# Patient Record
Sex: FEMALE | Race: WHITE | NOT HISPANIC OR LATINO
[De-identification: names, ages, dates, MRNs, and addresses within clinical notes are randomized per-mention and may not be internally consistent; named-entity substitution may affect disease eponyms.]

---

## 2019-02-16 ENCOUNTER — TRANSCRIPTION ENCOUNTER (OUTPATIENT)
Age: 49
End: 2019-02-16

## 2019-03-07 ENCOUNTER — TRANSCRIPTION ENCOUNTER (OUTPATIENT)
Age: 49
End: 2019-03-07

## 2019-04-01 ENCOUNTER — OUTPATIENT (OUTPATIENT)
Dept: OUTPATIENT SERVICES | Facility: HOSPITAL | Age: 49
LOS: 1 days | End: 2019-04-01
Payer: MEDICAID

## 2019-04-01 PROCEDURE — G9001: CPT

## 2019-04-18 DIAGNOSIS — Z71.89 OTHER SPECIFIED COUNSELING: ICD-10-CM

## 2019-06-05 ENCOUNTER — TRANSCRIPTION ENCOUNTER (OUTPATIENT)
Age: 49
End: 2019-06-05

## 2019-10-23 ENCOUNTER — FORM ENCOUNTER (OUTPATIENT)
Age: 49
End: 2019-10-23

## 2019-12-06 ENCOUNTER — TRANSCRIPTION ENCOUNTER (OUTPATIENT)
Age: 49
End: 2019-12-06

## 2019-12-12 ENCOUNTER — TRANSCRIPTION ENCOUNTER (OUTPATIENT)
Age: 49
End: 2019-12-12

## 2020-01-21 ENCOUNTER — TRANSCRIPTION ENCOUNTER (OUTPATIENT)
Age: 50
End: 2020-01-21

## 2020-05-05 ENCOUNTER — APPOINTMENT (OUTPATIENT)
Dept: GASTROENTEROLOGY | Facility: CLINIC | Age: 50
End: 2020-05-05

## 2020-05-06 ENCOUNTER — TRANSCRIPTION ENCOUNTER (OUTPATIENT)
Age: 50
End: 2020-05-06

## 2020-05-08 ENCOUNTER — APPOINTMENT (OUTPATIENT)
Dept: GASTROENTEROLOGY | Facility: CLINIC | Age: 50
End: 2020-05-08
Payer: MEDICAID

## 2020-05-08 DIAGNOSIS — Z78.9 OTHER SPECIFIED HEALTH STATUS: ICD-10-CM

## 2020-05-08 PROCEDURE — 99443: CPT

## 2020-05-08 NOTE — PHYSICAL EXAM
[General Appearance - Alert] : alert [Oriented To Time, Place, And Person] : oriented to person, place, and time [Impaired Insight] : insight and judgment were intact

## 2020-05-08 NOTE — HISTORY OF PRESENT ILLNESS
[de-identified] :  This is a new telemed  visit. Visit took 40 minutes. Verbal consent obtained\par   Patient  States in December she was taking doxycycline for sinusitis. She then began having esophageal spasms. She felt her chest tightness and chest pressure she felt some dysphagia with both cold and warm liquids. Symptoms were moderate to severe. Symptoms are occurring every day for about 2-3 weeks. Worse with eating. She went to an urgent care and was told her symptoms may be due to doxycycline so she stopped it. Patient was concerned as her mother has had history of severe GERD. Her mother has required esophageal dilations and possibly a Nissen fundoplication.\par     Patient states she gets severe heartburn which radiates to the back. Symptoms last hours. She gets associated belching. Worse with caffeine. Her crit 6 days a week. She is also having some joint aches and pains and fatigue. She thinks she may have fibromyalgia. She does not have a primary doctor at this time. Patient tried Prilosec 20 mg q. day for 3 weeks over-the-counter. She has no improvement of her symptoms.\par

## 2020-05-08 NOTE — ASSESSMENT
[FreeTextEntry1] : A/P\par The patient had a telemed visit Which was 40 min.\par gerd\par Today's instructions for acid reflux include avoid provocative foods. For example citrus alcohol coffee chocolate mints. Smaller meals, no eating 3 hours prior to bedtime and elevate head of the bed prior to sleep.\par prilosec 40 mg qd\par - will have in offive visit in 2-3 weeks. . Will consider EGD when covid pandemic improves

## 2020-05-19 ENCOUNTER — LABORATORY RESULT (OUTPATIENT)
Age: 50
End: 2020-05-19

## 2020-05-20 LAB
ALBUMIN SERPL ELPH-MCNC: 4.3 G/DL
ALP BLD-CCNC: 50 U/L
ALT SERPL-CCNC: 14 U/L
ANION GAP SERPL CALC-SCNC: 10 MMOL/L
AST SERPL-CCNC: 19 U/L
BILIRUB SERPL-MCNC: 0.4 MG/DL
BUN SERPL-MCNC: 14 MG/DL
CALCIUM SERPL-MCNC: 9.2 MG/DL
CHLORIDE SERPL-SCNC: 107 MMOL/L
CO2 SERPL-SCNC: 25 MMOL/L
CREAT SERPL-MCNC: 0.82 MG/DL
GLUCOSE SERPL-MCNC: 95 MG/DL
POTASSIUM SERPL-SCNC: 4.3 MMOL/L
PROT SERPL-MCNC: 6.3 G/DL
SODIUM SERPL-SCNC: 142 MMOL/L

## 2020-05-26 ENCOUNTER — APPOINTMENT (OUTPATIENT)
Dept: GASTROENTEROLOGY | Facility: CLINIC | Age: 50
End: 2020-05-26
Payer: MEDICAID

## 2020-05-26 VITALS
OXYGEN SATURATION: 98 % | RESPIRATION RATE: 16 BRPM | WEIGHT: 126 LBS | HEIGHT: 61 IN | BODY MASS INDEX: 23.79 KG/M2 | SYSTOLIC BLOOD PRESSURE: 121 MMHG | DIASTOLIC BLOOD PRESSURE: 74 MMHG | HEART RATE: 85 BPM | TEMPERATURE: 98.5 F

## 2020-05-26 DIAGNOSIS — R13.12 DYSPHAGIA, OROPHARYNGEAL PHASE: ICD-10-CM

## 2020-05-26 LAB
CELIAC DISEASE INTERPRETATION: NORMAL
CELIAC GENE PAIRS PRESENT: YES
DQ ALPHA 1: NORMAL
DQ BETA 1: NORMAL
IMMUNOGLOBULIN A (IGA): 177 MG/DL

## 2020-05-26 PROCEDURE — 99214 OFFICE O/P EST MOD 30 MIN: CPT

## 2020-05-26 NOTE — PHYSICAL EXAM
[General Appearance - Alert] : alert [General Appearance - In No Acute Distress] : in no acute distress [General Appearance - Well Nourished] : well nourished [Outer Ear] : the ears and nose were normal in appearance [Sclera] : the sclera and conjunctiva were normal [Hearing Threshold Finger Rub Not [place]amo] : hearing was normal [Both Tympanic Membranes Were Examined] : both tympanic membranes were normal [Respiration, Rhythm And Depth] : normal respiratory rhythm and effort [Neck Appearance] : the appearance of the neck was normal [] : no respiratory distress [Exaggerated Use Of Accessory Muscles For Inspiration] : no accessory muscle use [Auscultation Breath Sounds / Voice Sounds] : lungs were clear to auscultation bilaterally [Heart Rate And Rhythm] : heart rate was normal and rhythm regular [Apical Impulse] : the apical impulse was normal [Heart Sounds] : normal S1 and S2 [Abdomen Soft] : soft [Bowel Sounds] : normal bowel sounds [Skin Color & Pigmentation] : normal skin color and pigmentation [Abdomen Tenderness] : non-tender [Oriented To Time, Place, And Person] : oriented to person, place, and time [Impaired Insight] : insight and judgment were intact [Affect] : the affect was normal

## 2020-05-26 NOTE — ASSESSMENT
[FreeTextEntry1] : A/P\par gerd\par Today's instructions for acid reflux include avoid provocative foods. For example citrus alcohol coffee chocolate mints. Smaller meals, no eating 3 hours prior to bedtime and elevate head of the bed prior to sleep.\par prilosec 40 mg bid\par add carafate 1 g qid prn\par - will get esophagram to r/o stricture. Pt will call for results, then will schedule EGD\par  I discussed the risks and benefits of EGD and patient was given opportunity to ask questions. EGD to r/o Canseco's  esophagus, PUD, mass, AVM'S. Pt is medically optimized for EGD\par

## 2020-05-26 NOTE — HISTORY OF PRESENT ILLNESS
[de-identified] : The patient is here in followup. She states in December she took doxycycline and she began experiencing esophageal spasm. This occurred with cold and hot food. Has moderate to severe. It occurs every day for 5 weeks. Those symptoms resolved. However she now has severe heartburn. She is chest pressure that radiates to the back. It can last hours. Has belching. Initially she thought it was worse with caffeine. She try Prilosec 20 mg q. day with no improvement. She increase to 40 mg a day with no improvement. She is feeling globus sensation stating that she has an uncomfortable feeling in her throat when she swallows. Her CBC CMP TSH and celiac were negative.

## 2020-06-03 ENCOUNTER — APPOINTMENT (OUTPATIENT)
Dept: GASTROENTEROLOGY | Facility: CLINIC | Age: 50
End: 2020-06-03
Payer: MEDICAID

## 2020-06-03 ENCOUNTER — NON-APPOINTMENT (OUTPATIENT)
Age: 50
End: 2020-06-03

## 2020-06-03 PROCEDURE — 99443: CPT

## 2020-06-04 ENCOUNTER — RX CHANGE (OUTPATIENT)
Age: 50
End: 2020-06-04

## 2020-06-08 ENCOUNTER — RX CHANGE (OUTPATIENT)
Age: 50
End: 2020-06-08

## 2020-06-14 DIAGNOSIS — Z01.818 ENCOUNTER FOR OTHER PREPROCEDURAL EXAMINATION: ICD-10-CM

## 2020-06-15 ENCOUNTER — APPOINTMENT (OUTPATIENT)
Dept: DISASTER EMERGENCY | Facility: CLINIC | Age: 50
End: 2020-06-15

## 2020-06-16 ENCOUNTER — APPOINTMENT (OUTPATIENT)
Dept: DISASTER EMERGENCY | Facility: CLINIC | Age: 50
End: 2020-06-16

## 2020-06-17 LAB — SARS-COV-2 N GENE NPH QL NAA+PROBE: NOT DETECTED

## 2020-06-18 ENCOUNTER — OUTPATIENT (OUTPATIENT)
Dept: OUTPATIENT SERVICES | Facility: HOSPITAL | Age: 50
LOS: 1 days | End: 2020-06-18
Payer: COMMERCIAL

## 2020-06-18 ENCOUNTER — RESULT REVIEW (OUTPATIENT)
Age: 50
End: 2020-06-18

## 2020-06-18 ENCOUNTER — APPOINTMENT (OUTPATIENT)
Dept: GASTROENTEROLOGY | Facility: GI CENTER | Age: 50
End: 2020-06-18
Payer: MEDICAID

## 2020-06-18 DIAGNOSIS — K21.9 GASTRO-ESOPHAGEAL REFLUX DISEASE WITHOUT ESOPHAGITIS: ICD-10-CM

## 2020-06-18 PROCEDURE — 43239 EGD BIOPSY SINGLE/MULTIPLE: CPT

## 2020-06-18 PROCEDURE — 88305 TISSUE EXAM BY PATHOLOGIST: CPT | Mod: 26

## 2020-06-18 PROCEDURE — 88342 IMHCHEM/IMCYTCHM 1ST ANTB: CPT | Mod: 26

## 2020-06-18 PROCEDURE — 88342 IMHCHEM/IMCYTCHM 1ST ANTB: CPT

## 2020-06-18 PROCEDURE — 88305 TISSUE EXAM BY PATHOLOGIST: CPT

## 2020-06-18 NOTE — PHYSICAL EXAM
[General Appearance - Alert] : alert [General Appearance - Well Developed] : well developed [General Appearance - In No Acute Distress] : in no acute distress [General Appearance - Well Nourished] : well nourished [Outer Ear] : the ears and nose were normal in appearance [Sclera] : the sclera and conjunctiva were normal [] : no respiratory distress [Auscultation Breath Sounds / Voice Sounds] : lungs were clear to auscultation bilaterally [Respiration, Rhythm And Depth] : normal respiratory rhythm and effort [Exaggerated Use Of Accessory Muscles For Inspiration] : no accessory muscle use [Heart Rate And Rhythm] : heart rate was normal and rhythm regular [Apical Impulse] : the apical impulse was normal [Heart Sounds] : normal S1 and S2 [Abdomen Soft] : soft [Bowel Sounds] : normal bowel sounds [Oriented To Time, Place, And Person] : oriented to person, place, and time [Abdomen Tenderness] : non-tender [Impaired Insight] : insight and judgment were intact [Affect] : the affect was normal

## 2020-06-18 NOTE — PHYSICAL EXAM
[General Appearance - Alert] : alert [General Appearance - In No Acute Distress] : in no acute distress [General Appearance - Well Nourished] : well nourished [General Appearance - Well Developed] : well developed [Sclera] : the sclera and conjunctiva were normal [Outer Ear] : the ears and nose were normal in appearance [Respiration, Rhythm And Depth] : normal respiratory rhythm and effort [Exaggerated Use Of Accessory Muscles For Inspiration] : no accessory muscle use [] : no respiratory distress [Auscultation Breath Sounds / Voice Sounds] : lungs were clear to auscultation bilaterally [Heart Rate And Rhythm] : heart rate was normal and rhythm regular [Apical Impulse] : the apical impulse was normal [Bowel Sounds] : normal bowel sounds [Heart Sounds] : normal S1 and S2 [Abdomen Soft] : soft [Oriented To Time, Place, And Person] : oriented to person, place, and time [Impaired Insight] : insight and judgment were intact [Abdomen Tenderness] : non-tender [Affect] : the affect was normal

## 2020-06-18 NOTE — PROCEDURE
[With Biopsy] : with biopsy [GERD] : GERD [Procedure Explained] : The procedure was explained [Allergies Reviewed] : allergies reviewed. [Risks] : Risks [Benefits] : benefits [Alternatives] : alternatives [Consent Obtained] : written consent was obtained prior to the procedure and is detailed in the patient's record [Patient] : the patient [Automated Blood Pressure Cuff] : automated blood pressure cuff [Cardiac Monitor] : cardiac monitor [Pulse Oximeter] : pulse oximeter [2] : 2 [Sedation Clearance] : the patient was cleared for moderate sedation [Performed By: ___] : Performed by:  YANELI [Normal] : Normal [Sent to Pathology] : was sent to pathology for analysis [Tolerated Well] : the patient tolerated the procedure well [de-identified] :  Random biopsies taken from antrum and body to r/o Hp [Vital Signs Stable] : the vital signs were stable [de-identified] : Random biopsies to r/o celiac sprue

## 2020-06-18 NOTE — REASON FOR VISIT
[Follow-Up: _____] : a [unfilled] follow-up visit [FreeTextEntry1] : gerd,  [Endoscopy] : an endoscopy [FreeTextEntry2] : gerd

## 2020-06-18 NOTE — PROCEDURE
[With Biopsy] : with biopsy [GERD] : GERD [Procedure Explained] : The procedure was explained [Allergies Reviewed] : allergies reviewed. [Risks] : Risks [Benefits] : benefits [Alternatives] : alternatives [Consent Obtained] : written consent was obtained prior to the procedure and is detailed in the patient's record [Patient] : the patient [Automated Blood Pressure Cuff] : automated blood pressure cuff [Cardiac Monitor] : cardiac monitor [Pulse Oximeter] : pulse oximeter [2] : 2 [Sedation Clearance] : the patient was cleared for moderate sedation [Performed By: ___] : Performed by:  YANELI [Sent to Pathology] : was sent to pathology for analysis [Normal] : Normal [Tolerated Well] : the patient tolerated the procedure well [de-identified] :  Random biopsies taken from antrum and body to r/o Hp [Vital Signs Stable] : the vital signs were stable [de-identified] : Random biopsies to r/o celiac sprue

## 2020-06-18 NOTE — ASSESSMENT
[FreeTextEntry1] : A/P\par gerd- normal EGD\par \par Today's instructions for acid reflux include avoid provocative foods. For example citrus alcohol coffee chocolate mints. Smaller meals, no eating 3 hours prior to bedtime and elevate head of the bed prior to sleep.\par F/U in 3 months\par call in one week for HP  and celiac biopsy results\par continue current meds

## 2020-06-18 NOTE — REASON FOR VISIT
Grandma called in to get refills on the inhaler and also nasal spray. [Follow-Up: _____] : a [unfilled] follow-up visit [FreeTextEntry1] : gerd,  [Endoscopy] : an endoscopy [FreeTextEntry2] : gerd

## 2020-06-22 LAB — SURGICAL PATHOLOGY STUDY: SIGNIFICANT CHANGE UP

## 2020-06-29 ENCOUNTER — RX CHANGE (OUTPATIENT)
Age: 50
End: 2020-06-29

## 2020-06-29 ENCOUNTER — NON-APPOINTMENT (OUTPATIENT)
Age: 50
End: 2020-06-29

## 2020-08-19 ENCOUNTER — TRANSCRIPTION ENCOUNTER (OUTPATIENT)
Age: 50
End: 2020-08-19

## 2020-08-20 ENCOUNTER — EMERGENCY (EMERGENCY)
Facility: HOSPITAL | Age: 50
LOS: 1 days | Discharge: HOME | End: 2020-08-20
Attending: EMERGENCY MEDICINE
Payer: COMMERCIAL

## 2020-08-20 VITALS
DIASTOLIC BLOOD PRESSURE: 73 MMHG | OXYGEN SATURATION: 98 % | HEART RATE: 92 BPM | WEIGHT: 117.95 LBS | TEMPERATURE: 100 F | SYSTOLIC BLOOD PRESSURE: 114 MMHG | RESPIRATION RATE: 20 BRPM | HEIGHT: 61 IN

## 2020-08-20 LAB
ALBUMIN SERPL ELPH-MCNC: 3.8 G/DL — SIGNIFICANT CHANGE UP (ref 3.3–5.2)
ALP SERPL-CCNC: 63 U/L — SIGNIFICANT CHANGE UP (ref 40–120)
ALT FLD-CCNC: 28 U/L — SIGNIFICANT CHANGE UP
ANION GAP SERPL CALC-SCNC: 13 MMOL/L — SIGNIFICANT CHANGE UP (ref 5–17)
AST SERPL-CCNC: 40 U/L — HIGH
BILIRUB SERPL-MCNC: <0.2 MG/DL — LOW (ref 0.4–2)
BUN SERPL-MCNC: 6 MG/DL — LOW (ref 8–20)
CALCIUM SERPL-MCNC: 8.8 MG/DL — SIGNIFICANT CHANGE UP (ref 8.6–10.2)
CHLORIDE SERPL-SCNC: 104 MMOL/L — SIGNIFICANT CHANGE UP (ref 98–107)
CO2 SERPL-SCNC: 25 MMOL/L — SIGNIFICANT CHANGE UP (ref 22–29)
CREAT SERPL-MCNC: 0.68 MG/DL — SIGNIFICANT CHANGE UP (ref 0.5–1.3)
CULTURE RESULTS: SIGNIFICANT CHANGE UP
GLUCOSE SERPL-MCNC: 108 MG/DL — HIGH (ref 70–99)
HCT VFR BLD CALC: 39.1 % — SIGNIFICANT CHANGE UP (ref 34.5–45)
HGB BLD-MCNC: 12.9 G/DL — SIGNIFICANT CHANGE UP (ref 11.5–15.5)
MCHC RBC-ENTMCNC: 30.7 PG — SIGNIFICANT CHANGE UP (ref 27–34)
MCHC RBC-ENTMCNC: 33 GM/DL — SIGNIFICANT CHANGE UP (ref 32–36)
MCV RBC AUTO: 93.1 FL — SIGNIFICANT CHANGE UP (ref 80–100)
PLATELET # BLD AUTO: 224 K/UL — SIGNIFICANT CHANGE UP (ref 150–400)
POTASSIUM SERPL-MCNC: 4.2 MMOL/L — SIGNIFICANT CHANGE UP (ref 3.5–5.3)
POTASSIUM SERPL-SCNC: 4.2 MMOL/L — SIGNIFICANT CHANGE UP (ref 3.5–5.3)
PROT SERPL-MCNC: 6.1 G/DL — LOW (ref 6.6–8.7)
RBC # BLD: 4.2 M/UL — SIGNIFICANT CHANGE UP (ref 3.8–5.2)
RBC # FLD: 13.2 % — SIGNIFICANT CHANGE UP (ref 10.3–14.5)
SODIUM SERPL-SCNC: 141 MMOL/L — SIGNIFICANT CHANGE UP (ref 135–145)
SPECIMEN SOURCE: SIGNIFICANT CHANGE UP
WBC # BLD: 6.13 K/UL — SIGNIFICANT CHANGE UP (ref 3.8–10.5)
WBC # FLD AUTO: 6.13 K/UL — SIGNIFICANT CHANGE UP (ref 3.8–10.5)

## 2020-08-20 PROCEDURE — 99284 EMERGENCY DEPT VISIT MOD MDM: CPT

## 2020-08-20 PROCEDURE — 99283 EMERGENCY DEPT VISIT LOW MDM: CPT

## 2020-08-20 PROCEDURE — 36415 COLL VENOUS BLD VENIPUNCTURE: CPT

## 2020-08-20 PROCEDURE — 71045 X-RAY EXAM CHEST 1 VIEW: CPT | Mod: 26

## 2020-08-20 PROCEDURE — 87507 IADNA-DNA/RNA PROBE TQ 12-25: CPT

## 2020-08-20 PROCEDURE — 80053 COMPREHEN METABOLIC PANEL: CPT

## 2020-08-20 PROCEDURE — 71045 X-RAY EXAM CHEST 1 VIEW: CPT

## 2020-08-20 PROCEDURE — 85027 COMPLETE CBC AUTOMATED: CPT

## 2020-08-20 RX ORDER — ACETAMINOPHEN 500 MG
975 TABLET ORAL ONCE
Refills: 0 | Status: COMPLETED | OUTPATIENT
Start: 2020-08-20 | End: 2020-08-20

## 2020-08-20 RX ORDER — SODIUM CHLORIDE 9 MG/ML
2000 INJECTION INTRAMUSCULAR; INTRAVENOUS; SUBCUTANEOUS ONCE
Refills: 0 | Status: COMPLETED | OUTPATIENT
Start: 2020-08-20 | End: 2020-08-20

## 2020-08-20 RX ADMIN — Medication 975 MILLIGRAM(S): at 11:41

## 2020-08-20 RX ADMIN — SODIUM CHLORIDE 2000 MILLILITER(S): 9 INJECTION INTRAMUSCULAR; INTRAVENOUS; SUBCUTANEOUS at 11:41

## 2020-08-20 NOTE — ED PROVIDER NOTE - PATIENT PORTAL LINK FT
You can access the FollowMyHealth Patient Portal offered by [facility] by registering at the following website: [email or web address]. By joining [facility]’s FollowMyHealth portal, you will also be able to view your health information using other applications (apps) compatible with our system.

## 2020-08-20 NOTE — ED PROVIDER NOTE - NSFOLLOWUPINSTRUCTIONS_ED_ALL_ED_FT
- Follow up with your doctor within 2-3 days.   - Bring results with you to the appointment.   - Take Tylenol (Acetaminophen) 650mg or Motrin (Ibuprofen/Advil) 600mg every 6 hours as needed for pain.  - Stay well hydrated.   - Follow the BRAT (Bananas, Rice, Applesauce, Toast) diet.   - Return to the ED for any new or worsening symptoms.     Diarrhea    Diarrhea is frequent loose or watery bowel movements that has many causes. Diarrhea can make you feel weak and cause you to become dehydrated. Diarrhea typically lasts 2–3 days, but can last longer if it is a sign of something more serious. Drink clear fluids to prevent dehydration. Eat bland, easy-to-digest foods as tolerated.     SEEK IMMEDIATE MEDICAL CARE IF YOU HAVE ANY OF THE FOLLOWING SYMPTOMS: high fevers, lightheadedness/dizziness, chest pain, black or bloody stools, shortness of breath, severe abdominal or back pain, or any signs of dehydration.

## 2020-08-20 NOTE — ED ADULT TRIAGE NOTE - BP NONINVASIVE SYSTOLIC (MM HG)
RNCC left voicemail for AYALA Jaramillo at [facility] requesting callback on patient.     SANJU Bernal, RN  Telephonic RN Care Coordinator  Phone: [phone]  
RNCC received message from AYALA Jaramillo at [facility] stating that she received RNCC's message and is working on short term SNF placement for patient.     SANJU Bernal, RN  Telephonic RN Care Coordinator  Phone: [phone]  
[phone]

## 2020-08-20 NOTE — ED ADULT NURSE NOTE - CHIEF COMPLAINT QUOTE
Pt arrives to ED c/o SOB / fever/ body aches/ diarrhea  since Tuesday . Pt recently returned from [place]. Went to Urgent Care yesterday don't have COVID results. Breathing easy and unlabored

## 2020-08-20 NOTE — ED ADULT NURSE NOTE - OBJECTIVE STATEMENT
patient with diarrhea since return from [place] where she admits to eating fruits and vegetables washed with local water.  patient is alert and oriented x4, denies vomiting, denies abdominal pain.  abdomen is soft and non tender, c/o dry mouth and cough, states she hasn't drank water. skin is warm and dry. ambulating without difficulty.

## 2020-08-20 NOTE — ED PROVIDER NOTE - CLINICAL SUMMARY MEDICAL DECISION MAKING FREE TEXT BOX
Patient with viral illness in setting of returning from [place]. Plan for labs, IVF, tylenol, cxr. Would send stool sample if patient can provide. Reassess.

## 2020-08-20 NOTE — ED ADULT TRIAGE NOTE - CHIEF COMPLAINT QUOTE
Pt arrives to ED c/o SOB / fever/ body aches/ duarrhea  since Tuesday . Pt recently returned from [place]. Wend to Urgent Care yesterday don't have COVID results. Breathing easy and unlabored Pt arrives to ED c/o SOB / fever/ body aches/ diarrhea  since Tuesday . Pt recently returned from [place]. Wend to Urgent Care yesterday don't have COVID results. Breathing easy and unlabored Pt arrives to ED c/o SOB / fever/ body aches/ diarrhea  since Tuesday . Pt recently returned from [place]. Went to Urgent Care yesterday don't have COVID results. Breathing easy and unlabored

## 2020-08-20 NOTE — ED PROVIDER NOTE - OBJECTIVE STATEMENT
49F h/o GERD, chronic sinusitis p/w myalgias, fever, chills, non-bloody diarrhea, dry cough x 2 days since returning from [place]. Went to [facility] yesterday, covid swab sent out. Pt feeling dehydrated. Denies vomiting, sick contacts, rash, dysuria, hematuria.

## 2020-08-21 RX ORDER — AZITHROMYCIN 500 MG/1
1 TABLET, FILM COATED ORAL
Qty: 1 | Refills: 0
Start: 2020-08-21 | End: 2020-08-25

## 2020-08-21 NOTE — ED POST DISCHARGE NOTE - RESULT SUMMARY
+ salmonella - discussed results with patient + salmonella - discussed results with patient - pt tolerating PO - return precautions advised, sent script for antibiotics

## 2020-09-11 ENCOUNTER — APPOINTMENT (OUTPATIENT)
Dept: FAMILY MEDICINE | Facility: CLINIC | Age: 50
End: 2020-09-11
Payer: MEDICAID

## 2020-09-11 VITALS
HEIGHT: 61 IN | WEIGHT: 119 LBS | DIASTOLIC BLOOD PRESSURE: 66 MMHG | HEART RATE: 76 BPM | SYSTOLIC BLOOD PRESSURE: 110 MMHG | OXYGEN SATURATION: 99 % | TEMPERATURE: 97.8 F | BODY MASS INDEX: 22.47 KG/M2 | RESPIRATION RATE: 14 BRPM

## 2020-09-11 DIAGNOSIS — Z86.19 PERSONAL HISTORY OF OTHER INFECTIOUS AND PARASITIC DISEASES: ICD-10-CM

## 2020-09-11 PROCEDURE — 90471 IMMUNIZATION ADMIN: CPT

## 2020-09-11 PROCEDURE — 90686 IIV4 VACC NO PRSV 0.5 ML IM: CPT

## 2020-09-11 PROCEDURE — 99386 PREV VISIT NEW AGE 40-64: CPT | Mod: 25

## 2020-09-11 RX ORDER — NORETHINDRONE ACETATE AND ETHINYL ESTRADIOL 1; 20 MG/1; UG/1
1-20 TABLET ORAL
Qty: 21 | Refills: 0 | Status: DISCONTINUED | COMMUNITY
Start: 2019-12-03 | End: 2020-09-11

## 2020-09-11 RX ORDER — TAMSULOSIN HCL 0.4 MG
CAPSULE ORAL
Refills: 0 | Status: DISCONTINUED | COMMUNITY
End: 2020-09-11

## 2020-09-11 RX ORDER — OMEPRAZOLE 40 MG/1
40 CAPSULE, DELAYED RELEASE ORAL TWICE DAILY
Qty: 180 | Refills: 1 | Status: DISCONTINUED | COMMUNITY
Start: 2020-06-03 | End: 2020-09-11

## 2020-09-11 RX ORDER — TERCONAZOLE 4 MG/G
0.4 CREAM VAGINAL
Qty: 45 | Refills: 0 | Status: DISCONTINUED | COMMUNITY
Start: 2020-02-07 | End: 2020-09-11

## 2020-09-11 RX ORDER — OMEPRAZOLE 40 MG/1
40 CAPSULE, DELAYED RELEASE ORAL
Qty: 90 | Refills: 1 | Status: DISCONTINUED | COMMUNITY
Start: 2020-05-08 | End: 2020-09-11

## 2020-09-11 NOTE — PAST MEDICAL HISTORY
[Menstruating] : menstruating [Definite ___ (Date)] : the last menstrual period was [unfilled] [Total Preg ___] : G[unfilled] [Regular Cycle Intervals] : have been regular [Full Term ___] : Full Term: [unfilled] [Live Births ___] : P[unfilled]  [Living ___] : Living: [unfilled] [Premature ___] : Premature: [unfilled]

## 2020-09-14 LAB
25(OH)D3 SERPL-MCNC: 35.1 NG/ML
ALBUMIN SERPL ELPH-MCNC: 4.4 G/DL
ALP BLD-CCNC: 62 U/L
ALT SERPL-CCNC: 14 U/L
ANION GAP SERPL CALC-SCNC: 13 MMOL/L
APPEARANCE: CLEAR
AST SERPL-CCNC: 22 U/L
BILIRUB SERPL-MCNC: 0.3 MG/DL
BILIRUBIN URINE: NEGATIVE
BLOOD URINE: NEGATIVE
BUN SERPL-MCNC: 15 MG/DL
CALCIUM SERPL-MCNC: 9.4 MG/DL
CHLORIDE SERPL-SCNC: 102 MMOL/L
CHOLEST SERPL-MCNC: 160 MG/DL
CHOLEST/HDLC SERPL: 2.4 RATIO
CO2 SERPL-SCNC: 26 MMOL/L
COLOR: NORMAL
CREAT SERPL-MCNC: 0.86 MG/DL
ESTIMATED AVERAGE GLUCOSE: 108 MG/DL
GLUCOSE QUALITATIVE U: NEGATIVE
GLUCOSE SERPL-MCNC: 88 MG/DL
HBA1C MFR BLD HPLC: 5.4 %
HDLC SERPL-MCNC: 68 MG/DL
HIV1+2 AB SPEC QL IA.RAPID: NONREACTIVE
KETONES URINE: NEGATIVE
LDLC SERPL CALC-MCNC: 76 MG/DL
LEUKOCYTE ESTERASE URINE: NEGATIVE
NITRITE URINE: NEGATIVE
PH URINE: 6.5
POTASSIUM SERPL-SCNC: 4.3 MMOL/L
PROT SERPL-MCNC: 6.5 G/DL
PROTEIN URINE: NEGATIVE
SODIUM SERPL-SCNC: 141 MMOL/L
SPECIFIC GRAVITY URINE: 1.01
TRIGL SERPL-MCNC: 84 MG/DL
TSH SERPL-ACNC: 2.75 UIU/ML
UROBILINOGEN URINE: NORMAL

## 2020-09-16 NOTE — ASSESSMENT
[FreeTextEntry1] : 50 y/o F establishing new PCP:\par \par HCM:\par -Blood and UA today\par -HIV screening\par \par Gyn: with Dr Barraza\par -recent abnormal PAP, schedule for Colposcopy 9/24/20\par \par Pre Op Clearance:for Colposcopy 9/24/20\par -Pending pre surgical testing.\par -Pt with no absolute contraindication for surgical procedure. pending labs.\par \par Urinary hesitance:\par -UA w/ reflex\par -Start pyridium.\par \par Further recommendations based on lab results.

## 2020-09-16 NOTE — HEALTH RISK ASSESSMENT
[Very Good] : ~his/her~  mood as very good [Good] : ~his/her~ current health as good [0-5] : 0-5 [Yes] : Yes [Never (0 pts)] : Never (0 points) [3 or 4 (1 pt)] : 3 or 4  (1 point) [2 - 3 times a week (3 pts)] : 2 - 3  times a week (3 points) [No falls in past year] : Patient reported no falls in the past year [No] : In the past 12 months have you used drugs other than those required for medical reasons? No [0] : 1) Little interest or pleasure doing things: Not at all (0) [Retinopathy] : Retinopathy [Patient reported mammogram was normal] : Patient reported mammogram was normal [Patient reported PAP Smear was abnormal] : Patient reported PAP Smear was abnormal [Patient declined bone density test] : Patient declined bone density test [HIV Test offered] : HIV Test offered [Hepatitis C test offered] : Hepatitis C test offered [Patient declined colonoscopy] : Patient declined colonoscopy [None] : None [# of Members in Household ___] :  household currently consist of [unfilled] member(s) [With Family] : lives with family [With Significant Other] : lives with significant other [Unemployed] : unemployed [College] : College [Significant Other] : lives with significant other [Sexually Active] : sexually active [Fully functional (bathing, dressing, toileting, transferring, walking, feeding)] : Fully functional (bathing, dressing, toileting, transferring, walking, feeding) [Feels Safe at Home] : Feels safe at home [Fully functional (using the telephone, shopping, preparing meals, housekeeping, doing laundry, using] : Fully functional and needs no help or supervision to perform IADLs (using the telephone, shopping, preparing meals, housekeeping, doing laundry, using transportation, managing medications and managing finances) [Reports normal functional visual acuity (ie: able to read med bottle)] : Reports normal functional visual acuity [Smoke Detector] : smoke detector [Carbon Monoxide Detector] : carbon monoxide detector [Seat Belt] :  uses seat belt [Travel to Developing Areas] : travel to developing areas [Sunscreen] : uses sunscreen [Reviewed no changes] : Reviewed no changes [I will adhere to the patient's wishes as expressed in the advance directive except as noted below.] : I will adhere to the patient's wishes as expressed in the advance directive except as noted below [# Of Children ___] : has [unfilled] children [Designated Healthcare Proxy] : Designated healthcare proxy [Name: ___] : Health Care Proxy's Name: [unfilled]  [Relationship: ___] : Relationship: [unfilled] [FreeTextEntry1] : Urinary urgency, colposcopy with biopsy (09/24/20) [] : No [de-identified] : ER 08/19/20 for salmonella [de-identified] : GI, cardiology, GYN [YearQuit] : 1990 [Audit-CScore] : 3 [de-identified] : denies [de-identified] : biking, swimming, running [de-identified] : Rice, chicken, salad, eggs, smoothies [LowDoseCTScan] : denies [EyeExamDate] : 01/20 [Change in mental status noted] : No change in mental status noted [Language] : denies difficulty with language [Behavior] : denies difficulty with behavior [Learning/Retaining New Information] : denies difficulty learning/retaining new information [Handling Complex Tasks] : denies difficulty handling complex tasks [Reasoning] : denies difficulty with reasoning [Spatial Ability and Orientation] : denies difficulty with spatial ability and orientation [High Risk Behavior] : no high risk behavior [Reports changes in hearing] : Reports no changes in hearing [Guns at Home] : no guns at home [Reports changes in vision] : Reports no changes in vision [Reports changes in dental health] : Reports no changes in dental health [TB Exposure] : is not being exposed to tuberculosis [Safety elements used in home] : no safety elements used in home [Caregiver Concerns] : does not have caregiver concerns [MammogramDate] : 02/20 [PapSmearDate] : 02/20 [PapSmearComments] : Biopsy will be performed 09/24/20 [de-identified] : Some college [ColonoscopyDate] : denies [BoneDensityDate] : denies [de-identified] : [place] 08/14/20 [FreeTextEntry3] : 2 from partner [FreeTextEntry4] : denies [AdvancecareDate] : 09/11/20

## 2020-09-16 NOTE — HISTORY OF PRESENT ILLNESS
[FreeTextEntry1] : Pt is here to establish PCP. [de-identified] : 50 y/o F with hx significant for Abnormal PAP, schedule with Gyn for Colposcopy on 9/24/20 presents today to establish anew PCP and medical clearance for procedure.\par Pt reports Bloating, urinary hesitance x 4 days. Denies fever, dysuria, states has some discomfort/\par Pt relocated from [place] , and states never establish PCP since then.

## 2020-09-16 NOTE — REVIEW OF SYSTEMS
[Dysuria] : no dysuria [Incontinence] : no incontinence [Nocturia] : no nocturia [Frequency] : no frequency [Hematuria] : no hematuria [FreeTextEntry8] : hesitance

## 2020-09-17 LAB
BASOPHILS # BLD AUTO: 0.08 K/UL
BASOPHILS NFR BLD AUTO: 1.5 %
EOSINOPHIL # BLD AUTO: 0.37 K/UL
EOSINOPHIL NFR BLD AUTO: 6.9 %
HCT VFR BLD CALC: 43.1 %
HGB BLD-MCNC: 13 G/DL
IMM GRANULOCYTES NFR BLD AUTO: 0.2 %
LYMPHOCYTES # BLD AUTO: 1.5 K/UL
LYMPHOCYTES NFR BLD AUTO: 27.8 %
MAN DIFF?: NORMAL
MCHC RBC-ENTMCNC: 29.4 PG
MCHC RBC-ENTMCNC: 30.2 GM/DL
MCV RBC AUTO: 97.5 FL
MONOCYTES # BLD AUTO: 0.63 K/UL
MONOCYTES NFR BLD AUTO: 11.7 %
NEUTROPHILS # BLD AUTO: 2.8 K/UL
NEUTROPHILS NFR BLD AUTO: 51.9 %
PLATELET # BLD AUTO: 323 K/UL
RBC # BLD: 4.42 M/UL
RBC # FLD: 13.8 %
WBC # FLD AUTO: 5.39 K/UL

## 2020-10-12 ENCOUNTER — TRANSCRIPTION ENCOUNTER (OUTPATIENT)
Age: 50
End: 2020-10-12

## 2020-11-04 ENCOUNTER — TRANSCRIPTION ENCOUNTER (OUTPATIENT)
Age: 50
End: 2020-11-04

## 2020-11-11 PROBLEM — K21.9 GASTRO-ESOPHAGEAL REFLUX DISEASE WITHOUT ESOPHAGITIS: Chronic | Status: ACTIVE | Noted: 2020-08-20

## 2020-11-12 ENCOUNTER — TRANSCRIPTION ENCOUNTER (OUTPATIENT)
Age: 50
End: 2020-11-12

## 2020-11-16 ENCOUNTER — APPOINTMENT (OUTPATIENT)
Dept: GASTROENTEROLOGY | Facility: CLINIC | Age: 50
End: 2020-11-16

## 2020-11-19 ENCOUNTER — TRANSCRIPTION ENCOUNTER (OUTPATIENT)
Age: 50
End: 2020-11-19

## 2021-01-07 ENCOUNTER — FORM ENCOUNTER (OUTPATIENT)
Age: 51
End: 2021-01-07

## 2021-01-19 ENCOUNTER — APPOINTMENT (OUTPATIENT)
Dept: GYNECOLOGIC ONCOLOGY | Facility: CLINIC | Age: 51
End: 2021-01-19
Payer: MEDICAID

## 2021-01-19 DIAGNOSIS — Z80.0 FAMILY HISTORY OF MALIGNANT NEOPLASM OF DIGESTIVE ORGANS: ICD-10-CM

## 2021-01-19 DIAGNOSIS — Z87.19 PERSONAL HISTORY OF OTHER DISEASES OF THE DIGESTIVE SYSTEM: ICD-10-CM

## 2021-01-19 PROCEDURE — 99205 OFFICE O/P NEW HI 60 MIN: CPT | Mod: 25

## 2021-01-19 PROCEDURE — 93976 VASCULAR STUDY: CPT | Mod: 59

## 2021-01-19 PROCEDURE — 99072 ADDL SUPL MATRL&STAF TM PHE: CPT

## 2021-01-19 PROCEDURE — 76830 TRANSVAGINAL US NON-OB: CPT | Mod: 59

## 2021-01-19 PROCEDURE — 76857 US EXAM PELVIC LIMITED: CPT | Mod: 59

## 2021-01-20 PROBLEM — Z87.19 HISTORY OF GASTROESOPHAGEAL REFLUX (GERD): Status: RESOLVED | Noted: 2021-01-20 | Resolved: 2021-01-20

## 2021-01-20 PROBLEM — Z80.0 FAMILY HISTORY OF MALIGNANT NEOPLASM OF COLON: Status: ACTIVE | Noted: 2021-01-20

## 2021-01-20 RX ORDER — CLINDAMYCIN HYDROCHLORIDE 300 MG/1
300 CAPSULE ORAL
Qty: 30 | Refills: 0 | Status: DISCONTINUED | COMMUNITY
Start: 2020-10-12

## 2021-01-20 RX ORDER — METHYLPREDNISOLONE 4 MG/1
4 TABLET ORAL
Qty: 21 | Refills: 0 | Status: DISCONTINUED | COMMUNITY
Start: 2020-12-02

## 2021-01-20 RX ORDER — AZITHROMYCIN 250 MG/1
250 TABLET, FILM COATED ORAL
Qty: 6 | Refills: 0 | Status: DISCONTINUED | COMMUNITY
Start: 2020-08-21

## 2021-01-20 RX ORDER — CLINDAMYCIN HYDROCHLORIDE 150 MG/1
150 CAPSULE ORAL
Qty: 28 | Refills: 0 | Status: DISCONTINUED | COMMUNITY
Start: 2020-12-02

## 2021-01-20 RX ORDER — CHLORHEXIDINE GLUCONATE, 0.12% ORAL RINSE 1.2 MG/ML
0.12 SOLUTION DENTAL
Qty: 473 | Refills: 0 | Status: DISCONTINUED | COMMUNITY
Start: 2020-12-23

## 2021-01-20 RX ORDER — PHENAZOPYRIDINE 200 MG/1
200 TABLET, FILM COATED ORAL 3 TIMES DAILY
Qty: 6 | Refills: 0 | Status: DISCONTINUED | COMMUNITY
Start: 2020-09-11 | End: 2021-01-20

## 2021-01-20 NOTE — HISTORY OF PRESENT ILLNESS
[FreeTextEntry1] : 51 yo  via 3 C/S, LMP 20 referred by referred by friend for AUB and pelvic pain. She reports pain in C/S incision, pain with intercourse and cramping with menstruation. Menstruation lasts 4-5 days with dark brown discharge followed by a few days of bright blood. She reports normal flow. 2020 she had an abnormal pap smear followed by attempted colposcopy in office, which was unsuccessful due to her history of cryotherapy. She underwent colposcopy, D&C hysteroscopy polypectomy under anesthesia 2020, pathology benign. She reports she had a follow up in December and was planning to undergo hysterectomy with that physician but she was unhappy at her follow up visit. She reports since her D&C her symptoms have not resolved, her quality of life has been poorly effected due to her pain and she would like to discuss hysterectomy. Admits to occasional post coital spotting. She denies unintentional weight loss, change in bowel/bladder habits. \par \par Lpap: 2020 \par Lmammo: appointment this week\par Lcolonoscopy: scheduled for 3/2021

## 2021-01-20 NOTE — CHIEF COMPLAINT
[FreeTextEntry1] : [facility]\par [place] [facility]\par [address]\[place] [address]\par

## 2021-01-20 NOTE — PHYSICAL EXAM
[Abnormal] : Uterus: Abnormal [Normal] : Bimanual Exam: Normal [de-identified] : uterus adherant to anterior abdominal wall.  [de-identified] : Patient was seen and examined with chaperone Josi Vasquez PA-C.

## 2021-01-20 NOTE — ASSESSMENT
[FreeTextEntry1] : 51 yo female with chronic pelvic pain effecting quality of life. US today with uterus measuring 8.1 cm, adherent to anterior abdominal wall.

## 2021-01-20 NOTE — END OF VISIT
[FreeTextEntry3] : Written by Josi Vasquez PA-C, acting as a scribe for Dr. Jose Cruz.\par This note accurately reflects the work and decisions made by me.\par

## 2021-01-20 NOTE — DISCUSSION/SUMMARY
[FreeTextEntry1] : Disucssed with patient her pain is likely due to previous C/S causing uterus to adhere to abdominal wall, we discussed my recommendation for RA TLH BS cysto due improve the quality of her life. We discussed recmmendation for leaving ovaries in place due to her age and decreasing risk of all cause mortality associated with oophorectomy in a young healthy woman. I discussed at length with the patient the nature, purpose, risks, benefits, and alternatives of Robot assisted total laparoscopic hysterectomy with bilateral salpingectomy. The patient understands the risks to include (but not be limited to) bowel injury, bleeding (with the possible need for transfusion), bladder or ureteral injury, infections, deep venous thrombosis, and beth-operative death.  The patient also understands that her surgery may not be able to be performed robotically and that she may need a laparotomy.  She also understands the limitations of robotic surgery and the possibility of missing a surgical complication with need for subsequent re-exploration.  She agrees to proceed.  She asked numerous questions which were answered to her satisfaction.  She understands the need for a pre-operative bowel preparation and agrees to comply with our instructions.  She also understands the rationale for a cystoscopy at the completion of the procedure and the potential risks of cystoscopy.\par

## 2021-02-07 ENCOUNTER — RX RENEWAL (OUTPATIENT)
Age: 51
End: 2021-02-07

## 2021-02-10 ENCOUNTER — OUTPATIENT (OUTPATIENT)
Dept: OUTPATIENT SERVICES | Facility: HOSPITAL | Age: 51
LOS: 1 days | End: 2021-02-10
Payer: COMMERCIAL

## 2021-02-10 VITALS
RESPIRATION RATE: 18 BRPM | DIASTOLIC BLOOD PRESSURE: 72 MMHG | TEMPERATURE: 98 F | HEIGHT: 61 IN | HEART RATE: 74 BPM | SYSTOLIC BLOOD PRESSURE: 110 MMHG | WEIGHT: 121.25 LBS

## 2021-02-10 DIAGNOSIS — Z98.890 OTHER SPECIFIED POSTPROCEDURAL STATES: Chronic | ICD-10-CM

## 2021-02-10 DIAGNOSIS — R10.2 PELVIC AND PERINEAL PAIN: ICD-10-CM

## 2021-02-10 DIAGNOSIS — Z29.9 ENCOUNTER FOR PROPHYLACTIC MEASURES, UNSPECIFIED: ICD-10-CM

## 2021-02-10 DIAGNOSIS — Z98.891 HISTORY OF UTERINE SCAR FROM PREVIOUS SURGERY: Chronic | ICD-10-CM

## 2021-02-10 DIAGNOSIS — Z01.818 ENCOUNTER FOR OTHER PREPROCEDURAL EXAMINATION: ICD-10-CM

## 2021-02-10 LAB
A1C WITH ESTIMATED AVERAGE GLUCOSE RESULT: 5.5 % — SIGNIFICANT CHANGE UP (ref 4–5.6)
ANION GAP SERPL CALC-SCNC: 12 MMOL/L — SIGNIFICANT CHANGE UP (ref 5–17)
BASOPHILS # BLD AUTO: 0.07 K/UL — SIGNIFICANT CHANGE UP (ref 0–0.2)
BASOPHILS NFR BLD AUTO: 1.2 % — SIGNIFICANT CHANGE UP (ref 0–2)
BLD GP AB SCN SERPL QL: SIGNIFICANT CHANGE UP
BUN SERPL-MCNC: 18 MG/DL — SIGNIFICANT CHANGE UP (ref 8–20)
CALCIUM SERPL-MCNC: 9.2 MG/DL — SIGNIFICANT CHANGE UP (ref 8.6–10.2)
CHLORIDE SERPL-SCNC: 101 MMOL/L — SIGNIFICANT CHANGE UP (ref 98–107)
CO2 SERPL-SCNC: 25 MMOL/L — SIGNIFICANT CHANGE UP (ref 22–29)
CREAT SERPL-MCNC: 0.97 MG/DL — SIGNIFICANT CHANGE UP (ref 0.5–1.3)
EOSINOPHIL # BLD AUTO: 0.18 K/UL — SIGNIFICANT CHANGE UP (ref 0–0.5)
EOSINOPHIL NFR BLD AUTO: 3 % — SIGNIFICANT CHANGE UP (ref 0–6)
ESTIMATED AVERAGE GLUCOSE: 111 MG/DL — SIGNIFICANT CHANGE UP (ref 68–114)
GLUCOSE SERPL-MCNC: 94 MG/DL — SIGNIFICANT CHANGE UP (ref 70–99)
HCT VFR BLD CALC: 42.2 % — SIGNIFICANT CHANGE UP (ref 34.5–45)
HGB BLD-MCNC: 13.7 G/DL — SIGNIFICANT CHANGE UP (ref 11.5–15.5)
IMM GRANULOCYTES NFR BLD AUTO: 0.2 % — SIGNIFICANT CHANGE UP (ref 0–1.5)
LYMPHOCYTES # BLD AUTO: 1.48 K/UL — SIGNIFICANT CHANGE UP (ref 1–3.3)
LYMPHOCYTES # BLD AUTO: 24.7 % — SIGNIFICANT CHANGE UP (ref 13–44)
MCHC RBC-ENTMCNC: 29.3 PG — SIGNIFICANT CHANGE UP (ref 27–34)
MCHC RBC-ENTMCNC: 32.5 GM/DL — SIGNIFICANT CHANGE UP (ref 32–36)
MCV RBC AUTO: 90.4 FL — SIGNIFICANT CHANGE UP (ref 80–100)
MONOCYTES # BLD AUTO: 0.58 K/UL — SIGNIFICANT CHANGE UP (ref 0–0.9)
MONOCYTES NFR BLD AUTO: 9.7 % — SIGNIFICANT CHANGE UP (ref 2–14)
NEUTROPHILS # BLD AUTO: 3.67 K/UL — SIGNIFICANT CHANGE UP (ref 1.8–7.4)
NEUTROPHILS NFR BLD AUTO: 61.2 % — SIGNIFICANT CHANGE UP (ref 43–77)
PLATELET # BLD AUTO: 296 K/UL — SIGNIFICANT CHANGE UP (ref 150–400)
POTASSIUM SERPL-MCNC: 4.1 MMOL/L — SIGNIFICANT CHANGE UP (ref 3.5–5.3)
POTASSIUM SERPL-SCNC: 4.1 MMOL/L — SIGNIFICANT CHANGE UP (ref 3.5–5.3)
RBC # BLD: 4.67 M/UL — SIGNIFICANT CHANGE UP (ref 3.8–5.2)
RBC # FLD: 12.8 % — SIGNIFICANT CHANGE UP (ref 10.3–14.5)
SODIUM SERPL-SCNC: 138 MMOL/L — SIGNIFICANT CHANGE UP (ref 135–145)
WBC # BLD: 5.99 K/UL — SIGNIFICANT CHANGE UP (ref 3.8–10.5)
WBC # FLD AUTO: 5.99 K/UL — SIGNIFICANT CHANGE UP (ref 3.8–10.5)

## 2021-02-10 PROCEDURE — G0463: CPT

## 2021-02-10 RX ORDER — OMEPRAZOLE 10 MG/1
0 CAPSULE, DELAYED RELEASE ORAL
Qty: 0 | Refills: 0 | DISCHARGE

## 2021-02-10 NOTE — H&P PST ADULT - NSICDXPROBLEM_GEN_ALL_CORE_FT
PROBLEM DIAGNOSES  Problem: Need for prophylactic measure  Assessment and Plan: caprini score 4     Problem: Pain pelvic  Assessment and Plan: robotic assisted total laparoscopic hysterectomy bilateral salpingectomy   medical clearance

## 2021-02-10 NOTE — H&P PST ADULT - ASSESSMENT
Pleasant 50 yr old female with history of cervical dysplasia , lower abdominal discomfort and abn menses presents for hysterectomy with Dr. Cruz.  OPIOID RISK TOOL    NIECY EACH BOX THAT APPLIES AND ADD TOTALS AT THE END    FAMILY HISTORY OF SUBSTANCE ABUSE                 FEMALE         MALE                                                Alcohol                             [  ]1 pt          [  ]3pts                                               Illegal Durgs                     [  ]2 pts        [  ]3pts                                               Rx Drugs                           [  ]4 pts        [  ]4 pts    PERSONAL HISTORY OF SUBSTANCE ABUSE                                                                                          Alcohol                             [  ]3 pts       [  ]3 pts                                               Illegal Durgs                     [  ]4 pts        [  ]4 pts                                               Rx Drugs                           [  ]5 pts        [  ]5 pts    AGE BETWEEN 16-45 YEARS                                      [  ]1 pt         [  ]1 pt    HISTORY OF PREADOLESCENT   SEXUAL ABUSE                                                             [  ]3 pts        [  ]0pts    PSYCHOLOGICAL DISEASE                     ADD, OCD, Bipolar, Schizophrenia        [  ]2 pts         [  ]2 pts                      Depression                                               [  ]1 pt           [  ]1 pt           SCORING TOTAL   (add numbers and type here)              (*0**)                                     A score of 3 or lower indicated LOW risk for future opiod abuse  A score of 4 to 7 indicated moderate risk for future opiod abuse  A score of 8 or higher indicates a high risk for opiod abuse  CAPRINI VTE 2.0 SCORE [CLOT updated 2019]    AGE RELATED RISK FACTORS                                                       MOBILITY RELATED FACTORS  [ X] Age 41-60 years                                            (1 Point)                    [ ] Bed rest                                                        (1 Point)  [ ] Age: 61-74 years                                           (2 Points)                  [ ] Plaster cast                                                   (2 Points)  [ ] Age= 75 years                                              (3 Points)                    [ ] Bed bound for more than 72 hours                 (2 Points)    DISEASE RELATED RISK FACTORS                                               GENDER SPECIFIC FACTORS  [ ] Edema in the lower extremities                       (1 Point)              [ ] Pregnancy                                                     (1 Point)  [ ] Varicose veins                                               (1 Point)                     [ ] Post-partum < 6 weeks                                   (1 Point)             [ X] BMI > 25 Kg/m2                                            (1 Point)                     [ ] Hormonal therapy  or oral contraception          (1 Point)                 [ ] Sepsis (in the previous month)                        (1 Point)               [ ] History of pregnancy complications                 (1 point)  [ ] Pneumonia or serious lung disease                                               [ ] Unexplained or recurrent                     (1 Point)           (in the previous month)                               (1 Point)  [ ] Abnormal pulmonary function test                     (1 Point)                 SURGERY RELATED RISK FACTORS  [ ] Acute myocardial infarction                              (1 Point)               [ ]  Section                                             (1 Point)  [ ] Congestive heart failure (in the previous month)  (1 Point)      [ ] Minor surgery                                                  (1 Point)   [ ] Inflammatory bowel disease                             (1 Point)               [ ] Arthroscopic surgery                                        (2 Points)  [ ] Central venous access                                      (2 Points)                [X ] General surgery lasting more than 45 minutes (2 points)  [ ] Malignancy- Present or previous                   (2 Points)                [ ] Elective arthroplasty                                         (5 points)    [ ] Stroke (in the previous month)                          (5 Points)                                                                                                                                                           HEMATOLOGY RELATED FACTORS                                                 TRAUMA RELATED RISK FACTORS  [ ] Prior episodes of VTE                                     (3 Points)                [ ] Fracture of the hip, pelvis, or leg                       (5 Points)  [ ] Positive family history for VTE                         (3 Points)             [ ] Acute spinal cord injury (in the previous month)  (5 Points)  [ ] Prothrombin 07793 A                                     (3 Points)               [ ] Paralysis  (less than 1 month)                             (5 Points)  [ ] Factor V Leiden                                             (3 Points)                  [ ] Multiple Trauma within 1 month                        (5 Points)  [ ] Lupus anticoagulants                                     (3 Points)                                                           [ ] Anticardiolipin antibodies                               (3 Points)                                                       [ ] High homocysteine in the blood                      (3 Points)                                             [ ] Other congenital or acquired thrombophilia      (3 Points)                                                [ ] Heparin induced thrombocytopenia                  (3 Points)                                     Total Score [     4     ]

## 2021-02-10 NOTE — H&P PST ADULT - NSICDXPASTMEDICALHX_GEN_ALL_CORE_FT
PAST MEDICAL HISTORY:  Asthma     GERD (gastroesophageal reflux disease)      PAST MEDICAL HISTORY:  Asthma     Cervical dysplasia     GERD (gastroesophageal reflux disease)      PAST MEDICAL HISTORY:  Asthma mild    Cervical dysplasia     GERD (gastroesophageal reflux disease)     Kidney stone

## 2021-02-10 NOTE — H&P PST ADULT - NSANTHOSAYNRD_GEN_A_CORE
No. LEVI screening performed.  STOP BANG Legend: 0-2 = LOW Risk; 3-4 = INTERMEDIATE Risk; 5-8 = HIGH Risk

## 2021-02-10 NOTE — H&P PST ADULT - NSICDXFAMILYHX_GEN_ALL_CORE_FT
[facility]...
FAMILY HISTORY:  Family history of Hodgkins disease, father  nasopharyngeal cancer , non hodgkins  FH: colon cancer, grandmother

## 2021-02-10 NOTE — H&P PST ADULT - HISTORY OF PRESENT ILLNESS
50 yr old female presents with c/o abdominal pain lower with radiation to back at times , pt had 3 c -sections  1999, 2001, 2006 , pt states she has scar tissue that is causing pain . Occ urinary stress incontinence , also has discomfort with intercourse , pt had dysplasia in past with colposcopy , PAP  done 2020 abnormal with dysplasia , attempted colposcopy in office but to uncomfortable . Polyp  was removed .  50 yr old female presents with c/o abdominal pain lower with radiation to back at times , pt had 3 c -sections  1999, 2001, 2006 , pt states she has scar tissue that is causing pain . Occ urinary stress incontinence , also has discomfort with intercourse , pt had dysplasia in sept 2020 abnormal with dysplasia , attempted  colposcopy in office but to uncomfortable  had D&C . Polyp  was removed .  50 yr old female presents with c/o abdominal pain lower with radiation to back at times , pt had 3 c -sections  , ,  ,   lmp 2020 pt states she has scar tissue that is causing pain . Occ urinary stress incontinence , also has discomfort with intercourse , pt had dysplasia with pap smear in 2020 , attempted  colposcopy in office but to uncomfortable  had D&C and hysteroscopy . Polyp  was removed . Pt states scar tissue noted and is scheduled for hysterectomy .

## 2021-02-10 NOTE — H&P PST ADULT - NSICDXPASTSURGICALHX_GEN_ALL_CORE_FT
PAST SURGICAL HISTORY:  H/O  section x3 , ,      PAST SURGICAL HISTORY:  H/O  section x3 , ,     H/O dilation and curettage     H/O hand surgery staph infection  1992    History of sinus surgery 2005

## 2021-02-17 ENCOUNTER — APPOINTMENT (OUTPATIENT)
Dept: FAMILY MEDICINE | Facility: CLINIC | Age: 51
End: 2021-02-17
Payer: MEDICAID

## 2021-02-17 VITALS
SYSTOLIC BLOOD PRESSURE: 114 MMHG | TEMPERATURE: 98.1 F | DIASTOLIC BLOOD PRESSURE: 68 MMHG | WEIGHT: 123 LBS | OXYGEN SATURATION: 97 % | BODY MASS INDEX: 23.22 KG/M2 | HEIGHT: 61 IN | HEART RATE: 85 BPM | RESPIRATION RATE: 12 BRPM

## 2021-02-17 DIAGNOSIS — R87.619 UNSPECIFIED ABNORMAL CYTOLOGICAL FINDINGS IN SPECIMENS FROM CERVIX UTERI: ICD-10-CM

## 2021-02-17 PROCEDURE — 99213 OFFICE O/P EST LOW 20 MIN: CPT

## 2021-02-17 PROCEDURE — 99072 ADDL SUPL MATRL&STAF TM PHE: CPT

## 2021-02-17 NOTE — HISTORY OF PRESENT ILLNESS
[No Pertinent Cardiac History] : no history of aortic stenosis, atrial fibrillation, coronary artery disease, recent myocardial infarction, or implantable device/pacemaker [No Pertinent Pulmonary History] : no history of asthma, COPD, sleep apnea, or smoking [No Adverse Anesthesia Reaction] : no adverse anesthesia reaction in self or family member [(Patient denies any chest pain, claudication, dyspnea on exertion, orthopnea, palpitations or syncope)] : Patient denies any chest pain, claudication, dyspnea on exertion, orthopnea, palpitations or syncope [Chronic Anticoagulation] : no chronic anticoagulation [Chronic Kidney Disease] : no chronic kidney disease [Diabetes] : no diabetes [FreeTextEntry1] : Robotic assisted total Hysterectomy and salpingectomy [FreeTextEntry2] : 02/24/21 [FreeTextEntry3] : Jose Valle [FreeTextEntry4] : 49 y/o F with hx significant for Abnormal PAP, s/p Colposcopy on 9/24/20 , which was unsuccessful due to her history of cryotherapy. She underwent colposcopy, D&C hysteroscopy polypectomy under anesthesia 9/2020, pathology benign. \par She was recently seen by new Gyn, Dr Cruz and is schedule for Robotic laparoscopic B/L Hysterectomy and salpingectomy.\par She is here today for medical clearance.\par

## 2021-02-17 NOTE — RESULTS/DATA
[] : not indicated [de-identified] : presurgical testing done at [facility] on 2/10/21.\par Schedule for COVID PCR on 2/21/21

## 2021-02-17 NOTE — ASSESSMENT
[High Risk Surgery - Intraperitoneal, Intrathoracic or Supringuinal Vascular Procedures] : High Risk Surgery - Intraperitoneal, Intrathoracic or Supringuinal Vascular Procedures - No (0) [Ischemic Heart Disease] : Ischemic Heart Disease - No (0) [Congestive Heart Failure] : Congestive Heart Failure - No (0) [Prior Cerebrovascular Accident or TIA] : Prior Cerebrovascular Accident or TIA - No (0) [Creatinine >= 2mg/dL (1 Point)] : Creatinine >= 2mg/dL - No (0) [Insulin-dependent Diabetic (1 Point)] : Insulin-dependent Diabetic - No (0) [0] : 0 , RCRI Class: I, Risk of Post-Op Cardiac Complications: 3.9%, 95% CI for Risk Estimate: 2.8% - 5.4% [Patient Optimized for Surgery] : Patient optimized for surgery [No Further Testing Recommended] : no further testing recommended [Continue medications as is] : Continue current medications [As per surgery] : as per surgery [FreeTextEntry4] : Pt with no absolute contraindication for surgical procedure. Clear from clinical stand point.\par Pending COVID PCR schedule for 2/21/21

## 2021-02-21 ENCOUNTER — APPOINTMENT (OUTPATIENT)
Dept: DISASTER EMERGENCY | Facility: CLINIC | Age: 51
End: 2021-02-21

## 2021-02-21 LAB — SARS-COV-2 N GENE NPH QL NAA+PROBE: NOT DETECTED

## 2021-02-24 ENCOUNTER — OUTPATIENT (OUTPATIENT)
Dept: OUTPATIENT SERVICES | Facility: HOSPITAL | Age: 51
LOS: 1 days | End: 2021-02-24
Payer: COMMERCIAL

## 2021-02-24 ENCOUNTER — RESULT REVIEW (OUTPATIENT)
Age: 51
End: 2021-02-24

## 2021-02-24 VITALS
TEMPERATURE: 99 F | HEART RATE: 80 BPM | DIASTOLIC BLOOD PRESSURE: 60 MMHG | OXYGEN SATURATION: 98 % | RESPIRATION RATE: 16 BRPM | SYSTOLIC BLOOD PRESSURE: 98 MMHG

## 2021-02-24 VITALS
TEMPERATURE: 99 F | HEIGHT: 61 IN | HEART RATE: 65 BPM | OXYGEN SATURATION: 98 % | SYSTOLIC BLOOD PRESSURE: 113 MMHG | DIASTOLIC BLOOD PRESSURE: 666 MMHG | WEIGHT: 119.93 LBS | RESPIRATION RATE: 16 BRPM

## 2021-02-24 DIAGNOSIS — Z98.890 OTHER SPECIFIED POSTPROCEDURAL STATES: Chronic | ICD-10-CM

## 2021-02-24 DIAGNOSIS — R10.2 PELVIC AND PERINEAL PAIN: ICD-10-CM

## 2021-02-24 DIAGNOSIS — Z98.891 HISTORY OF UTERINE SCAR FROM PREVIOUS SURGERY: Chronic | ICD-10-CM

## 2021-02-24 LAB
ABO RH CONFIRMATION: SIGNIFICANT CHANGE UP
GLUCOSE BLDC GLUCOMTR-MCNC: 114 MG/DL — HIGH (ref 70–99)
GLUCOSE BLDC GLUCOMTR-MCNC: 89 MG/DL — SIGNIFICANT CHANGE UP (ref 70–99)
GLUCOSE BLDC GLUCOMTR-MCNC: 94 MG/DL — SIGNIFICANT CHANGE UP (ref 70–99)

## 2021-02-24 PROCEDURE — 94640 AIRWAY INHALATION TREATMENT: CPT

## 2021-02-24 PROCEDURE — 36415 COLL VENOUS BLD VENIPUNCTURE: CPT

## 2021-02-24 PROCEDURE — 57425 LAPAROSCOPY SURG COLPOPEXY: CPT | Mod: 80

## 2021-02-24 PROCEDURE — 58571 TLH W/T/O 250 G OR LESS: CPT

## 2021-02-24 PROCEDURE — 58571 TLH W/T/O 250 G OR LESS: CPT | Mod: 80

## 2021-02-24 PROCEDURE — S2900: CPT

## 2021-02-24 PROCEDURE — 88307 TISSUE EXAM BY PATHOLOGIST: CPT

## 2021-02-24 PROCEDURE — 57425 LAPAROSCOPY SURG COLPOPEXY: CPT

## 2021-02-24 PROCEDURE — 82962 GLUCOSE BLOOD TEST: CPT

## 2021-02-24 PROCEDURE — 88307 TISSUE EXAM BY PATHOLOGIST: CPT | Mod: 26

## 2021-02-24 RX ORDER — SODIUM CHLORIDE 9 MG/ML
3 INJECTION INTRAMUSCULAR; INTRAVENOUS; SUBCUTANEOUS ONCE
Refills: 0 | Status: DISCONTINUED | OUTPATIENT
Start: 2021-02-24 | End: 2021-02-24

## 2021-02-24 RX ORDER — ONDANSETRON 8 MG/1
4 TABLET, FILM COATED ORAL ONCE
Refills: 0 | Status: DISCONTINUED | OUTPATIENT
Start: 2021-02-24 | End: 2021-02-24

## 2021-02-24 RX ORDER — ACETAMINOPHEN 500 MG
1 TABLET ORAL
Qty: 0 | Refills: 0 | DISCHARGE

## 2021-02-24 RX ORDER — HYDROMORPHONE HYDROCHLORIDE 2 MG/ML
0.5 INJECTION INTRAMUSCULAR; INTRAVENOUS; SUBCUTANEOUS
Refills: 0 | Status: DISCONTINUED | OUTPATIENT
Start: 2021-02-24 | End: 2021-02-24

## 2021-02-24 RX ORDER — IBUPROFEN 200 MG
1 TABLET ORAL
Qty: 0 | Refills: 0 | DISCHARGE

## 2021-02-24 RX ORDER — IPRATROPIUM/ALBUTEROL SULFATE 18-103MCG
3 AEROSOL WITH ADAPTER (GRAM) INHALATION ONCE
Refills: 0 | Status: COMPLETED | OUTPATIENT
Start: 2021-02-24 | End: 2021-02-24

## 2021-02-24 RX ORDER — SODIUM CHLORIDE 9 MG/ML
1000 INJECTION, SOLUTION INTRAVENOUS
Refills: 0 | Status: DISCONTINUED | OUTPATIENT
Start: 2021-02-24 | End: 2021-02-24

## 2021-02-24 RX ORDER — DOCUSATE SODIUM 100 MG
1 CAPSULE ORAL
Qty: 12 | Refills: 0
Start: 2021-02-24 | End: 2021-02-27

## 2021-02-24 RX ORDER — VANCOMYCIN HCL 1 G
750 VIAL (EA) INTRAVENOUS ONCE
Refills: 0 | Status: COMPLETED | OUTPATIENT
Start: 2021-02-24 | End: 2021-02-24

## 2021-02-24 RX ADMIN — HYDROMORPHONE HYDROCHLORIDE 0.5 MILLIGRAM(S): 2 INJECTION INTRAMUSCULAR; INTRAVENOUS; SUBCUTANEOUS at 10:53

## 2021-02-24 RX ADMIN — Medication 250 MILLIGRAM(S): at 06:55

## 2021-02-24 RX ADMIN — Medication 3 MILLILITER(S): at 10:00

## 2021-02-24 NOTE — ASU DISCHARGE PLAN (ADULT/PEDIATRIC) - ASU DC SPECIAL INSTRUCTIONSFT
Follow-up with Dr. Cruz in two weeks to review pathology report.    Please contact your provider for any pain uncontrolled by medication, excessive bleeding or Fever>100.4  Please take aleve-1 tablet every 12 hours x 3 days, may take percocet as prescribed for breakthrough pain. Please take colace while on narcotic to avoid constipation.      May walk and climb stairs as often as youd like, no vigorous activity, do not lift anything greater than 10lbs, nothing per vagina x 6 weeks, do not drive while on pain medication.

## 2021-02-24 NOTE — PROGRESS NOTE ADULT - SUBJECTIVE AND OBJECTIVE BOX
GYNECOLOGIC ONCOLOGY PROGRESS NOTE    POD#0    PROBLEMS:      Pt seen and examined at bedside.     SUBJECTIVE:    Patient is c/o lower back pressure/pain, requesting to use restroom.  Pain well-controlled.  Flatus: negative  Denies Nausea, Vomiting or Diarrhea.  Denies shortness of breath, chest pain or dyspnea on exertion.  Tolerating fluids.    OBJECTIVE:     VITALS:  T(F): 98.8 (02-24-21 @ 12:30), Max: 98.8 (02-24-21 @ 06:24)  HR: 80 (02-24-21 @ 12:30) (65 - 84)  BP: 92/49 (02-24-21 @ 12:30) (92/49 - 132/29)  RR: 16 (02-24-21 @ 12:30) (14 - 18)  SpO2: 98% (02-24-21 @ 12:30) (93% - 98%)    I&O's Summary    24 Feb 2021 07:01  -  24 Feb 2021 13:04  --------------------------------------------------------  IN: 0 mL / OUT: 250 mL / NET: -250 mL  Sandhu removed in OR, voiding spontaneously.       MEDICATIONS  (STANDING):  lactated ringers. 1000 milliLiter(s) (75 mL/Hr) IV Continuous <Continuous>    MEDICATIONS  (PRN):  HYDROmorphone  Injectable 0.5 milliGRAM(s) IV Push every 10 minutes PRN Moderate Pain (4 - 6)  ondansetron Injectable 4 milliGRAM(s) IV Push once PRN Nausea and/or Vomiting      Physical Exam:  Constitutional: NAD  Pulmonary: clear to auscultation bilaterally   Cardiovascular: Regular rate and rhythm   Abdomen: soft, non-tender, non-distended, hypoactive bowel sounds  Extremities: no lower extremity edema or calve tenderness, Shaina's sign negative.  Incision: Clean, dry, intact with dermabond.  Without signs of infection or hernia.      LABS:                RADIOLOGY & ADDITIONAL TESTS:

## 2021-02-24 NOTE — BRIEF OPERATIVE NOTE - OPERATION/FINDINGS
~8cm uterus. normal appearing uterus, bilateral fallopian tubes and ovaries. mild omental adhesion to anterior abdominal wall. uncomplicated RA TLH BS. cystoscopy performed at the end of the procedure, bilateral ureteral jets visualized.

## 2021-02-24 NOTE — ASU DISCHARGE PLAN (ADULT/PEDIATRIC) - CARE PROVIDER_API CALL
Jose Cruz)  [facility]  [address] [place] [address]  Phone: [phone]  Fax: [phone]  Follow Up Time:

## 2021-02-24 NOTE — PROGRESS NOTE ADULT - ASSESSMENT
51 y/o female s/p RA [facility] BS cysto for pelvic pain. Patient feeling well post operatively, VSS, recovering well, pain well controlled, voiding, tolerating fluid and ambulating to bathroom. Discussed discharge instructions with patient. Advised to call with any questions or concerns. Scripts sent to home pharmacy.     D/w Dr [facility]

## 2021-02-24 NOTE — BRIEF OPERATIVE NOTE - NSICDXBRIEFPROCEDURE_GEN_ALL_CORE_FT
PROCEDURES:  Salpingectomy, bilateral, robot-assisted 24-Feb-2021 09:34:57  Handy [facility]  Robot-assisted laparoscopic total hysterectomy using da Eleazar Xi with cystoscopy 24-Feb-2021 09:34:45  Handy [facility]

## 2021-02-25 PROBLEM — N20.0 CALCULUS OF KIDNEY: Chronic | Status: ACTIVE | Noted: 2021-02-10

## 2021-02-25 PROBLEM — N87.9 DYSPLASIA OF CERVIX UTERI, UNSPECIFIED: Chronic | Status: ACTIVE | Noted: 2021-02-10

## 2021-02-25 PROBLEM — J45.909 UNSPECIFIED ASTHMA, UNCOMPLICATED: Chronic | Status: ACTIVE | Noted: 2020-08-20

## 2021-03-04 LAB — SURGICAL PATHOLOGY STUDY: SIGNIFICANT CHANGE UP

## 2021-03-05 ENCOUNTER — APPOINTMENT (OUTPATIENT)
Dept: GASTROENTEROLOGY | Facility: CLINIC | Age: 51
End: 2021-03-05
Payer: MEDICAID

## 2021-03-05 VITALS
BODY MASS INDEX: 22.66 KG/M2 | TEMPERATURE: 98.2 F | SYSTOLIC BLOOD PRESSURE: 107 MMHG | HEIGHT: 61 IN | WEIGHT: 120 LBS | HEART RATE: 74 BPM | DIASTOLIC BLOOD PRESSURE: 69 MMHG

## 2021-03-05 DIAGNOSIS — K62.5 HEMORRHAGE OF ANUS AND RECTUM: ICD-10-CM

## 2021-03-05 PROCEDURE — 99214 OFFICE O/P EST MOD 30 MIN: CPT

## 2021-03-05 PROCEDURE — 99072 ADDL SUPL MATRL&STAF TM PHE: CPT

## 2021-03-08 LAB
APPEARANCE: CLEAR
BACTERIA UR CULT: NORMAL
BACTERIA: NEGATIVE
BILIRUBIN URINE: NEGATIVE
BLOOD URINE: NEGATIVE
COLOR: NORMAL
GLUCOSE QUALITATIVE U: NEGATIVE
HYALINE CASTS: 3 /LPF
KETONES URINE: NEGATIVE
LEUKOCYTE ESTERASE URINE: NEGATIVE
MICROSCOPIC-UA: NORMAL
NITRITE URINE: NEGATIVE
PH URINE: 6
PROTEIN URINE: NEGATIVE
RED BLOOD CELLS URINE: 1 /HPF
SPECIFIC GRAVITY URINE: 1.01
SQUAMOUS EPITHELIAL CELLS: 8 /HPF
UROBILINOGEN URINE: NORMAL
WHITE BLOOD CELLS URINE: 1 /HPF

## 2021-03-09 ENCOUNTER — APPOINTMENT (OUTPATIENT)
Dept: GYNECOLOGIC ONCOLOGY | Facility: CLINIC | Age: 51
End: 2021-03-09
Payer: MEDICAID

## 2021-03-09 VITALS
DIASTOLIC BLOOD PRESSURE: 70 MMHG | HEIGHT: 61 IN | SYSTOLIC BLOOD PRESSURE: 105 MMHG | WEIGHT: 120 LBS | HEART RATE: 80 BPM | RESPIRATION RATE: 16 BRPM | BODY MASS INDEX: 22.66 KG/M2

## 2021-03-09 DIAGNOSIS — B96.89 ACUTE VAGINITIS: ICD-10-CM

## 2021-03-09 DIAGNOSIS — N76.0 ACUTE VAGINITIS: ICD-10-CM

## 2021-03-09 PROCEDURE — 99072 ADDL SUPL MATRL&STAF TM PHE: CPT

## 2021-03-09 PROCEDURE — 99212 OFFICE O/P EST SF 10 MIN: CPT | Mod: 24

## 2021-03-09 NOTE — ASSESSMENT
[FreeTextEntry1] : Pt is a 51 yo s/p TRH, BS on 2/24/21 for adenomyosis. She is recovering well. Bacterial vaginosis.

## 2021-03-09 NOTE — REASON FOR VISIT
[Post Op] : post op visit [de-identified] : 2/24/21 [de-identified] : TRH, BS [de-identified] : Pt presents for post-op check. She reports still having some lower abdominal discomfort, irritation of the bladder and urine culture was negative. She has no fevers/chills, no vaginal discharge.

## 2021-03-15 ENCOUNTER — TRANSCRIPTION ENCOUNTER (OUTPATIENT)
Age: 51
End: 2021-03-15

## 2021-03-18 NOTE — PHYSICAL EXAM
[General Appearance - Alert] : alert [General Appearance - In No Acute Distress] : in no acute distress [General Appearance - Well Nourished] : well nourished [General Appearance - Well Developed] : well developed [Sclera] : the sclera and conjunctiva were normal [Outer Ear] : the ears and nose were normal in appearance [] : no respiratory distress [Respiration, Rhythm And Depth] : normal respiratory rhythm and effort [Exaggerated Use Of Accessory Muscles For Inspiration] : no accessory muscle use [Auscultation Breath Sounds / Voice Sounds] : lungs were clear to auscultation bilaterally [Apical Impulse] : the apical impulse was normal [Heart Rate And Rhythm] : heart rate was normal and rhythm regular [Heart Sounds] : normal S1 and S2 [Bowel Sounds] : normal bowel sounds [Abdomen Soft] : soft [Abdomen Tenderness] : non-tender [Oriented To Time, Place, And Person] : oriented to person, place, and time [Impaired Insight] : insight and judgment were intact [Affect] : the affect was normal [FreeTextEntry1] :  patient with  5 small incisions from her laproscopic surgery last week

## 2021-03-18 NOTE — HISTORY OF PRESENT ILLNESS
[de-identified] : Patient is here for follow-up of GERD.  Patient was first seen in May.  Initially last spring patient was having episodes of dysphagia and esophageal spasm, chest pain after taking doxycycline.  Those symptoms resolved on doxycycline  when was stopped.\par    Patient then began developing heartburn with radiation to the back.  Symptoms would last hours.  She had associated belching.  Worse with coffee.  Had associated globus sensation.  Last visit patient's was increased to Prilosec 40 mg twice daily and Carafate as needed.  Symptoms  have since resolved and she is currently on Prilosec 40 mg daily.  She never needed to use the Carafate.  He had an EGD in June 2020 which was normal.  Biopsies were negative for H. pylori and celiac sprue.  Her CBC this month was normal\par     Patient tells me she had a hysterectomy last week.  She started experiencing constipation 2 weeks before her hysterectomy.  She also had rectal bleeding for every day for 1 week.  Rectal bleeding was worse with constipation.  She was having a bowel movement every few days.  Patient states her constipation was worse after her surgery as she was taking Percocet.  Now that she is decreasing the  Percocet, the constipation is improving.  She is using over-the-counter Colace as well.  She has no family history of colon cancer or colon polyps first-degree relative.  She has a grandparent with colon cancer

## 2021-03-18 NOTE — ASSESSMENT
[FreeTextEntry1] : A/P\par gerd\par Today's instructions for acid reflux include avoid provocative foods. For example citrus alcohol coffee chocolate mints. Smaller meals, no eating 3 hours prior to bedtime and elevate head of the bed prior to sleep.\par prilosec 40 mg qd\par \par \par rectal bleeding/constipation\par miralax qd\par  I discussed the risks and benefits of colonoscopy and patient was given opportunity to ask questions. Colonoscopy to r/o colon cancer, polyps, AVM's. Patient is medically optimized for the procedure\par suprep \par addendum- suprep, movi prep and gavalyte not covered. will give miralax with dulcolax\par

## 2021-03-24 ENCOUNTER — NON-APPOINTMENT (OUTPATIENT)
Age: 51
End: 2021-03-24

## 2021-03-25 ENCOUNTER — NON-APPOINTMENT (OUTPATIENT)
Age: 51
End: 2021-03-25

## 2021-03-26 ENCOUNTER — APPOINTMENT (OUTPATIENT)
Dept: GYNECOLOGIC ONCOLOGY | Facility: CLINIC | Age: 51
End: 2021-03-26
Payer: MEDICAID

## 2021-03-26 VITALS — SYSTOLIC BLOOD PRESSURE: 133 MMHG | OXYGEN SATURATION: 98 % | DIASTOLIC BLOOD PRESSURE: 87 MMHG | HEART RATE: 110 BPM

## 2021-03-26 DIAGNOSIS — T78.40XA ALLERGY, UNSPECIFIED, INITIAL ENCOUNTER: ICD-10-CM

## 2021-03-26 PROCEDURE — 99212 OFFICE O/P EST SF 10 MIN: CPT | Mod: 24

## 2021-03-26 PROCEDURE — 99072 ADDL SUPL MATRL&STAF TM PHE: CPT

## 2021-03-29 PROBLEM — T78.40XA ALLERGIC REACTION: Status: ACTIVE | Noted: 2021-03-25

## 2021-03-29 NOTE — ASSESSMENT
[FreeTextEntry1] : 51 y/o with physical exam findings consistent with allergic reaction of port site incisions, cause uncertain as she has tried multiple creams/washes in the past few weeks. I discussed with the patient that her left wrist rash is a contact dermatitis from waxing kit that she trialed at home. In regards to facial flushing and chest redness I believe this is secondary to anxiousness and physical examination manifestation. I discussed that when comparing the pictures from yesterday 3/25 to 3/26 when steroids were initiated, I do not see a increase in rash but it remains stable. I discussed with the patient that an allergic reaction can take 1-3 weeks to resolve and we treat symptomatically. I recommend Benadryl 50mg q4-6 hours for symptomatic control and to continue steroid melvin as prescribed. I also recommended continuation of antibiotics to cover for bacterial infection although clinically not present at this time. I discussed with the patient to refrain from using scented soaps and creams and to let water run over it in the shower without irritating or directly scrubbing the incision sites. I recommended to let physicians know in the future possible allergic reaction to dermabond vs. Monocryl suture. Patient expressed understanding and ultimately seems nervous/anxious about post operative state but understands this may be an active for the next few weeks which is normal as long as she doesn't appear to be acutely worsening. Advised to observe for symptomatic symptoms of an infection and to call us immediately if present.  Above plan discussed with Dr. Cruz and pictures sent to him as well during office visit.

## 2021-03-29 NOTE — HISTORY OF PRESENT ILLNESS
[FreeTextEntry1] : 49 y/o female s/p RA TLH BS on 2/24/21 with post operative port site incision infections vs. allergic reaction presenting today after being send in by her primary gynecologist for evaluation of wounds. Patient was treated by our office on 3/23/21 with course of Bactrim and Mupirocin ointment after sending a photograph which was suspicious for wound infection. At that time patient denied fevers. She then called back in 2 days and reported after applying Mupirocin ointment it was noted that her incisions appeared to be more irritated and inflamed. She was then advised to stop using the ointment and steroid melvin pack was prescribed for presumed allergic reaction either to Dermabond vs. sutures. Patient now presents today with reported subjectively worsening of incisions as well as facial flushing and chest redness. She also reports left hand rash which she admits was post trial of skin waxing kit. Patient denies sensation of throat closing, fevers, chest pains or difficulty breathing. She also admits to trying multiple skin washes post operatively including the preoperative chlorhexidine wash on incisions. Of note she also reports she took Moderna vaccination 3/21/21 for which she feels anxious from as well.

## 2021-03-29 NOTE — PHYSICAL EXAM
[Normal] : Masses/Tenderness: Non-tender, no masses [Abnormal] : Skin and subcutaneous tissue: Abnormal

## 2021-04-06 ENCOUNTER — APPOINTMENT (OUTPATIENT)
Dept: GYNECOLOGIC ONCOLOGY | Facility: CLINIC | Age: 51
End: 2021-04-06
Payer: MEDICAID

## 2021-04-06 VITALS
SYSTOLIC BLOOD PRESSURE: 115 MMHG | HEIGHT: 61 IN | OXYGEN SATURATION: 99 % | DIASTOLIC BLOOD PRESSURE: 77 MMHG | WEIGHT: 120 LBS | HEART RATE: 78 BPM | BODY MASS INDEX: 22.66 KG/M2

## 2021-04-06 DIAGNOSIS — N80.0 ENDOMETRIOSIS OF UTERUS: ICD-10-CM

## 2021-04-06 PROCEDURE — 99024 POSTOP FOLLOW-UP VISIT: CPT

## 2021-04-06 NOTE — REASON FOR VISIT
[Post Op] : post op visit [de-identified] : 2/24/21 [de-identified] : TRH, BS [de-identified] : Pt presents for  second post-op check. She reports still having some lower abdominal discomfort, irritation of the bladder and urine culture was negative. She has no fevers/chills, no vaginal discharge.

## 2021-04-06 NOTE — ASSESSMENT
[FreeTextEntry1] : Pt is a 49 yo s/p TRH, BS on 2/24/21 for adenomyosis. She is recovered well. She had an allergic reaction likely to dermabond requiring PO taper. Improving lesions around incisions.

## 2021-04-06 NOTE — DISCUSSION/SUMMARY
[Clean] : was clean [Dry] : was dry [Intact] : was intact [Erythema] : was erythematous [Ecchymosis] : was not ecchymotic [Seroma] : had no seroma [None] : had no drainage [Normal Skin] : normal appearance [Firm] : soft [Tender] : nontender [Abnormal Bowel Sounds] : normal bowel sounds [Rebound] : no rebound tenderness [Guarding] : no guarding [Incisional Hernia] : no incisional hernia [Mass] : no palpable mass [Doing Well] : is doing well [Excellent Pain Control] : has excellent pain control [No Sign of Infection] : is showing no signs of infection [FreeTextEntry1] : paitent with circunferencial erythema much improved from prior evaluation.

## 2021-05-03 ENCOUNTER — APPOINTMENT (OUTPATIENT)
Dept: DISASTER EMERGENCY | Facility: CLINIC | Age: 51
End: 2021-05-03

## 2021-05-04 LAB — SARS-COV-2 N GENE NPH QL NAA+PROBE: NOT DETECTED

## 2021-05-06 ENCOUNTER — OUTPATIENT (OUTPATIENT)
Dept: OUTPATIENT SERVICES | Facility: HOSPITAL | Age: 51
LOS: 1 days | End: 2021-05-06
Payer: COMMERCIAL

## 2021-05-06 ENCOUNTER — RESULT REVIEW (OUTPATIENT)
Age: 51
End: 2021-05-06

## 2021-05-06 ENCOUNTER — APPOINTMENT (OUTPATIENT)
Dept: GASTROENTEROLOGY | Facility: GI CENTER | Age: 51
End: 2021-05-06
Payer: MEDICAID

## 2021-05-06 DIAGNOSIS — Z98.890 OTHER SPECIFIED POSTPROCEDURAL STATES: Chronic | ICD-10-CM

## 2021-05-06 DIAGNOSIS — K62.5 HEMORRHAGE OF ANUS AND RECTUM: ICD-10-CM

## 2021-05-06 DIAGNOSIS — Z98.891 HISTORY OF UTERINE SCAR FROM PREVIOUS SURGERY: Chronic | ICD-10-CM

## 2021-05-06 PROCEDURE — 88305 TISSUE EXAM BY PATHOLOGIST: CPT | Mod: 26

## 2021-05-06 PROCEDURE — 45381 COLONOSCOPY SUBMUCOUS NJX: CPT

## 2021-05-06 PROCEDURE — 45380 COLONOSCOPY AND BIOPSY: CPT

## 2021-05-06 PROCEDURE — 88305 TISSUE EXAM BY PATHOLOGIST: CPT

## 2021-05-06 PROCEDURE — 45381 COLONOSCOPY SUBMUCOUS NJX: CPT | Mod: 59

## 2021-05-06 NOTE — ASSESSMENT
[FreeTextEntry1] : A/P\par  Sigmoid mass\par  call in one week for biopsy results\par  Will order Ct of chest and abdomen\par  Will get Colorectal surgery evaluation\par cbc, cmp and cea ordered\par \par F/U in 2 weeks

## 2021-05-06 NOTE — PHYSICAL EXAM
[General Appearance - Alert] : alert [General Appearance - In No Acute Distress] : in no acute distress [General Appearance - Well Nourished] : well nourished [General Appearance - Well Developed] : well developed [Sclera] : the sclera and conjunctiva were normal [Outer Ear] : the ears and nose were normal in appearance [Neck Appearance] : the appearance of the neck was normal [Neck Cervical Mass (___cm)] : no neck mass was observed [] : no respiratory distress [Respiration, Rhythm And Depth] : normal respiratory rhythm and effort [Exaggerated Use Of Accessory Muscles For Inspiration] : no accessory muscle use [Auscultation Breath Sounds / Voice Sounds] : lungs were clear to auscultation bilaterally [Apical Impulse] : the apical impulse was normal [Heart Rate And Rhythm] : heart rate was normal and rhythm regular [Heart Sounds] : normal S1 and S2 [Bowel Sounds] : normal bowel sounds [Abdomen Soft] : soft [Abdomen Tenderness] : non-tender [Skin Color & Pigmentation] : normal skin color and pigmentation [Impaired Insight] : insight and judgment were intact [Oriented To Time, Place, And Person] : oriented to person, place, and time [Affect] : the affect was normal

## 2021-05-06 NOTE — PROCEDURE
[Procedure Explained] : The procedure was explained [Allergies Reviewed] : allergies reviewed. [Risks] : Risks [Benefits] : benefits [Alternatives] : alternatives [Bleeding] : bleeding risk [Infection] : risk of infection [Consent Obtained] : written consent was obtained prior to the procedure and is detailed in the patient's record [Patient] : the patient [Bowel Prep Kit] : the patient took the appropriate bowel preparation kit as directed [Approved Diet Followed] : the patient avoided solid foods and adhered to the approved diet list for 24 hours prior to the procedure [Automated Blood Pressure Cuff] : automated blood pressure cuff [Cardiac Monitor] : cardiac monitor [Pulse Oximeter] : pulse oximeter [2] : 2 [Prep Qualtiy: ___] : Prep Quality:  [unfilled] [Withdrawal Time: ___] : Withdrawal Time:  [unfilled] [Performed By: ___] : Performed by:  YANELI [Left Lateral Decubitus] : The patient was positioned in the left lateral decubitus position [Cecum (Landmarks/Transillum)] : and guided to the cecum which was identified by the anatomic landmarks of the appendiceal orifice and ileocecal valve and by transillumination in the right lower quadrant [Insufflated] : insufflated [Single Pass Needed] : after a single pass [Retroflex View] : a retroflex view of the rectum was performed [Patient Rotated Into Alternating Positions] : the patient was rotated into alternating positions relative to insertion in order to maximally visualize the mucosa [Normal] : Normal [Diverticulosis] : diverticulosis [Hemorrhoids] : hemorrhoids [Sent to Pathology] : was sent to pathology for analysis [Tolerated Well] : the patient tolerated the procedure well [Vital Signs Stable] : the vital signs were stable [No Complications] : There were no complications [Abnormal Rectum] : a normal rectum [External Hemorrhoids] : no external hemorrhoids [de-identified] : rectal bleeding, constipation [de-identified] :  There was a 4 cm mass from 35-39 cm from anal verge. The mass was 1/2 the circumference of the lumen.  Multiple biopsies taken.  3 cc indospot ink injected submucosally   just distal to the mass. Few tics noted

## 2021-05-06 NOTE — PROCEDURE
[Procedure Explained] : The procedure was explained [Allergies Reviewed] : allergies reviewed. [Risks] : Risks [Benefits] : benefits [Alternatives] : alternatives [Bleeding] : bleeding risk [Infection] : risk of infection [Consent Obtained] : written consent was obtained prior to the procedure and is detailed in the patient's record [Patient] : the patient [Bowel Prep Kit] : the patient took the appropriate bowel preparation kit as directed [Approved Diet Followed] : the patient avoided solid foods and adhered to the approved diet list for 24 hours prior to the procedure [Automated Blood Pressure Cuff] : automated blood pressure cuff [Cardiac Monitor] : cardiac monitor [Pulse Oximeter] : pulse oximeter [2] : 2 [Prep Qualtiy: ___] : Prep Quality:  [unfilled] [Withdrawal Time: ___] : Withdrawal Time:  [unfilled] [Performed By: ___] : Performed by:  YANELI [Left Lateral Decubitus] : The patient was positioned in the left lateral decubitus position [Cecum (Landmarks/Transillum)] : and guided to the cecum which was identified by the anatomic landmarks of the appendiceal orifice and ileocecal valve and by transillumination in the right lower quadrant [Insufflated] : insufflated [Single Pass Needed] : after a single pass [Retroflex View] : a retroflex view of the rectum was performed [Patient Rotated Into Alternating Positions] : the patient was rotated into alternating positions relative to insertion in order to maximally visualize the mucosa [Normal] : Normal [Diverticulosis] : diverticulosis [Hemorrhoids] : hemorrhoids [Sent to Pathology] : was sent to pathology for analysis [Tolerated Well] : the patient tolerated the procedure well [Vital Signs Stable] : the vital signs were stable [No Complications] : There were no complications [Abnormal Rectum] : a normal rectum [External Hemorrhoids] : no external hemorrhoids [de-identified] : rectal bleeding, constipation [de-identified] :  There was a 4 cm mass from 35-39 cm from anal verge. The mass was 1/2 the circumference of the lumen.  Multiple biopsies taken.  3 cc indospot ink injected submucosally   just distal to the mass. Few tics noted

## 2021-05-07 ENCOUNTER — OUTPATIENT (OUTPATIENT)
Dept: OUTPATIENT SERVICES | Facility: HOSPITAL | Age: 51
LOS: 1 days | End: 2021-05-07
Payer: MEDICAID

## 2021-05-07 ENCOUNTER — APPOINTMENT (OUTPATIENT)
Dept: CT IMAGING | Facility: CLINIC | Age: 51
End: 2021-05-07
Payer: MEDICAID

## 2021-05-07 ENCOUNTER — NON-APPOINTMENT (OUTPATIENT)
Age: 51
End: 2021-05-07

## 2021-05-07 ENCOUNTER — OUTPATIENT (OUTPATIENT)
Dept: OUTPATIENT SERVICES | Facility: HOSPITAL | Age: 51
LOS: 1 days | End: 2021-05-07

## 2021-05-07 DIAGNOSIS — C18.7 MALIGNANT NEOPLASM OF SIGMOID COLON: ICD-10-CM

## 2021-05-07 DIAGNOSIS — Z98.890 OTHER SPECIFIED POSTPROCEDURAL STATES: Chronic | ICD-10-CM

## 2021-05-07 DIAGNOSIS — Z98.891 HISTORY OF UTERINE SCAR FROM PREVIOUS SURGERY: Chronic | ICD-10-CM

## 2021-05-07 DIAGNOSIS — C18.6 MALIGNANT NEOPLASM OF DESCENDING COLON: ICD-10-CM

## 2021-05-07 DIAGNOSIS — Z00.00 ENCOUNTER FOR GENERAL ADULT MEDICAL EXAMINATION WITHOUT ABNORMAL FINDINGS: ICD-10-CM

## 2021-05-07 LAB
ALBUMIN SERPL ELPH-MCNC: 4 G/DL
ALP BLD-CCNC: 65 U/L
ALT SERPL-CCNC: 14 U/L
ANION GAP SERPL CALC-SCNC: 13 MMOL/L
AST SERPL-CCNC: 19 U/L
BASOPHILS # BLD AUTO: 0.08 K/UL
BASOPHILS NFR BLD AUTO: 1.3 %
BILIRUB SERPL-MCNC: 0.6 MG/DL
BUN SERPL-MCNC: 11 MG/DL
CALCIUM SERPL-MCNC: 9 MG/DL
CEA SERPL-MCNC: 2.1 NG/ML
CHLORIDE SERPL-SCNC: 107 MMOL/L
CO2 SERPL-SCNC: 22 MMOL/L
CREAT SERPL-MCNC: 0.79 MG/DL
EOSINOPHIL # BLD AUTO: 0.48 K/UL
EOSINOPHIL NFR BLD AUTO: 7.8 %
GLUCOSE SERPL-MCNC: 88 MG/DL
HCT VFR BLD CALC: 39.2 %
HGB BLD-MCNC: 12.4 G/DL
IMM GRANULOCYTES NFR BLD AUTO: 0.2 %
LYMPHOCYTES # BLD AUTO: 1.53 K/UL
LYMPHOCYTES NFR BLD AUTO: 25 %
MAN DIFF?: NORMAL
MCHC RBC-ENTMCNC: 28.7 PG
MCHC RBC-ENTMCNC: 31.6 GM/DL
MCV RBC AUTO: 90.7 FL
MONOCYTES # BLD AUTO: 0.66 K/UL
MONOCYTES NFR BLD AUTO: 10.8 %
NEUTROPHILS # BLD AUTO: 3.37 K/UL
NEUTROPHILS NFR BLD AUTO: 54.9 %
PLATELET # BLD AUTO: 336 K/UL
POTASSIUM SERPL-SCNC: 4.6 MMOL/L
PROT SERPL-MCNC: 6.3 G/DL
RBC # BLD: 4.32 M/UL
RBC # FLD: 13.8 %
SODIUM SERPL-SCNC: 142 MMOL/L
WBC # FLD AUTO: 6.13 K/UL

## 2021-05-07 PROCEDURE — 71260 CT THORAX DX C+: CPT

## 2021-05-07 PROCEDURE — 74177 CT ABD & PELVIS W/CONTRAST: CPT | Mod: 26

## 2021-05-07 PROCEDURE — 71260 CT THORAX DX C+: CPT | Mod: 26

## 2021-05-07 PROCEDURE — 74177 CT ABD & PELVIS W/CONTRAST: CPT

## 2021-05-10 LAB — SURGICAL PATHOLOGY STUDY: SIGNIFICANT CHANGE UP

## 2021-05-13 ENCOUNTER — APPOINTMENT (OUTPATIENT)
Dept: COLORECTAL SURGERY | Facility: CLINIC | Age: 51
End: 2021-05-13
Payer: MEDICAID

## 2021-05-13 VITALS
RESPIRATION RATE: 16 BRPM | HEIGHT: 61 IN | HEART RATE: 74 BPM | SYSTOLIC BLOOD PRESSURE: 116 MMHG | WEIGHT: 120 LBS | BODY MASS INDEX: 22.66 KG/M2 | DIASTOLIC BLOOD PRESSURE: 74 MMHG | TEMPERATURE: 98.2 F

## 2021-05-13 DIAGNOSIS — Z80.0 FAMILY HISTORY OF MALIGNANT NEOPLASM OF DIGESTIVE ORGANS: ICD-10-CM

## 2021-05-13 PROCEDURE — 99072 ADDL SUPL MATRL&STAF TM PHE: CPT

## 2021-05-13 PROCEDURE — 99205 OFFICE O/P NEW HI 60 MIN: CPT

## 2021-05-13 RX ORDER — PEG-3350, SODIUM SULFATE, SODIUM CHLORIDE, POTASSIUM CHLORIDE, SODIUM ASCORBATE AND ASCORBIC ACID 7.5-2.691G
100 KIT ORAL
Qty: 1 | Refills: 0 | Status: COMPLETED | COMMUNITY
Start: 2021-03-15 | End: 2021-05-13

## 2021-05-13 RX ORDER — SODIUM SULFATE, POTASSIUM SULFATE, MAGNESIUM SULFATE 17.5; 3.13; 1.6 G/ML; G/ML; G/ML
17.5-3.13-1.6 SOLUTION, CONCENTRATE ORAL
Qty: 1 | Refills: 0 | Status: COMPLETED | COMMUNITY
Start: 2021-03-05 | End: 2021-05-13

## 2021-05-13 RX ORDER — METRONIDAZOLE 500 MG/1
500 TABLET ORAL TWICE DAILY
Qty: 10 | Refills: 0 | Status: COMPLETED | COMMUNITY
Start: 2021-03-09 | End: 2021-05-13

## 2021-05-13 RX ORDER — SULFAMETHOXAZOLE AND TRIMETHOPRIM 800; 160 MG/1; MG/1
800-160 TABLET ORAL
Qty: 10 | Refills: 0 | Status: COMPLETED | COMMUNITY
Start: 2021-03-23 | End: 2021-05-13

## 2021-05-13 RX ORDER — METHYLPREDNISOLONE 4 MG/1
4 TABLET ORAL
Qty: 1 | Refills: 0 | Status: COMPLETED | COMMUNITY
Start: 2021-03-25 | End: 2021-05-13

## 2021-05-13 NOTE — PHYSICAL EXAM
[Respiratory Effort] : normal respiratory effort [Normal Rate and Rhythm] : normal rate and rhythm [Calm] : calm [de-identified] : Soft, nontender, nondistended.  No mass or hernias appreciated.  Healed laparoscopic surgery sites.  Pfannenstiel incision noted [de-identified] : Well-appearing, in no distress [de-identified] : Normocephalic, atraumatic [de-identified] : Moves extremities without difficulty [de-identified] : Warm and dry [de-identified] : Alert and oriented x3

## 2021-05-13 NOTE — DATA REVIEWED
[FreeTextEntry1] : Colonoscopy and pathology report reviewed. \par \par CEA 2.1\par \par \par EXAM: CT ABDOMEN AND PELVIS IC\par EXAM: CT CHEST IC\par \par PROCEDURE DATE: 05/07/2021\par \par INTERPRETATION: CLINICAL INFORMATION: Sigmoid lesion.\par \par COMPARISON: None.\par \par FINDINGS:\par CHEST:\par LUNGS AND LARGE AIRWAYS: Patent central airways. There is 3 mm nodular thickening of the minor fissure on image 80, likely postinflammatory. There is a 2 mm calcified granuloma in the left lower lobe on image 125. No confluent airspace opacity is identified. There is no evidence of interstitial lung disease, bronchiectasis, or fibrosis.\par PLEURA: No pleural effusion.\par VESSELS: Within normal limits.\par HEART: Heart size is normal. No pericardial effusion.\par MEDIASTINUM AND OLESYA: No lymphadenopathy.\par CHEST WALL AND LOWER NECK: Within normal limits.\par \par ABDOMEN AND PELVIS:\par LIVER: Within normal limits.\par BILE DUCTS: Normal caliber.\par GALLBLADDER: Within normal limits.\par SPLEEN: Within normal limits. Small accessory spleens.\par PANCREAS: Within normal limits.\par ADRENALS: Within normal limits.\par KIDNEYS/URETERS: Tiny right renal upper pole cysts. Otherwise, within normal limits.\par \par BLADDER: Minimally distended.\par REPRODUCTIVE ORGANS: The uterus has been removed. The adnexa are unremarkable by CT criteria.\par \par BOWEL: Evaluation of the left colon is suboptimal due to limited transit of oral contrast and stool. There is diverticulosis of the sigmoid colon. There is eccentric irregular enhancing wall thickening of the proximal to mid sigmoid colon extending for 3.2 cm, best visualized on image 230. There are shotty enhancing adjacent mesenteric lymph nodes measuring up to 6 mm on image 216. No central mesenteric adenopathy is identified.\par \par Remaining small and large bowel loops are unremarkable with no evidence of obstruction, bowel wall thickening, or inflammatory stranding of the adjacent mesenteric fat.\par PERITONEUM: No ascites.\par VESSELS: Within normal limits.\par RETROPERITONEUM/LYMPH NODES: No lymphadenopathy.\par ABDOMINAL WALL: Within normal limits.\par BONES: Degenerative changes of the pubic symphysis.\par \par IMPRESSION: Eccentric irregular proximal to mid sigmoid mass with shotty enhancing adjacent mesenteric lymph nodes, compatible with known malignancy. No evidence of central mesenteric adenopathy or hepatic metastases. No evidence of lung metastases.\par \par \par \par

## 2021-05-13 NOTE — CONSULT LETTER
[Dear  ___] : Dear  [unfilled], [Consult Letter:] : I had the pleasure of evaluating your patient, [unfilled]. [Please see my note below.] : Please see my note below. [Consult Closing:] : Thank you very much for allowing me to participate in the care of this patient.  If you have any questions, please do not hesitate to contact me. [Sincerely,] : Sincerely, [FreeTextEntry3] : King Johnson MD\par

## 2021-05-13 NOTE — HISTORY OF PRESENT ILLNESS
[FreeTextEntry1] : 50-year-old female who presents for consultation for new diagnosis of sigmoid colon cancer.  She has had a variety of symptoms over the past few years and is uncertain which ones are new..  She did have some chronic pelvic pain over the last 10 years and recently underwent a hysterectomy for adenomyosis by Dr. Cruz in 2021.  Several of her pelvic symptoms did improve.  She reports constipation and intermittent blood in her stool and some changes in the caliber of her stool.  Her appetite has been fine and she has not had any significant weight loss.  She underwent a colonoscopy which showed a mass in the sigmoid colon.  Staging work-up so far showed no evidence of metastatic disease.  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

## 2021-05-17 ENCOUNTER — APPOINTMENT (OUTPATIENT)
Dept: GASTROENTEROLOGY | Facility: CLINIC | Age: 51
End: 2021-05-17
Payer: MEDICAID

## 2021-05-17 VITALS
HEART RATE: 76 BPM | DIASTOLIC BLOOD PRESSURE: 70 MMHG | SYSTOLIC BLOOD PRESSURE: 112 MMHG | BODY MASS INDEX: 22.09 KG/M2 | OXYGEN SATURATION: 98 % | TEMPERATURE: 98.2 F | RESPIRATION RATE: 14 BRPM | HEIGHT: 61 IN | WEIGHT: 117 LBS

## 2021-05-17 PROCEDURE — 99214 OFFICE O/P EST MOD 30 MIN: CPT

## 2021-05-17 PROCEDURE — 99072 ADDL SUPL MATRL&STAF TM PHE: CPT

## 2021-05-17 RX ORDER — METRONIDAZOLE 500 MG/1
500 TABLET ORAL
Qty: 3 | Refills: 0 | Status: DISCONTINUED | COMMUNITY
Start: 2021-05-13 | End: 2021-05-17

## 2021-05-17 RX ORDER — MUPIROCIN 20 MG/G
2 OINTMENT TOPICAL TWICE DAILY
Qty: 1 | Refills: 0 | Status: DISCONTINUED | COMMUNITY
Start: 2021-03-23 | End: 2021-05-17

## 2021-05-17 RX ORDER — NEOMYCIN SULFATE 500 MG/1
500 TABLET ORAL
Qty: 6 | Refills: 0 | Status: DISCONTINUED | COMMUNITY
Start: 2021-05-13 | End: 2021-05-17

## 2021-05-17 NOTE — HISTORY OF PRESENT ILLNESS
[de-identified] : The patient was seen with worsening constipation after having a hysterectomy.  She also had low-volume rectal bleeding.  Colonoscopy showed a sigmoid mass.  Biopsies positive for adenocarcinoma.  CT was negative for lung and liver mets.  CEA was normal patient is denying any abdominal pain nausea vomiting\par    Does have chronic heartburn and regurgitation which at 1 time gave her some globus sensation.  Symptoms controlled Prilosec 40 mg a day

## 2021-05-17 NOTE — ASSESSMENT
[FreeTextEntry1] : A/P\par The patient is here for follow-up of colon cancer.  She was seen by Dr. Daigle\par -As per patient surgery is pending in the next 3 weeks\par -Follow-up with me in about 3 months.  Plan for repeat colonoscopy in about a year\par \par \par -GERD\par Today's instructions for acid reflux include avoid provocative foods. For example citrus alcohol coffee chocolate mints. Smaller meals, no eating 3 hours prior to bedtime and elevate head of the bed prior to sleep.\par [id number] mg qd

## 2021-05-21 ENCOUNTER — OUTPATIENT (OUTPATIENT)
Dept: OUTPATIENT SERVICES | Facility: HOSPITAL | Age: 51
LOS: 1 days | End: 2021-05-21
Payer: COMMERCIAL

## 2021-05-21 VITALS
TEMPERATURE: 98 F | DIASTOLIC BLOOD PRESSURE: 70 MMHG | RESPIRATION RATE: 20 BRPM | HEIGHT: 61 IN | HEART RATE: 88 BPM | SYSTOLIC BLOOD PRESSURE: 121 MMHG | WEIGHT: 117.07 LBS

## 2021-05-21 DIAGNOSIS — Z90.710 ACQUIRED ABSENCE OF BOTH CERVIX AND UTERUS: Chronic | ICD-10-CM

## 2021-05-21 DIAGNOSIS — Z98.891 HISTORY OF UTERINE SCAR FROM PREVIOUS SURGERY: Chronic | ICD-10-CM

## 2021-05-21 DIAGNOSIS — Z98.890 OTHER SPECIFIED POSTPROCEDURAL STATES: Chronic | ICD-10-CM

## 2021-05-21 DIAGNOSIS — Z29.9 ENCOUNTER FOR PROPHYLACTIC MEASURES, UNSPECIFIED: ICD-10-CM

## 2021-05-21 DIAGNOSIS — C18.7 MALIGNANT NEOPLASM OF SIGMOID COLON: ICD-10-CM

## 2021-05-21 DIAGNOSIS — Z01.818 ENCOUNTER FOR OTHER PREPROCEDURAL EXAMINATION: ICD-10-CM

## 2021-05-21 LAB
A1C WITH ESTIMATED AVERAGE GLUCOSE RESULT: 5 % — SIGNIFICANT CHANGE UP (ref 4–5.6)
ALBUMIN SERPL ELPH-MCNC: 4.1 G/DL — SIGNIFICANT CHANGE UP (ref 3.3–5.2)
ALP SERPL-CCNC: 56 U/L — SIGNIFICANT CHANGE UP (ref 40–120)
ALT FLD-CCNC: 13 U/L — SIGNIFICANT CHANGE UP
ANION GAP SERPL CALC-SCNC: 9 MMOL/L — SIGNIFICANT CHANGE UP (ref 5–17)
APTT BLD: 32.1 SEC — SIGNIFICANT CHANGE UP (ref 27.5–35.5)
AST SERPL-CCNC: 24 U/L — SIGNIFICANT CHANGE UP
BASOPHILS # BLD AUTO: 0.07 K/UL — SIGNIFICANT CHANGE UP (ref 0–0.2)
BASOPHILS NFR BLD AUTO: 1.4 % — SIGNIFICANT CHANGE UP (ref 0–2)
BILIRUB SERPL-MCNC: 0.3 MG/DL — LOW (ref 0.4–2)
BLD GP AB SCN SERPL QL: SIGNIFICANT CHANGE UP
BUN SERPL-MCNC: 13 MG/DL — SIGNIFICANT CHANGE UP (ref 8–20)
CALCIUM SERPL-MCNC: 8.6 MG/DL — SIGNIFICANT CHANGE UP (ref 8.6–10.2)
CHLORIDE SERPL-SCNC: 105 MMOL/L — SIGNIFICANT CHANGE UP (ref 98–107)
CO2 SERPL-SCNC: 25 MMOL/L — SIGNIFICANT CHANGE UP (ref 22–29)
CREAT SERPL-MCNC: 0.61 MG/DL — SIGNIFICANT CHANGE UP (ref 0.5–1.3)
EOSINOPHIL # BLD AUTO: 0.3 K/UL — SIGNIFICANT CHANGE UP (ref 0–0.5)
EOSINOPHIL NFR BLD AUTO: 5.9 % — SIGNIFICANT CHANGE UP (ref 0–6)
ESTIMATED AVERAGE GLUCOSE: 97 MG/DL — SIGNIFICANT CHANGE UP (ref 68–114)
GLUCOSE SERPL-MCNC: 85 MG/DL — SIGNIFICANT CHANGE UP (ref 70–99)
HCT VFR BLD CALC: 38.1 % — SIGNIFICANT CHANGE UP (ref 34.5–45)
HGB BLD-MCNC: 12.1 G/DL — SIGNIFICANT CHANGE UP (ref 11.5–15.5)
IMM GRANULOCYTES NFR BLD AUTO: 0.2 % — SIGNIFICANT CHANGE UP (ref 0–1.5)
INR BLD: 1 RATIO — SIGNIFICANT CHANGE UP (ref 0.88–1.16)
LYMPHOCYTES # BLD AUTO: 1.07 K/UL — SIGNIFICANT CHANGE UP (ref 1–3.3)
LYMPHOCYTES # BLD AUTO: 21.1 % — SIGNIFICANT CHANGE UP (ref 13–44)
MCHC RBC-ENTMCNC: 28.6 PG — SIGNIFICANT CHANGE UP (ref 27–34)
MCHC RBC-ENTMCNC: 31.8 GM/DL — LOW (ref 32–36)
MCV RBC AUTO: 90.1 FL — SIGNIFICANT CHANGE UP (ref 80–100)
MONOCYTES # BLD AUTO: 0.5 K/UL — SIGNIFICANT CHANGE UP (ref 0–0.9)
MONOCYTES NFR BLD AUTO: 9.9 % — SIGNIFICANT CHANGE UP (ref 2–14)
NEUTROPHILS # BLD AUTO: 3.11 K/UL — SIGNIFICANT CHANGE UP (ref 1.8–7.4)
NEUTROPHILS NFR BLD AUTO: 61.5 % — SIGNIFICANT CHANGE UP (ref 43–77)
PLATELET # BLD AUTO: 285 K/UL — SIGNIFICANT CHANGE UP (ref 150–400)
POTASSIUM SERPL-MCNC: 3.9 MMOL/L — SIGNIFICANT CHANGE UP (ref 3.5–5.3)
POTASSIUM SERPL-SCNC: 3.9 MMOL/L — SIGNIFICANT CHANGE UP (ref 3.5–5.3)
PROT SERPL-MCNC: 6.3 G/DL — LOW (ref 6.6–8.7)
PROTHROM AB SERPL-ACNC: 11.6 SEC — SIGNIFICANT CHANGE UP (ref 10.6–13.6)
RBC # BLD: 4.23 M/UL — SIGNIFICANT CHANGE UP (ref 3.8–5.2)
RBC # FLD: 13.7 % — SIGNIFICANT CHANGE UP (ref 10.3–14.5)
SODIUM SERPL-SCNC: 139 MMOL/L — SIGNIFICANT CHANGE UP (ref 135–145)
WBC # BLD: 5.06 K/UL — SIGNIFICANT CHANGE UP (ref 3.8–10.5)
WBC # FLD AUTO: 5.06 K/UL — SIGNIFICANT CHANGE UP (ref 3.8–10.5)

## 2021-05-21 PROCEDURE — 93010 ELECTROCARDIOGRAM REPORT: CPT

## 2021-05-21 RX ORDER — L.ACIDOPH/B.ANIMALIS/B.LONGUM 15B CELL
1 CAPSULE ORAL
Qty: 0 | Refills: 0 | DISCHARGE

## 2021-05-21 NOTE — H&P PST ADULT - ASSESSMENT
49 yo F    OPIOID RISK TOOL    NIECY EACH BOX THAT APPLIES AND ADD TOTALS AT THE END    FAMILY HISTORY OF SUBSTANCE ABUSE                 FEMALE         MALE                                                Alcohol                             [  ]1 pt          [  ]3pts                                               Illegal Durgs                     [  ]2 pts        [  ]3pts                                               Rx Drugs                           [  ]4 pts        [  ]4 pts    PERSONAL HISTORY OF SUBSTANCE ABUSE                                                                                          Alcohol                             [  ]3 pts       [  ]3 pts                                               Illegal Drugs                     [  ]4 pts        [  ]4 pts                                               Rx Drugs                           [  ]5 pts        [  ]5 pts    AGE BETWEEN 16-45 YEARS                                      [  ]1 pt         [  ]1 pt    HISTORY OF PREADOLESCENT   SEXUAL ABUSE                                                             [  ]3 pts        [  ]0pts    PSYCHOLOGICAL DISEASE                     ADD, OCD, Bipolar, Schizophrenia        [  ]2 pts         [  ]2 pts                      Depression                                               [  ]1 pt           [  ]1 pt           SCORING TOTAL   (add numbers and type here)              ( 0 )                                     A score of 3 or lower indicated LOW risk for future opioid abuse  A score of 4 to 7 indicated moderate risk for future opioid abuse  A score of 8 or higher indicates a high risk for opioid abuse    CAPRINI VTE 2.0 SCORE [CLOT updated 2019]    AGE RELATED RISK FACTORS                                                       MOBILITY RELATED FACTORS  [x ] Age 41-60 years                                            (1 Point)                    [ ] Bed rest                                                        (1 Point)  [ ] Age: 61-74 years                                           (2 Points)                  [ ] Plaster cast                                                   (2 Points)  [ ] Age= 75 years                                              (3 Points)                    [ ] Bed bound for more than 72 hours                 (2 Points)    DISEASE RELATED RISK FACTORS                                               GENDER SPECIFIC FACTORS  [ ] Edema in the lower extremities                       (1 Point)              [ ] Pregnancy                                                     (1 Point)  [ ] Varicose veins                                               (1 Point)                     [ ] Post-partum < 6 weeks                                   (1 Point)             [x ] BMI > 25 Kg/m2                                            (1 Point)                     [ ] Hormonal therapy  or oral contraception          (1 Point)                 [ ] Sepsis (in the previous month)                        (1 Point)               [ ] History of pregnancy complications                 (1 point)  [ ] Pneumonia or serious lung disease                                               [ ] Unexplained or recurrent                     (1 Point)           (in the previous month)                               (1 Point)  [ ] Abnormal pulmonary function test                     (1 Point)                 SURGERY RELATED RISK FACTORS  [ ] Acute myocardial infarction                              (1 Point)               [ ]  Section                                             (1 Point)  [ ] Congestive heart failure (in the previous month)  (1 Point)      [ ] Minor surgery                                                  (1 Point)   [ ] Inflammatory bowel disease                             (1 Point)               [ ] Arthroscopic surgery                                        (2 Points)  [ ] Central venous access                                      (2 Points)                [x ] General surgery lasting more than 45 minutes (2 points)  [ x] Malignancy- Present or previous                   (2 Points)                [ ] Elective arthroplasty                                         (5 points)    [ ] Stroke (in the previous month)                          (5 Points)                                                                                                                                                           HEMATOLOGY RELATED FACTORS                                                 TRAUMA RELATED RISK FACTORS  [ ] Prior episodes of VTE                                     (3 Points)                [ ] Fracture of the hip, pelvis, or leg                       (5 Points)  [ ] Positive family history for VTE                         (3 Points)             [ ] Acute spinal cord injury (in the previous month)  (5 Points)  [ ] Prothrombin 08238 A                                     (3 Points)               [ ] Paralysis  (less than 1 month)                             (5 Points)  [ ] Factor V Leiden                                             (3 Points)                  [ ] Multiple Trauma within 1 month                        (5 Points)  [ ] Lupus anticoagulants                                     (3 Points)                                                           [ ] Anticardiolipin antibodies                               (3 Points)                                                       [ ] High homocysteine in the blood                      (3 Points)                                             [ ] Other congenital or acquired thrombophilia      (3 Points)                                                [ ] Heparin induced thrombocytopenia                  (3 Points)                                     Total Score [   6       ] 51 yo F   x3 PMH of asthma presents with c/o new diagnosis for sigmoid colon cancer. She had chronic pelvic pain for the past 10 yrs and underwent a hysterectomy for adenomyosis w/Dr Cruz in 2021. Today she reports generalized fatigue, occasional constipation, intermittent blood in stool and some abdominal discomfort with bowel movements. She denies loss of appetite or recent weight loss. Recent colonoscopy showed a mass in sigmoid colon. Staging work up showed no evidence of metastatic disease. Reports FHx of colon cancer. Preop assessment prior to robotic assisted possible open sigmoid colon resection w/Dr Johnson on       OPIOID RISK TOOL    NIEYC EACH BOX THAT APPLIES AND ADD TOTALS AT THE END    FAMILY HISTORY OF SUBSTANCE ABUSE                 FEMALE         MALE                                                Alcohol                             [  ]1 pt          [  ]3pts                                               Illegal Durgs                     [  ]2 pts        [  ]3pts                                               Rx Drugs                           [  ]4 pts        [  ]4 pts    PERSONAL HISTORY OF SUBSTANCE ABUSE                                                                                          Alcohol                             [  ]3 pts       [  ]3 pts                                               Illegal Drugs                     [  ]4 pts        [  ]4 pts                                               Rx Drugs                           [  ]5 pts        [  ]5 pts    AGE BETWEEN 16-45 YEARS                                      [  ]1 pt         [  ]1 pt    HISTORY OF PREADOLESCENT   SEXUAL ABUSE                                                             [  ]3 pts        [  ]0pts    PSYCHOLOGICAL DISEASE                     ADD, OCD, Bipolar, Schizophrenia        [  ]2 pts         [  ]2 pts                      Depression                                               [  ]1 pt           [  ]1 pt           SCORING TOTAL   (add numbers and type here)              ( 0 )                                     A score of 3 or lower indicated LOW risk for future opioid abuse  A score of 4 to 7 indicated moderate risk for future opioid abuse  A score of 8 or higher indicates a high risk for opioid abuse    CAPRINI VTE 2.0 SCORE [CLOT updated 2019]    AGE RELATED RISK FACTORS                                                       MOBILITY RELATED FACTORS  [x ] Age 41-60 years                                            (1 Point)                    [ ] Bed rest                                                        (1 Point)  [ ] Age: 61-74 years                                           (2 Points)                  [ ] Plaster cast                                                   (2 Points)  [ ] Age= 75 years                                              (3 Points)                    [ ] Bed bound for more than 72 hours                 (2 Points)    DISEASE RELATED RISK FACTORS                                               GENDER SPECIFIC FACTORS  [ ] Edema in the lower extremities                       (1 Point)              [ ] Pregnancy                                                     (1 Point)  [ ] Varicose veins                                               (1 Point)                     [ ] Post-partum < 6 weeks                                   (1 Point)             [ ] BMI > 25 Kg/m2                                            (1 Point)                     [ ] Hormonal therapy  or oral contraception          (1 Point)                 [ ] Sepsis (in the previous month)                        (1 Point)               [ ] History of pregnancy complications                 (1 point)  [ ] Pneumonia or serious lung disease                                               [ ] Unexplained or recurrent                     (1 Point)           (in the previous month)                               (1 Point)  [ ] Abnormal pulmonary function test                     (1 Point)                 SURGERY RELATED RISK FACTORS  [ ] Acute myocardial infarction                              (1 Point)               [ ]  Section                                             (1 Point)  [ ] Congestive heart failure (in the previous month)  (1 Point)      [ ] Minor surgery                                                  (1 Point)   [ ] Inflammatory bowel disease                             (1 Point)               [ ] Arthroscopic surgery                                        (2 Points)  [ ] Central venous access                                      (2 Points)                [x ] General surgery lasting more than 45 minutes (2 points)  [ x] Malignancy- Present or previous                   (2 Points)                [ ] Elective arthroplasty                                         (5 points)    [ ] Stroke (in the previous month)                          (5 Points)                                                                                                                                                           HEMATOLOGY RELATED FACTORS                                                 TRAUMA RELATED RISK FACTORS  [ ] Prior episodes of VTE                                     (3 Points)                [ ] Fracture of the hip, pelvis, or leg                       (5 Points)  [ ] Positive family history for VTE                         (3 Points)             [ ] Acute spinal cord injury (in the previous month)  (5 Points)  [ ] Prothrombin 40150 A                                     (3 Points)               [ ] Paralysis  (less than 1 month)                             (5 Points)  [ ] Factor V Leiden                                             (3 Points)                  [ ] Multiple Trauma within 1 month                        (5 Points)  [ ] Lupus anticoagulants                                     (3 Points)                                                           [ ] Anticardiolipin antibodies                               (3 Points)                                                       [ ] High homocysteine in the blood                      (3 Points)                                             [ ] Other congenital or acquired thrombophilia      (3 Points)                                                [ ] Heparin induced thrombocytopenia                  (3 Points)                                     Total Score [   5     ]

## 2021-05-21 NOTE — H&P PST ADULT - NSICDXPROBLEM_GEN_ALL_CORE_FT
PROBLEM DIAGNOSES  Problem: Malignant neoplasm of sigmoid colon  Assessment and Plan: preop assessment, medical clearance pending, robotic assisted possible open sigmoid colon resection 5/28    Problem: Need for prophylactic measure  Assessment and Plan: caprini score 6, moderate risk for dvt, SCD ordered, surgical team to assess for dvt prophylaxis        PROBLEM DIAGNOSES  Problem: Need for prophylactic measure  Assessment and Plan: caprini score 5, moderate risk for dvt, SCD ordered, surgical team to assess for dvt prophylaxis     Problem: Malignant neoplasm of sigmoid colon  Assessment and Plan: preop assessment, medical clearance pending, robotic assisted possible open sigmoid colon resection 5/28

## 2021-05-21 NOTE — H&P PST ADULT - NSICDXPASTSURGICALHX_GEN_ALL_CORE_FT
PAST SURGICAL HISTORY:  H/O  section x3 , ,     H/O dilation and curettage     H/O hand surgery staph infection  1992    History of sinus surgery 2005     PAST SURGICAL HISTORY:  H/O  section x3 , ,     H/O dilation and curettage     H/O hand surgery staph infection  1992    History of sinus surgery     S/P hysterectomy 2021     PAST SURGICAL HISTORY:  H/O  section x3 , ,     H/O dilation and curettage     H/O hand surgery staph infection 1992    History of sinus surgery     S/P hysterectomy 2021

## 2021-05-21 NOTE — H&P PST ADULT - HEALTH CARE MAINTENANCE
endoscopy  2020 endoscopy  2020  colonoscopy 2021 endoscopy 2020  colonoscopy 5/6/2021: sigmoid mass

## 2021-05-21 NOTE — H&P PST ADULT - NSALCOHOLAMT_GEN_A_CORE_SD
1-2 drinks
Patient Specific Counseling (Will Not Stick From Patient To Patient): - Discussed SPF usage, rec 50+
Detail Level: Detailed

## 2021-05-21 NOTE — H&P PST ADULT - NEGATIVE GENERAL GENITOURINARY SYMPTOMS
no hematuria/no flank pain L/no flank pain R/no bladder infections/no incontinence/no dysuria/normal urinary frequency/no nocturia

## 2021-05-21 NOTE — H&P PST ADULT - HISTORY OF PRESENT ILLNESS
51 yo F   x3 PMH of asthma presents with c/o new diagnosis for sigmoid colon cancer. She had chronic pelvic pain for the past 10 yrs and underwent a hysterectomy for adenomyosis w/Dr Cruz in 2021. She reports constipation and intermittent blood in her stools and some changes in the caliber of her stools. Denies loss of appetite or recent weight loss. Recent colonoscopy showed a mass in sigmoid colon. Staging work up showed no evidence of metastatic disease. Preop assessment prior to robotic assisted possible open sigmoid colon resection w/Dr Johnson on  51 yo F   x3 PMH of asthma presents with c/o new diagnosis for sigmoid colon cancer. She had chronic pelvic pain for the past 10 yrs and underwent a hysterectomy for adenomyosis w/Dr Cruz in 2021. She reports generalized fatigue, constipation and intermittent blood in her stools and some changes in the caliber of her stools. Denies loss of appetite or recent weight loss. Recent colonoscopy showed a mass in sigmoid colon. Staging work up showed no evidence of metastatic disease. Reports FHx of colon cancer. Preop assessment prior to robotic assisted possible open sigmoid colon resection w/Dr Johnson on  49 yo F   x3 PMH of asthma presents with c/o new diagnosis for sigmoid colon cancer. She had chronic pelvic pain for the past 10 yrs and underwent a hysterectomy for adenomyosis w/Dr Cruz in 2021. Today she reports generalized fatigue, occasional constipation, intermittent blood in stool and some abdominal discomfort with bowel movements. She denies loss of appetite or recent weight loss. Recent colonoscopy showed a mass in sigmoid colon. Staging work up showed no evidence of metastatic disease. Reports FHx of colon cancer. Preop assessment prior to robotic assisted possible open sigmoid colon resection w/Dr Johnson on     CT abdomen/pelvis 21 impression: eccentric irregular proximal to mid sigmoid mass with shotty enhancing adjacent mesenteric lymph nodes, compatible with known malignancy. No evidence of central mesenteric adenopathy or hepatic metastases. No evidence of lung metastases.

## 2021-05-21 NOTE — H&P PST ADULT - NSICDXPASTMEDICALHX_GEN_ALL_CORE_FT
PAST MEDICAL HISTORY:  Asthma mild    Cervical dysplasia     GERD (gastroesophageal reflux disease)     Kidney stone      PAST MEDICAL HISTORY:  Asthma mild    Cervical dysplasia     GERD (gastroesophageal reflux disease)     Kidney stone     Malignant neoplasm of sigmoid colon

## 2021-05-21 NOTE — H&P PST ADULT - NEGATIVE GASTROINTESTINAL SYMPTOMS
Will get baseline six minute walk    no nausea/no vomiting/no diarrhea/no constipation no nausea/no vomiting/no diarrhea

## 2021-05-21 NOTE — PATIENT PROFILE ADULT - NSPREOP1_ABLETOREACHPT_GEN_A_NUR
[phone]    denies domestic or international travel in the past 3 weeks [phone]    denies domestic or international travel in the past 3 weeks/yes

## 2021-05-21 NOTE — H&P PST ADULT - NSICDXFAMILYHX_GEN_ALL_CORE_FT
FAMILY HISTORY:  Family history of Hodgkins disease, father  nasopharyngeal cancer , non hodgkins  FH: colon cancer, grandmother     FAMILY HISTORY:  FH: colon cancer, maternal grandmother, maternal uncle    Father  Still living? Unknown  FH: lymphoma, Age at diagnosis: Age Unknown    Aunt  Still living? Unknown  FH: breast cancer, Age at diagnosis: Age Unknown

## 2021-05-21 NOTE — H&P PST ADULT - GASTROINTESTINAL DETAILS
soft/nontender/no masses palpable/bowel sounds normal/no bruit soft/nontender/no distention/no masses palpable/bowel sounds normal/no bruit

## 2021-05-21 NOTE — H&P PST ADULT - EKG AND INTERPRETATION
NSR, HR , official reading present NSR, HR 68, official reading present NSR, HR 68, official reading pending

## 2021-05-26 ENCOUNTER — APPOINTMENT (OUTPATIENT)
Dept: FAMILY MEDICINE | Facility: CLINIC | Age: 51
End: 2021-05-26
Payer: MEDICAID

## 2021-05-26 VITALS
WEIGHT: 118 LBS | HEART RATE: 79 BPM | TEMPERATURE: 98.2 F | HEIGHT: 61 IN | BODY MASS INDEX: 22.28 KG/M2 | DIASTOLIC BLOOD PRESSURE: 70 MMHG | SYSTOLIC BLOOD PRESSURE: 110 MMHG | RESPIRATION RATE: 16 BRPM | OXYGEN SATURATION: 98 %

## 2021-05-26 DIAGNOSIS — Z01.818 ENCOUNTER FOR OTHER PREPROCEDURAL EXAMINATION: ICD-10-CM

## 2021-05-26 PROCEDURE — 99214 OFFICE O/P EST MOD 30 MIN: CPT

## 2021-05-26 NOTE — HISTORY OF PRESENT ILLNESS
[No Pertinent Cardiac History] : no history of aortic stenosis, atrial fibrillation, coronary artery disease, recent myocardial infarction, or implantable device/pacemaker [No Pertinent Pulmonary History] : no history of asthma, COPD, sleep apnea, or smoking [No Adverse Anesthesia Reaction] : no adverse anesthesia reaction in self or family member [(Patient denies any chest pain, claudication, dyspnea on exertion, orthopnea, palpitations or syncope)] : Patient denies any chest pain, claudication, dyspnea on exertion, orthopnea, palpitations or syncope [Chronic Anticoagulation] : no chronic anticoagulation [Chronic Kidney Disease] : no chronic kidney disease [Diabetes] : no diabetes [FreeTextEntry1] : Robotic sigmoid resection. [FreeTextEntry2] : 5/28/21 [FreeTextEntry4] : 51 y/o F with hx significant for Abnormal PAP, s/p Colposcopy on 9/24/20 , which was unsuccessful due to her history of cryotherapy. She underwent colposcopy, D&C hysteroscopy polypectomy under anesthesia 9/2020, pathology benign. \par She was recently seen by new Gyn, Dr Cruz had Robotic laparoscopic B/L Hysterectomy and salpingectomy 02/24/21.\par She had a Rectal bleeding, and colonoscopy done 5/6/21 with Dx Sigmoid Colon. CT scan done showed no MTS. She was seen by proctology and manisha to Robotic Sigmoidectomy.\par She is here today for medical clearance.\par  [FreeTextEntry3] : Dr Jose Johnson

## 2021-05-26 NOTE — RESULTS/DATA
[] : results reviewed [de-identified] : presurgical testing done at [facility] on 5/21/21.

## 2021-05-26 NOTE — ASSESSMENT
[High Risk Surgery - Intraperitoneal, Intrathoracic or Supringuinal Vascular Procedures] : High Risk Surgery - Intraperitoneal, Intrathoracic or Supringuinal Vascular Procedures - No (0) [Ischemic Heart Disease] : Ischemic Heart Disease - No (0) [Congestive Heart Failure] : Congestive Heart Failure - No (0) [Prior Cerebrovascular Accident or TIA] : Prior Cerebrovascular Accident or TIA - No (0) [Creatinine >= 2mg/dL (1 Point)] : Creatinine >= 2mg/dL - No (0) [Insulin-dependent Diabetic (1 Point)] : Insulin-dependent Diabetic - No (0) [0] : 0 , RCRI Class: I, Risk of Post-Op Cardiac Complications: 3.9%, 95% CI for Risk Estimate: 2.8% - 5.4% [Patient Optimized for Surgery] : Patient optimized for surgery [No Further Testing Recommended] : no further testing recommended [Continue medications as is] : Continue current medications [As per surgery] : as per surgery [FreeTextEntry4] : Pt with no absolute contraindication for surgical procedure. Clear from clinical stand point.\par

## 2021-05-27 ENCOUNTER — TRANSCRIPTION ENCOUNTER (OUTPATIENT)
Age: 51
End: 2021-05-27

## 2021-05-27 PROBLEM — C18.7 MALIGNANT NEOPLASM OF SIGMOID COLON: Chronic | Status: ACTIVE | Noted: 2021-05-21

## 2021-05-28 ENCOUNTER — RESULT REVIEW (OUTPATIENT)
Age: 51
End: 2021-05-28

## 2021-05-28 ENCOUNTER — INPATIENT (INPATIENT)
Facility: HOSPITAL | Age: 51
LOS: 2 days | Discharge: HOME | DRG: 331 | End: 2021-05-31
Attending: SURGERY | Admitting: SURGERY
Payer: COMMERCIAL

## 2021-05-28 ENCOUNTER — APPOINTMENT (OUTPATIENT)
Dept: COLORECTAL SURGERY | Facility: HOSPITAL | Age: 51
End: 2021-05-28

## 2021-05-28 VITALS
RESPIRATION RATE: 16 BRPM | OXYGEN SATURATION: 100 % | SYSTOLIC BLOOD PRESSURE: 97 MMHG | TEMPERATURE: 98 F | WEIGHT: 115.96 LBS | DIASTOLIC BLOOD PRESSURE: 82 MMHG | HEART RATE: 89 BPM | HEIGHT: 61 IN

## 2021-05-28 DIAGNOSIS — Z98.890 OTHER SPECIFIED POSTPROCEDURAL STATES: Chronic | ICD-10-CM

## 2021-05-28 DIAGNOSIS — Z98.891 HISTORY OF UTERINE SCAR FROM PREVIOUS SURGERY: Chronic | ICD-10-CM

## 2021-05-28 DIAGNOSIS — C18.7 MALIGNANT NEOPLASM OF SIGMOID COLON: ICD-10-CM

## 2021-05-28 DIAGNOSIS — Z90.710 ACQUIRED ABSENCE OF BOTH CERVIX AND UTERUS: Chronic | ICD-10-CM

## 2021-05-28 LAB
GLUCOSE BLDC GLUCOMTR-MCNC: 107 MG/DL — HIGH (ref 70–99)
GLUCOSE BLDC GLUCOMTR-MCNC: 184 MG/DL — HIGH (ref 70–99)
GLUCOSE BLDC GLUCOMTR-MCNC: 187 MG/DL — HIGH (ref 70–99)

## 2021-05-28 PROCEDURE — 44204 LAPARO PARTIAL COLECTOMY: CPT | Mod: AS

## 2021-05-28 PROCEDURE — 88342 IMHCHEM/IMCYTCHM 1ST ANTB: CPT | Mod: 26

## 2021-05-28 PROCEDURE — 45300 PROCTOSIGMOIDOSCOPY DX: CPT

## 2021-05-28 PROCEDURE — 44204 LAPARO PARTIAL COLECTOMY: CPT

## 2021-05-28 PROCEDURE — 88305 TISSUE EXAM BY PATHOLOGIST: CPT | Mod: 26

## 2021-05-28 PROCEDURE — 88341 IMHCHEM/IMCYTCHM EA ADD ANTB: CPT | Mod: 26

## 2021-05-28 PROCEDURE — 88309 TISSUE EXAM BY PATHOLOGIST: CPT | Mod: 26

## 2021-05-28 RX ORDER — HYDROMORPHONE HYDROCHLORIDE 2 MG/ML
0.5 INJECTION INTRAMUSCULAR; INTRAVENOUS; SUBCUTANEOUS
Refills: 0 | Status: DISCONTINUED | OUTPATIENT
Start: 2021-05-28 | End: 2021-05-28

## 2021-05-28 RX ORDER — ACETAMINOPHEN 500 MG
1000 TABLET ORAL ONCE
Refills: 0 | Status: COMPLETED | OUTPATIENT
Start: 2021-05-28 | End: 2021-05-28

## 2021-05-28 RX ORDER — ACETAMINOPHEN 500 MG
975 TABLET ORAL EVERY 6 HOURS
Refills: 0 | Status: DISCONTINUED | OUTPATIENT
Start: 2021-05-28 | End: 2021-05-31

## 2021-05-28 RX ORDER — OXYCODONE HYDROCHLORIDE 5 MG/1
5 TABLET ORAL EVERY 4 HOURS
Refills: 0 | Status: DISCONTINUED | OUTPATIENT
Start: 2021-05-28 | End: 2021-05-28

## 2021-05-28 RX ORDER — TRAMADOL HYDROCHLORIDE 50 MG/1
50 TABLET ORAL EVERY 4 HOURS
Refills: 0 | Status: DISCONTINUED | OUTPATIENT
Start: 2021-05-28 | End: 2021-05-30

## 2021-05-28 RX ORDER — KETOROLAC TROMETHAMINE 30 MG/ML
15 SYRINGE (ML) INJECTION EVERY 6 HOURS
Refills: 0 | Status: DISCONTINUED | OUTPATIENT
Start: 2021-05-28 | End: 2021-05-30

## 2021-05-28 RX ORDER — ENOXAPARIN SODIUM 100 MG/ML
40 INJECTION SUBCUTANEOUS EVERY 24 HOURS
Refills: 0 | Status: DISCONTINUED | OUTPATIENT
Start: 2021-05-29 | End: 2021-06-04

## 2021-05-28 RX ORDER — CIPROFLOXACIN LACTATE 400MG/40ML
400 VIAL (ML) INTRAVENOUS ONCE
Refills: 0 | Status: COMPLETED | OUTPATIENT
Start: 2021-05-28 | End: 2021-05-28

## 2021-05-28 RX ORDER — HEPARIN SODIUM 5000 [USP'U]/ML
5000 INJECTION INTRAVENOUS; SUBCUTANEOUS ONCE
Refills: 0 | Status: COMPLETED | OUTPATIENT
Start: 2021-05-28 | End: 2021-05-28

## 2021-05-28 RX ORDER — ONDANSETRON 8 MG/1
4 TABLET, FILM COATED ORAL EVERY 6 HOURS
Refills: 0 | Status: DISCONTINUED | OUTPATIENT
Start: 2021-05-28 | End: 2021-06-04

## 2021-05-28 RX ORDER — BUPIVACAINE 13.3 MG/ML
20 INJECTION, SUSPENSION, LIPOSOMAL INFILTRATION ONCE
Refills: 0 | Status: DISCONTINUED | OUTPATIENT
Start: 2021-05-28 | End: 2021-05-28

## 2021-05-28 RX ORDER — ONDANSETRON 8 MG/1
4 TABLET, FILM COATED ORAL ONCE
Refills: 0 | Status: DISCONTINUED | OUTPATIENT
Start: 2021-05-28 | End: 2021-05-28

## 2021-05-28 RX ORDER — HYDROMORPHONE HYDROCHLORIDE 2 MG/ML
0.5 INJECTION INTRAMUSCULAR; INTRAVENOUS; SUBCUTANEOUS EVERY 4 HOURS
Refills: 0 | Status: DISCONTINUED | OUTPATIENT
Start: 2021-05-28 | End: 2021-05-31

## 2021-05-28 RX ORDER — FENTANYL CITRATE 50 UG/ML
50 INJECTION INTRAVENOUS
Refills: 0 | Status: DISCONTINUED | OUTPATIENT
Start: 2021-05-28 | End: 2021-05-28

## 2021-05-28 RX ORDER — SODIUM CHLORIDE 9 MG/ML
3 INJECTION INTRAMUSCULAR; INTRAVENOUS; SUBCUTANEOUS EVERY 8 HOURS
Refills: 0 | Status: DISCONTINUED | OUTPATIENT
Start: 2021-05-28 | End: 2021-05-28

## 2021-05-28 RX ORDER — SODIUM CHLORIDE 9 MG/ML
1000 INJECTION, SOLUTION INTRAVENOUS
Refills: 0 | Status: DISCONTINUED | OUTPATIENT
Start: 2021-05-28 | End: 2021-05-29

## 2021-05-28 RX ORDER — SODIUM CHLORIDE 9 MG/ML
1000 INJECTION, SOLUTION INTRAVENOUS
Refills: 0 | Status: DISCONTINUED | OUTPATIENT
Start: 2021-05-28 | End: 2021-05-28

## 2021-05-28 RX ADMIN — Medication 400 MILLIGRAM(S): at 19:25

## 2021-05-28 RX ADMIN — Medication 15 MILLIGRAM(S): at 23:30

## 2021-05-28 RX ADMIN — HEPARIN SODIUM 5000 UNIT(S): 5000 INJECTION INTRAVENOUS; SUBCUTANEOUS at 10:54

## 2021-05-28 RX ADMIN — Medication 15 MILLIGRAM(S): at 23:45

## 2021-05-28 RX ADMIN — Medication 1000 MILLIGRAM(S): at 20:00

## 2021-05-28 RX ADMIN — SODIUM CHLORIDE 70 MILLILITER(S): 9 INJECTION, SOLUTION INTRAVENOUS at 23:39

## 2021-05-28 NOTE — BRIEF OPERATIVE NOTE - OPERATION/FINDINGS
Tattooed sigmoid colon identified, multiple lymph nodes noted with tattoo dye. Viable margins of the colon visualized with IC-Green Firefly. 28 EEA stapler used. Leak test negative for air bubbles.

## 2021-05-28 NOTE — CHART NOTE - NSCHARTNOTEFT_GEN_A_CORE
Subjective/Overnight event: Evaluated at bedside found laying down in no distress.  Denies any fever/chills, nausea/vomiting, dizziness, SOB chest pain, constipation/diarrhea, weakness, numbness.       MEDICATIONS  (STANDING):  aspirin enteric coated 81 milliGRAM(s) Oral daily  atorvastatin 40 milliGRAM(s) Oral at bedtime  cadexomer iodine 0.9% Gel 1 Application(s) Topical daily  calcium acetate 667 milliGRAM(s) Oral four times a day with meals  carvedilol 25 milliGRAM(s) Oral every 12 hours  chlorhexidine 4% Liquid 1 Application(s) Topical Once  dextrose 40% Gel 15 Gram(s) Oral once  dextrose 5%. 1000 milliLiter(s) (50 mL/Hr) IV Continuous <Continuous>  dextrose 5%. 1000 milliLiter(s) (100 mL/Hr) IV Continuous <Continuous>  dextrose 50% Injectable 25 Gram(s) IV Push once  dextrose 50% Injectable 12.5 Gram(s) IV Push once  dextrose 50% Injectable 25 Gram(s) IV Push once  furosemide    Tablet 80 milliGRAM(s) Oral daily  glucagon  Injectable 1 milliGRAM(s) IntraMuscular once  hydrALAZINE 50 milliGRAM(s) Oral every 8 hours  insulin glargine Injectable (LANTUS) 30 Unit(s) SubCutaneous at bedtime  insulin lispro (ADMELOG) corrective regimen sliding scale   SubCutaneous Before meals and at bedtime  isosorbide   mononitrate ER Tablet (IMDUR) 30 milliGRAM(s) Oral daily  Nephro-deanna 1 Tablet(s) Oral daily  piperacillin/tazobactam IVPB.. 3.375 Gram(s) IV Intermittent every 12 hours    MEDICATIONS  (PRN):      Vital Signs:  Vital Signs Last 24 Hrs  T(C): 36.6 (26 May 2021 16:00), Max: 36.6 (26 May 2021 10:37)  T(F): 97.9 (26 May 2021 16:00), Max: 97.9 (26 May 2021 16:00)  HR: 81 (26 May 2021 16:00) (71 - 81)  BP: 142/59 (26 May 2021 16:00) (142/59 - 174/81)  BP(mean): --  RR: 17 (26 May 2021 16:00) (16 - 17)  SpO2: 100% (26 May 2021 16:00) (100% - 100%)    CAPILLARY BLOOD GLUCOSE      POCT Blood Glucose.: 118 mg/dL (26 May 2021 17:53)  POCT Blood Glucose.: 113 mg/dL (26 May 2021 14:32)      I&O's Detail      Physical Examination:  General: alert, oriented x 3 in no acute distress   CV: normal S1/S2, RRR, no gallops, murmurs or rubs   Lungs: clear to auscultation b/l, symmetric chest movement, no rales, rhonchi or wheezing   Abdomen: soft, non-tender, non-distended, +BS  EXT: sensation intact, full ROM     Labs:    05-26    136  |  98  |  52.0<H>  ----------------------------<  145<H>  5.0   |  25.0  |  9.48<H>    Ca    10.0      26 May 2021 10:06    TPro  7.9  /  Alb  3.9  /  TBili  0.3<L>  /  DBili  x   /  AST  13  /  ALT  8   /  AlkPhos  87  05-26    LIVER FUNCTIONS - ( 26 May 2021 10:06 )  Alb: 3.9 g/dL / Pro: 7.9 g/dL / ALK PHOS: 87 U/L / ALT: 8 U/L / AST: 13 U/L / GGT: x                                 11.2   10.37 )-----------( 133      ( 26 May 2021 10:06 )             36.9 Subjective/Overnight event: Evaluated at bedside found laying down in no distress Robot assisted sigmoidectomy. Reports pain is under control with current pain medications. No bowel function reported. Voiding adequate volumes. Denies any fever/chills, nausea/vomiting, dizziness, SOB chest pain, constipation/diarrhea, weakness, numbness.       MEDICATIONS  (STANDING):  aspirin enteric coated 81 milliGRAM(s) Oral daily  atorvastatin 40 milliGRAM(s) Oral at bedtime  cadexomer iodine 0.9% Gel 1 Application(s) Topical daily  calcium acetate 667 milliGRAM(s) Oral four times a day with meals  carvedilol 25 milliGRAM(s) Oral every 12 hours  chlorhexidine 4% Liquid 1 Application(s) Topical Once  dextrose 40% Gel 15 Gram(s) Oral once  dextrose 5%. 1000 milliLiter(s) (50 mL/Hr) IV Continuous <Continuous>  dextrose 5%. 1000 milliLiter(s) (100 mL/Hr) IV Continuous <Continuous>  dextrose 50% Injectable 25 Gram(s) IV Push once  dextrose 50% Injectable 12.5 Gram(s) IV Push once  dextrose 50% Injectable 25 Gram(s) IV Push once  furosemide    Tablet 80 milliGRAM(s) Oral daily  glucagon  Injectable 1 milliGRAM(s) IntraMuscular once  hydrALAZINE 50 milliGRAM(s) Oral every 8 hours  insulin glargine Injectable (LANTUS) 30 Unit(s) SubCutaneous at bedtime  insulin lispro (ADMELOG) corrective regimen sliding scale   SubCutaneous Before meals and at bedtime  isosorbide   mononitrate ER Tablet (IMDUR) 30 milliGRAM(s) Oral daily  Nephro-deanna 1 Tablet(s) Oral daily  piperacillin/tazobactam IVPB.. 3.375 Gram(s) IV Intermittent every 12 hours    MEDICATIONS  (PRN):  Vital Signs:  Vital Signs Last 24 Hrs  T(C): 36.6 (26 May 2021 16:00), Max: 36.6 (26 May 2021 10:37)  T(F): 97.9 (26 May 2021 16:00), Max: 97.9 (26 May 2021 16:00)  HR: 81 (26 May 2021 16:00) (71 - 81)  BP: 142/59 (26 May 2021 16:00) (142/59 - 174/81)  BP(mean): --  RR: 17 (26 May 2021 16:00) (16 - 17)  SpO2: 100% (26 May 2021 16:00) (100% - 100%)    CAPILLARY BLOOD GLUCOSE  POCT Blood Glucose.: 118 mg/dL (26 May 2021 17:53)  POCT Blood Glucose.: 113 mg/dL (26 May 2021 14:32)    Physical Examination:  General: alert, oriented x 3 in no acute distress   CV: normal S1/S2, RRR, no gallops, murmurs or rubs   Lungs:  clear to auscultation b/l, symmetric chest movement, no rales, rhonchi or wheezing   Abdomen: surgical wound with no surrounding erythema, edema or active bleeding, soft, non-tender, non-distended, +BS  EXT: sensation intact, full ROM     Labs:    05-26    136  |  98  |  52.0<H>  ----------------------------<  145<H>  5.0   |  25.0  |  9.48<H>    Ca    10.0      26 May 2021 10:06    TPro  7.9  /  Alb  3.9  /  TBili  0.3<L>  /  DBili  x   /  AST  13  /  ALT  8   /  AlkPhos  87  05-26    LIVER FUNCTIONS - ( 26 May 2021 10:06 )  Alb: 3.9 g/dL / Pro: 7.9 g/dL / ALK PHOS: 87 U/L / ALT: 8 U/L / AST: 13 U/L / GGT: x                                 11.2   10.37 )-----------( 133      ( 26 May 2021 10:06 )             36.9    Assessment:   49 y/o F s/p robot assisted sigmoidectomy. Patient tolerated well procedure with no reports of new symptoms. Still awaiting bowel function.     Plan:   - Advance diet as tolerated   - Monitor bowel function   - Discontinue mireles 5/29 AM

## 2021-05-28 NOTE — BRIEF OPERATIVE NOTE - NSICDXBRIEFPROCEDURE_GEN_ALL_CORE_FT
PROCEDURES:  Robot-assisted laparoscopic left colectomy or sigmoidectomy using da Eleazar Xi 28-May-2021 15:17:58  Beau Mathews

## 2021-05-29 LAB
COVID-19 SPIKE DOMAIN AB INTERP: POSITIVE
COVID-19 SPIKE DOMAIN ANTIBODY RESULT: >250 U/ML — HIGH
SARS-COV-2 IGG+IGM SERPL QL IA: >250 U/ML — HIGH
SARS-COV-2 IGG+IGM SERPL QL IA: POSITIVE

## 2021-05-29 RX ORDER — ACETAMINOPHEN 500 MG
1000 TABLET ORAL ONCE
Refills: 0 | Status: DISCONTINUED | OUTPATIENT
Start: 2021-05-29 | End: 2021-05-31

## 2021-05-29 RX ORDER — PANTOPRAZOLE SODIUM 20 MG/1
40 TABLET, DELAYED RELEASE ORAL
Refills: 0 | Status: DISCONTINUED | OUTPATIENT
Start: 2021-05-29 | End: 2021-05-31

## 2021-05-29 RX ADMIN — Medication 15 MILLIGRAM(S): at 23:48

## 2021-05-29 RX ADMIN — PANTOPRAZOLE SODIUM 40 MILLIGRAM(S): 20 TABLET, DELAYED RELEASE ORAL at 17:08

## 2021-05-29 RX ADMIN — Medication 15 MILLIGRAM(S): at 06:09

## 2021-05-29 RX ADMIN — Medication 15 MILLIGRAM(S): at 17:08

## 2021-05-29 RX ADMIN — Medication 975 MILLIGRAM(S): at 09:20

## 2021-05-29 RX ADMIN — TRAMADOL HYDROCHLORIDE 50 MILLIGRAM(S): 50 TABLET ORAL at 04:01

## 2021-05-29 RX ADMIN — Medication 975 MILLIGRAM(S): at 20:08

## 2021-05-29 RX ADMIN — Medication 975 MILLIGRAM(S): at 01:40

## 2021-05-29 RX ADMIN — Medication 975 MILLIGRAM(S): at 02:19

## 2021-05-29 RX ADMIN — Medication 15 MILLIGRAM(S): at 17:23

## 2021-05-29 RX ADMIN — TRAMADOL HYDROCHLORIDE 50 MILLIGRAM(S): 50 TABLET ORAL at 04:45

## 2021-05-29 RX ADMIN — Medication 975 MILLIGRAM(S): at 08:27

## 2021-05-29 RX ADMIN — Medication 15 MILLIGRAM(S): at 13:15

## 2021-05-29 RX ADMIN — ONDANSETRON 4 MILLIGRAM(S): 8 TABLET, FILM COATED ORAL at 20:31

## 2021-05-29 RX ADMIN — Medication 975 MILLIGRAM(S): at 12:59

## 2021-05-29 RX ADMIN — Medication 15 MILLIGRAM(S): at 13:00

## 2021-05-29 RX ADMIN — Medication 975 MILLIGRAM(S): at 21:08

## 2021-05-29 RX ADMIN — Medication 975 MILLIGRAM(S): at 13:50

## 2021-05-29 RX ADMIN — ENOXAPARIN SODIUM 40 MILLIGRAM(S): 100 INJECTION SUBCUTANEOUS at 06:09

## 2021-05-29 NOTE — PROGRESS NOTE ADULT - SUBJECTIVE AND OBJECTIVE BOX
Subjective/Overnight event: Evaluated at bedside found laying down in no distress AM. No overnight events.  Denies any fever/chills, nausea/vomiting, dizziness, SOB chest pain, constipation/diarrhea, weakness, numbness.       MEDICATIONS  (STANDING):  acetaminophen   Tablet .. 975 milliGRAM(s) Oral every 6 hours  enoxaparin Injectable 40 milliGRAM(s) SubCutaneous every 24 hours  ketorolac   Injectable 15 milliGRAM(s) IV Push every 6 hours  lactated ringers. 1000 milliLiter(s) (70 mL/Hr) IV Continuous <Continuous>    MEDICATIONS  (PRN):  HYDROmorphone  Injectable 0.5 milliGRAM(s) IV Push every 4 hours PRN for break through pain  ondansetron Injectable 4 milliGRAM(s) IV Push every 6 hours PRN Nausea  oxyCODONE    IR 5 milliGRAM(s) Oral every 4 hours PRN Severe Pain (7 - 10)  traMADol 50 milliGRAM(s) Oral every 4 hours PRN Moderate Pain (4 - 6)      Vital Signs:  Vital Signs Last 24 Hrs  T(C): 37 (28 May 2021 21:03), Max: 37.6 (28 May 2021 16:40)  T(F): 98.6 (28 May 2021 21:03), Max: 99.6 (28 May 2021 16:40)  HR: 75 (28 May 2021 21:03) (75 - 96)  BP: 113/64 (28 May 2021 21:03) (92/58 - 122/65)  BP(mean): --  RR: 15 (28 May 2021 20:30) (10 - 18)  SpO2: 96% (28 May 2021 21:03) (94% - 100%)    CAPILLARY BLOOD GLUCOSE      POCT Blood Glucose.: 107 mg/dL (28 May 2021 16:45)  POCT Blood Glucose.: 187 mg/dL (28 May 2021 09:28)  POCT Blood Glucose.: 184 mg/dL (28 May 2021 09:26)      I&O's Detail    28 May 2021 07:01  -  29 May 2021 01:10  --------------------------------------------------------  IN:    Lactated Ringers: 300 mL    Oral Fluid: 240 mL  Total IN: 540 mL    OUT:    Indwelling Catheter - Urethral (mL): 350 mL  Total OUT: 350 mL    Total NET: 190 mL          Physical Examination:  General: alert, oriented x 3 in no acute distress   CV: normal S1/S2, RRR, no gallops, murmurs or rubs   Lungs: clear to auscultation b/l, symmetric chest movement, no rales, rhonchi or wheezing   Abdomen: soft, non-tender, non-distended, +BS  EXT: sensation intact, full ROM     Labs:                                         Subjective/Overnight event: Evaluated at bedside found laying down in no distress AM. No overnight events. Reports pain is under control with current pain medications. No bowel function reported. Voiding adequate volumes. Denies any fever/chills, nausea/vomiting, dizziness, SOB chest pain, constipation/diarrhea, weakness, numbness. Denies any fever/chills, nausea/vomiting, dizziness, SOB chest pain, constipation/diarrhea, weakness, numbness.       MEDICATIONS  (STANDING):  acetaminophen   Tablet .. 975 milliGRAM(s) Oral every 6 hours  enoxaparin Injectable 40 milliGRAM(s) SubCutaneous every 24 hours  ketorolac   Injectable 15 milliGRAM(s) IV Push every 6 hours  lactated ringers. 1000 milliLiter(s) (70 mL/Hr) IV Continuous <Continuous>    MEDICATIONS  (PRN):  HYDROmorphone  Injectable 0.5 milliGRAM(s) IV Push every 4 hours PRN for break through pain  ondansetron Injectable 4 milliGRAM(s) IV Push every 6 hours PRN Nausea  oxyCODONE    IR 5 milliGRAM(s) Oral every 4 hours PRN Severe Pain (7 - 10)  traMADol 50 milliGRAM(s) Oral every 4 hours PRN Moderate Pain (4 - 6)      Vital Signs:  Vital Signs Last 24 Hrs  T(C): 37 (28 May 2021 21:03), Max: 37.6 (28 May 2021 16:40)  T(F): 98.6 (28 May 2021 21:03), Max: 99.6 (28 May 2021 16:40)  HR: 75 (28 May 2021 21:03) (75 - 96)  BP: 113/64 (28 May 2021 21:03) (92/58 - 122/65)  BP(mean): --  RR: 15 (28 May 2021 20:30) (10 - 18)  SpO2: 96% (28 May 2021 21:03) (94% - 100%)    CAPILLARY BLOOD GLUCOSE  POCT Blood Glucose.: 107 mg/dL (28 May 2021 16:45)  POCT Blood Glucose.: 187 mg/dL (28 May 2021 09:28)  POCT Blood Glucose.: 184 mg/dL (28 May 2021 09:26)      I&O's Detail    28 May 2021 07:01  -  29 May 2021 01:10  --------------------------------------------------------  IN:    Lactated Ringers: 300 mL    Oral Fluid: 240 mL  Total IN: 540 mL    OUT:    Indwelling Catheter - Urethral (mL): 350 mL  Total OUT: 350 mL    Total NET: 190 mL    Physical Examination:  General: alert, oriented x 3 in no acute distress   CV: normal S1/S2, RRR, no gallops, murmurs or rubs   Lungs: clear to auscultation b/l, symmetric chest movement, no rales, rhonchi or wheezing   Abdomen: surgical wound with no surrounding erythema, edema or active bleeding, soft, non-tender, non-distended, +BS  EXT: sensation intact, full ROM

## 2021-05-29 NOTE — PROGRESS NOTE ADULT - ATTENDING COMMENTS
Seen and examined.  No c/o. Pain well controlled.  Hola liquid diet without N/V. Passing gas but no stool.  AFVSS  NAD  dressings in place, soft, approp tender, ND  Labs pending  POD 1  Advance to low fiber diet.  HLIV.  D/C planning for tomorrow.

## 2021-05-30 RX ADMIN — Medication 15 MILLIGRAM(S): at 12:14

## 2021-05-30 RX ADMIN — ONDANSETRON 4 MILLIGRAM(S): 8 TABLET, FILM COATED ORAL at 05:40

## 2021-05-30 RX ADMIN — TRAMADOL HYDROCHLORIDE 50 MILLIGRAM(S): 50 TABLET ORAL at 15:25

## 2021-05-30 RX ADMIN — Medication 975 MILLIGRAM(S): at 11:59

## 2021-05-30 RX ADMIN — TRAMADOL HYDROCHLORIDE 50 MILLIGRAM(S): 50 TABLET ORAL at 16:20

## 2021-05-30 RX ADMIN — Medication 975 MILLIGRAM(S): at 18:05

## 2021-05-30 RX ADMIN — Medication 975 MILLIGRAM(S): at 17:11

## 2021-05-30 RX ADMIN — Medication 15 MILLIGRAM(S): at 05:55

## 2021-05-30 RX ADMIN — Medication 975 MILLIGRAM(S): at 05:39

## 2021-05-30 RX ADMIN — Medication 15 MILLIGRAM(S): at 11:59

## 2021-05-30 RX ADMIN — PANTOPRAZOLE SODIUM 40 MILLIGRAM(S): 20 TABLET, DELAYED RELEASE ORAL at 05:39

## 2021-05-30 RX ADMIN — Medication 975 MILLIGRAM(S): at 12:55

## 2021-05-30 RX ADMIN — Medication 15 MILLIGRAM(S): at 05:33

## 2021-05-30 RX ADMIN — Medication 975 MILLIGRAM(S): at 06:45

## 2021-05-30 RX ADMIN — ENOXAPARIN SODIUM 40 MILLIGRAM(S): 100 INJECTION SUBCUTANEOUS at 05:33

## 2021-05-30 RX ADMIN — Medication 975 MILLIGRAM(S): at 23:05

## 2021-05-30 RX ADMIN — Medication 15 MILLIGRAM(S): at 17:26

## 2021-05-30 RX ADMIN — Medication 15 MILLIGRAM(S): at 00:05

## 2021-05-30 RX ADMIN — ONDANSETRON 4 MILLIGRAM(S): 8 TABLET, FILM COATED ORAL at 11:59

## 2021-05-30 RX ADMIN — Medication 15 MILLIGRAM(S): at 17:11

## 2021-05-30 NOTE — PROGRESS NOTE ADULT - SUBJECTIVE AND OBJECTIVE BOX
INTERVAL HPI/OVERNIGHT EVENTS: No acute events overnight.  Tolerating diet, urinating and ambulating independently. Passing flatus and soft bowel movement. Denies fevers, chills, nausea, emesis.  Denies chest pain, dyspnea.  Denies constipation, diarrhea. Denies headaches, dizziness, changes in vision.       MEDICATIONS  (STANDING):  acetaminophen   Tablet .. 975 milliGRAM(s) Oral every 6 hours  acetaminophen  IVPB .. 1000 milliGRAM(s) IV Intermittent once  enoxaparin Injectable 40 milliGRAM(s) SubCutaneous every 24 hours  ketorolac   Injectable 15 milliGRAM(s) IV Push every 6 hours  pantoprazole    Tablet 40 milliGRAM(s) Oral before breakfast    MEDICATIONS  (PRN):  HYDROmorphone  Injectable 0.5 milliGRAM(s) IV Push every 4 hours PRN for break through pain  ondansetron Injectable 4 milliGRAM(s) IV Push every 6 hours PRN Nausea  oxyCODONE    IR 5 milliGRAM(s) Oral every 4 hours PRN Severe Pain (7 - 10)  traMADol 50 milliGRAM(s) Oral every 4 hours PRN Moderate Pain (4 - 6)      Vital Signs Last 24 Hrs  T(C): 37.3 (29 May 2021 21:30), Max: 37.3 (29 May 2021 21:30)  T(F): 99.1 (29 May 2021 21:30), Max: 99.1 (29 May 2021 21:30)  HR: 63 (29 May 2021 21:30) (63 - 93)  BP: 97/61 (29 May 2021 21:30) (85/57 - 108/66)  BP(mean): --  RR: 18 (29 May 2021 21:30) (18 - 18)  SpO2: 95% (29 May 2021 21:30) (93% - 98%)      GEN: NAD, laying in bed, appears stated age  HEENT: NCAT, clear oral mucosa, normal conjunctiva  Chest: non-tender  CV:  non-tachycardic, 2+ radial pulse  Pulm: no increased work of breathing, no conversational dyspnea  GI: soft, non tender, dressing in place c/d/i, non-distended  MSK: moving all extremities       I&O's Detail    28 May 2021 07:01  -  29 May 2021 07:00  --------------------------------------------------------  IN:    Lactated Ringers: 300 mL    Oral Fluid: 240 mL  Total IN: 540 mL    OUT:    Indwelling Catheter - Urethral (mL): 1850 mL  Total OUT: 1850 mL    Total NET: -1310 mL      29 May 2021 07:01  -  30 May 2021 01:35  --------------------------------------------------------  IN:  Total IN: 0 mL    OUT:    Voided (mL): 1050 mL  Total OUT: 1050 mL    Total NET: -1050 mL

## 2021-05-30 NOTE — PROGRESS NOTE ADULT - ATTENDING COMMENTS
Seen and examined.  Some nausea though no emesis.   Had been tolerating po without incident. Passing gas and stool.  Pain controlled.  AFVSS  NAD  incisions CDI, soft, approp tender, ND  Labs not collected yest     POD 2  Cont po.  OOB.  Possible d/c tomorrow if feeling improved.

## 2021-05-31 ENCOUNTER — TRANSCRIPTION ENCOUNTER (OUTPATIENT)
Age: 51
End: 2021-05-31

## 2021-05-31 VITALS
HEART RATE: 64 BPM | DIASTOLIC BLOOD PRESSURE: 64 MMHG | OXYGEN SATURATION: 97 % | RESPIRATION RATE: 18 BRPM | TEMPERATURE: 98 F | SYSTOLIC BLOOD PRESSURE: 105 MMHG

## 2021-05-31 PROCEDURE — 93005 ELECTROCARDIOGRAM TRACING: CPT

## 2021-05-31 PROCEDURE — G0463: CPT

## 2021-05-31 RX ORDER — TRAMADOL HYDROCHLORIDE 50 MG/1
1 TABLET ORAL
Qty: 16 | Refills: 0
Start: 2021-05-31

## 2021-05-31 RX ORDER — ACETAMINOPHEN 500 MG
2 TABLET ORAL
Qty: 0 | Refills: 0 | DISCHARGE

## 2021-05-31 RX ORDER — ACETAMINOPHEN 500 MG
2 TABLET ORAL
Qty: 120 | Refills: 0
Start: 2021-05-31

## 2021-05-31 RX ORDER — IBUPROFEN 200 MG
1 TABLET ORAL
Qty: 120 | Refills: 0
Start: 2021-05-31

## 2021-05-31 RX ORDER — IBUPROFEN 200 MG
0 TABLET ORAL
Qty: 0 | Refills: 0 | DISCHARGE

## 2021-05-31 RX ADMIN — Medication 975 MILLIGRAM(S): at 05:38

## 2021-05-31 RX ADMIN — Medication 975 MILLIGRAM(S): at 00:25

## 2021-05-31 RX ADMIN — Medication 975 MILLIGRAM(S): at 11:16

## 2021-05-31 RX ADMIN — ONDANSETRON 4 MILLIGRAM(S): 8 TABLET, FILM COATED ORAL at 06:03

## 2021-05-31 RX ADMIN — Medication 975 MILLIGRAM(S): at 06:30

## 2021-05-31 RX ADMIN — Medication 975 MILLIGRAM(S): at 12:10

## 2021-05-31 RX ADMIN — ENOXAPARIN SODIUM 40 MILLIGRAM(S): 100 INJECTION SUBCUTANEOUS at 05:37

## 2021-05-31 RX ADMIN — PANTOPRAZOLE SODIUM 40 MILLIGRAM(S): 20 TABLET, DELAYED RELEASE ORAL at 05:37

## 2021-05-31 NOTE — DISCHARGE NOTE NURSING/CASE MANAGEMENT/SOCIAL WORK - PATIENT PORTAL LINK FT
You can access the FollowMyHealth Patient Portal offered by [facility] by registering at the following website: [email or web address]. By joining [facility]’s FollowMyHealth portal, you will also be able to view your health information using other applications (apps) compatible with our system.

## 2021-05-31 NOTE — PROGRESS NOTE ADULT - ASSESSMENT
49yo female admitted following robotic assisted sigmoid colectomy for previous diverticulitis on 5/28/2021    Postoperative course uneventful, doing well and stable for discharge    Regular diet  OOB ad arya  IS   DVT PPx Lovenox while in house  Home medications as prescribed  OK to shower    Discharge to home
51yo female admitted following robotic assisted sigmoid colectomy for previous diverticulitis on 5/28/2021. Patient remained hospitalized yesterday as the bowel function was slow.    Postoperative course uneventful, doing well and stable for discharge    Regular diet  OOB ad arya  IS   DVT PPx Lovenox while in house  Home medications as prescribed  OK to shower    Discharge to home
Assessment:   49 y/o F s/p robot assisted sigmoidectomy due sigmoid colon cancer. Patient tolerated well procedure with no reports of new symptoms. Currently on full liquid diet. Still awaiting bowel function.     Plan:   - Advance diet as tolerated   - Monitor bowel function   - Continue IVF   - Continue on DVT ppx   - Encourage out of bed ambulating   - Incentive spirometry   - Discontinue mireles 5/29 AM.

## 2021-05-31 NOTE — DISCHARGE NOTE PROVIDER - NSDCCPCAREPLAN_GEN_ALL_CORE_FT
PRINCIPAL DISCHARGE DIAGNOSIS  Diagnosis: Diverticulitis  Assessment and Plan of Treatment: 1) Regular diet as tolerated  2) OK to shower, no immersion in water for 2 weeks or until cleared by surgeon  3) Activity as tolerated EXCEPT no heavy lifting >10lbs for 2 weeks then none >25lbs for 6 weeks or until cleared by surgeon  4) Call clinic to make appointment in 2 weeks with Dr. Johnson  5) DO NOT DRIVE while taking narcotic pain medication  6) Over the Counter Tylenol and Ibuprofen (with food) for mild to moderate pain, Tramadol prescription for severe pain  7) Call clinic with concerns regarding persistent fevers, severe pain not relieved with medications, persistent nausea/vomiting, redness surrounding wound or purulent drainage from wound

## 2021-05-31 NOTE — DISCHARGE NOTE PROVIDER - PROVIDER TOKENS
PROVIDER:[TOKEN:[[id number]],FOLLOWUP:[2 weeks]] PROVIDER:[TOKEN:[[id number]],FOLLOWUP:[2 weeks]]

## 2021-05-31 NOTE — PROGRESS NOTE ADULT - ATTENDING COMMENTS
Seen and examined.  Feels much improved this AM.  Hola regular diet and passing gas and well formed BM.  AFVSS  NAD  soft, NTND  A/P -   D/C IV  D/C home.

## 2021-05-31 NOTE — DISCHARGE NOTE PROVIDER - CARE PROVIDER_API CALL
Digna Graf)  ColonRectal Surgery; Surgery  [address] [place] [address]  Phone: [phone]  Fax: [phone]  Follow Up Time: 2 weeks   Sanjana Johnson)  ColonRectal Surgery; Surgery  [address] [place] [address]  Phone: [phone]  Fax: [phone]  Follow Up Time: 2 weeks

## 2021-05-31 NOTE — DISCHARGE NOTE PROVIDER - CARE PROVIDERS DIRECT ADDRESSES
[email or web address]

## 2021-05-31 NOTE — DISCHARGE NOTE PROVIDER - HOSPITAL COURSE
51yo female with hx of diverticulitis, admitted following robotic assisted sigmoidectomy 5/28/2021    Postoperative course uneventful.  Bowel function returned POD2, tolerating regular diet urinating and ambulating independently.  Pain control now achieved with oral regimen.

## 2021-05-31 NOTE — DISCHARGE NOTE PROVIDER - NSDCMRMEDTOKEN_GEN_ALL_CORE_FT
acetaminophen 500 mg oral tablet: 2 tab(s) orally every 6 hours, As Needed -for mild pain MDD:4G  Albuterol (Eqv-ProAir HFA) 90 mcg/inh inhalation aerosol:   Flonase 50 mcg/inh nasal spray: 1 spray(s) nasal once a day  ibuprofen 600 mg oral tablet: 1 tab(s) orally every 6 hours, As Needed -for moderate pain MDD:6tabs  Melatonin 10 mg oral capsule: 1 cap(s) orally once a day (at bedtime)  multivitamin: 1  orally once a day  omeprazole 40 mg oral delayed release capsule: 1  orally 2 times a day  traMADol 50 mg oral tablet: 1 tab(s) orally every 6 hours, As Needed -for severe pain MDD:10 tabs  Vitamin C: 1   once a day  Xyzal 5 mg oral tablet: 1 tab(s) orally once a day (in the evening)

## 2021-06-01 ENCOUNTER — NON-APPOINTMENT (OUTPATIENT)
Age: 51
End: 2021-06-01

## 2021-06-01 LAB — GLUCOSE BLDC GLUCOMTR-MCNC: 142 MG/DL — HIGH (ref 70–99)

## 2021-06-04 LAB — SURGICAL PATHOLOGY STUDY: SIGNIFICANT CHANGE UP

## 2021-06-07 ENCOUNTER — APPOINTMENT (OUTPATIENT)
Dept: COLORECTAL SURGERY | Facility: CLINIC | Age: 51
End: 2021-06-07
Payer: MEDICAID

## 2021-06-07 VITALS
RESPIRATION RATE: 16 BRPM | TEMPERATURE: 98 F | WEIGHT: 114 LBS | BODY MASS INDEX: 21.52 KG/M2 | SYSTOLIC BLOOD PRESSURE: 118 MMHG | DIASTOLIC BLOOD PRESSURE: 71 MMHG | HEART RATE: 81 BPM | HEIGHT: 61 IN

## 2021-06-07 PROCEDURE — 99024 POSTOP FOLLOW-UP VISIT: CPT

## 2021-06-07 RX ORDER — IBUPROFEN 600 MG/1
600 TABLET, FILM COATED ORAL
Qty: 120 | Refills: 0 | Status: COMPLETED | COMMUNITY
Start: 2021-05-31

## 2021-06-07 RX ORDER — DOCUSATE SODIUM 100 MG/1
100 CAPSULE, LIQUID FILLED ORAL
Qty: 12 | Refills: 0 | Status: COMPLETED | COMMUNITY
Start: 2021-02-24

## 2021-06-07 RX ORDER — TRAMADOL HYDROCHLORIDE 50 MG/1
50 TABLET, COATED ORAL
Qty: 16 | Refills: 0 | Status: COMPLETED | COMMUNITY
Start: 2021-05-31

## 2021-06-07 RX ORDER — OXYCODONE AND ACETAMINOPHEN 5; 325 MG/1; MG/1
5-325 TABLET ORAL
Qty: 20 | Refills: 0 | Status: COMPLETED | COMMUNITY
Start: 2021-02-24

## 2021-06-07 RX ORDER — NAPROXEN 500 MG/1
500 TABLET ORAL
Qty: 28 | Refills: 0 | Status: COMPLETED | COMMUNITY
Start: 2021-03-15

## 2021-06-07 NOTE — HISTORY OF PRESENT ILLNESS
[FreeTextEntry1] : 50-year-old female who presents for a postoperative visit after undergoing a robotic sigmoid colon resection for colon cancer 10 days ago.  She has been doing very well from surgery.  She denies any abdominal pain, nausea or vomiting.  No fevers or chills.  She is having regular bowel movements without difficulty.

## 2021-06-07 NOTE — PHYSICAL EXAM
[Respiratory Effort] : normal respiratory effort [Calm] : calm [de-identified] : Soft, nontender, nondistended.  Well-healed surgical incisions without signs of infection. [de-identified] : Well-appearing, in no distress [de-identified] : Normocephalic, atraumatic [de-identified] : Moves extremities without difficulty [de-identified] : Warm and dry [de-identified] : Alert and oriented x3

## 2021-06-14 ENCOUNTER — OUTPATIENT (OUTPATIENT)
Dept: OUTPATIENT SERVICES | Facility: HOSPITAL | Age: 51
LOS: 1 days | Discharge: HOME | End: 2021-06-14

## 2021-06-14 DIAGNOSIS — Z98.890 OTHER SPECIFIED POSTPROCEDURAL STATES: Chronic | ICD-10-CM

## 2021-06-14 DIAGNOSIS — Z98.891 HISTORY OF UTERINE SCAR FROM PREVIOUS SURGERY: Chronic | ICD-10-CM

## 2021-06-14 DIAGNOSIS — C18.9 MALIGNANT NEOPLASM OF COLON, UNSPECIFIED: ICD-10-CM

## 2021-06-14 DIAGNOSIS — Z90.710 ACQUIRED ABSENCE OF BOTH CERVIX AND UTERUS: Chronic | ICD-10-CM

## 2021-06-16 ENCOUNTER — APPOINTMENT (OUTPATIENT)
Dept: HEMATOLOGY ONCOLOGY | Facility: CLINIC | Age: 51
End: 2021-06-16
Payer: MEDICAID

## 2021-06-16 ENCOUNTER — RESULT REVIEW (OUTPATIENT)
Age: 51
End: 2021-06-16

## 2021-06-16 VITALS
BODY MASS INDEX: 21.34 KG/M2 | SYSTOLIC BLOOD PRESSURE: 124 MMHG | WEIGHT: 113 LBS | DIASTOLIC BLOOD PRESSURE: 79 MMHG | HEART RATE: 91 BPM | HEIGHT: 61 IN | OXYGEN SATURATION: 92 %

## 2021-06-16 LAB
BASOPHILS # BLD AUTO: 0.2 K/UL — SIGNIFICANT CHANGE UP (ref 0–0.2)
BASOPHILS NFR BLD AUTO: 1.3 % — SIGNIFICANT CHANGE UP (ref 0–2)
EOSINOPHIL # BLD AUTO: 1.6 K/UL — HIGH (ref 0–0.5)
EOSINOPHIL NFR BLD AUTO: 13.7 % — HIGH (ref 0–6)
HCT VFR BLD CALC: 40.2 % — SIGNIFICANT CHANGE UP (ref 34.5–45)
HGB BLD-MCNC: 12.6 G/DL — SIGNIFICANT CHANGE UP (ref 11.5–15.5)
LYMPHOCYTES # BLD AUTO: 1.3 K/UL — SIGNIFICANT CHANGE UP (ref 1–3.3)
LYMPHOCYTES # BLD AUTO: 11 % — LOW (ref 13–44)
MCHC RBC-ENTMCNC: 29 PG — SIGNIFICANT CHANGE UP (ref 27–34)
MCHC RBC-ENTMCNC: 31.4 G/DL — LOW (ref 32–36)
MCV RBC AUTO: 92.4 FL — SIGNIFICANT CHANGE UP (ref 80–100)
MONOCYTES # BLD AUTO: 0.9 K/UL — SIGNIFICANT CHANGE UP (ref 0–0.9)
MONOCYTES NFR BLD AUTO: 7.3 % — SIGNIFICANT CHANGE UP (ref 2–14)
NEUTROPHILS # BLD AUTO: 7.8 K/UL — HIGH (ref 1.8–7.4)
NEUTROPHILS NFR BLD AUTO: 66.7 % — SIGNIFICANT CHANGE UP (ref 43–77)
PLATELET # BLD AUTO: 323 K/UL — SIGNIFICANT CHANGE UP (ref 150–400)
RBC # BLD: 4.35 M/UL — SIGNIFICANT CHANGE UP (ref 3.8–5.2)
RBC # FLD: 13 % — SIGNIFICANT CHANGE UP (ref 10.3–14.5)
SURGICAL PATHOLOGY STUDY: SIGNIFICANT CHANGE UP
WBC # BLD: 11.7 K/UL — HIGH (ref 3.8–10.5)
WBC # FLD AUTO: 11.7 K/UL — HIGH (ref 3.8–10.5)

## 2021-06-16 PROCEDURE — 99205 OFFICE O/P NEW HI 60 MIN: CPT

## 2021-06-16 NOTE — ASSESSMENT
[FreeTextEntry1] : pT3 N0 M0 ADenocarcinoma SIgmoid colon, \par \par - Invasive moderately differentiated adenocarcinoma invading\par through the muscularis propria into pericolorectal tissue.\par - Tattoo is present.\par - Diverticulosis.\par - 24 lymph nodes, negative for carcinoma (0/24).\par - Margins of resection are not involved.\par \par Rsected node-negative (stage II) disease, the benefits of chemotherapy are controversial, as is the relative benefit of an oxaliplatin-based as compared with a non-oxaliplatin-based regimen.\par Benefits of chemotherapy is based on high risk features \par PAtient deos not have any high risk features, No LVi,  NO neural invasion 25 LN examined, NO Perforation \par \par GIven no high risk features no benefit of adjuvant chemotherapy\par - WIll order Labs today \par - F/u visit with labs cbc/ cmp/ CEA q 3months x 2 y and then q 6 months from Y 3-5 \par - CT CAP q 6 months x 5 years and Y3-5 and consider spacing q 8-12 months\par - Colonoscopy 1 y after surgery \par - refer to genetic counselor \par - f/u in 3 months \par \par

## 2021-06-16 NOTE — HISTORY OF PRESENT ILLNESS
[de-identified] : 50-year-old female presents to our office with a new diagnosis of Sigmoid colon cancer \par \par  She has had a variety of symptoms with chronic pelvic pain over the last 10 years and recently underwent a hysterectomy for adenomyosis by Dr. Cruz in February 2021. Several of her pelvic symptoms did improve. \par After that she had intermittent blood in her stool and some changes in the caliber of her stool. Her appetite has been fine and she has not had any significant weight loss. \par She underwent a colonoscopy which showed a mass in the sigmoid colon. \par COLONOSCOPY: 5/17/2021: Intramucosal adenocarcinoma \par CT CAP 5/2021 :  LUNGS AND LARGE AIRWAYS: Patent central airways. There is 3 mm nodular thickening of the minor fissure on image 80, likely postinflammatory. There is a 2 mm calcified granuloma in the left lower lobe on image 125. No confluent airspace opacity is identified. There is no evidence of interstitial lung disease, bronchiectasis, or fibrosis.\par \par S/p Robotic sigmoid colon resection for colon cancer on 5/28/21 \par T3N0 stage II colon cancer, MSI Stable, No high risk features\par  [de-identified] : Patient here with significant other for initial visit \par She is recovering well post op  Denies any symptoms \par Some loose BM over the past few days \par + recent sinus issues \par Patient had a 11 lb weight loss over the past few mnths from surgeries\par No fevers chills \par \par BIological children 21, 19 , 14 \par \par

## 2021-06-16 NOTE — RESULTS/DATA
[FreeTextEntry1] : \par PATHOLOGY 5/28/21 \par - Adenocarcinoma, moderately differentiated.\par - Sigmoid colon \par - Invasive moderately differentiated adenocarcinoma extending to pericolorectal\par adipose tissue (pT3).\par \par MLH1, MSH2, MSH6, PMS2:   Intact nuclear expression These results show low probability of microsatellite instability-high (MSI-H).\par There is no evidence of DNA mismatch repair deficiency in the analyzed tissue, indicating there is a low probability for Delgado\par syndrome (a common cause hereditary non polyposis colorectal\par cancer or HNPCC).\par \par CK-7: focal positive. CK-20, CDX2: positive\par \par COLONOSCOPY: 5/17/2021: Intramucosal adenocarcinoma \par \par CT CAP 5/2021 :  LUNGS AND LARGE AIRWAYS: Patent central airways. There is 3 mm nodular thickening of the minor fissure on image 80, likely postinflammatory. There is a 2 mm calcified granuloma in the left lower lobe on image 125. No confluent airspace opacity is identified. There is no evidence of interstitial lung disease, bronchiectasis, or fibrosis.\par

## 2021-06-18 DIAGNOSIS — N39.0 URINARY TRACT INFECTION, SITE NOT SPECIFIED: ICD-10-CM

## 2021-06-21 LAB
ALBUMIN SERPL ELPH-MCNC: 4.3 G/DL
ALP BLD-CCNC: 69 U/L
ALT SERPL-CCNC: 19 U/L
ANION GAP SERPL CALC-SCNC: 13 MMOL/L
AST SERPL-CCNC: 23 U/L
BILIRUB SERPL-MCNC: 0.2 MG/DL
BUN SERPL-MCNC: 10 MG/DL
CALCIUM SERPL-MCNC: 9.2 MG/DL
CEA SERPL-MCNC: 1.7 NG/ML
CHLORIDE SERPL-SCNC: 107 MMOL/L
CO2 SERPL-SCNC: 24 MMOL/L
CREAT SERPL-MCNC: 0.85 MG/DL
GLUCOSE SERPL-MCNC: 86 MG/DL
POTASSIUM SERPL-SCNC: 4.3 MMOL/L
PROT SERPL-MCNC: 6.4 G/DL
SODIUM SERPL-SCNC: 145 MMOL/L

## 2021-06-22 ENCOUNTER — TRANSCRIPTION ENCOUNTER (OUTPATIENT)
Age: 51
End: 2021-06-22

## 2021-06-22 PROCEDURE — S2900: CPT

## 2021-06-22 PROCEDURE — 88341 IMHCHEM/IMCYTCHM EA ADD ANTB: CPT

## 2021-06-22 PROCEDURE — 88305 TISSUE EXAM BY PATHOLOGIST: CPT

## 2021-06-22 PROCEDURE — 36415 COLL VENOUS BLD VENIPUNCTURE: CPT

## 2021-06-22 PROCEDURE — 82962 GLUCOSE BLOOD TEST: CPT

## 2021-06-22 PROCEDURE — 88342 IMHCHEM/IMCYTCHM 1ST ANTB: CPT

## 2021-06-22 PROCEDURE — C1889: CPT

## 2021-06-22 PROCEDURE — 86769 SARS-COV-2 COVID-19 ANTIBODY: CPT

## 2021-06-22 PROCEDURE — 88309 TISSUE EXAM BY PATHOLOGIST: CPT

## 2021-06-30 ENCOUNTER — TRANSCRIPTION ENCOUNTER (OUTPATIENT)
Age: 51
End: 2021-06-30

## 2021-07-12 ENCOUNTER — TRANSCRIPTION ENCOUNTER (OUTPATIENT)
Age: 51
End: 2021-07-12

## 2021-07-29 ENCOUNTER — APPOINTMENT (OUTPATIENT)
Dept: COLORECTAL SURGERY | Facility: CLINIC | Age: 51
End: 2021-07-29
Payer: MEDICAID

## 2021-07-29 VITALS
HEIGHT: 61 IN | OXYGEN SATURATION: 97 % | WEIGHT: 109 LBS | DIASTOLIC BLOOD PRESSURE: 71 MMHG | HEART RATE: 71 BPM | SYSTOLIC BLOOD PRESSURE: 110 MMHG | BODY MASS INDEX: 20.58 KG/M2 | TEMPERATURE: 97.9 F | RESPIRATION RATE: 14 BRPM

## 2021-07-29 PROCEDURE — 99024 POSTOP FOLLOW-UP VISIT: CPT

## 2021-07-29 NOTE — PHYSICAL EXAM
[Respiratory Effort] : normal respiratory effort [Calm] : calm [de-identified] : Soft, nontender, nondistended.  Well-healed surgical incisions without signs of infection. Epigastric trocar site with small bump which may be reacture to the suture knot. No sign of infection [de-identified] : Well-appearing, in no distress [de-identified] : Normocephalic, atraumatic [de-identified] : Moves extremities without difficulty [de-identified] : Warm and dry [de-identified] : Alert and oriented x3

## 2021-07-29 NOTE — HISTORY OF PRESENT ILLNESS
[FreeTextEntry1] : 50-year-old female who presents for a postoperative visit after undergoing a robotic sigmoid colon resection for colon cancer on May 28.  He has been doing very well since surgery and has no complaints today.  She has about 2 formed bowel movements on a daily basis.  No abdominal pain, nausea or vomiting.  She does notice some discomfort and itching from one of the robotic trocar sites.  She met with Dr. Valenzuela from medical oncology and no adjuvant chemotherapy was recommended.\par \par

## 2021-08-23 ENCOUNTER — OUTPATIENT (OUTPATIENT)
Dept: OUTPATIENT SERVICES | Facility: HOSPITAL | Age: 51
LOS: 1 days | End: 2021-08-23

## 2021-08-23 DIAGNOSIS — Z98.891 HISTORY OF UTERINE SCAR FROM PREVIOUS SURGERY: Chronic | ICD-10-CM

## 2021-08-23 DIAGNOSIS — Z98.890 OTHER SPECIFIED POSTPROCEDURAL STATES: Chronic | ICD-10-CM

## 2021-08-23 DIAGNOSIS — C18.6 MALIGNANT NEOPLASM OF DESCENDING COLON: ICD-10-CM

## 2021-08-23 DIAGNOSIS — Z90.710 ACQUIRED ABSENCE OF BOTH CERVIX AND UTERUS: Chronic | ICD-10-CM

## 2021-08-25 ENCOUNTER — APPOINTMENT (OUTPATIENT)
Dept: CT IMAGING | Facility: CLINIC | Age: 51
End: 2021-08-25
Payer: MEDICAID

## 2021-08-25 ENCOUNTER — RESULT REVIEW (OUTPATIENT)
Age: 51
End: 2021-08-25

## 2021-08-25 ENCOUNTER — OUTPATIENT (OUTPATIENT)
Dept: OUTPATIENT SERVICES | Facility: HOSPITAL | Age: 51
LOS: 1 days | End: 2021-08-25
Payer: MEDICAID

## 2021-08-25 DIAGNOSIS — Z98.890 OTHER SPECIFIED POSTPROCEDURAL STATES: Chronic | ICD-10-CM

## 2021-08-25 DIAGNOSIS — C18.6 MALIGNANT NEOPLASM OF DESCENDING COLON: ICD-10-CM

## 2021-08-25 DIAGNOSIS — Z90.710 ACQUIRED ABSENCE OF BOTH CERVIX AND UTERUS: Chronic | ICD-10-CM

## 2021-08-25 DIAGNOSIS — Z98.891 HISTORY OF UTERINE SCAR FROM PREVIOUS SURGERY: Chronic | ICD-10-CM

## 2021-08-25 PROCEDURE — 74177 CT ABD & PELVIS W/CONTRAST: CPT

## 2021-08-25 PROCEDURE — 71260 CT THORAX DX C+: CPT

## 2021-08-25 PROCEDURE — 71260 CT THORAX DX C+: CPT | Mod: 26

## 2021-08-25 PROCEDURE — 74177 CT ABD & PELVIS W/CONTRAST: CPT | Mod: 26

## 2021-09-08 ENCOUNTER — OUTPATIENT (OUTPATIENT)
Dept: OUTPATIENT SERVICES | Facility: HOSPITAL | Age: 51
LOS: 1 days | Discharge: HOME | End: 2021-09-08

## 2021-09-08 DIAGNOSIS — Z98.890 OTHER SPECIFIED POSTPROCEDURAL STATES: Chronic | ICD-10-CM

## 2021-09-08 DIAGNOSIS — Z90.710 ACQUIRED ABSENCE OF BOTH CERVIX AND UTERUS: Chronic | ICD-10-CM

## 2021-09-08 DIAGNOSIS — C18.9 MALIGNANT NEOPLASM OF COLON, UNSPECIFIED: ICD-10-CM

## 2021-09-08 DIAGNOSIS — Z98.891 HISTORY OF UTERINE SCAR FROM PREVIOUS SURGERY: Chronic | ICD-10-CM

## 2021-09-10 ENCOUNTER — APPOINTMENT (OUTPATIENT)
Dept: HEMATOLOGY ONCOLOGY | Facility: CLINIC | Age: 51
End: 2021-09-10
Payer: MEDICAID

## 2021-09-10 ENCOUNTER — RESULT REVIEW (OUTPATIENT)
Age: 51
End: 2021-09-10

## 2021-09-10 VITALS
SYSTOLIC BLOOD PRESSURE: 99 MMHG | WEIGHT: 112.01 LBS | BODY MASS INDEX: 21.15 KG/M2 | HEART RATE: 74 BPM | OXYGEN SATURATION: 99 % | DIASTOLIC BLOOD PRESSURE: 66 MMHG | HEIGHT: 61 IN

## 2021-09-10 VITALS — TEMPERATURE: 98.3 F

## 2021-09-10 LAB
BASOPHILS # BLD AUTO: 0.1 K/UL — SIGNIFICANT CHANGE UP (ref 0–0.2)
BASOPHILS NFR BLD AUTO: 1.8 % — SIGNIFICANT CHANGE UP (ref 0–2)
EOSINOPHIL # BLD AUTO: 0.4 K/UL — SIGNIFICANT CHANGE UP (ref 0–0.5)
EOSINOPHIL NFR BLD AUTO: 8.5 % — HIGH (ref 0–6)
HCT VFR BLD CALC: 40.8 % — SIGNIFICANT CHANGE UP (ref 34.5–45)
HGB BLD-MCNC: 12.8 G/DL — SIGNIFICANT CHANGE UP (ref 11.5–15.5)
LYMPHOCYTES # BLD AUTO: 1.3 K/UL — SIGNIFICANT CHANGE UP (ref 1–3.3)
LYMPHOCYTES # BLD AUTO: 24.7 % — SIGNIFICANT CHANGE UP (ref 13–44)
MCHC RBC-ENTMCNC: 29 PG — SIGNIFICANT CHANGE UP (ref 27–34)
MCHC RBC-ENTMCNC: 31.2 G/DL — LOW (ref 32–36)
MCV RBC AUTO: 93.1 FL — SIGNIFICANT CHANGE UP (ref 80–100)
MONOCYTES # BLD AUTO: 0.5 K/UL — SIGNIFICANT CHANGE UP (ref 0–0.9)
MONOCYTES NFR BLD AUTO: 10.6 % — SIGNIFICANT CHANGE UP (ref 2–14)
NEUTROPHILS # BLD AUTO: 2.8 K/UL — SIGNIFICANT CHANGE UP (ref 1.8–7.4)
NEUTROPHILS NFR BLD AUTO: 54.4 % — SIGNIFICANT CHANGE UP (ref 43–77)
PLATELET # BLD AUTO: 260 K/UL — SIGNIFICANT CHANGE UP (ref 150–400)
RBC # BLD: 4.39 M/UL — SIGNIFICANT CHANGE UP (ref 3.8–5.2)
RBC # FLD: 13.4 % — SIGNIFICANT CHANGE UP (ref 10.3–14.5)
WBC # BLD: 5.1 K/UL — SIGNIFICANT CHANGE UP (ref 3.8–10.5)
WBC # FLD AUTO: 5.1 K/UL — SIGNIFICANT CHANGE UP (ref 3.8–10.5)

## 2021-09-10 PROCEDURE — 99215 OFFICE O/P EST HI 40 MIN: CPT

## 2021-09-10 NOTE — ADDENDUM
[FreeTextEntry1] : Documented by Conor Hare acting as scribe for Dr. Aviles on 09/10/2021 \par \par All Medical record entries made by the Scribe were at my, Dr. Aviles's, direction and personally dictated by me on 09/10/2021 . I have reviewed the chart and agree that the record accurately reflects my personal performance of the history, physical exam, assessment and plan. I have also personally directed, reviewed, and agreed with the discharge instructions.\par

## 2021-09-10 NOTE — ASSESSMENT
[FreeTextEntry1] : pT3 N0 M0 ADenocarcinoma SIgmoid colon, \par \par - Invasive moderately differentiated adenocarcinoma invading\par through the muscularis propria into pericolorectal tissue.\par - Tattoo is present.\par - Diverticulosis.\par - 24 lymph nodes, negative for carcinoma (0/24).\par - Margins of resection are not involved.\par \par Resected node-negative (stage II) disease, the benefits of chemotherapy are controversial, as is the relative benefit of an oxaliplatin-based as compared with a non-oxaliplatin-based regimen.\par Benefits of chemotherapy is based on high risk features \par Patient does not have any high risk features, No LVi,  NO neural invasion 25 LN examined, NO Perforation \par \par GIven no high risk features no benefit of adjuvant chemotherapy\par - Today's CBC 9/10/21: WBC 5.1 K/uL, HGB 12.8 g/dL, RBC Count 4.39 M/uL, HCT 40.8 %,  K/uL \par - F/u visit with labs cbc/ cmp/ CEA q 3months x 2 y and then q 6 months from Y 3-5 \par - CT CAP q 6 months x 5 years and Y3-5 and consider spacing q 8-12 months\par - Colonoscopy 1 y after surgery 06/22\par - refer to genetic counselor \par - F/u in 3 months with Dexter\par - F/u in 6 months\par - Next scans due in 2/22\par - Refer to ENT due to fullness in throat/ sore throat . Father with Nasopharyngeal lymphoma, patient is very nervous No obvious lymphadenopathy \par

## 2021-09-10 NOTE — HISTORY OF PRESENT ILLNESS
[de-identified] : 50-year-old female presents to our office with a new diagnosis of Sigmoid colon cancer \par \par She has had a variety of symptoms with chronic pelvic pain over the last 10 years and recently underwent a hysterectomy for adenomyosis by Dr. Cruz in February 2021. Several of her pelvic symptoms did improve. \par After that she had intermittent blood in her stool and some changes in the caliber of her stool. Her appetite has been fine and she has not had any significant weight loss. \par She underwent a colonoscopy which showed a mass in the sigmoid colon. \par COLONOSCOPY: 5/17/2021: Intramucosal adenocarcinoma \par CT CAP 5/2021 :  LUNGS AND LARGE AIRWAYS: Patent central airways. There is 3 mm nodular thickening of the minor fissure on image 80, likely postinflammatory. There is a 2 mm calcified granuloma in the left lower lobe on image 125. No confluent airspace opacity is identified. There is no evidence of interstitial lung disease, bronchiectasis, or fibrosis.\par \par S/p Robotic sigmoid colon resection for colon cancer on 5/28/21 \par T3N0 stage II colon cancer, MSI Stable, No high risk features\par  [de-identified] : Patient presents today for a FU visit.\par \par Reports discomfort in throat especially when eating, sore throat. Referred to ENT \par Reports lots of allergies. Has not recently seen an immunologist. \par Patient had a 11 lb weight loss over the past few months from surgeries\par BIological children 21, 19 , 14 \par \par 8/25/21 CT Abdomen Chest Pelvis IMPRESSION: Status post sigmoid resection. No evidence of locoregional recurrence or metastatic disease.\par Today's CBC 9/10/21: WBC 5.1 K/uL, HGB 12.8 g/dL, RBC Count 4.39 M/uL, Mean Cell Volume 93.1 fl, HCT 40.8 %,  K/uL  [9 - Distress Level] : Distress Level: 9 [Referred Patient  to social work for follow-up] : Patient was referred to social work for follow-up [FreeTextEntry7] : John Paul saw patient during appointment

## 2021-09-13 ENCOUNTER — APPOINTMENT (OUTPATIENT)
Dept: FAMILY MEDICINE | Facility: CLINIC | Age: 51
End: 2021-09-13
Payer: MEDICAID

## 2021-09-13 ENCOUNTER — NON-APPOINTMENT (OUTPATIENT)
Age: 51
End: 2021-09-13

## 2021-09-13 VITALS
WEIGHT: 112 LBS | TEMPERATURE: 98.7 F | DIASTOLIC BLOOD PRESSURE: 70 MMHG | SYSTOLIC BLOOD PRESSURE: 102 MMHG | HEART RATE: 73 BPM | BODY MASS INDEX: 21.14 KG/M2 | HEIGHT: 61 IN | OXYGEN SATURATION: 98 % | RESPIRATION RATE: 16 BRPM

## 2021-09-13 PROCEDURE — 99396 PREV VISIT EST AGE 40-64: CPT | Mod: 25

## 2021-09-13 PROCEDURE — 90715 TDAP VACCINE 7 YRS/> IM: CPT

## 2021-09-13 PROCEDURE — 90471 IMMUNIZATION ADMIN: CPT

## 2021-09-13 RX ORDER — CIPROFLOXACIN HYDROCHLORIDE 500 MG/1
500 TABLET, FILM COATED ORAL
Qty: 14 | Refills: 0 | Status: DISCONTINUED | COMMUNITY
Start: 2021-06-18 | End: 2021-09-13

## 2021-09-13 NOTE — HEALTH RISK ASSESSMENT
[Good] : ~his/her~ current health as good [Fair] :  ~his/her~ mood as fair [Yes] : Yes [No] : In the past 12 months have you used drugs other than those required for medical reasons? No [No falls in past year] : Patient reported no falls in the past year [0] : 2) Feeling down, depressed, or hopeless: Not at all (0) [Patient reported mammogram was normal] : Patient reported mammogram was normal [Patient reported PAP Smear was normal] : Patient reported PAP Smear was normal [Patient reported colonoscopy was abnormal] : Patient reported colonoscopy was abnormal [Hepatitis C test declined] : Hepatitis C test declined [HIV test declined] : HIV test declined [With Family] : lives with family [Unemployed] : unemployed [On disability] : on disability [] :  [# Of Children ___] : has [unfilled] children [Sexually Active] : sexually active [Feels Safe at Home] : Feels safe at home [Fully functional (bathing, dressing, toileting, transferring, walking, feeding)] : Fully functional (bathing, dressing, toileting, transferring, walking, feeding) [Fully functional (using the telephone, shopping, preparing meals, housekeeping, doing laundry, using] : Fully functional and needs no help or supervision to perform IADLs (using the telephone, shopping, preparing meals, housekeeping, doing laundry, using transportation, managing medications and managing finances) [Smoke Detector] : smoke detector [Safety elements used in home] : safety elements used in home [Seat Belt] :  uses seat belt [With Patient/Caregiver] : , with patient/caregiver [Designated Healthcare Proxy] : Designated healthcare proxy [Name: ___] : Health Care Proxy's Name: [unfilled]  [Relationship: ___] : Relationship: [unfilled] [FreeTextEntry1] : Proctology, hematology. [] : No [de-identified] : social [de-identified] : regular HIIT [de-identified] : healthy [Change in mental status noted] : No change in mental status noted [Language] : denies difficulty with language [Handling Complex Tasks] : denies difficulty handling complex tasks [Reports changes in hearing] : Reports no changes in hearing [Reports changes in vision] : Reports no changes in vision [Reports changes in dental health] : Reports no changes in dental health [TB Exposure] : is not being exposed to tuberculosis [MammogramDate] : 2021 [MammogramComments] : with Gyn: Dr Portillo Barraza [PapSmearDate] : 2021 [PapSmearComments] : w/ Gyn [ColonoscopyDate] : 05/21 [ColonoscopyComments] : Sigmoid mass [HIVDate] : 2020 [HIVComments] : negative [AdvancecareDate] : 09/21

## 2021-09-13 NOTE — HISTORY OF PRESENT ILLNESS
[FreeTextEntry1] : Annual physical [de-identified] : 51 y/o F with hx significant for Abnormal PAP, s/p Colposcopy on 9/24/20 , which was unsuccessful due to her history of cryotherapy. She underwent colposcopy, D&C hysteroscopy polypectomy under anesthesia 9/2020, pathology benign. \par She was recently seen by new Gyn, Dr Cruz had Robotic laparoscopic B/L Hysterectomy and salpingectomy 02/24/21.\par She had a Rectal bleeding, and colonoscopy done 5/6/21 with Dx Sigmoid Colon. CT scan done showed no MTS. She was seen by proctology, s/p Robotic Sigmoidectomy on 05/21\par Pt is doing very well.\par She reports some difficulty swallowing and is schedule to see ENT, Dr Marshall on 09/21/21

## 2021-09-13 NOTE — ASSESSMENT
[FreeTextEntry1] : 51 y/o F with Dx Colon cancer post Robotic sigmoid resection on 05/28/21. No adjuvant chemo recommended. Doing well, here for annual physical.\par \par HCM:\par -Blood done recenlty with hemato/onco\par -Lipid profile and TSH today\par \par Mammo and Gyn: up to date with Dr Barraza.\par \par Colonoscopy: 05/2021\par -needs to continue surveillance colonosocpy in 1 year\par \par Colon cancer s/p Robotic sigmoid resection\par -F/up with Oncology\par -Needs CEA q 6 months.\par \par Immunizations:\par -Tdap today

## 2021-09-14 LAB
CHOLEST SERPL-MCNC: 186 MG/DL
HDLC SERPL-MCNC: 77 MG/DL
LDLC SERPL CALC-MCNC: 88 MG/DL
NONHDLC SERPL-MCNC: 109 MG/DL
TRIGL SERPL-MCNC: 105 MG/DL
TSH SERPL-ACNC: 0.96 UIU/ML

## 2021-09-16 ENCOUNTER — OUTPATIENT (OUTPATIENT)
Dept: OUTPATIENT SERVICES | Facility: HOSPITAL | Age: 51
LOS: 1 days | End: 2021-09-16

## 2021-09-16 ENCOUNTER — APPOINTMENT (OUTPATIENT)
Dept: CT IMAGING | Facility: CLINIC | Age: 51
End: 2021-09-16
Payer: MEDICAID

## 2021-09-16 ENCOUNTER — TRANSCRIPTION ENCOUNTER (OUTPATIENT)
Age: 51
End: 2021-09-16

## 2021-09-16 DIAGNOSIS — Z98.890 OTHER SPECIFIED POSTPROCEDURAL STATES: Chronic | ICD-10-CM

## 2021-09-16 DIAGNOSIS — Z00.8 ENCOUNTER FOR OTHER GENERAL EXAMINATION: ICD-10-CM

## 2021-09-16 DIAGNOSIS — Z90.710 ACQUIRED ABSENCE OF BOTH CERVIX AND UTERUS: Chronic | ICD-10-CM

## 2021-09-16 DIAGNOSIS — Z98.891 HISTORY OF UTERINE SCAR FROM PREVIOUS SURGERY: Chronic | ICD-10-CM

## 2021-09-16 PROCEDURE — 74177 CT ABD & PELVIS W/CONTRAST: CPT | Mod: 26

## 2021-09-21 ENCOUNTER — APPOINTMENT (OUTPATIENT)
Dept: OTOLARYNGOLOGY | Facility: CLINIC | Age: 51
End: 2021-09-21
Payer: MEDICAID

## 2021-09-21 VITALS
WEIGHT: 111 LBS | BODY MASS INDEX: 20.96 KG/M2 | HEIGHT: 61 IN | TEMPERATURE: 98.2 F | DIASTOLIC BLOOD PRESSURE: 70 MMHG | SYSTOLIC BLOOD PRESSURE: 116 MMHG

## 2021-09-21 DIAGNOSIS — R49.0 DYSPHONIA: ICD-10-CM

## 2021-09-21 DIAGNOSIS — J32.0 CHRONIC MAXILLARY SINUSITIS: ICD-10-CM

## 2021-09-21 DIAGNOSIS — K21.9 GASTRO-ESOPHAGEAL REFLUX DISEASE W/OUT ESOPHAGITIS: ICD-10-CM

## 2021-09-21 PROCEDURE — 99204 OFFICE O/P NEW MOD 45 MIN: CPT | Mod: 25

## 2021-09-21 PROCEDURE — 31231 NASAL ENDOSCOPY DX: CPT

## 2021-09-21 NOTE — ASSESSMENT
[FreeTextEntry1] : Patient with throat discomfort - has findngs of reflux - patient also has hx of PND and prior sinus surgery about 15 years ago.  Has dns and findings of likely left max sinusitis.  Recommended patient continue current omeprazole before breakfast and added famotidiine before bed.  Discussed reflux diet as well. \par (note father with hx of NP carcinoma and mother with esophagitis and patient s/p colon resection for colon cancer several mos ago)\par Follow up in 2 mos - will also get CT of sinuses after patient completes meds for sinusitis (clarithromycin and medrol) and will also use [facility] rinses.

## 2021-09-21 NOTE — HISTORY OF PRESENT ILLNESS
[de-identified] : c/o difficulty with throat.  Had sinus surgery 15-16 years ago.  c/o throat discomfort now - has raspy voice for past few days.  Feels like having issues with sinus problems again. Occ has throat discomfort with various foods.  Not smoker.  No hemoptysis or sputum.  Occ nasal congestion.   (father has hx of NP carcinoma and mother has hx of esophagitis )- has had prior upper endo.  (also has hx of colon cancer and colon resection - May 2021.)  \par Currently on omeprazole 40 q day - had been bid now on q day since colon resection.

## 2021-09-23 LAB
ALBUMIN SERPL ELPH-MCNC: 4.3 G/DL
ALP BLD-CCNC: 64 U/L
ALT SERPL-CCNC: 16 U/L
ANION GAP SERPL CALC-SCNC: 14 MMOL/L
AST SERPL-CCNC: 23 U/L
BILIRUB SERPL-MCNC: 0.2 MG/DL
BUN SERPL-MCNC: 17 MG/DL
CALCIUM SERPL-MCNC: 9.1 MG/DL
CEA SERPL-MCNC: 1.9 NG/ML
CHLORIDE SERPL-SCNC: 103 MMOL/L
CO2 SERPL-SCNC: 21 MMOL/L
CREAT SERPL-MCNC: 0.85 MG/DL
GLUCOSE SERPL-MCNC: 91 MG/DL
MAGNESIUM SERPL-MCNC: 2 MG/DL
POTASSIUM SERPL-SCNC: 4.5 MMOL/L
PROT SERPL-MCNC: 6.4 G/DL
SODIUM SERPL-SCNC: 138 MMOL/L

## 2021-10-14 RX ORDER — OMEPRAZOLE 40 MG/1
40 CAPSULE, DELAYED RELEASE ORAL DAILY
Qty: 30 | Refills: 5 | Status: DISCONTINUED | COMMUNITY
Start: 2021-03-05 | End: 2021-10-14

## 2021-10-15 ENCOUNTER — TRANSCRIPTION ENCOUNTER (OUTPATIENT)
Age: 51
End: 2021-10-15

## 2021-10-15 ENCOUNTER — APPOINTMENT (OUTPATIENT)
Dept: COLORECTAL SURGERY | Facility: CLINIC | Age: 51
End: 2021-10-15
Payer: MEDICAID

## 2021-10-15 VITALS
HEART RATE: 81 BPM | HEIGHT: 61 IN | WEIGHT: 112 LBS | SYSTOLIC BLOOD PRESSURE: 135 MMHG | TEMPERATURE: 99.1 F | DIASTOLIC BLOOD PRESSURE: 79 MMHG | BODY MASS INDEX: 21.14 KG/M2 | RESPIRATION RATE: 16 BRPM

## 2021-10-15 PROCEDURE — 99213 OFFICE O/P EST LOW 20 MIN: CPT

## 2021-10-15 NOTE — PHYSICAL EXAM
[Respiratory Effort] : normal respiratory effort [Calm] : calm [de-identified] : Soft, nontender, nondistended.  Well-healed surgical incisions without signs of infection. Epigastric trocar site with small bump which may be reacture to the suture knot. No sign of infection [de-identified] : Well-appearing, in no distress [de-identified] : Normocephalic, atraumatic [de-identified] : Moves extremities without difficulty [de-identified] : Small patches of erythematous rash in skin. [de-identified] : Alert and oriented x3

## 2021-10-15 NOTE — HISTORY OF PRESENT ILLNESS
[FreeTextEntry1] : 50-year-old female who presents for a follow-up visit after undergoing a robotic sigmoid colon resection for colon cancer on May 2021.  She recently developed lower back pain radiating anteriorly towards the suprapubic region.  Urine sample was tested which was a contaminated and she was prophylactically treated for urinary tract infection.  She recently went back to urgent care for repeat urine analysis which was reportedly normal and a culture is pending.  No nausea, vomiting, fevers or chills.  She is having daily bowel movements but describes a normal formed size initially with subsequent stools being smaller in size and caliber.  She underwent a CT scan of her abdomen and pelvis as part of the work-up for her lower back/lower abdominal pain.  This was normal with no acute intra-abdominal findings.  \par \par She recently started a medication by her ENT for sinusitis which has caused a rash.  She stopped the medication yesterday.  \par \par \par \par

## 2021-10-22 ENCOUNTER — APPOINTMENT (OUTPATIENT)
Dept: FAMILY MEDICINE | Facility: CLINIC | Age: 51
End: 2021-10-22
Payer: MEDICAID

## 2021-10-22 ENCOUNTER — OUTPATIENT (OUTPATIENT)
Dept: OUTPATIENT SERVICES | Facility: HOSPITAL | Age: 51
LOS: 1 days | End: 2021-10-22

## 2021-10-22 ENCOUNTER — APPOINTMENT (OUTPATIENT)
Dept: CT IMAGING | Facility: CLINIC | Age: 51
End: 2021-10-22
Payer: MEDICAID

## 2021-10-22 VITALS
BODY MASS INDEX: 21.14 KG/M2 | DIASTOLIC BLOOD PRESSURE: 74 MMHG | WEIGHT: 112 LBS | HEIGHT: 61 IN | TEMPERATURE: 97.8 F | SYSTOLIC BLOOD PRESSURE: 116 MMHG | RESPIRATION RATE: 12 BRPM | HEART RATE: 85 BPM | OXYGEN SATURATION: 98 %

## 2021-10-22 DIAGNOSIS — Z90.710 ACQUIRED ABSENCE OF BOTH CERVIX AND UTERUS: Chronic | ICD-10-CM

## 2021-10-22 DIAGNOSIS — Z00.8 ENCOUNTER FOR OTHER GENERAL EXAMINATION: ICD-10-CM

## 2021-10-22 DIAGNOSIS — Z98.891 HISTORY OF UTERINE SCAR FROM PREVIOUS SURGERY: Chronic | ICD-10-CM

## 2021-10-22 DIAGNOSIS — Z98.890 OTHER SPECIFIED POSTPROCEDURAL STATES: Chronic | ICD-10-CM

## 2021-10-22 DIAGNOSIS — Z88.9 ALLERGY STATUS TO UNSPECIFIED DRUGS, MEDICAMENTS AND BIOLOGICAL SUBSTANCES: ICD-10-CM

## 2021-10-22 DIAGNOSIS — J32.9 CHRONIC SINUSITIS, UNSPECIFIED: ICD-10-CM

## 2021-10-22 PROCEDURE — 70486 CT MAXILLOFACIAL W/O DYE: CPT | Mod: 26

## 2021-10-22 PROCEDURE — 99214 OFFICE O/P EST MOD 30 MIN: CPT

## 2021-10-22 RX ORDER — METHYLPREDNISOLONE 4 MG/1
4 TABLET ORAL
Qty: 1 | Refills: 0 | Status: DISCONTINUED | COMMUNITY
Start: 2021-09-21 | End: 2021-10-22

## 2021-10-22 RX ORDER — L.ACIDOPH/L.BULG/B.BIF/S.THERM 1B-250 MG
TABLET ORAL
Qty: 28 | Refills: 0 | Status: DISCONTINUED | COMMUNITY
Start: 2021-06-18 | End: 2021-10-22

## 2021-10-22 RX ORDER — CLARITHROMYCIN 500 MG/1
500 TABLET, FILM COATED ORAL
Qty: 20 | Refills: 0 | Status: DISCONTINUED | COMMUNITY
Start: 2021-09-21 | End: 2021-10-22

## 2021-10-22 NOTE — HEALTH RISK ASSESSMENT
[Yes] : Yes [No] : In the past 12 months have you used drugs other than those required for medical reasons? No [No falls in past year] : Patient reported no falls in the past year [0] : 2) Feeling down, depressed, or hopeless: Not at all (0) [Good] : ~his/her~ current health as good [Fair] :  ~his/her~ mood as fair [Patient reported mammogram was normal] : Patient reported mammogram was normal [Patient reported PAP Smear was normal] : Patient reported PAP Smear was normal [Patient reported colonoscopy was abnormal] : Patient reported colonoscopy was abnormal [HIV test declined] : HIV test declined [Hepatitis C test declined] : Hepatitis C test declined [With Family] : lives with family [Unemployed] : unemployed [On disability] : on disability [] :  [# Of Children ___] : has [unfilled] children [Sexually Active] : sexually active [Feels Safe at Home] : Feels safe at home [Fully functional (bathing, dressing, toileting, transferring, walking, feeding)] : Fully functional (bathing, dressing, toileting, transferring, walking, feeding) [Fully functional (using the telephone, shopping, preparing meals, housekeeping, doing laundry, using] : Fully functional and needs no help or supervision to perform IADLs (using the telephone, shopping, preparing meals, housekeeping, doing laundry, using transportation, managing medications and managing finances) [Smoke Detector] : smoke detector [Safety elements used in home] : safety elements used in home [Seat Belt] :  uses seat belt [With Patient/Caregiver] : , with patient/caregiver [Designated Healthcare Proxy] : Designated healthcare proxy [Name: ___] : Health Care Proxy's Name: [unfilled]  [Relationship: ___] : Relationship: [unfilled] [] : No [de-identified] : social [de-identified] : regular HIIT [de-identified] : healthy [FreeTextEntry1] : Proctology, hematology. [Change in mental status noted] : No change in mental status noted [Language] : denies difficulty with language [Handling Complex Tasks] : denies difficulty handling complex tasks [Reports changes in hearing] : Reports no changes in hearing [Reports changes in vision] : Reports no changes in vision [Reports changes in dental health] : Reports no changes in dental health [TB Exposure] : is not being exposed to tuberculosis [MammogramDate] : 2021 [MammogramComments] : with Gyn: Dr Portillo Barraza [PapSmearDate] : 2021 [PapSmearComments] : w/ Gyn [ColonoscopyDate] : 05/21 [ColonoscopyComments] : Sigmoid mass; sp sigmoid colectomy [HIVDate] : 2020 [HIVComments] : negative [AdvancecareDate] : 09/21

## 2021-10-22 NOTE — HISTORY OF PRESENT ILLNESS
[de-identified] : 49 y/o F with hx significant for Abnormal PAP, s/p Colposcopy on 9/24/20 , which was unsuccessful due to her history of cryotherapy. She underwent colposcopy, D&C hysteroscopy polypectomy under anesthesia 9/2020, pathology benign. \par She was recently seen by new Gyn, Dr Cruz had Robotic laparoscopic B/L Hysterectomy and salpingectomy 02/24/21.\par She had a Rectal bleeding, and colonoscopy done 5/6/21 with Dx Sigmoid Colon. CT scan done showed no MTS. She was seen by proctology, s/p Robotic Sigmoidectomy on 05/21\par Pt is doing very well.\par She reports some difficulty swallowing and is schedule to see ENT, Dr Marshall on 09/21/21 [FreeTextEntry8] : 49 y/o F, hx of GERD, chronic sinusitis, sigmoid colon cancer s/p colectomy, and adenomyosis s/p hysterectomy/salpingectomy, presenting for diffuse rash. Pt reports she has had 1 month of diffuse, itchy, burning rash that comes on intermittently since seeing her ENT (Dr. Marshall) and taking famotidine, clarithromycin, and prednisone for left maxillary sinusitis. Reports famotidine is the only novel medication. Denies any other novel exposure, including detergent, shampoo, soap, clothing. She has since then completed her course of clarithromycin and saw an urgent care for her rash, and stopped taking the famotidine and was prescribed another course of prednisone on 10/15/21. The patient reports that she still has this rash every day and it comes on randomly and also with mild scraping of her skin. She had an exacerbation this morning for which she took Benadryl with improvement. The rash also affects her face, but there is no mucosal involvement, blisters, or ulcerations. She reports oral and vaginal dryness over the past 2 months and also reports chronic chest tightness. She is concerned about nasopharyngeal lymphoma, which her father had.

## 2021-10-22 NOTE — ASSESSMENT
[FreeTextEntry1] : 49 y/o F with Dx Colon cancer post Robotic sigmoid resection on 05/28/21. No adjuvant chemo recommended. Here for acute visit regarding 1 month of diffuse urticaria.\par \par Urticaria:\par - no evidence of anaphylaxis, SJS/TEN, Dress syndrome\par - potentially 2/2 famotidine\par - recommended to finish course of prednisone and take benadryl PRN\par - referral for allergy/immunology provided\par - return instructions provided

## 2021-10-22 NOTE — REVIEW OF SYSTEMS
[Negative] : Heme/Lymph [Chest Pain] : chest pain [Dyspnea on Exertion] : dyspnea on exertion [Itching] : Itching [Skin Rash] : skin rash [Nasal Discharge] : no nasal discharge [Sore Throat] : no sore throat [Wheezing] : no wheezing [Cough] : no cough [Dysuria] : no dysuria [Hematuria] : no hematuria [Vaginal Discharge] : no vaginal discharge [FreeTextEntry4] : oral dryness [FreeTextEntry8] : vaginal dryness

## 2021-10-22 NOTE — PHYSICAL EXAM
[No Acute Distress] : no acute distress [Well Nourished] : well nourished [Well Developed] : well developed [Well-Appearing] : well-appearing [Normal Sclera/Conjunctiva] : normal sclera/conjunctiva [PERRL] : pupils equal round and reactive to light [EOMI] : extraocular movements intact [Normal Outer Ear/Nose] : the outer ears and nose were normal in appearance [No JVD] : no jugular venous distention [No Lymphadenopathy] : no lymphadenopathy [Supple] : supple [Thyroid Normal, No Nodules] : the thyroid was normal and there were no nodules present [No Respiratory Distress] : no respiratory distress  [No Accessory Muscle Use] : no accessory muscle use [Clear to Auscultation] : lungs were clear to auscultation bilaterally [Normal Rate] : normal rate  [Regular Rhythm] : with a regular rhythm [Normal S1, S2] : normal S1 and S2 [No Murmur] : no murmur heard [No Carotid Bruits] : no carotid bruits [No Abdominal Bruit] : a ~M bruit was not heard ~T in the abdomen [No Varicosities] : no varicosities [Pedal Pulses Present] : the pedal pulses are present [No Edema] : there was no peripheral edema [No Palpable Aorta] : no palpable aorta [No Extremity Clubbing/Cyanosis] : no extremity clubbing/cyanosis [Soft] : abdomen soft [Non Tender] : non-tender [Non-distended] : non-distended [No Masses] : no abdominal mass palpated [No HSM] : no HSM [Normal Bowel Sounds] : normal bowel sounds [Normal Posterior Cervical Nodes] : no posterior cervical lymphadenopathy [Normal Anterior Cervical Nodes] : no anterior cervical lymphadenopathy [No CVA Tenderness] : no CVA  tenderness [No Spinal Tenderness] : no spinal tenderness [No Joint Swelling] : no joint swelling [Grossly Normal Strength/Tone] : grossly normal strength/tone [Coordination Grossly Intact] : coordination grossly intact [No Focal Deficits] : no focal deficits [Normal Gait] : normal gait [Deep Tendon Reflexes (DTR)] : deep tendon reflexes were 2+ and symmetric [Normal Affect] : the affect was normal [Normal Insight/Judgement] : insight and judgment were intact [de-identified] : dry tongue [de-identified] : minimal linear erythematous, blanchable patch to left proximal forearm, left distal wrist, and left upper quadrant abdomen just inferior to bra; no rash on back or chest

## 2021-10-26 ENCOUNTER — APPOINTMENT (OUTPATIENT)
Dept: DERMATOLOGY | Facility: CLINIC | Age: 51
End: 2021-10-26
Payer: MEDICAID

## 2021-10-26 ENCOUNTER — NON-APPOINTMENT (OUTPATIENT)
Age: 51
End: 2021-10-26

## 2021-10-26 PROCEDURE — 99203 OFFICE O/P NEW LOW 30 MIN: CPT

## 2021-10-29 ENCOUNTER — APPOINTMENT (OUTPATIENT)
Dept: DERMATOLOGY | Facility: CLINIC | Age: 51
End: 2021-10-29
Payer: MEDICAID

## 2021-10-29 PROCEDURE — 99214 OFFICE O/P EST MOD 30 MIN: CPT

## 2021-11-17 ENCOUNTER — APPOINTMENT (OUTPATIENT)
Dept: FAMILY MEDICINE | Facility: CLINIC | Age: 51
End: 2021-11-17
Payer: MEDICAID

## 2021-11-17 VITALS
HEART RATE: 88 BPM | WEIGHT: 112 LBS | RESPIRATION RATE: 16 BRPM | DIASTOLIC BLOOD PRESSURE: 70 MMHG | OXYGEN SATURATION: 98 % | TEMPERATURE: 98.1 F | SYSTOLIC BLOOD PRESSURE: 112 MMHG | BODY MASS INDEX: 21.14 KG/M2 | HEIGHT: 61 IN

## 2021-11-17 PROCEDURE — 0013A: CPT

## 2021-11-17 NOTE — ASSESSMENT
[FreeTextEntry1] : moderna vaccine administered on left deltoid, well tolerated, no s/s acute rxs, educational pamphlet. Pt inst. to wait 15 min. post vaccine, and to call if any new s/e experience.  Pt verbalizes understanding. LUANA Valdovinos RN.

## 2021-11-17 NOTE — PLAN
[FreeTextEntry1] : Vacciine administered on left deltoid, well tolerated, no s/s acute rxs, educational material provided, Pt inst. to wait 15 min. post vaccinatio and inform of any s/s. Pt verbalizes understanding. LUANA Valdovinos RN.

## 2021-11-17 NOTE — HISTORY OF PRESENT ILLNESS
[FreeTextEntry1] : Pt here for 3rd dose moderna vaccine.  [de-identified] : Pt reports doiing well.

## 2021-12-09 ENCOUNTER — OUTPATIENT (OUTPATIENT)
Dept: OUTPATIENT SERVICES | Facility: HOSPITAL | Age: 51
LOS: 1 days | Discharge: HOME | End: 2021-12-09

## 2021-12-09 DIAGNOSIS — Z90.710 ACQUIRED ABSENCE OF BOTH CERVIX AND UTERUS: Chronic | ICD-10-CM

## 2021-12-09 DIAGNOSIS — Z98.891 HISTORY OF UTERINE SCAR FROM PREVIOUS SURGERY: Chronic | ICD-10-CM

## 2021-12-09 DIAGNOSIS — Z98.890 OTHER SPECIFIED POSTPROCEDURAL STATES: Chronic | ICD-10-CM

## 2021-12-09 DIAGNOSIS — C18.9 MALIGNANT NEOPLASM OF COLON, UNSPECIFIED: ICD-10-CM

## 2021-12-10 ENCOUNTER — RESULT REVIEW (OUTPATIENT)
Age: 51
End: 2021-12-10

## 2021-12-10 ENCOUNTER — APPOINTMENT (OUTPATIENT)
Dept: HEMATOLOGY ONCOLOGY | Facility: CLINIC | Age: 51
End: 2021-12-10
Payer: MEDICAID

## 2021-12-10 VITALS
OXYGEN SATURATION: 97 % | BODY MASS INDEX: 21.34 KG/M2 | HEART RATE: 64 BPM | HEIGHT: 61 IN | SYSTOLIC BLOOD PRESSURE: 111 MMHG | WEIGHT: 113.01 LBS | DIASTOLIC BLOOD PRESSURE: 76 MMHG

## 2021-12-10 LAB
BASOPHILS # BLD AUTO: 0.1 K/UL — SIGNIFICANT CHANGE UP (ref 0–0.2)
BASOPHILS NFR BLD AUTO: 2 % — SIGNIFICANT CHANGE UP (ref 0–2)
EOSINOPHIL # BLD AUTO: 0.5 K/UL — SIGNIFICANT CHANGE UP (ref 0–0.5)
EOSINOPHIL NFR BLD AUTO: 11.1 % — HIGH (ref 0–6)
HCT VFR BLD CALC: 41.6 % — SIGNIFICANT CHANGE UP (ref 34.5–45)
HGB BLD-MCNC: 13.1 G/DL — SIGNIFICANT CHANGE UP (ref 11.5–15.5)
LYMPHOCYTES # BLD AUTO: 1.6 K/UL — SIGNIFICANT CHANGE UP (ref 1–3.3)
LYMPHOCYTES # BLD AUTO: 33.4 % — SIGNIFICANT CHANGE UP (ref 13–44)
MCHC RBC-ENTMCNC: 29.2 PG — SIGNIFICANT CHANGE UP (ref 27–34)
MCHC RBC-ENTMCNC: 31.5 G/DL — LOW (ref 32–36)
MCV RBC AUTO: 92.8 FL — SIGNIFICANT CHANGE UP (ref 80–100)
MONOCYTES # BLD AUTO: 0.5 K/UL — SIGNIFICANT CHANGE UP (ref 0–0.9)
MONOCYTES NFR BLD AUTO: 9.9 % — SIGNIFICANT CHANGE UP (ref 2–14)
NEUTROPHILS # BLD AUTO: 2.1 K/UL — SIGNIFICANT CHANGE UP (ref 1.8–7.4)
NEUTROPHILS NFR BLD AUTO: 43.6 % — SIGNIFICANT CHANGE UP (ref 43–77)
PLATELET # BLD AUTO: 230 K/UL — SIGNIFICANT CHANGE UP (ref 150–400)
RBC # BLD: 4.48 M/UL — SIGNIFICANT CHANGE UP (ref 3.8–5.2)
RBC # FLD: 12.8 % — SIGNIFICANT CHANGE UP (ref 10.3–14.5)
WBC # BLD: 4.9 K/UL — SIGNIFICANT CHANGE UP (ref 3.8–10.5)
WBC # FLD AUTO: 4.9 K/UL — SIGNIFICANT CHANGE UP (ref 3.8–10.5)

## 2021-12-10 PROCEDURE — 99214 OFFICE O/P EST MOD 30 MIN: CPT

## 2021-12-13 NOTE — ASSESSMENT
[FreeTextEntry1] : pT3 N0 M0 ADenocarcinoma SIgmoid colon, \par \par - Invasive moderately differentiated adenocarcinoma invading\par through the muscularis propria into pericolorectal tissue.\par - Tattoo is present.\par - Diverticulosis.\par - 24 lymph nodes, negative for carcinoma (0/24).\par - Margins of resection are not involved.\par \par Resected node-negative (stage II) disease, the benefits of chemotherapy are controversial, as is the relative benefit of an oxaliplatin-based as compared with a non-oxaliplatin-based regimen.\par Benefits of chemotherapy is based on high risk features \par Patient does not have any high risk features, No LVi,  NO neural invasion 25 LN examined, NO Perforation \par \par GIven no high risk features no benefit of adjuvant chemotherapy\par - Today's CBC 12/10/21: WBC 4.9 K/uL, HGB 13.1 g/dL, RBC Count 4.48 M/uL, HCT 41.6 %,  K/uL \par - F/u visit with labs cbc/ cmp/ CEA q 3months x 2 y and then q 6 months from Y 3-5 \par - CT CAP q 6 months x 5 years and Y3-5 and consider spacing q 8-12 months\par - Colonoscopy 1 y after surgery 06/22\par - refer to genetic counselor \par - F/u in 3 months after scans\par - Next scans due in 2/22\par - Refer to Orthopedist to further evaluate chronic shoulder pain\par

## 2021-12-13 NOTE — RESULTS/DATA
[FreeTextEntry1] : 8/25/21 CT Abdomen Chest Pelvis IMPRESSION: Status post sigmoid resection. No evidence of locoregional recurrence or metastatic disease.\par \par PATHOLOGY 5/28/21 \par - Adenocarcinoma, moderately differentiated.\par - Sigmoid colon \par - Invasive moderately differentiated adenocarcinoma extending to pericolorectal\par adipose tissue (pT3).\par \par MLH1, MSH2, MSH6, PMS2:   Intact nuclear expression These results show low probability of microsatellite instability-high (MSI-H).\par There is no evidence of DNA mismatch repair deficiency in the analyzed tissue, indicating there is a low probability for Delgado\par syndrome (a common cause hereditary non polyposis colorectal\par cancer or HNPCC).\par \par CK-7: focal positive. CK-20, CDX2: positive\par \par COLONOSCOPY: 5/17/2021: Intramucosal adenocarcinoma \par \par CT CAP 5/2021 :  LUNGS AND LARGE AIRWAYS: Patent central airways. There is 3 mm nodular thickening of the minor fissure on image 80, likely postinflammatory. There is a 2 mm calcified granuloma in the left lower lobe on image 125. No confluent airspace opacity is identified. There is no evidence of interstitial lung disease, bronchiectasis, or fibrosis.\par

## 2021-12-13 NOTE — HISTORY OF PRESENT ILLNESS
[9 - Distress Level] : Distress Level: 9 [Referred Patient  to social work for follow-up] : Patient was referred to social work for follow-up [Date: ____________] : Patient's last distress assessment performed on [unfilled]. [de-identified] : 50-year-old female presents to our office with a new diagnosis of Sigmoid colon cancer \par \par She has had a variety of symptoms with chronic pelvic pain over the last 10 years and recently underwent a hysterectomy for adenomyosis by Dr. Cruz in February 2021. Several of her pelvic symptoms did improve. \par After that she had intermittent blood in her stool and some changes in the caliber of her stool. Her appetite has been fine and she has not had any significant weight loss. \par She underwent a colonoscopy which showed a mass in the sigmoid colon. \par COLONOSCOPY: 5/17/2021: Intramucosal adenocarcinoma \par CT CAP 5/2021 :  LUNGS AND LARGE AIRWAYS: Patent central airways. There is 3 mm nodular thickening of the minor fissure on image 80, likely postinflammatory. There is a 2 mm calcified granuloma in the left lower lobe on image 125. No confluent airspace opacity is identified. There is no evidence of interstitial lung disease, bronchiectasis, or fibrosis.\par \par S/p Robotic sigmoid colon resection for colon cancer on 5/28/21 \par T3N0 stage II colon cancer, MSI Stable, No high risk features\par  [de-identified] : Patient presents today for a FU visit.\par Patient doing well. patient c/o b/l shoulder pain, states this has been chronic. She noticed discomfort has worsened the past couple of months. Patient requesting referral to Orthopedist. \par Patient admits fatigue, appetite is good. BM have been normal, intermittently noticing stool may be thin at times.\par Denies fever/chills, rash, mouth sores, nausea, vomiting, diarrhea,constipation,  abdominal pain, bleeding, easy bruising or visual changes, chest pain, cough, SOB or RIOS,  neuropathy, LE edema.\par \par \par BIological children 21, 19 , 14 \par  [FreeTextEntry7] : John Paul saw patient during appointment

## 2021-12-14 ENCOUNTER — APPOINTMENT (OUTPATIENT)
Dept: ORTHOPEDIC SURGERY | Facility: CLINIC | Age: 51
End: 2021-12-14
Payer: MEDICAID

## 2021-12-14 VITALS
WEIGHT: 113 LBS | HEART RATE: 84 BPM | SYSTOLIC BLOOD PRESSURE: 120 MMHG | DIASTOLIC BLOOD PRESSURE: 82 MMHG | BODY MASS INDEX: 21.34 KG/M2 | HEIGHT: 61 IN

## 2021-12-14 DIAGNOSIS — Z85.038 PERSONAL HISTORY OF OTHER MALIGNANT NEOPLASM OF LARGE INTESTINE: ICD-10-CM

## 2021-12-14 LAB
ALBUMIN SERPL ELPH-MCNC: 4.3 G/DL
ALP BLD-CCNC: 53 U/L
ALT SERPL-CCNC: 16 U/L
ANION GAP SERPL CALC-SCNC: 9 MMOL/L
AST SERPL-CCNC: 17 U/L
BILIRUB SERPL-MCNC: 0.2 MG/DL
BUN SERPL-MCNC: 10 MG/DL
CALCIUM SERPL-MCNC: 8.9 MG/DL
CEA SERPL-MCNC: 2.2 NG/ML
CHLORIDE SERPL-SCNC: 107 MMOL/L
CO2 SERPL-SCNC: 26 MMOL/L
CREAT SERPL-MCNC: 0.82 MG/DL
GLUCOSE SERPL-MCNC: 98 MG/DL
POTASSIUM SERPL-SCNC: 4.1 MMOL/L
PROT SERPL-MCNC: 6.3 G/DL
SODIUM SERPL-SCNC: 143 MMOL/L

## 2021-12-14 PROCEDURE — 20610 DRAIN/INJ JOINT/BURSA W/O US: CPT

## 2021-12-14 PROCEDURE — 99203 OFFICE O/P NEW LOW 30 MIN: CPT | Mod: 25

## 2021-12-14 PROCEDURE — 73030 X-RAY EXAM OF SHOULDER: CPT | Mod: LT,RT

## 2021-12-14 NOTE — PHYSICAL EXAM
[de-identified] : General:\par Awake, alert, no acute distress, Patient was cooperative and appropriate during the examination.\par \par The patient is of normal weight for height and age.\par \par Walks without an antalgic gait.\par \par Full, painless range of motion of the neck and back.\par \par Exam of the bilateral lower extremities is intact and symmetric with regards to dermatologic, vascular, and neurologic exam. Bilateral lower extremity sensation is grossly intact to light touch in the DP/SP/T/S/S nerve distributions. Intact DF/PF/EHL. BIlateral lower extremities warm and well-perfused with brisk capillary refill.\par \par \par Pulmonary:\par Regular, nonlabored breathing\par \par Abdomen:\par Soft, nontender, nondistended.\par \par Lymphatic:\par No evidence of axillary lymphadenopathy \par \par Bilateral shoulder Exam:\par Physical exam of the shoulder demonstrates normal skin without signs of skin changes or abnormalities. No erythema, warmth, or joint effusion appreciated. \par \par Sensation intact light touch C5-T1\par Palpable radial pulse\par Radial/ulnar/median/axillary/musculocutaneous/AIN/PIN nerves grossly intact\par \par Range of motion:\par Forward Flexion: 180 with pain at the terminal degrees of flexion bilaterally \par Abduction: 150 bilaterally\par External Rotation: 85 bilaterally\par Internal Rotation: mid lumbar level bilaterally\par \par Palpation:\par Not tender to palpation over the glenohumeral joint\par Mildly tender palpation over the rotator cuff insertion on the greater tuberosity\par Not tender to palpation over the AC joint\par Moderately tender to palpation of the biceps tendon/bicipital groove\par \par Strength testing:\par Supraspinatus: 5-/5 bilaterally\par Infraspinatus: 5/5 bilaterally \par Subscapularis: 5/5 bilaterally\par \par Special test:\par Empty can test mildly positive bilaterally\par Hernandez impingement test positive bilaterally\par Neer test positive bilaterally \par Speeds test positive bilaterally\par [place]'s test negative bilaterally\par Lift-off test negative bilaterally\par Bell-press test negative bilaterally\par Cross-arm adduction test negative  bilaterally [de-identified] : X-rays 4 views of the  bilateral shoulders taken in the office today on 12/14/2021 shows no acute fracture or dislocation with a possible calcific deposit on the right and no other acute findings.

## 2021-12-14 NOTE — DISCUSSION/SUMMARY
[de-identified] : A/P: Patient is a 50 year old female with bilateral shoulder impingement syndrome.\par \par Plan: At this time I recommend symptomatic treatment with resting, icing, heating, stretching, anti-inflammatory medicines as needed and activity modifications, and home exercises.  Prescriptions for mobic 15mg to be taken once daily with food for pain and inflammation was sent to his pharmacy today.  I reviewed the risks and benefits associated with his medicines. GI precautions were discussed. She is advised to undergo physical therapy for pain and inflammation, strengthening, and an prescription was provided today. I am also recommending bilateral shoulder injections be administered in the office today. She will avoid exacerbating activities. She will follow-up in 6-8 weeks for repeat evaluation. We discussed potential MRI in the future if symptoms persist or worsen. Patient seen and examined with Dr. Abebe today. \par \par  The patient verbalizes understanding and agrees with the plan.  All questions were answered to his satisfaction.

## 2021-12-14 NOTE — PROCEDURE
[de-identified] : Procedure note: Left shoulder subacromial injection\par \par Using sterile technique the shoulder subacromial space was injected with a mixture of 80mg depomedrol and 1% lidocaine. The patient tolerated the procedure well and was dressed with a sterile bandage. The patient received immediate symptotic relief. They were educated on signs of infection including but not limited to erythema, warmth, tenderness,fever, chills. They will call if any of these symptoms were to occur. We also discussed potential elevation of their blood glucose level. Patient was advised to monitor their symptom and follow-up with their primary care if any of these symptoms were to occur. Patient was advised that it may take a few days for the injection to begin to work and they will ice, heat, rest and take the medication as needed for pain. \par \par Procedure note: Right shoulder subacromial injection\par \par Using sterile technique the shoulder subacromial space was injected with a mixture of 80mg depomedrol and 1% lidocaine. The patient tolerated the procedure well and was dressed with a sterile bandage. The patient received immediate symptotic relief. They were educated on signs of infection including but not limited to erythema, warmth, tenderness,fever, chills. They will call if any of these symptoms were to occur. We also discussed potential elevation of their blood glucose level. Patient was advised to monitor their symptom and follow-up with their primary care if any of these symptoms were to occur. Patient was advised that it may take a few days for the injection to begin to work and they will ice, heat, rest and take the medication as needed for pain.

## 2021-12-14 NOTE — CONSULT LETTER
[Dear  ___] : Dear  [unfilled], [Consult Letter:] : I had the pleasure of evaluating your patient, [unfilled]. [( Thank you for referring [unfilled] for consultation for _____ )] : Thank you for referring [unfilled] for consultation for [unfilled] [Please see my note below.] : Please see my note below. [Consult Closing:] : Thank you very much for allowing me to participate in the care of this patient.  If you have any questions, please do not hesitate to contact me. [Sincerely,] : Sincerely, [FreeTextEntry2] : SUSHILA ATWOOD\par  [FreeTextEntry3] : Nicolas Abebe DO.\par Sports Medicine \par Orthopaedic Surgery\par \par [facility]\par [facility] \par [address] \[address] \[place] [address]\par \par Tel [phone]\par Fax [phone]\par \par For same day and next day orthopedic appointments contact:\par [email or web address] |[phone])\par Appointments available nights and weekends!  \par \par [facility] [place]\par Visit us at [facility]/orthopaedic\par

## 2021-12-14 NOTE — HISTORY OF PRESENT ILLNESS
[Pain Location] : pain [] : right & left shoulder [Worsening] : worsening [7] : a current pain level of 7/10 [9] : an average pain level of 9/10 [8] : a minimum pain level of 8/10 [10] : a maximum pain level of 10/10 [Constant] : ~He/She~ states the symptoms seem to be constant [Direct Pressure] : worsened by direct pressure [Lifting] : worsened by lifting [Rest] : relieved by rest [de-identified] : 12/14/2021 : LE OWEN  is a 50 year year old female who presents to the office for evaluation of her bilateral shoulder. She states she has had bilateral shoulder pain for many years. She sates it has got worse recently and effect her ability to sleep and is alleviated with rest. She states her left shoulder pain is worse than her right. She states she takes aleve which gives some relief. She states she has had multiple surgical procedures for other things and now that all those issues are resolved she wants to get her shoulders evaluated because the pain in them is getting worse. She states she has tried doing exercise but feels tired and is unable to do things because of pain. She states she has neck pain as well and occasional shooting sensation, numbness and tingling into the hands. She has no other complaints. She denies any new injury. She denies any numbness or tingling currently. She denies any fever, chills, chest pain, sob. \par 	\par She denies prior injury.\par She has intermittent paresthesia in bilateral arms\par She endorses night symptoms that wake her up.\par Symptoms worsen with activity.

## 2021-12-20 ENCOUNTER — TRANSCRIPTION ENCOUNTER (OUTPATIENT)
Age: 51
End: 2021-12-20

## 2022-01-13 ENCOUNTER — RX RENEWAL (OUTPATIENT)
Age: 52
End: 2022-01-13

## 2022-02-25 ENCOUNTER — OUTPATIENT (OUTPATIENT)
Dept: OUTPATIENT SERVICES | Facility: HOSPITAL | Age: 52
LOS: 1 days | End: 2022-02-25
Payer: MEDICAID

## 2022-02-25 ENCOUNTER — APPOINTMENT (OUTPATIENT)
Dept: ORTHOPEDIC SURGERY | Facility: CLINIC | Age: 52
End: 2022-02-25
Payer: MEDICAID

## 2022-02-25 ENCOUNTER — APPOINTMENT (OUTPATIENT)
Dept: CT IMAGING | Facility: CLINIC | Age: 52
End: 2022-02-25
Payer: MEDICAID

## 2022-02-25 VITALS
HEART RATE: 74 BPM | WEIGHT: 115 LBS | HEIGHT: 61 IN | DIASTOLIC BLOOD PRESSURE: 69 MMHG | SYSTOLIC BLOOD PRESSURE: 109 MMHG | TEMPERATURE: 97.9 F | BODY MASS INDEX: 21.71 KG/M2

## 2022-02-25 DIAGNOSIS — Z00.00 ENCOUNTER FOR GENERAL ADULT MEDICAL EXAMINATION WITHOUT ABNORMAL FINDINGS: ICD-10-CM

## 2022-02-25 DIAGNOSIS — Z98.890 OTHER SPECIFIED POSTPROCEDURAL STATES: Chronic | ICD-10-CM

## 2022-02-25 DIAGNOSIS — Z90.710 ACQUIRED ABSENCE OF BOTH CERVIX AND UTERUS: Chronic | ICD-10-CM

## 2022-02-25 DIAGNOSIS — C18.7 MALIGNANT NEOPLASM OF SIGMOID COLON: ICD-10-CM

## 2022-02-25 DIAGNOSIS — S13.9XXA SPRAIN OF JOINTS AND LIGAMENTS OF UNSPECIFIED PARTS OF NECK, INITIAL ENCOUNTER: ICD-10-CM

## 2022-02-25 DIAGNOSIS — Z98.891 HISTORY OF UTERINE SCAR FROM PREVIOUS SURGERY: Chronic | ICD-10-CM

## 2022-02-25 PROCEDURE — 74177 CT ABD & PELVIS W/CONTRAST: CPT

## 2022-02-25 PROCEDURE — 71260 CT THORAX DX C+: CPT

## 2022-02-25 PROCEDURE — 99214 OFFICE O/P EST MOD 30 MIN: CPT | Mod: 25

## 2022-02-25 PROCEDURE — 71260 CT THORAX DX C+: CPT | Mod: 26

## 2022-02-25 PROCEDURE — 74177 CT ABD & PELVIS W/CONTRAST: CPT | Mod: 26

## 2022-02-25 PROCEDURE — 20610 DRAIN/INJ JOINT/BURSA W/O US: CPT | Mod: LT

## 2022-02-25 NOTE — HISTORY OF PRESENT ILLNESS
[Pain Location] : pain [] : right & left shoulder [Worsening] : worsening [7] : a current pain level of 7/10 [9] : an average pain level of 9/10 [8] : a minimum pain level of 8/10 [10] : a maximum pain level of 10/10 [Constant] : ~He/She~ states the symptoms seem to be constant [Direct Pressure] : worsened by direct pressure [Lifting] : worsened by lifting [Rest] : relieved by rest [de-identified] : She was to02/25/2022 : LE OWEN  is a 51 year year old female who presents to the office for follow-up evaluation of her bilateral shoulders.  She states since the last office visit the injection gave her great relief for about a month and a half.  She states she has been doing physical therapy both formally and on her own that is giving her some relief.  She states she never took the meloxicam because she did so much better after the injections.  She states however jumping jacks 1 day and feel that this aggravated her shoulders again and since then she has had more pain in the shoulder, right worse than left.  She states pain can be sharp at times and radiates down the arms and is worse with activities and movements and alleviated with rest.  She states is worse when reaching out away from her body.  She states she gets intermittent neck pain and numbness and tingling into the hands.  She has no other complaints today.  She denies any new injury.  She denies any numbness or tingling distally currently.\par \par 12/14/2021 : LE OWEN  is a 50 year year old female who presents to the office for evaluation of her bilateral shoulder. She states she has had bilateral shoulder pain for many years. She sates it has got worse recently and effect her ability to sleep and is alleviated with rest. She states her left shoulder pain is worse than her right. She states she takes aleve which gives some relief. She states she has had multiple surgical procedures for other things and now that all those issues are resolved she wants to get her shoulders evaluated because the pain in them is getting worse. She states she has tried doing exercise but feels tired and is unable to do things because of pain. She states she has neck pain as well and occasional shooting sensation, numbness and tingling into the hands. She has no other complaints. She denies any new injury. She denies any numbness or tingling currently. She denies any fever, chills, chest pain, sob. \par 	\par She denies prior injury.\par She has intermittent paresthesia in bilateral arms\par She endorses night symptoms that wake her up.\par Symptoms worsen with activity.

## 2022-02-25 NOTE — DISCUSSION/SUMMARY
[de-identified] : A/P: Patient is a 50 year old female with bilateral shoulder impingement syndrome.\par \par Plan: At this time I recommend symptomatic treatment with resting, icing, heating, stretching, anti-inflammatory medicines as needed and activity modifications, and home exercises.  I am recommending she take Mobic 15 mg once daily with food for pain and inflammation.  GI precautions were discussed.  She will use ice and heat as needed.  She will avoid exacerbating activities and will continue physical therapy both formally on her own for pain and inflammation.  I am also recommending a prescription for MRIs for the bilateral shoulder for further evaluation due to the chronicity of her symptoms and lack of improvement with conservative treatment.  I also feel that her neck pain is related to compensation however she is concerned about this.  She was given a referral to physiatry to be evaluated for her neck pain.  We may consider repeat injection versus surgical intervention pending the MRI results.  She will follow-up after the MRIs are complete.  All questions were answered.\par \par  The patient verbalizes understanding and agrees with the plan.  All questions were answered to his satisfaction.

## 2022-02-25 NOTE — PHYSICAL EXAM
[de-identified] : General:\par Awake, alert, no acute distress, Patient was cooperative and appropriate during the examination.\par \par The patient is of normal weight for height and age.\par \par Walks without an antalgic gait.\par \par Full, painless range of motion of the neck and back.\par \par Exam of the bilateral lower extremities is intact and symmetric with regards to dermatologic, vascular, and neurologic exam. Bilateral lower extremity sensation is grossly intact to light touch in the DP/SP/T/S/S nerve distributions. Intact DF/PF/EHL. BIlateral lower extremities warm and well-perfused with brisk capillary refill.\par \par \par Pulmonary:\par Regular, nonlabored breathing\par \par Abdomen:\par Soft, nontender, nondistended.\par \par Lymphatic:\par No evidence of axillary lymphadenopathy \par \par Bilateral shoulder Exam:\par Physical exam of the shoulder demonstrates normal skin without signs of skin changes or abnormalities. No erythema, warmth, or joint effusion appreciated. \par \par Sensation intact light touch C5-T1\par Palpable radial pulse\par Radial/ulnar/median/axillary/musculocutaneous/AIN/PIN nerves grossly intact\par \par Range of motion:\par Forward Flexion: 170 with pain at the terminal degrees of flexion bilaterally \par Abduction: 150 bilaterally\par External Rotation: 85 bilaterally\par Internal Rotation: mid lumbar level bilaterally\par \par Palpation:\par Not tender to palpation over the glenohumeral joint\par Mildly tender palpation over the rotator cuff insertion on the greater tuberosity\par Not tender to palpation over the AC joint\par Moderately tender to palpation of the biceps tendon/bicipital groove\par \par Strength testing:\par Supraspinatus: 4+/5 bilaterally\par Infraspinatus: 5/5 bilaterally \par Subscapularis: 5/5 bilaterally\par \par Special test:\par Empty can test mildly positive bilaterally\par Hernandez impingement test positive bilaterally\par Neer test positive bilaterally \par Speeds test positive bilaterally\par [place]'s test negative bilaterally\par Lift-off test negative bilaterally\par Bell-press test negative bilaterally\par Cross-arm adduction test negative  bilaterally [de-identified] : X-rays 4 views of the  bilateral shoulders taken in the office today on 12/14/2021 shows no acute fracture or dislocation with a possible calcific deposit on the right and no other acute findings.

## 2022-02-25 NOTE — PROCEDURE
[de-identified] : Procedure note: Left shoulder subacromial injection\par \par Using sterile technique the shoulder subacromial space was injected with a mixture of 80mg depomedrol and 1% lidocaine. The patient tolerated the procedure well and was dressed with a sterile bandage. The patient received immediate symptotic relief. They were educated on signs of infection including but not limited to erythema, warmth, tenderness,fever, chills. They will call if any of these symptoms were to occur. We also discussed potential elevation of their blood glucose level. Patient was advised to monitor their symptom and follow-up with their primary care if any of these symptoms were to occur. Patient was advised that it may take a few days for the injection to begin to work and they will ice, heat, rest and take the medication as needed for pain. \par \par Procedure note: Right shoulder subacromial injection\par \par Using sterile technique the shoulder subacromial space was injected with a mixture of 80mg depomedrol and 1% lidocaine. The patient tolerated the procedure well and was dressed with a sterile bandage. The patient received immediate symptotic relief. They were educated on signs of infection including but not limited to erythema, warmth, tenderness,fever, chills. They will call if any of these symptoms were to occur. We also discussed potential elevation of their blood glucose level. Patient was advised to monitor their symptom and follow-up with their primary care if any of these symptoms were to occur. Patient was advised that it may take a few days for the injection to begin to work and they will ice, heat, rest and take the medication as needed for pain.

## 2022-02-25 NOTE — CONSULT LETTER
[Dear  ___] : Dear  [unfilled], [Consult Letter:] : I had the pleasure of evaluating your patient, [unfilled]. [( Thank you for referring [unfilled] for consultation for _____ )] : Thank you for referring [unfilled] for consultation for [unfilled] [Please see my note below.] : Please see my note below. [Consult Closing:] : Thank you very much for allowing me to participate in the care of this patient.  If you have any questions, please do not hesitate to contact me. [Sincerely,] : Sincerely, [FreeTextEntry2] : SUSHILA ATWOOD\par  [FreeTextEntry3] : Nicolas Abebe DO.\par Sports Medicine \par Orthopaedic Surgery\par \par [facility]\par [facility] \par [address] \[address] \[place] [address]\par \par Tel [phone]\par Fax [phone]\par \par For same day and next day orthopedic appointments contact:\par [email or web address] |[phone])\par Appointments available nights and weekends!  \par \par [facility]\par Visit us at [facility]/orthopaedic\par

## 2022-03-07 ENCOUNTER — RESULT REVIEW (OUTPATIENT)
Age: 52
End: 2022-03-07

## 2022-03-07 ENCOUNTER — OUTPATIENT (OUTPATIENT)
Dept: OUTPATIENT SERVICES | Facility: HOSPITAL | Age: 52
LOS: 1 days | End: 2022-03-07

## 2022-03-07 ENCOUNTER — APPOINTMENT (OUTPATIENT)
Dept: MRI IMAGING | Facility: CLINIC | Age: 52
End: 2022-03-07
Payer: MEDICAID

## 2022-03-07 DIAGNOSIS — Z98.891 HISTORY OF UTERINE SCAR FROM PREVIOUS SURGERY: Chronic | ICD-10-CM

## 2022-03-07 DIAGNOSIS — Z98.890 OTHER SPECIFIED POSTPROCEDURAL STATES: Chronic | ICD-10-CM

## 2022-03-07 DIAGNOSIS — M25.511 PAIN IN RIGHT SHOULDER: ICD-10-CM

## 2022-03-07 DIAGNOSIS — Z90.710 ACQUIRED ABSENCE OF BOTH CERVIX AND UTERUS: Chronic | ICD-10-CM

## 2022-03-07 PROCEDURE — 73221 MRI JOINT UPR EXTREM W/O DYE: CPT | Mod: 26,LT,76

## 2022-03-09 ENCOUNTER — APPOINTMENT (OUTPATIENT)
Dept: PHYSICAL MEDICINE AND REHAB | Facility: CLINIC | Age: 52
End: 2022-03-09
Payer: MEDICAID

## 2022-03-09 VITALS
HEART RATE: 82 BPM | HEIGHT: 61 IN | SYSTOLIC BLOOD PRESSURE: 118 MMHG | BODY MASS INDEX: 22.09 KG/M2 | RESPIRATION RATE: 12 BRPM | WEIGHT: 117 LBS | DIASTOLIC BLOOD PRESSURE: 78 MMHG

## 2022-03-09 DIAGNOSIS — Z87.09 PERSONAL HISTORY OF OTHER DISEASES OF THE RESPIRATORY SYSTEM: ICD-10-CM

## 2022-03-09 DIAGNOSIS — Z78.9 OTHER SPECIFIED HEALTH STATUS: ICD-10-CM

## 2022-03-09 PROCEDURE — 99204 OFFICE O/P NEW MOD 45 MIN: CPT

## 2022-03-09 RX ORDER — MELOXICAM 15 MG/1
15 TABLET ORAL
Qty: 30 | Refills: 0 | Status: COMPLETED | COMMUNITY
Start: 2021-12-14 | End: 2022-03-09

## 2022-03-09 RX ORDER — FAMOTIDINE 20 MG/1
20 TABLET, FILM COATED ORAL
Qty: 90 | Refills: 3 | Status: COMPLETED | COMMUNITY
Start: 2021-09-21 | End: 2022-03-09

## 2022-03-09 NOTE — ASSESSMENT
[FreeTextEntry1] : Ms. LE OWEN is a 51 year old female who presents with bilateral shoulder and neck pain, with her pain mostly in her shoulders but also complains of posterior lower cervical spine pain. Does have radiating pain from her neck to her bilateral shoulders and upper arms, but not to hands. Pain is likely related to her underlying shoulder pathology, but could also have a cervical spondylosis component, unlikely a radicular component. Denies any red flag signs. Will recommend:\par - MRI C Spine given prolonged duration of pain despite HEP and given history of cancer\par - Mobic 15mg QD PRN. Patient advised on cardiac/gi/renal side effects. Patient encouraged to take medication with food and not with other NSAIDs. \par - Methocarbamol 500mg Qhs PRN. Advised of side effects including sedation. \par - Patient to explore acupuncture and yoga\par - She will follow up with Dr. Abebe regarding her shoulder pain/shoulder MRIs\par \par RTC in 2-3 weeks. Patient in agreement with plan. Patient aware of red flag signs including any changes to their bowel/bladder control, groin numbness or new weakness. Patient knows to seek immediate attention by calling 911 or going to nearest ER if these symptoms appear.

## 2022-03-09 NOTE — HISTORY OF PRESENT ILLNESS
[FreeTextEntry1] : Ms. LE OWEN  is a 51 year old female with a PMHx of Colon CA who presents with neck pain. \par \par Location:Posterior Neck/Shoulders\par Onset:Chronic, but back in 1/2022 she was doing jumping jacks for which she said really made the pain worse. \par Provocation/Palliative:Worse with some exercises/weight lifting, better somewhat with rest but pain is worse when laying on side. \par Quality:Dull \par Radiation:Down to bilateral shoulders and upper arm\par Severity:6-7/10 in neck, 8/10 in shoulders\par Timing:Not improving with time\par \par Has intermittent numbness down both upper arms. Denies any associated arm or hand weakness. Denies any loss of bowel/bladder control or any groin numbness.\par Previous medications trialed:Mobic - takes edge off\par Previous procedures relevant to complaint:Bilateral subacromial steroid injection with great relief\par Has tried conservative treatment?:Currently in PT, and has completed a HEP for 6+ weeks

## 2022-03-09 NOTE — PHYSICAL EXAM
[FreeTextEntry1] : PE:\par Constitutional: In NAD, calm and cooperative\par MSK:\par 	Inspection: no gross swelling identified\par 	Palpation: Tenderness over C7 spinous process, minimal TTP over lower cervical paraspinals\par 	ROM: Pain at end cervical extension>flexion but AROM intact\par 	Strength: 5/5 strength in bilateral upper extremities\par 	Reflexes: 2+ Biceps/Brachioradialis/patella/Achilles reflex bilaterally, Vilchis’s/Clonus negative bilaterally\par 	Sensation: Intact to light touch in bilateral upper extremities\par 	Special tests: [place]’s test negative bilaterally, Neer's test positive bilaterally, hawkin's positive bilaterally

## 2022-03-10 ENCOUNTER — OUTPATIENT (OUTPATIENT)
Dept: OUTPATIENT SERVICES | Facility: HOSPITAL | Age: 52
LOS: 1 days | Discharge: HOME | End: 2022-03-10

## 2022-03-10 DIAGNOSIS — Z98.890 OTHER SPECIFIED POSTPROCEDURAL STATES: Chronic | ICD-10-CM

## 2022-03-10 DIAGNOSIS — C18.9 MALIGNANT NEOPLASM OF COLON, UNSPECIFIED: ICD-10-CM

## 2022-03-10 DIAGNOSIS — Z90.710 ACQUIRED ABSENCE OF BOTH CERVIX AND UTERUS: Chronic | ICD-10-CM

## 2022-03-10 DIAGNOSIS — Z98.891 HISTORY OF UTERINE SCAR FROM PREVIOUS SURGERY: Chronic | ICD-10-CM

## 2022-03-11 ENCOUNTER — RESULT REVIEW (OUTPATIENT)
Age: 52
End: 2022-03-11

## 2022-03-11 ENCOUNTER — APPOINTMENT (OUTPATIENT)
Dept: HEMATOLOGY ONCOLOGY | Facility: CLINIC | Age: 52
End: 2022-03-11
Payer: MEDICAID

## 2022-03-11 VITALS
HEIGHT: 61 IN | SYSTOLIC BLOOD PRESSURE: 108 MMHG | BODY MASS INDEX: 21.71 KG/M2 | HEART RATE: 74 BPM | DIASTOLIC BLOOD PRESSURE: 74 MMHG | OXYGEN SATURATION: 97 % | WEIGHT: 115 LBS

## 2022-03-11 LAB
BASOPHILS # BLD AUTO: 0.1 K/UL — SIGNIFICANT CHANGE UP (ref 0–0.2)
BASOPHILS NFR BLD AUTO: 1.7 % — SIGNIFICANT CHANGE UP (ref 0–2)
CEA SERPL-MCNC: 1.9 NG/ML
EOSINOPHIL # BLD AUTO: 0.4 K/UL — SIGNIFICANT CHANGE UP (ref 0–0.5)
EOSINOPHIL NFR BLD AUTO: 6.4 % — HIGH (ref 0–6)
HCT VFR BLD CALC: 41.9 % — SIGNIFICANT CHANGE UP (ref 34.5–45)
HGB BLD-MCNC: 13.4 G/DL — SIGNIFICANT CHANGE UP (ref 11.5–15.5)
LYMPHOCYTES # BLD AUTO: 1.4 K/UL — SIGNIFICANT CHANGE UP (ref 1–3.3)
LYMPHOCYTES # BLD AUTO: 26.1 % — SIGNIFICANT CHANGE UP (ref 13–44)
MCHC RBC-ENTMCNC: 30.6 PG — SIGNIFICANT CHANGE UP (ref 27–34)
MCHC RBC-ENTMCNC: 32 G/DL — SIGNIFICANT CHANGE UP (ref 32–36)
MCV RBC AUTO: 95.8 FL — SIGNIFICANT CHANGE UP (ref 80–100)
MONOCYTES # BLD AUTO: 0.6 K/UL — SIGNIFICANT CHANGE UP (ref 0–0.9)
MONOCYTES NFR BLD AUTO: 10.2 % — SIGNIFICANT CHANGE UP (ref 2–14)
NEUTROPHILS # BLD AUTO: 3 K/UL — SIGNIFICANT CHANGE UP (ref 1.8–7.4)
NEUTROPHILS NFR BLD AUTO: 55.5 % — SIGNIFICANT CHANGE UP (ref 43–77)
PLATELET # BLD AUTO: 268 K/UL — SIGNIFICANT CHANGE UP (ref 150–400)
RBC # BLD: 4.37 M/UL — SIGNIFICANT CHANGE UP (ref 3.8–5.2)
RBC # FLD: 12.8 % — SIGNIFICANT CHANGE UP (ref 10.3–14.5)
WBC # BLD: 5.5 K/UL — SIGNIFICANT CHANGE UP (ref 3.8–10.5)
WBC # FLD AUTO: 5.5 K/UL — SIGNIFICANT CHANGE UP (ref 3.8–10.5)

## 2022-03-11 PROCEDURE — 99215 OFFICE O/P EST HI 40 MIN: CPT

## 2022-03-11 NOTE — HISTORY OF PRESENT ILLNESS
[Date: ____________] : Patient's last distress assessment performed on [unfilled]. [9 - Distress Level] : Distress Level: 9 [Referred Patient  to social work for follow-up] : Patient was referred to social work for follow-up [de-identified] : 50-year-old female presents to our office with a new diagnosis of Sigmoid colon cancer \par \par She has had a variety of symptoms with chronic pelvic pain over the last 10 years and recently underwent a hysterectomy for adenomyosis by Dr. Cruz in February 2021. Several of her pelvic symptoms did improve. \par After that she had intermittent blood in her stool and some changes in the caliber of her stool. Her appetite has been fine and she has not had any significant weight loss. \par She underwent a colonoscopy which showed a mass in the sigmoid colon. \par COLONOSCOPY: 5/17/2021: Intramucosal adenocarcinoma \par CT CAP 5/2021 :  LUNGS AND LARGE AIRWAYS: Patent central airways. There is 3 mm nodular thickening of the minor fissure on image 80, likely postinflammatory. There is a 2 mm calcified granuloma in the left lower lobe on image 125. No confluent airspace opacity is identified. There is no evidence of interstitial lung disease, bronchiectasis, or fibrosis.\par \par S/p Robotic sigmoid colon resection for colon cancer on 5/28/21 \par T3N0 stage II colon cancer, MSI Stable, No high risk features\par  [de-identified] : Patient presents today for a FU visit.\par \par CT ABD Pelvis 2/25/22: Unchanged 3 mm nodule within the middle lobe along the minor fissure. No new nodule.\par A subcentimeter hypodense focus in the upper pole of the right kidney is too small to characterize but is unchanged. No hydronephrosis\par Status post sigmoid colon resection without evidence of recurrent or metastatic disease within the chest, abdomen, or pelvis.\par \par Patient having issues with b/l shoulder pain, states this has been chronic. she saw PMNR \par Patient admits fatigue, appetite is good. \par No diarrhea constipation, Sometimes notes that stools are thinner \par \par Denies fever/chills, rash, mouth sores, nausea, vomiting, diarrhea,constipation,  abdominal pain, bleeding, easy bruising or visual changes, chest pain, cough, SOB or RIOS,  neuropathy, LE edema.\par \par \par BIological children 21, 19 , 14 \par  [FreeTextEntry7] : John Paul saw patient during appointment

## 2022-03-11 NOTE — ASSESSMENT
[FreeTextEntry1] : pT3 N0 M0 ADenocarcinoma SIgmoid colon, in Jun 2021 \par \par - Invasive moderately differentiated adenocarcinoma invading through the muscularis propria into pericolorectal tissue.\par - Tattoo is present.\par - Diverticulosis.\par - 24 lymph nodes, negative for carcinoma (0/24).\par - Margins of resection are not involved.\par \par Resected node-negative (stage II) disease, the benefits of chemotherapy are controversial, as is the relative benefit of an oxaliplatin-based as compared with a non-oxaliplatin-based regimen.\par Benefits of chemotherapy is based on high risk features \par Patient does not have any high risk features, No LVI   NO neural invasion 25 LN examined, NO Perforation \par \par GIven no high risk features no benefit of adjuvant chemotherapy\par Recent CT Abd Pelvis from 2/25/22: with no evidence of recurrent or metastatic disease in the CAP \par \par - Today's Labs with Normal WBC \par - F/u visit with labs cbc/ cmp/ CEA q 3months x 2 y and then q 6 months from Y 3-5 \par - CT CAP q 6 months x 5 years and Y3-5 and consider spacing q 8-12 months\par - Patient due for Colonoscopy 1 y after surgery 06/2022, \par - she is having colonoscopy scheudled with DR Shi in May 2022 Reports sometimes that she has thin stools\par - referred to genetic counselor, reminded to make appointement\par - F/u in 3 months with darwin\par \par

## 2022-03-14 LAB
ALBUMIN SERPL ELPH-MCNC: 4.5 G/DL
ALP BLD-CCNC: 54 U/L
ALT SERPL-CCNC: 14 U/L
ANION GAP SERPL CALC-SCNC: 14 MMOL/L
AST SERPL-CCNC: 20 U/L
BILIRUB SERPL-MCNC: 0.4 MG/DL
BUN SERPL-MCNC: 16 MG/DL
CALCIUM SERPL-MCNC: 9.4 MG/DL
CHLORIDE SERPL-SCNC: 103 MMOL/L
CO2 SERPL-SCNC: 23 MMOL/L
CREAT SERPL-MCNC: 0.92 MG/DL
EGFR: 76 ML/MIN/1.73M2
GLUCOSE SERPL-MCNC: 82 MG/DL
POTASSIUM SERPL-SCNC: 4.4 MMOL/L
PROT SERPL-MCNC: 6.7 G/DL
SODIUM SERPL-SCNC: 140 MMOL/L

## 2022-03-16 ENCOUNTER — OUTPATIENT (OUTPATIENT)
Dept: OUTPATIENT SERVICES | Facility: HOSPITAL | Age: 52
LOS: 1 days | End: 2022-03-16

## 2022-03-16 ENCOUNTER — APPOINTMENT (OUTPATIENT)
Dept: MRI IMAGING | Facility: CLINIC | Age: 52
End: 2022-03-16
Payer: MEDICAID

## 2022-03-16 DIAGNOSIS — Z98.890 OTHER SPECIFIED POSTPROCEDURAL STATES: Chronic | ICD-10-CM

## 2022-03-16 DIAGNOSIS — Z90.710 ACQUIRED ABSENCE OF BOTH CERVIX AND UTERUS: Chronic | ICD-10-CM

## 2022-03-16 DIAGNOSIS — Z98.891 HISTORY OF UTERINE SCAR FROM PREVIOUS SURGERY: Chronic | ICD-10-CM

## 2022-03-16 DIAGNOSIS — Z00.8 ENCOUNTER FOR OTHER GENERAL EXAMINATION: ICD-10-CM

## 2022-03-16 PROCEDURE — 72141 MRI NECK SPINE W/O DYE: CPT | Mod: 26

## 2022-03-17 ENCOUNTER — APPOINTMENT (OUTPATIENT)
Dept: ORTHOPEDIC SURGERY | Facility: CLINIC | Age: 52
End: 2022-03-17
Payer: MEDICAID

## 2022-03-17 PROCEDURE — 99442: CPT

## 2022-03-23 ENCOUNTER — APPOINTMENT (OUTPATIENT)
Dept: PHYSICAL MEDICINE AND REHAB | Facility: CLINIC | Age: 52
End: 2022-03-23
Payer: MEDICAID

## 2022-03-23 VITALS
SYSTOLIC BLOOD PRESSURE: 102 MMHG | RESPIRATION RATE: 12 BRPM | HEART RATE: 80 BPM | DIASTOLIC BLOOD PRESSURE: 70 MMHG | BODY MASS INDEX: 21.71 KG/M2 | HEIGHT: 61 IN | WEIGHT: 115 LBS

## 2022-03-23 PROCEDURE — 99214 OFFICE O/P EST MOD 30 MIN: CPT

## 2022-03-23 NOTE — HISTORY OF PRESENT ILLNESS
[FreeTextEntry1] : Ms. LE OWEN is a 51 year old  female who presents for follow up. At last visit, she was ordered an MRI C Spine, started on Mobic and Robaxin and was going to explore acupuncture and yoga. Dr. Abebe gave her a medrol dose radha but she has not taken it as she does not want to disrupt her stomach. She continues to take Mobic/Robaxin occasionally with only mild relief. \par \par Location:Posterior Neck/Shoulders\par Onset:Chronic, but back in 1/2022 she was doing jumping jacks for which she said really made the pain worse. \par Provocation/Palliative:Worse with some exercises/weight lifting, better somewhat with rest but pain is worse when laying on side. \par Quality:Dull\par Radiation:Down to bilateral shoulders and upper arm\par Severity:6-7/10 in neck, 8/10 in shoulders\par Timing:Not improving with time\par \par No bowel/bladder changes. No groin numbness.

## 2022-03-23 NOTE — PHYSICAL EXAM
[FreeTextEntry1] : PE:\par Constitutional: In NAD, calm and cooperative\par MSK:\par 	Inspection: no gross swelling identified\par 	Palpation:minimal TTP over lower cervical paraspinals\par 	ROM: Pain at end cervical extension>flexion but AROM intact, also pain at end left lateral rotation\par 	Strength: 5/5 strength in bilateral upper extremities\par 	Reflexes: 2+ Biceps/Brachioradialis/patella/Achilles reflex bilaterally, Vilchis’s/Clonus negative bilaterally\par 	Sensation: Intact to light touch in bilateral upper extremities\par 	Special tests: Spurling’s test negative bilaterally, Neer's test positive bilaterally, hawkin's positive bilaterally

## 2022-03-23 NOTE — ASSESSMENT
[FreeTextEntry1] : Ms. LE OWEN is a 51 year old female who presents with bilateral shoulder and neck pain, with her pain mostly in her shoulders but also complains of posterior lower cervical spine pain. Does have radiating pain from her neck to her bilateral shoulders and upper arms, but not to hands. Pain is likely related to her underlying shoulder pathology, but could also have a cervical spondylosic or radicular component. Denies any red flag signs. Will recommend:\par -Continue Mobic 15mg QD PRN. Patient advised on cardiac/gi/renal side effects. Patient encouraged to take medication with food and not with other NSAIDs. \par - Continue Methocarbamol 500mg Qhs PRN. Advised of side effects including sedation. \par - Will start trial of Gabapentin 100mg Qhs with gradual increase to 300mg Qhs. Patient aware of side effects and risks including sedation, leg swelling, and possible mood changes. \par - Patient to explore acupuncture and yoga\par - She will follow up with Dr. Abebe regarding her shoulder pain/shoulder MRIs\par \par RTC in 4 weeks. Patient in agreement with plan. Patient aware of red flag signs including any changes to their bowel/bladder control, groin numbness or new weakness. Patient knows to seek immediate attention by calling 911 or going to nearest ER if these symptoms appear.

## 2022-03-25 ENCOUNTER — APPOINTMENT (OUTPATIENT)
Dept: PHYSICAL MEDICINE AND REHAB | Facility: CLINIC | Age: 52
End: 2022-03-25

## 2022-03-28 ENCOUNTER — NON-APPOINTMENT (OUTPATIENT)
Age: 52
End: 2022-03-28

## 2022-03-28 ENCOUNTER — APPOINTMENT (OUTPATIENT)
Dept: OBGYN | Facility: CLINIC | Age: 52
End: 2022-03-28
Payer: MEDICAID

## 2022-03-28 VITALS
HEIGHT: 61 IN | WEIGHT: 115 LBS | BODY MASS INDEX: 21.71 KG/M2 | SYSTOLIC BLOOD PRESSURE: 110 MMHG | DIASTOLIC BLOOD PRESSURE: 70 MMHG

## 2022-03-28 PROCEDURE — 99386 PREV VISIT NEW AGE 40-64: CPT

## 2022-03-28 PROCEDURE — 82270 OCCULT BLOOD FECES: CPT

## 2022-03-31 ENCOUNTER — TRANSCRIPTION ENCOUNTER (OUTPATIENT)
Age: 52
End: 2022-03-31

## 2022-03-31 ENCOUNTER — APPOINTMENT (OUTPATIENT)
Dept: ORTHOPEDIC SURGERY | Facility: CLINIC | Age: 52
End: 2022-03-31
Payer: MEDICAID

## 2022-03-31 VITALS
SYSTOLIC BLOOD PRESSURE: 101 MMHG | BODY MASS INDEX: 21.71 KG/M2 | HEART RATE: 78 BPM | HEIGHT: 61 IN | DIASTOLIC BLOOD PRESSURE: 62 MMHG | WEIGHT: 115 LBS

## 2022-03-31 DIAGNOSIS — M75.41 IMPINGEMENT SYNDROME OF RIGHT SHOULDER: ICD-10-CM

## 2022-03-31 DIAGNOSIS — M75.42 IMPINGEMENT SYNDROME OF LEFT SHOULDER: ICD-10-CM

## 2022-03-31 PROCEDURE — 99214 OFFICE O/P EST MOD 30 MIN: CPT

## 2022-03-31 NOTE — HISTORY OF PRESENT ILLNESS
[de-identified] : 3/31/2022: Sophia is a pleasant 51-year-old right-hand-dominant female who returns to the office today for reevaluation of her bilateral shoulder pain which is worse on her left side.  At her previous office visit with [facility] Strauss, MRIs of both shoulders were ordered due to lack of improvement with conservative treatment.  These imaging results were reviewed over the phone with her.  Her right shoulder demonstrates tendinopathy of the rotator cuff and biceps in the absence of any full-thickness tearing.  Her left shoulder MRI does show a partial-thickness tear of her rotator cuff as well as impingement and tendinopathy of the biceps tendon.  The patient has been going to physical therapy for the past several months.  She has had bilateral subacromial cortisone injections without any lasting relief.  She has been taking Tylenol as needed for pain.  At this point she is unhappy with her progress and would now like to consider surgical treatment.  She comes in to discuss her options with me today.  The patient denies any fevers, chills, sweats, recent illnesses, numbness, tingling, weakness.  The patient is being treated for neck pain with Dr. Diane.

## 2022-03-31 NOTE — PHYSICAL EXAM
[Appropriately responsive] : appropriately responsive [Alert] : alert [No Acute Distress] : no acute distress [No Lymphadenopathy] : no lymphadenopathy [Regular Rate Rhythm] : regular rate rhythm [No Murmurs] : no murmurs [Clear to Auscultation B/L] : clear to auscultation bilaterally [Soft] : soft [Non-tender] : non-tender [Non-distended] : non-distended [No HSM] : No HSM [No Lesions] : no lesions [No Mass] : no mass [Oriented x3] : oriented x3 [Examination Of The Breasts] : a normal appearance [No Masses] : no breast masses were palpable [Labia Majora] : normal [Labia Minora] : normal [Normal] : normal [Absent] : absent [Uterine Adnexae] : normal [No Tenderness] : no tenderness [FreeTextEntry9] : darek

## 2022-03-31 NOTE — DISCUSSION/SUMMARY
[de-identified] : Assessment: 51-year-old female with bilateral shoulder pain which is worse on her left secondary to a high-grade partial-thickness rotator cuff tear and biceps tendinopathy\par \par P plan: I had a long discussion with the patient today regarding the nature of their diagnosis and treatment plan.  I reviewed the patient's imaging today with him in the office which demonstrates left shoulder rotator cuff partial tearing in the setting of impingement and biceps tendinopathy.  Her right shoulder MRI demonstrates tendinopathy of the rotator cuff without any full-thickness tearing.  We discussed the risks and benefits of no treatment as well as nonoperative and operative treatments.  Nonsurgical treatment would include modalities such as resting, icing, heating, stretching, anti-inflammatory medicines as needed, activity/lifestyle modifications, physical therapy, possible cortisone injections, and a gradual return to activities as tolerated.  The benefits of nonsurgical treatment are that it may improve some of the patient's symptoms while avoiding the risks and rehabilitation associated with surgery.  The disadvantages of nonsurgical treatment are that they may incompletely alleviate the patient's symptoms.  The patient has had extensive conservative treatment in the form of physical therapy, bilateral cortisone injections, activity modifications, and home exercises without any substantial improvement in her symptoms.  She would now like to consider surgery for her left shoulder as a more definitive option.  Surgical treatment would include a left shoulder arthroscopy with possible rotator cuff repair versus debridement, subacromial decompression, synovectomy, and possible open subpectoral biceps tenodesis.  The benefits of surgical treatment include improved shoulder pain and function.  The risks of surgery include but are not limited to infection, blood loss, blood clots, neurovascular injury, stiffness/loss of range of motion, persistent pain, progressive arthritis, fracture, instability, failure of hardware, failure of the tendon(s) to heal, anesthesia related complications including paralysis and death, and the need for further surgery in the future.  Postoperatively the patient will be nonweightbearing in a sling for a minimum of 2-6 weeks depending on the procedure performed.  They will be required to use the sling at all times except for hygiene, pendulum exercises, elbow/hand/wrist exercises, and physical therapy.  They will not be permitted to drive while wearing the sling and/or while taking narcotic pain medications.  Physical therapy after surgery is generally recommended for 2 to 3 days/week and will continue for a minimum of 3 to 6 months after postoperatively.  I expect most patients to return to unrestricted activities 3-6 months postoperatively depending on the procedure although some may take longer to fully recover.  The recovery after a rotator cuff repair will be substantially longer than an arthroscopy in isolation.  Noncompliance with any postoperative restrictions and/or physical therapy could lead to a suboptimal outcome or surgical failure.  The patient verbalizes their understanding of all surgical risks as well as the anticipated postoperative course and would like to proceed with surgery.  They will speak with my surgical coordinator regarding presurgical testing as well as scheduling the procedure.  She will require clearance from her oncologist and medical doctor prior to proceeding.\par \par The patient verbalizes their understanding and agrees with this plan.  All questions were answered to their satisfaction.

## 2022-03-31 NOTE — PHYSICAL EXAM
[de-identified] : General:\par Awake, alert, no acute distress, Patient was cooperative and appropriate during the examination.\par \par The patient is of normal weight for height and age.\par \par Walks without an antalgic gait.\par \par Full, painless range of motion of the neck and back.\par \par Exam of the bilateral lower extremities is intact and symmetric with regards to dermatologic, vascular, and neurologic exam. Bilateral lower extremity sensation is grossly intact to light touch in the DP/SP/T/S/S nerve distributions. Intact DF/PF/EHL. BIlateral lower extremities warm and well-perfused with brisk capillary refill.\par \par \par Pulmonary:\par Regular, nonlabored breathing\par \par Abdomen:\par Soft, nontender, nondistended.\par \par Lymphatic:\par No evidence of axillary lymphadenopathy \par \par Bilateral shoulder Exam:\par Physical exam of the shoulder demonstrates normal skin without signs of skin changes or abnormalities. No erythema, warmth, or joint effusion appreciated. \par \par Sensation intact light touch C5-T1\par Palpable radial pulse\par Radial/ulnar/median/axillary/musculocutaneous/AIN/PIN nerves grossly intact\par \par Range of motion:\par Forward Flexion: 180 with pain at the terminal degrees of flexion bilaterally \par Abduction: 150 bilaterally\par External Rotation: 85 bilaterally\par Internal Rotation: mid lumbar level bilaterally\par \par Palpation:\par Not tender to palpation over the glenohumeral joint\par Mildly tender palpation over the rotator cuff insertion on the greater tuberosity\par Not tender to palpation over the AC joint\par Exquisitely tender to palpation of the biceps tendon/bicipital groove on the left, moderately on the right\par \par Strength testing:\par Supraspinatus: 4+/5 on the left with pain, 5/5 on the right\par Infraspinatus: 5/5 bilaterally \par Subscapularis: 5/5 bilaterally\par \par Special test:\par Empty can test positive bilaterally\par Hernandez impingement test positive bilaterally, worse on the left\par Neer test positive bilaterally \par Speeds test positive bilaterally, worse on the left\par Seffner's test negative bilaterally\par Lift-off test negative bilaterally\par Bell-press test negative bilaterally\par Cross-arm adduction test negative bilaterally \par \par \par  [de-identified] : MRI of the left shoulder from a [facility] imaging facility on 3/7/2022 was reviewed the patient today in the office.  There is a focal high-grade tear of the anterior supraspinatus tendon at its insertion without any full-thickness tearing.  The patient has tenosynovitis about the long head of the biceps tendon without any subluxation.  No significant arthritis.  AC joint is intact.  Subacromial pinching and bursitis appreciated.\par \par MRI of the right shoulder from a [facility] imaging facility on 3/7/2022 was reviewed with the patient today in the office.  The patient has tendinopathy of her rotator cuff in the absence of any full-thickness tearing.  She also has tenosynovitis about the long head of the biceps tendon without any subluxation.\par \par \par \par X-rays 4 views of the bilateral shoulders taken in the office on 12/14/2021 shows no acute fracture or dislocation with a possible calcific deposit on the right and no other acute findings.

## 2022-03-31 NOTE — HISTORY OF PRESENT ILLNESS
[FreeTextEntry1] : 50 yo p3 (3cs) sp trh bs in 2021 w Anze Uhr for chronic pel pain and adenomyosis. then dxd w colon cancer in may 2021, had colectomy - stage 2 t3nomo . didn’t need chemo, having 3 mo fu colonoscopies. Fam hx, mgm and pat uncle had colon cancer as well. had brca testing\par has lived here for past 5 yrs.

## 2022-04-01 LAB — HPV HIGH+LOW RISK DNA PNL CVX: DETECTED

## 2022-04-01 NOTE — DATA REVIEWED
[FreeTextEntry1] : \par   MR Cervical Spine No Cont             Final\par \par No Documents Attached\par \par \par \par \par   EXAM: [id number] - MR SPINE CERVICAL  - ORDERED BY: JENNY MEDINA\par \par \par PROCEDURE DATE:  03/16/2022\par \par \par \par INTERPRETATION:  MRI CERVICAL SPINE WITHOUT CONTRAST\par \par HISTORY: 51F with persistent neck pain, hx of cancer;.\par \par COMPARISON: None.\par \par TECHNIQUE: MRI of the cervical spine was performed without contrast: Sagittal T1, T2-weighted, STIR, axial T2-weighted and gradient sequences were performed.\par \par FINDINGS:\par The cerebellar tonsils are normal in position. The cervical spinal cord is normal in caliber and signal.\par \par Straightening of the normal cervical lordosis. No spondylolisthesis. Vertebral body heights and bone marrow signal are preserved. There is no fracture or subluxation. There is multilevel disc desiccation, with mild loss of disc space heights at C4-C5, and moderate loss of disc space heights at C5-C6 and C6-C7.\par \par C2-C3: No significant disc herniation, central canal stenosis or neural foraminal narrowing.\par \par C3-C4: Mild posterior disc bulge and mild uncovertebral and facet arthropathy. No significant central canal stenosis or neural foraminal narrowing.\par \par C4-C5: Minimal posterior disc bulge. No significant central canal stenosis or neural foraminal narrowing.\par \par C5-C6: Disc osteophyte complex and uncovertebral arthropathy contribute to mild central canal stenosis, and mild bilateral neural foraminal narrowing.\par \par C6-C7: Disc osteophyte complex and uncovertebral arthropathy contribute to mild central canal stenosis, severe left and moderate right neural foraminal narrowing.\par \par C7-T1: No significant disc herniation, central canal stenosis or neural foraminal narrowing.\par \par IMPRESSION:\par Multilevel degenerative changes, as detailed by level above, worst at C5-C6 and C6-C7.\par \par --- End of Report ---\par \par \par \par \par \par \par GARCIA PITTS MD; Attending Radiologist\par This document has been electronically signed. Mar 17 2022 10:58PM\par \par  \par \par  Ordered by: JENNY MEDINA       Collected/Examined: 16Mar2022 07:46AM       \par Verified by: JENNY MEDINA 18Mar2022 12:25PM       \par  Result Communication: Discussed results with patient;\par Stage: Final       \par  Performed at: [facility]       Resulted: 17Mar2022 10:52PM       Last Updated: 18Mar2022 12:25PM       Accession: [id number]        Breath sounds clear and equal bilaterally.

## 2022-04-06 LAB — CYTOLOGY CVX/VAG DOC THIN PREP: NORMAL

## 2022-04-07 ENCOUNTER — NON-APPOINTMENT (OUTPATIENT)
Age: 52
End: 2022-04-07

## 2022-04-07 ENCOUNTER — RX RENEWAL (OUTPATIENT)
Age: 52
End: 2022-04-07

## 2022-04-08 ENCOUNTER — APPOINTMENT (OUTPATIENT)
Dept: GASTROENTEROLOGY | Facility: CLINIC | Age: 52
End: 2022-04-08
Payer: MEDICAID

## 2022-04-08 VITALS
DIASTOLIC BLOOD PRESSURE: 66 MMHG | TEMPERATURE: 97.5 F | HEIGHT: 61 IN | OXYGEN SATURATION: 98 % | BODY MASS INDEX: 22.28 KG/M2 | WEIGHT: 118 LBS | SYSTOLIC BLOOD PRESSURE: 110 MMHG | HEART RATE: 87 BPM | RESPIRATION RATE: 14 BRPM

## 2022-04-08 PROCEDURE — 99214 OFFICE O/P EST MOD 30 MIN: CPT

## 2022-04-08 RX ORDER — LEVOCETIRIZINE DIHYDROCHLORIDE 5 MG/1
5 TABLET, FILM COATED ORAL
Refills: 0 | Status: DISCONTINUED | COMMUNITY
End: 2022-04-08

## 2022-04-08 RX ORDER — METHYLPREDNISOLONE 4 MG/1
4 TABLET ORAL
Qty: 1 | Refills: 0 | Status: DISCONTINUED | COMMUNITY
Start: 2022-03-17 | End: 2022-04-08

## 2022-04-10 NOTE — HISTORY OF PRESENT ILLNESS
[de-identified] : Patient is a 51 year female, with PMH sigmoid adenocarcinoma s/p robotic sigmoid colon resection in May 2021, who presents for colon cancer screening. \par \par Patient was seen last year for upper abdominal bloating and change in BM. She had EGD/colonoscopy. EGD showed gastritis, neg HP. Colonoscopy showed a sigmoid mass, adenocarcinoma on pathology. She went on to have a robotic sigmoid resection, no chemo needed. She presents today for follow up visit. \par \par She states she is overall feeling well but is noticing some upper abdominal bloating again, some constipation, change in stool caliber, straining and some blood per rectum with wiping. CEA has remained normal. \par \par PT plans to have shoulder surgery and admits to significant left shoulder pain. High dose NSAID use daily. She has rx for Omeprazole 40mg but admits to not taking it daily bc she forgets to take it. \par \par Patient denies dysphagia, nausea, vomiting, or unexplained weight loss. \par \par Recent labs done by hematology.

## 2022-04-10 NOTE — PHYSICAL EXAM
[General Appearance - Alert] : alert [General Appearance - In No Acute Distress] : in no acute distress [Sclera] : the sclera and conjunctiva were normal [PERRL With Normal Accommodation] : pupils were equal in size, round, and reactive to light [Extraocular Movements] : extraocular movements were intact [Outer Ear] : the ears and nose were normal in appearance [Neck Appearance] : the appearance of the neck was normal [Auscultation Breath Sounds / Voice Sounds] : lungs were clear to auscultation bilaterally [Heart Rate And Rhythm] : heart rate was normal and rhythm regular [Heart Sounds] : normal S1 and S2 [Heart Sounds Gallop] : no gallops [Murmurs] : no murmurs [Heart Sounds Pericardial Friction Rub] : no pericardial rub [Bowel Sounds] : normal bowel sounds [Abdomen Soft] : soft [Abdomen Tenderness] : non-tender [] : no hepato-splenomegaly [Abdomen Mass (___ Cm)] : no abdominal mass palpated [Normal Sphincter Tone] : normal sphincter tone [No Rectal Mass] : no rectal mass [No Focal Deficits] : no focal deficits [Oriented To Time, Place, And Person] : oriented to person, place, and time [Impaired Insight] : insight and judgment were intact [Affect] : the affect was normal [Occult Blood Positive] : stool was negative for occult blood

## 2022-04-10 NOTE — ADDENDUM
[FreeTextEntry1] : I, Rachel Johnson PA-C, acted as a scribe for the services dictated to me by BESS Garnica in this document on Apr 08, 2022 for LE OWEN .\par

## 2022-04-12 ENCOUNTER — APPOINTMENT (OUTPATIENT)
Dept: DERMATOLOGY | Facility: CLINIC | Age: 52
End: 2022-04-12
Payer: MEDICAID

## 2022-04-12 PROCEDURE — 99214 OFFICE O/P EST MOD 30 MIN: CPT

## 2022-04-13 ENCOUNTER — APPOINTMENT (OUTPATIENT)
Dept: OBGYN | Facility: CLINIC | Age: 52
End: 2022-04-13
Payer: MEDICAID

## 2022-04-13 VITALS
HEIGHT: 61 IN | DIASTOLIC BLOOD PRESSURE: 78 MMHG | WEIGHT: 117 LBS | SYSTOLIC BLOOD PRESSURE: 129 MMHG | BODY MASS INDEX: 22.09 KG/M2 | HEART RATE: 78 BPM

## 2022-04-13 DIAGNOSIS — N95.2 POSTMENOPAUSAL ATROPHIC VAGINITIS: ICD-10-CM

## 2022-04-13 PROCEDURE — 99213 OFFICE O/P EST LOW 20 MIN: CPT

## 2022-04-13 NOTE — HISTORY OF PRESENT ILLNESS
[FreeTextEntry1] :  51-year-old white female para 3 presents with complaint of some pelvic pressure and burning. She denies any dysuria urgency. Patient has had T. Rh bilateral salpingectomy as well as surgery for colon cancer. She says she was diagnosed with BV a few months ago. She is sexually active and monogamous. She also reports some dyspareunia and discomfort during intercourse.

## 2022-04-13 NOTE — DISCUSSION/SUMMARY
[FreeTextEntry1] : I reassured the patient I did not see the pathologic discharge. Urine culture and vaginal cultures were performed. I discussed possibility of each atrophic vaginitis and I discussed considering vaginal estrogen creams as treatment. I discussed risks and benefits at length including small amount of systemic absorption of estrogen and its associated risks including elevated risk of DVT breast cancer etc. Patient verbalized understanding. I also recommend an ultrasound to evaluate her adnexa.

## 2022-04-22 ENCOUNTER — APPOINTMENT (OUTPATIENT)
Dept: PHYSICAL MEDICINE AND REHAB | Facility: CLINIC | Age: 52
End: 2022-04-22

## 2022-04-25 ENCOUNTER — LABORATORY RESULT (OUTPATIENT)
Age: 52
End: 2022-04-25

## 2022-04-25 ENCOUNTER — NON-APPOINTMENT (OUTPATIENT)
Age: 52
End: 2022-04-25

## 2022-04-25 ENCOUNTER — APPOINTMENT (OUTPATIENT)
Dept: SURGERY | Facility: CLINIC | Age: 52
End: 2022-04-25
Payer: MEDICAID

## 2022-04-25 VITALS
DIASTOLIC BLOOD PRESSURE: 66 MMHG | HEIGHT: 60 IN | HEART RATE: 79 BPM | WEIGHT: 116 LBS | SYSTOLIC BLOOD PRESSURE: 97 MMHG | RESPIRATION RATE: 14 BRPM | BODY MASS INDEX: 22.78 KG/M2 | TEMPERATURE: 97.3 F | OXYGEN SATURATION: 96 %

## 2022-04-25 DIAGNOSIS — K42.9 UMBILICAL HERNIA W/OUT OBSTRUCTION OR GANGRENE: ICD-10-CM

## 2022-04-25 LAB
A VAGINAE DNA VAG QL NAA+PROBE: ABNORMAL
BACTERIA UR CULT: NORMAL
BVAB2 DNA VAG QL NAA+PROBE: NORMAL
C KRUSEI DNA VAG QL NAA+PROBE: NEGATIVE
C KRUSEI DNA VAG QL NAA+PROBE: POSITIVE
C TRACH RRNA SPEC QL NAA+PROBE: NEGATIVE
MEGA1 DNA VAG QL NAA+PROBE: ABNORMAL
N GONORRHOEA RRNA SPEC QL NAA+PROBE: NEGATIVE
T VAGINALIS RRNA SPEC QL NAA+PROBE: NEGATIVE

## 2022-04-25 PROCEDURE — 99204 OFFICE O/P NEW MOD 45 MIN: CPT

## 2022-04-26 ENCOUNTER — EMERGENCY (EMERGENCY)
Facility: HOSPITAL | Age: 52
LOS: 1 days | Discharge: HOME | End: 2022-04-26
Attending: STUDENT IN AN ORGANIZED HEALTH CARE EDUCATION/TRAINING PROGRAM
Payer: COMMERCIAL

## 2022-04-26 VITALS
OXYGEN SATURATION: 98 % | HEART RATE: 107 BPM | SYSTOLIC BLOOD PRESSURE: 102 MMHG | DIASTOLIC BLOOD PRESSURE: 66 MMHG | HEIGHT: 61 IN | RESPIRATION RATE: 18 BRPM | TEMPERATURE: 99 F | WEIGHT: 113.1 LBS

## 2022-04-26 VITALS
HEART RATE: 94 BPM | TEMPERATURE: 99 F | RESPIRATION RATE: 18 BRPM | OXYGEN SATURATION: 97 % | DIASTOLIC BLOOD PRESSURE: 64 MMHG | SYSTOLIC BLOOD PRESSURE: 100 MMHG

## 2022-04-26 DIAGNOSIS — Z90.710 ACQUIRED ABSENCE OF BOTH CERVIX AND UTERUS: Chronic | ICD-10-CM

## 2022-04-26 DIAGNOSIS — Z98.891 HISTORY OF UTERINE SCAR FROM PREVIOUS SURGERY: Chronic | ICD-10-CM

## 2022-04-26 DIAGNOSIS — Z98.890 OTHER SPECIFIED POSTPROCEDURAL STATES: Chronic | ICD-10-CM

## 2022-04-26 DIAGNOSIS — Z90.49 ACQUIRED ABSENCE OF OTHER SPECIFIED PARTS OF DIGESTIVE TRACT: Chronic | ICD-10-CM

## 2022-04-26 LAB
ALBUMIN SERPL ELPH-MCNC: 4.5 G/DL — SIGNIFICANT CHANGE UP (ref 3.3–5.2)
ALP SERPL-CCNC: 68 U/L — SIGNIFICANT CHANGE UP (ref 40–120)
ALT FLD-CCNC: 26 U/L — SIGNIFICANT CHANGE UP
ANION GAP SERPL CALC-SCNC: 15 MMOL/L — SIGNIFICANT CHANGE UP (ref 5–17)
ANISOCYTOSIS BLD QL: SLIGHT — SIGNIFICANT CHANGE UP
AST SERPL-CCNC: 24 U/L — SIGNIFICANT CHANGE UP
BASOPHILS # BLD AUTO: 0 K/UL — SIGNIFICANT CHANGE UP (ref 0–0.2)
BASOPHILS # BLD AUTO: 0 K/UL — SIGNIFICANT CHANGE UP (ref 0–0.2)
BASOPHILS NFR BLD AUTO: 0 % — SIGNIFICANT CHANGE UP (ref 0–2)
BASOPHILS NFR BLD AUTO: 0 % — SIGNIFICANT CHANGE UP (ref 0–2)
BILIRUB SERPL-MCNC: 0.3 MG/DL — LOW (ref 0.4–2)
BUN SERPL-MCNC: 9.8 MG/DL — SIGNIFICANT CHANGE UP (ref 8–20)
C DIFF BY PCR RESULT: SIGNIFICANT CHANGE UP
C DIFF TOX GENS STL QL NAA+PROBE: SIGNIFICANT CHANGE UP
CALCIUM SERPL-MCNC: 9.2 MG/DL — SIGNIFICANT CHANGE UP (ref 8.6–10.2)
CHLORIDE SERPL-SCNC: 100 MMOL/L — SIGNIFICANT CHANGE UP (ref 98–107)
CO2 SERPL-SCNC: 24 MMOL/L — SIGNIFICANT CHANGE UP (ref 22–29)
CREAT SERPL-MCNC: 0.73 MG/DL — SIGNIFICANT CHANGE UP (ref 0.5–1.3)
CULTURE RESULTS: SIGNIFICANT CHANGE UP
EGFR: 100 ML/MIN/1.73M2 — SIGNIFICANT CHANGE UP
EOSINOPHIL # BLD AUTO: 0 K/UL — SIGNIFICANT CHANGE UP (ref 0–0.5)
EOSINOPHIL # BLD AUTO: 0 K/UL — SIGNIFICANT CHANGE UP (ref 0–0.5)
EOSINOPHIL NFR BLD AUTO: 0 % — SIGNIFICANT CHANGE UP (ref 0–6)
EOSINOPHIL NFR BLD AUTO: 0 % — SIGNIFICANT CHANGE UP (ref 0–6)
GIANT PLATELETS BLD QL SMEAR: PRESENT — SIGNIFICANT CHANGE UP
GLUCOSE SERPL-MCNC: 119 MG/DL — HIGH (ref 70–99)
HCT VFR BLD CALC: 33.5 % — LOW (ref 34.5–45)
HCT VFR BLD CALC: 43.5 % — SIGNIFICANT CHANGE UP (ref 34.5–45)
HGB BLD-MCNC: 11.1 G/DL — LOW (ref 11.5–15.5)
HGB BLD-MCNC: 14.8 G/DL — SIGNIFICANT CHANGE UP (ref 11.5–15.5)
LACTATE BLDV-MCNC: 1.2 MMOL/L — SIGNIFICANT CHANGE UP (ref 0.5–2)
LIDOCAIN IGE QN: 62 U/L — HIGH (ref 22–51)
LYMPHOCYTES # BLD AUTO: 0.21 K/UL — LOW (ref 1–3.3)
LYMPHOCYTES # BLD AUTO: 0.3 K/UL — LOW (ref 1–3.3)
LYMPHOCYTES # BLD AUTO: 0.9 % — LOW (ref 13–44)
LYMPHOCYTES # BLD AUTO: 1.7 % — LOW (ref 13–44)
MANUAL SMEAR VERIFICATION: SIGNIFICANT CHANGE UP
MANUAL SMEAR VERIFICATION: SIGNIFICANT CHANGE UP
MCHC RBC-ENTMCNC: 30.7 PG — SIGNIFICANT CHANGE UP (ref 27–34)
MCHC RBC-ENTMCNC: 30.7 PG — SIGNIFICANT CHANGE UP (ref 27–34)
MCHC RBC-ENTMCNC: 33.1 GM/DL — SIGNIFICANT CHANGE UP (ref 32–36)
MCHC RBC-ENTMCNC: 34 GM/DL — SIGNIFICANT CHANGE UP (ref 32–36)
MCV RBC AUTO: 90.2 FL — SIGNIFICANT CHANGE UP (ref 80–100)
MCV RBC AUTO: 92.5 FL — SIGNIFICANT CHANGE UP (ref 80–100)
MICROCYTES BLD QL: SLIGHT — SIGNIFICANT CHANGE UP
MONOCYTES # BLD AUTO: 0.83 K/UL — SIGNIFICANT CHANGE UP (ref 0–0.9)
MONOCYTES # BLD AUTO: 1.09 K/UL — HIGH (ref 0–0.9)
MONOCYTES NFR BLD AUTO: 3.5 % — SIGNIFICANT CHANGE UP (ref 2–14)
MONOCYTES NFR BLD AUTO: 6.1 % — SIGNIFICANT CHANGE UP (ref 2–14)
NEUTROPHILS # BLD AUTO: 16.5 K/UL — HIGH (ref 1.8–7.4)
NEUTROPHILS # BLD AUTO: 22.58 K/UL — HIGH (ref 1.8–7.4)
NEUTROPHILS NFR BLD AUTO: 92.2 % — HIGH (ref 43–77)
NEUTROPHILS NFR BLD AUTO: 93.9 % — HIGH (ref 43–77)
NEUTS BAND # BLD: 1.7 % — SIGNIFICANT CHANGE UP (ref 0–8)
OVALOCYTES BLD QL SMEAR: SLIGHT — SIGNIFICANT CHANGE UP
PLAT MORPH BLD: NORMAL — SIGNIFICANT CHANGE UP
PLAT MORPH BLD: NORMAL — SIGNIFICANT CHANGE UP
PLATELET # BLD AUTO: 286 K/UL — SIGNIFICANT CHANGE UP (ref 150–400)
PLATELET # BLD AUTO: 405 K/UL — HIGH (ref 150–400)
POIKILOCYTOSIS BLD QL AUTO: SLIGHT — SIGNIFICANT CHANGE UP
POLYCHROMASIA BLD QL SMEAR: SLIGHT — SIGNIFICANT CHANGE UP
POTASSIUM SERPL-MCNC: 3.9 MMOL/L — SIGNIFICANT CHANGE UP (ref 3.5–5.3)
POTASSIUM SERPL-SCNC: 3.9 MMOL/L — SIGNIFICANT CHANGE UP (ref 3.5–5.3)
PROT SERPL-MCNC: 7.1 G/DL — SIGNIFICANT CHANGE UP (ref 6.6–8.7)
RBC # BLD: 3.62 M/UL — LOW (ref 3.8–5.2)
RBC # BLD: 4.82 M/UL — SIGNIFICANT CHANGE UP (ref 3.8–5.2)
RBC # FLD: 12.2 % — SIGNIFICANT CHANGE UP (ref 10.3–14.5)
RBC # FLD: 12.4 % — SIGNIFICANT CHANGE UP (ref 10.3–14.5)
RBC BLD AUTO: ABNORMAL
RBC BLD AUTO: NORMAL — SIGNIFICANT CHANGE UP
SODIUM SERPL-SCNC: 139 MMOL/L — SIGNIFICANT CHANGE UP (ref 135–145)
SPECIMEN SOURCE: SIGNIFICANT CHANGE UP
WBC # BLD: 17.9 K/UL — HIGH (ref 3.8–10.5)
WBC # BLD: 23.62 K/UL — HIGH (ref 3.8–10.5)
WBC # FLD AUTO: 17.9 K/UL — HIGH (ref 3.8–10.5)
WBC # FLD AUTO: 23.62 K/UL — HIGH (ref 3.8–10.5)

## 2022-04-26 PROCEDURE — 83690 ASSAY OF LIPASE: CPT

## 2022-04-26 PROCEDURE — 99284 EMERGENCY DEPT VISIT MOD MDM: CPT

## 2022-04-26 PROCEDURE — 87493 C DIFF AMPLIFIED PROBE: CPT

## 2022-04-26 PROCEDURE — 99284 EMERGENCY DEPT VISIT MOD MDM: CPT | Mod: 25

## 2022-04-26 PROCEDURE — 85025 COMPLETE CBC W/AUTO DIFF WBC: CPT

## 2022-04-26 PROCEDURE — 96375 TX/PRO/DX INJ NEW DRUG ADDON: CPT

## 2022-04-26 PROCEDURE — 83605 ASSAY OF LACTIC ACID: CPT

## 2022-04-26 PROCEDURE — 87507 IADNA-DNA/RNA PROBE TQ 12-25: CPT

## 2022-04-26 PROCEDURE — 36415 COLL VENOUS BLD VENIPUNCTURE: CPT

## 2022-04-26 PROCEDURE — 96374 THER/PROPH/DIAG INJ IV PUSH: CPT

## 2022-04-26 PROCEDURE — 80053 COMPREHEN METABOLIC PANEL: CPT

## 2022-04-26 RX ORDER — METOCLOPRAMIDE HCL 10 MG
10 TABLET ORAL ONCE
Refills: 0 | Status: COMPLETED | OUTPATIENT
Start: 2022-04-26 | End: 2022-04-26

## 2022-04-26 RX ORDER — KETOROLAC TROMETHAMINE 30 MG/ML
15 SYRINGE (ML) INJECTION ONCE
Refills: 0 | Status: DISCONTINUED | OUTPATIENT
Start: 2022-04-26 | End: 2022-04-26

## 2022-04-26 RX ORDER — SODIUM CHLORIDE 9 MG/ML
2000 INJECTION INTRAMUSCULAR; INTRAVENOUS; SUBCUTANEOUS ONCE
Refills: 0 | Status: COMPLETED | OUTPATIENT
Start: 2022-04-26 | End: 2022-04-26

## 2022-04-26 RX ORDER — SODIUM CHLORIDE 9 MG/ML
1000 INJECTION INTRAMUSCULAR; INTRAVENOUS; SUBCUTANEOUS ONCE
Refills: 0 | Status: COMPLETED | OUTPATIENT
Start: 2022-04-26 | End: 2022-04-26

## 2022-04-26 RX ORDER — ONDANSETRON 8 MG/1
4 TABLET, FILM COATED ORAL ONCE
Refills: 0 | Status: COMPLETED | OUTPATIENT
Start: 2022-04-26 | End: 2022-04-26

## 2022-04-26 RX ORDER — ONDANSETRON 8 MG/1
1 TABLET, FILM COATED ORAL
Qty: 9 | Refills: 0
Start: 2022-04-26 | End: 2022-04-28

## 2022-04-26 RX ORDER — FAMOTIDINE 10 MG/ML
20 INJECTION INTRAVENOUS ONCE
Refills: 0 | Status: COMPLETED | OUTPATIENT
Start: 2022-04-26 | End: 2022-04-26

## 2022-04-26 RX ADMIN — Medication 15 MILLIGRAM(S): at 02:17

## 2022-04-26 RX ADMIN — Medication 10 MILLIGRAM(S): at 04:01

## 2022-04-26 RX ADMIN — FAMOTIDINE 20 MILLIGRAM(S): 10 INJECTION INTRAVENOUS at 02:18

## 2022-04-26 RX ADMIN — SODIUM CHLORIDE 2000 MILLILITER(S): 9 INJECTION INTRAMUSCULAR; INTRAVENOUS; SUBCUTANEOUS at 03:13

## 2022-04-26 RX ADMIN — ONDANSETRON 4 MILLIGRAM(S): 8 TABLET, FILM COATED ORAL at 02:17

## 2022-04-26 RX ADMIN — Medication 20 MILLIGRAM(S): at 04:01

## 2022-04-26 RX ADMIN — SODIUM CHLORIDE 1000 MILLILITER(S): 9 INJECTION INTRAMUSCULAR; INTRAVENOUS; SUBCUTANEOUS at 02:18

## 2022-04-26 NOTE — ED ADULT NURSE NOTE - OBJECTIVE STATEMENT
Patient A&Ox4, complaining of abdominal pain, nausea, vomiting, and diarrhea. Patient states her symptoms starting on 4/21/22 on her way home from [place] and her partner is experiencing same symptoms. Patient's respirations even and unlabored, denies chest pain, SOB, or dizziness. Patient states she has been unable to tolerate PO intake and has vomiting/ diarrhea episodes after eating.

## 2022-04-26 NOTE — ED PROVIDER NOTE - NSICDXFAMILYHX_GEN_ALL_CORE_FT
FAMILY HISTORY:  FH: colon cancer, maternal grandmother, maternal uncle    Father  Still living? Unknown  FH: lymphoma, Age at diagnosis: Age Unknown    Aunt  Still living? Unknown  FH: breast cancer, Age at diagnosis: Age Unknown

## 2022-04-26 NOTE — ED PROVIDER NOTE - OBJECTIVE STATEMENT
52 yo female PMHx colon CA s/p resection, partial hysterectomy, hernia presents to ED c/o abdominal pain. Traveled from [place], arrived home 4/21, diarrhea began that night. Improved last night and had been OK for 24 hours. Tonight after dinner began with nonbloody diarrhea and vomiting. No further complaints at this time.   Denies fevers.

## 2022-04-26 NOTE — ED PROVIDER NOTE - NS ED ATTENDING STATEMENT MOD
This was a shared visit with the RADHAMES. I reviewed and verified the documentation and independently performed the documented:

## 2022-04-26 NOTE — ED ADULT NURSE NOTE - WILL THE PATIENT ACCEPT THE PFIZER COVID-19 VACCINE IF ELIGIBLE AND IT IS AVAILABLE?
Takes 1/2 tablet of soma 350 mg tablet as needed. Taking gabapentin 300 mg capsule in the morning but doesn't take lunch or dinner doses consistently.    Not applicable

## 2022-04-26 NOTE — ED ADULT TRIAGE NOTE - CHIEF COMPLAINT QUOTE
pt c/o abdominal pain , vomiting and diarrhea, weakness, since Thursday, just came back from [place] past Thursday, unable to keep food down, hx of colon CA

## 2022-04-26 NOTE — ED PROVIDER NOTE - NSFOLLOWUPINSTRUCTIONS_ED_ALL_ED_FT
- Prescription sent to pharmacy.  - Increase fluids.   - [place] diet as tolerated. Avoid citrus based food/drinking, milk, caffeine, alcohol.   - Please bring all documentation from your ED visit to any related future follow up appointment.  - Please call to schedule follow up appointment with your primary care physician within 24-48 hours for re-evaluation.   - Please seek immediate medical attention or return to the ED for any new/worsening, signs/symptoms, or concerns.    Feel better!       Vomiting, Adult      Vomiting occurs when stomach contents are thrown up and out of the mouth. Many people notice nausea before vomiting. Vomiting can make you feel weak and cause you to become dehydrated. Dehydration can make you feel tired and thirsty, cause you to have a dry mouth, and decrease how often you urinate. Older adults and people who have other diseases or a weak body defense system (immune system) are at higher risk for dehydration. It is important to treat vomiting as told by your health care provider.      Follow these instructions at home:       Eating and drinking                   Follow these recommendations as told by your health care provider:  •Take an oral rehydration solution (ORS). This is a drink that is sold at pharmacies and retail stores.      •Eat bland, easy-to-digest foods in small amounts as you are able. These foods include bananas, applesauce, rice, lean meats, toast, and crackers.      •Drink clear fluids slowly and in small amounts as you are able. Clear fluids include water, ice chips, low-calorie sports drinks, and fruit juice that has water added (diluted fruit juice).      •Avoid drinking fluids that contain a lot of sugar or caffeine, such as energy drinks, sports drinks, and soda.      •Avoid alcohol.      •Avoid spicy or fatty foods.      General instructions     •Wash your hands often using soap and water. If soap and water are not available, use hand . Make sure that everyone in your household washes their hands frequently.      •Take over-the-counter and prescription medicines only as told by your health care provider.      •Rest at home while you recover.      •Watch your condition for any changes.      •Keep all follow-up visits as told by your health care provider. This is important.        Contact a health care provider if:    •Your vomiting gets worse.      •You have new symptoms.      •You have a fever.      •You cannot drink fluids without vomiting.      •You feel light-headed or dizzy.      •You have a headache.      •You have muscle cramps.      •You have a rash.      •You have pain while urinating.        Get help right away if:    •You have pain in your chest, neck, arm, or jaw.      •You feel extremely weak or you faint.      •You have persistent vomiting.      •You have vomit that is bright red or looks like black coffee grounds.      •You have stools that are bloody or black, or stools that look like tar.      •You have a severe headache, a stiff neck, or both.      •You have severe pain, cramping, or bloating in your abdomen.      •You have trouble breathing or you are breathing very quickly.      •Your heart is beating very quickly.      •Your skin feels cold and clammy.      •You feel confused.    •You have signs of dehydration, such as:  •Dark urine, very little urine, or no urine.      •Cracked lips.      •Dry mouth.      •Sunken eyes.      •Sleepiness.      •Weakness.        These symptoms may represent a serious problem that is an emergency. Do not wait to see if the symptoms will go away. Get medical help right away. Call your local emergency services (911 in the [place] Do not drive yourself to the hospital.       Summary    •Vomiting occurs when stomach contents are thrown up and out of the mouth. Vomiting can cause you to become dehydrated. Older adults and people who have other diseases or a weak immune system are at higher risk for dehydration.      •It is important to treat vomiting as told by your health care provider. Follow your health care provider's instructions about eating and drinking.      •Wash your hands often using soap and water. If soap and water are not available, use hand . Make sure that everyone in your household washes their hands frequently.      •Watch your condition for any changes and for signs of dehydration.      •Keep all follow-up visits as told by your health care provider. This is important.      This information is not intended to replace advice given to you by your health care provider. Make sure you discuss any questions you have with your health care provider.      Diarrhea, Adult      Diarrhea is frequent loose and watery bowel movements. Diarrhea can make you feel weak and cause you to become dehydrated. Dehydration can make you tired and thirsty, cause you to have a dry mouth, and decrease how often you urinate.    Diarrhea typically lasts 2–3 days. However, it can last longer if it is a sign of something more serious. It is important to treat your diarrhea as told by your health care provider.      Follow these instructions at home:      Eating and drinking                   Follow these recommendations as told by your health care provider:  •Take an oral rehydration solution (ORS). This is an over-the-counter medicine that helps return your body to its normal balance of nutrients and water. It is found at pharmacies and retail stores.      •Drink plenty of fluids, such as water, ice chips, diluted fruit juice, and low-calorie sports drinks. You can drink milk also, if desired.      •Avoid drinking fluids that contain a lot of sugar or caffeine, such as energy drinks, sports drinks, and soda.      •Eat bland, easy-to-digest foods in small amounts as you are able. These foods include bananas, applesauce, rice, lean meats, toast, and crackers.      •Avoid alcohol.      •Avoid spicy or fatty foods.      Medicines     •Take over-the-counter and prescription medicines only as told by your health care provider.      •If you were prescribed an antibiotic medicine, take it as told by your health care provider. Do not stop using the antibiotic even if you start to feel better.        General instructions    •Wash your hands often using soap and water. If soap and water are not available, use a hand . Others in the household should wash their hands as well. Hands should be washed:  •After using the toilet or changing a diaper.       •Before preparing, cooking, or serving food.       •While caring for a sick person or while visiting someone in a hospital.         •Drink enough fluid to keep your urine pale yellow.      •Rest at home while you recover.      •Watch your condition for any changes.      •Take a warm bath to relieve any burning or pain from frequent diarrhea episodes.      •Keep all follow-up visits as told by your health care provider. This is important.        Contact a health care provider if:    •You have a fever.      •Your diarrhea gets worse.      •You have new symptoms.      •You cannot keep fluids down.      •You feel light-headed or dizzy.      •You have a headache.      •You have muscle cramps.        Get help right away if:    •You have chest pain.      •You feel extremely weak or you faint.      •You have bloody or black stools or stools that look like tar.      •You have severe pain, cramping, or bloating in your abdomen.      •You have trouble breathing or you are breathing very quickly.      •Your heart is beating very quickly.      •Your skin feels cold and clammy.      •You feel confused.    •You have signs of dehydration, such as:  •Dark urine, very little urine, or no urine.      •Cracked lips.      •Dry mouth.      •Sunken eyes.      •Sleepiness.      •Weakness.          Summary    •Diarrhea is frequent loose and watery bowel movements. Diarrhea can make you feel weak and cause you to become dehydrated.      •Drink enough fluids to keep your urine pale yellow.      •Make sure that you wash your hands after using the toilet. If soap and water are not available, use hand .      •Contact a health care provider if your diarrhea gets worse or you have new symptoms.      •Get help right away if you have signs of dehydration.      This information is not intended to replace advice given to you by your health care provider. Make sure you discuss any questions you have with your health care provider.      [place] Diet      A bland diet consists of foods that are often soft and do not have a lot of fat, fiber, or extra seasonings. Foods without fat, fiber, or seasoning are easier for the body to digest. They are also less likely to irritate your mouth, throat, stomach, and other parts of your digestive system. A bland diet is sometimes called a BRAT diet.      What is my plan?    Your health care provider or food and nutrition specialist (dietitian) may recommend specific changes to your diet to prevent symptoms or to treat your symptoms. These changes may include:  •Eating small meals often.      •Cooking food until it is soft enough to chew easily.      •Chewing your food well.      •Drinking fluids slowly.      •Not eating foods that are very spicy, sour, or fatty.      •Not eating citrus fruits, such as oranges and grapefruit.        What do I need to know about this diet?    •Eat a variety of foods from the bland diet food list.      • Do not follow a bland diet longer than needed.      •Ask your health care provider whether you should take vitamins or supplements.        What foods can I eat?      Grains      Hot cereals, such as cream of wheat. Rice. Bread, crackers, or tortillas made from refined white flour.    Vegetables     Canned or cooked vegetables. Mashed or boiled potatoes.      Fruits      Bananas. Applesauce. Other types of cooked or canned fruit with the skin and seeds removed, such as canned peaches or pears.      Meats and other proteins      Scrambled eggs. Creamy peanut butter or other nut butters. Lean, well-cooked meats, such as chicken or fish. Tofu. Soups or broths.    Dairy     Low-fat dairy products, such as milk, cottage cheese, or yogurt.      Beverages      Water. Herbal tea. Apple juice.    Fats and oils     Mild salad dressings. Canola or olive oil.    Sweets and desserts     Pudding. Custard. Fruit gelatin. Ice cream.    The items listed above may not be a complete list of recommended foods and beverages. Contact a dietitian for more options.       What foods are not recommended?    Grains     Whole grain breads and cereals.    Vegetables     Raw vegetables.    Fruits     Raw fruits, especially citrus, berries, or dried fruits.    Dairy     Whole fat dairy foods.    Beverages     Caffeinated drinks. Alcohol.    Seasonings and condiments     Strongly flavored seasonings or condiments. Hot sauce. Salsa.    Other foods     Spicy foods. Fried foods. Sour foods, such as pickled or fermented foods. Foods with high sugar content. Foods high in fiber.    The items listed above may not be a complete list of foods and beverages to avoid. Contact a dietitian for more information.       Summary    •A bland diet consists of foods that are often soft and do not have a lot of fat, fiber, or extra seasonings.      •Foods without fat, fiber, or seasoning are easier for the body to digest.      •Check with your health care provider to see how long you should follow this diet plan. It is not meant to be followed for long periods.      This information is not intended to replace advice given to you by your health care provider. Make sure you discuss any questions you have with your health care provider.

## 2022-04-26 NOTE — ED PROVIDER NOTE - CARE PROVIDER_API CALL
Ana Nguyen (DO)  Gastroenterology  [address], [address]  [place] [address]  Phone: [phone]  Fax: [phone]  Follow Up Time:

## 2022-04-26 NOTE — ED PROVIDER NOTE - PROGRESS NOTE DETAILS
MOSES Pineda: Improved, no further diarrhea in ED. WBC improved after hydration. Likely 2/2 vomiting/diarrhea. Has appointment with IGNACIO Nguyen on Thursday for colonoscopy.

## 2022-04-26 NOTE — ED PROVIDER NOTE - PATIENT PORTAL LINK FT
You can access the FollowMyHealth Patient Portal offered by  by registering at the following website: [email or web address]. By joining [employer]’s FollowMyHealth portal, you will also be able to view your health information using other applications (apps) compatible with our system.

## 2022-04-26 NOTE — ED PROVIDER NOTE - NSICDXPASTSURGICALHX_GEN_ALL_CORE_FT
PAST SURGICAL HISTORY:  H/O  section x3 , ,     H/O dilation and curettage     H/O hand surgery staph infection 1992    History of sinus surgery     S/P colon resection     S/P hysterectomy 2021

## 2022-04-26 NOTE — ED PROVIDER NOTE - NSICDXPASTMEDICALHX_GEN_ALL_CORE_FT
PAST MEDICAL HISTORY:  Asthma mild    Cervical dysplasia     GERD (gastroesophageal reflux disease)     Kidney stone     Malignant neoplasm of sigmoid colon

## 2022-04-26 NOTE — ED PROVIDER NOTE - CLINICAL SUMMARY MEDICAL DECISION MAKING FREE TEXT BOX
50 yo female PMHx colon CA s/p resection, partial hysterectomy, hernia presents to ED c/o abdominal pain, vomiting, diarrhea. Recently returned from [place], partner with similar sxms. See progress note.

## 2022-04-26 NOTE — ED PROVIDER NOTE - PHYSICAL EXAMINATION
Gen: Nontoxic, well appearing, in NAD.  Skin: Warm and dry as visualized.  Head: NC/AT.  Eyes: PERRLA. EOMI.  Neck: Supple, FROM. Trachea midline.   Resp: No distress.  Cardio: Well perfused.  Abd: Nondistended. Soft, mild epigastric tenderness. No McBurney's tenderness. No rebound or guarding.   Ext: No deformities. MAEx4. FROM.   Neuro: A&Ox3. Appears nonfocal.   Psych: Normal affect and mood.

## 2022-04-27 ENCOUNTER — RX RENEWAL (OUTPATIENT)
Age: 52
End: 2022-04-27

## 2022-04-27 ENCOUNTER — NON-APPOINTMENT (OUTPATIENT)
Age: 52
End: 2022-04-27

## 2022-04-27 ENCOUNTER — TRANSCRIPTION ENCOUNTER (OUTPATIENT)
Age: 52
End: 2022-04-27

## 2022-04-28 ENCOUNTER — OUTPATIENT (OUTPATIENT)
Dept: OUTPATIENT SERVICES | Facility: HOSPITAL | Age: 52
LOS: 1 days | End: 2022-04-28
Payer: COMMERCIAL

## 2022-04-28 ENCOUNTER — RESULT REVIEW (OUTPATIENT)
Age: 52
End: 2022-04-28

## 2022-04-28 ENCOUNTER — APPOINTMENT (OUTPATIENT)
Dept: GASTROENTEROLOGY | Facility: GI CENTER | Age: 52
End: 2022-04-28
Payer: MEDICAID

## 2022-04-28 DIAGNOSIS — Z98.891 HISTORY OF UTERINE SCAR FROM PREVIOUS SURGERY: Chronic | ICD-10-CM

## 2022-04-28 DIAGNOSIS — Z90.49 ACQUIRED ABSENCE OF OTHER SPECIFIED PARTS OF DIGESTIVE TRACT: Chronic | ICD-10-CM

## 2022-04-28 DIAGNOSIS — Z98.890 OTHER SPECIFIED POSTPROCEDURAL STATES: Chronic | ICD-10-CM

## 2022-04-28 DIAGNOSIS — C18.7 MALIGNANT NEOPLASM OF SIGMOID COLON: ICD-10-CM

## 2022-04-28 DIAGNOSIS — Z90.710 ACQUIRED ABSENCE OF BOTH CERVIX AND UTERUS: Chronic | ICD-10-CM

## 2022-04-28 PROCEDURE — 45380 COLONOSCOPY AND BIOPSY: CPT | Mod: 33

## 2022-04-28 PROCEDURE — 88305 TISSUE EXAM BY PATHOLOGIST: CPT

## 2022-04-28 PROCEDURE — 45380 COLONOSCOPY AND BIOPSY: CPT | Mod: PT

## 2022-04-28 NOTE — PHYSICAL EXAM
[General Appearance - Alert] : alert [General Appearance - In No Acute Distress] : in no acute distress [General Appearance - Well Nourished] : well nourished [Sclera] : the sclera and conjunctiva were normal [General Appearance - Well Developed] : well developed [] : no respiratory distress [Respiration, Rhythm And Depth] : normal respiratory rhythm and effort [Exaggerated Use Of Accessory Muscles For Inspiration] : no accessory muscle use [Apical Impulse] : the apical impulse was normal [Auscultation Breath Sounds / Voice Sounds] : lungs were clear to auscultation bilaterally [Heart Rate And Rhythm] : heart rate was normal and rhythm regular [Heart Sounds] : normal S1 and S2 [Abdomen Soft] : soft [Bowel Sounds] : normal bowel sounds [Abdomen Tenderness] : non-tender [Oriented To Time, Place, And Person] : oriented to person, place, and time [Impaired Insight] : insight and judgment were intact [Affect] : the affect was normal

## 2022-04-28 NOTE — REASON FOR VISIT
[Follow-Up: _____] : a [unfilled] follow-up visit [FreeTextEntry1] : hx of colon cancer- change in bowel habits, constipation

## 2022-04-28 NOTE — ASSESSMENT
[FreeTextEntry1] : A/P\par hx of colon cancer- surveillence colonoscopy\par  I discussed the risks and benefits of colonoscopy and patient was given opportunity to ask questions. Colonoscopy to r/o colon cancer, polyps, AVM's. Patient is medically optimized for the procedure\par \par change in bowel habits\par \par

## 2022-04-29 ENCOUNTER — NON-APPOINTMENT (OUTPATIENT)
Age: 52
End: 2022-04-29

## 2022-04-29 DIAGNOSIS — R10.9 UNSPECIFIED ABDOMINAL PAIN: ICD-10-CM

## 2022-04-29 DIAGNOSIS — R11.2 NAUSEA WITH VOMITING, UNSPECIFIED: ICD-10-CM

## 2022-04-29 DIAGNOSIS — R19.7 DIARRHEA, UNSPECIFIED: ICD-10-CM

## 2022-05-02 ENCOUNTER — NON-APPOINTMENT (OUTPATIENT)
Age: 52
End: 2022-05-02

## 2022-05-02 LAB
BASOPHILS # BLD AUTO: 0.05 K/UL
BASOPHILS NFR BLD AUTO: 0.8 %
CRP SERPL-MCNC: 12 MG/L
EOSINOPHIL # BLD AUTO: 0.24 K/UL
EOSINOPHIL NFR BLD AUTO: 3.7 %
HCT VFR BLD CALC: 39.3 %
HGB BLD-MCNC: 12.9 G/DL
IMM GRANULOCYTES NFR BLD AUTO: 0.5 %
LYMPHOCYTES # BLD AUTO: 1.93 K/UL
LYMPHOCYTES NFR BLD AUTO: 30.1 %
MAN DIFF?: NORMAL
MCHC RBC-ENTMCNC: 30.6 PG
MCHC RBC-ENTMCNC: 32.8 GM/DL
MCV RBC AUTO: 93.1 FL
MONOCYTES # BLD AUTO: 0.6 K/UL
MONOCYTES NFR BLD AUTO: 9.4 %
NEUTROPHILS # BLD AUTO: 3.56 K/UL
NEUTROPHILS NFR BLD AUTO: 55.5 %
PLATELET # BLD AUTO: 393 K/UL
RBC # BLD: 4.22 M/UL
RBC # FLD: 12.9 %
WBC # FLD AUTO: 6.41 K/UL

## 2022-05-03 LAB — SURGICAL PATHOLOGY STUDY: SIGNIFICANT CHANGE UP

## 2022-05-04 ENCOUNTER — NON-APPOINTMENT (OUTPATIENT)
Age: 52
End: 2022-05-04

## 2022-05-06 ENCOUNTER — EMERGENCY (EMERGENCY)
Facility: HOSPITAL | Age: 52
LOS: 1 days | Discharge: HOME | End: 2022-05-06
Attending: EMERGENCY MEDICINE
Payer: COMMERCIAL

## 2022-05-06 ENCOUNTER — NON-APPOINTMENT (OUTPATIENT)
Age: 52
End: 2022-05-06

## 2022-05-06 VITALS
TEMPERATURE: 99 F | HEART RATE: 98 BPM | HEIGHT: 61 IN | OXYGEN SATURATION: 100 % | SYSTOLIC BLOOD PRESSURE: 100 MMHG | WEIGHT: 113.1 LBS | RESPIRATION RATE: 20 BRPM | DIASTOLIC BLOOD PRESSURE: 69 MMHG

## 2022-05-06 DIAGNOSIS — Z98.891 HISTORY OF UTERINE SCAR FROM PREVIOUS SURGERY: Chronic | ICD-10-CM

## 2022-05-06 DIAGNOSIS — Z90.710 ACQUIRED ABSENCE OF BOTH CERVIX AND UTERUS: Chronic | ICD-10-CM

## 2022-05-06 DIAGNOSIS — Z98.890 OTHER SPECIFIED POSTPROCEDURAL STATES: Chronic | ICD-10-CM

## 2022-05-06 DIAGNOSIS — Z90.49 ACQUIRED ABSENCE OF OTHER SPECIFIED PARTS OF DIGESTIVE TRACT: Chronic | ICD-10-CM

## 2022-05-06 LAB
BACTERIA STL CULT: NORMAL
DEPRECATED O AND P PREP STL: NORMAL

## 2022-05-06 PROCEDURE — 99285 EMERGENCY DEPT VISIT HI MDM: CPT

## 2022-05-06 PROCEDURE — 72131 CT LUMBAR SPINE W/O DYE: CPT | Mod: 26,MA

## 2022-05-06 PROCEDURE — 96372 THER/PROPH/DIAG INJ SC/IM: CPT

## 2022-05-06 PROCEDURE — 99284 EMERGENCY DEPT VISIT MOD MDM: CPT | Mod: 25

## 2022-05-06 PROCEDURE — 72192 CT PELVIS W/O DYE: CPT | Mod: MA

## 2022-05-06 PROCEDURE — 72131 CT LUMBAR SPINE W/O DYE: CPT | Mod: MA

## 2022-05-06 PROCEDURE — 72192 CT PELVIS W/O DYE: CPT | Mod: 26,MA

## 2022-05-06 RX ORDER — DEXAMETHASONE 0.5 MG/5ML
8 ELIXIR ORAL ONCE
Refills: 0 | Status: COMPLETED | OUTPATIENT
Start: 2022-05-06 | End: 2022-05-06

## 2022-05-06 RX ORDER — LIDOCAINE 4 G/100G
1 CREAM TOPICAL ONCE
Refills: 0 | Status: COMPLETED | OUTPATIENT
Start: 2022-05-06 | End: 2022-05-06

## 2022-05-06 RX ORDER — OXYCODONE AND ACETAMINOPHEN 5; 325 MG/1; MG/1
1 TABLET ORAL
Qty: 9 | Refills: 0
Start: 2022-05-06 | End: 2022-05-08

## 2022-05-06 RX ORDER — KETOROLAC TROMETHAMINE 30 MG/ML
30 SYRINGE (ML) INJECTION ONCE
Refills: 0 | Status: DISCONTINUED | OUTPATIENT
Start: 2022-05-06 | End: 2022-05-06

## 2022-05-06 RX ORDER — METHOCARBAMOL 500 MG/1
1500 TABLET, FILM COATED ORAL ONCE
Refills: 0 | Status: COMPLETED | OUTPATIENT
Start: 2022-05-06 | End: 2022-05-06

## 2022-05-06 RX ORDER — IBUPROFEN 200 MG
1 TABLET ORAL
Qty: 28 | Refills: 0
Start: 2022-05-06 | End: 2022-05-12

## 2022-05-06 RX ORDER — OXYCODONE AND ACETAMINOPHEN 5; 325 MG/1; MG/1
1 TABLET ORAL ONCE
Refills: 0 | Status: DISCONTINUED | OUTPATIENT
Start: 2022-05-06 | End: 2022-05-06

## 2022-05-06 RX ORDER — METHOCARBAMOL 500 MG/1
2 TABLET, FILM COATED ORAL
Qty: 18 | Refills: 0
Start: 2022-05-06 | End: 2022-05-08

## 2022-05-06 RX ADMIN — OXYCODONE AND ACETAMINOPHEN 1 TABLET(S): 5; 325 TABLET ORAL at 11:00

## 2022-05-06 RX ADMIN — OXYCODONE AND ACETAMINOPHEN 1 TABLET(S): 5; 325 TABLET ORAL at 12:00

## 2022-05-06 RX ADMIN — Medication 8 MILLIGRAM(S): at 09:22

## 2022-05-06 RX ADMIN — Medication 30 MILLIGRAM(S): at 09:36

## 2022-05-06 RX ADMIN — METHOCARBAMOL 1500 MILLIGRAM(S): 500 TABLET, FILM COATED ORAL at 09:22

## 2022-05-06 RX ADMIN — Medication 30 MILLIGRAM(S): at 09:21

## 2022-05-06 RX ADMIN — LIDOCAINE 1 PATCH: 4 CREAM TOPICAL at 09:23

## 2022-05-06 NOTE — ED PROVIDER NOTE - OBJECTIVE STATEMENT
51F presents to the ED c/o right sided lower back pain radiating to her RLE x 2 weeks. Pt states that she was in [place] x 2 weeks ago and was drinking, lost her balance and fell onto her buttocks twice. Pt states that pain has been worsening since then. Pt otherwise denies fever/chills, c/p, sob, abd pain, n/v/c/d, dysuria, numbness/tingling/weakness, saddle anesthesia, urinary/bowel incontinence and has no other complaints at this time. 51 year old F presents to the ED c/o right sided lower back pain radiating to her RLE x 2 weeks. Pt states that she was in [place] x 2 weeks ago and was drinking, lost her balance and fell onto her buttocks twice. Pt states that pain has been worsening since then. Pt otherwise denies fever/chills, c/p, sob, abd pain, n/v/c/d, dysuria, numbness/tingling/weakness, saddle anesthesia, urinary/bowel incontinence and has no other complaints at this time.

## 2022-05-06 NOTE — ED PROVIDER NOTE - NEUROLOGICAL, MLM
Alert and oriented, no focal deficits, no motor or sensory deficits. 5/5 strength in UE/LE b/l. No clonus.

## 2022-05-06 NOTE — ED ADULT TRIAGE NOTE - CHIEF COMPLAINT QUOTE
Patient arrived to ED today with c/o right buttocks, right leg, right lower back pains that have increased since fall about two weeks ago.  Patient was here just over a week ago for the same pain and now is worse.  Patient states pain now is effecting her to "go to the bathroom".

## 2022-05-06 NOTE — ED PROVIDER NOTE - PROGRESS NOTE DETAILS
Pt with L5-S1 disc herniating on CT scan with no neuro deficits on exam. Denies urinary/bowel dysfunction. Pt has appt with her spine specialist this Wednesday and states that she would like to get MRI outpt. Pt stable for d/c.

## 2022-05-06 NOTE — ED ADULT TRIAGE NOTE - NS ED TRIAGE AVPU SCALE
Alert-The patient is alert, awake and responds to voice. The patient is oriented to time, place, and person. The triage nurse is able to obtain subjective information.
N/A

## 2022-05-06 NOTE — ED PROVIDER NOTE - PATIENT PORTAL LINK FT
You can access the FollowMyHealth Patient Portal offered by [facility] by registering at the following website: [email or web address]. By joining [employer]’s FollowMyHealth portal, you will also be able to view your health information using other applications (apps) compatible with our system.

## 2022-05-06 NOTE — ED PROVIDER NOTE - CLINICAL SUMMARY MEDICAL DECISION MAKING FREE TEXT BOX
meds, reassess diagnostic imaging results reviewed with patient; symptoms improved; patient feels comfortable with discharge and medical plan; PMD or clinic follow up recommended for reassessment. Patient is aware of signs/symptoms to return to the emergency department.

## 2022-05-07 ENCOUNTER — INPATIENT (INPATIENT)
Facility: HOSPITAL | Age: 52
LOS: 2 days | Discharge: HOME | DRG: 552 | End: 2022-05-10
Attending: STUDENT IN AN ORGANIZED HEALTH CARE EDUCATION/TRAINING PROGRAM | Admitting: STUDENT IN AN ORGANIZED HEALTH CARE EDUCATION/TRAINING PROGRAM
Payer: COMMERCIAL

## 2022-05-07 ENCOUNTER — NON-APPOINTMENT (OUTPATIENT)
Age: 52
End: 2022-05-07

## 2022-05-07 VITALS
OXYGEN SATURATION: 100 % | RESPIRATION RATE: 20 BRPM | DIASTOLIC BLOOD PRESSURE: 71 MMHG | HEIGHT: 61 IN | TEMPERATURE: 99 F | SYSTOLIC BLOOD PRESSURE: 103 MMHG | WEIGHT: 149.91 LBS | HEART RATE: 94 BPM

## 2022-05-07 DIAGNOSIS — M54.9 DORSALGIA, UNSPECIFIED: ICD-10-CM

## 2022-05-07 DIAGNOSIS — Z98.891 HISTORY OF UTERINE SCAR FROM PREVIOUS SURGERY: Chronic | ICD-10-CM

## 2022-05-07 DIAGNOSIS — Z90.49 ACQUIRED ABSENCE OF OTHER SPECIFIED PARTS OF DIGESTIVE TRACT: Chronic | ICD-10-CM

## 2022-05-07 DIAGNOSIS — Z98.890 OTHER SPECIFIED POSTPROCEDURAL STATES: Chronic | ICD-10-CM

## 2022-05-07 DIAGNOSIS — Z90.710 ACQUIRED ABSENCE OF BOTH CERVIX AND UTERUS: Chronic | ICD-10-CM

## 2022-05-07 LAB
ALBUMIN SERPL ELPH-MCNC: 4.2 G/DL — SIGNIFICANT CHANGE UP (ref 3.3–5.2)
ALP SERPL-CCNC: 53 U/L — SIGNIFICANT CHANGE UP (ref 40–120)
ALT FLD-CCNC: 18 U/L — SIGNIFICANT CHANGE UP
ANION GAP SERPL CALC-SCNC: 13 MMOL/L — SIGNIFICANT CHANGE UP (ref 5–17)
AST SERPL-CCNC: 27 U/L — SIGNIFICANT CHANGE UP
BASOPHILS # BLD AUTO: 0.01 K/UL — SIGNIFICANT CHANGE UP (ref 0–0.2)
BASOPHILS NFR BLD AUTO: 0.1 % — SIGNIFICANT CHANGE UP (ref 0–2)
BILIRUB SERPL-MCNC: 0.4 MG/DL — SIGNIFICANT CHANGE UP (ref 0.4–2)
BUN SERPL-MCNC: 16.2 MG/DL — SIGNIFICANT CHANGE UP (ref 8–20)
CALCIUM SERPL-MCNC: 9 MG/DL — SIGNIFICANT CHANGE UP (ref 8.6–10.2)
CHLORIDE SERPL-SCNC: 102 MMOL/L — SIGNIFICANT CHANGE UP (ref 98–107)
CO2 SERPL-SCNC: 23 MMOL/L — SIGNIFICANT CHANGE UP (ref 22–29)
CREAT SERPL-MCNC: 0.75 MG/DL — SIGNIFICANT CHANGE UP (ref 0.5–1.3)
EGFR: 97 ML/MIN/1.73M2 — SIGNIFICANT CHANGE UP
EOSINOPHIL # BLD AUTO: 0 K/UL — SIGNIFICANT CHANGE UP (ref 0–0.5)
EOSINOPHIL NFR BLD AUTO: 0 % — SIGNIFICANT CHANGE UP (ref 0–6)
FLUAV AG NPH QL: SIGNIFICANT CHANGE UP
FLUBV AG NPH QL: SIGNIFICANT CHANGE UP
GLUCOSE SERPL-MCNC: 173 MG/DL — HIGH (ref 70–99)
HCG SERPL-ACNC: <4 MIU/ML — SIGNIFICANT CHANGE UP
HCT VFR BLD CALC: 38.9 % — SIGNIFICANT CHANGE UP (ref 34.5–45)
HGB BLD-MCNC: 12.7 G/DL — SIGNIFICANT CHANGE UP (ref 11.5–15.5)
IMM GRANULOCYTES NFR BLD AUTO: 0.4 % — SIGNIFICANT CHANGE UP (ref 0–1.5)
LYMPHOCYTES # BLD AUTO: 0.94 K/UL — LOW (ref 1–3.3)
LYMPHOCYTES # BLD AUTO: 7.2 % — LOW (ref 13–44)
MCHC RBC-ENTMCNC: 30.4 PG — SIGNIFICANT CHANGE UP (ref 27–34)
MCHC RBC-ENTMCNC: 32.6 GM/DL — SIGNIFICANT CHANGE UP (ref 32–36)
MCV RBC AUTO: 93.1 FL — SIGNIFICANT CHANGE UP (ref 80–100)
MONOCYTES # BLD AUTO: 0.39 K/UL — SIGNIFICANT CHANGE UP (ref 0–0.9)
MONOCYTES NFR BLD AUTO: 3 % — SIGNIFICANT CHANGE UP (ref 2–14)
NEUTROPHILS # BLD AUTO: 11.64 K/UL — HIGH (ref 1.8–7.4)
NEUTROPHILS NFR BLD AUTO: 89.3 % — HIGH (ref 43–77)
PLATELET # BLD AUTO: 369 K/UL — SIGNIFICANT CHANGE UP (ref 150–400)
POTASSIUM SERPL-MCNC: 4 MMOL/L — SIGNIFICANT CHANGE UP (ref 3.5–5.3)
POTASSIUM SERPL-SCNC: 4 MMOL/L — SIGNIFICANT CHANGE UP (ref 3.5–5.3)
PROT SERPL-MCNC: 6.7 G/DL — SIGNIFICANT CHANGE UP (ref 6.6–8.7)
RBC # BLD: 4.18 M/UL — SIGNIFICANT CHANGE UP (ref 3.8–5.2)
RBC # FLD: 13.2 % — SIGNIFICANT CHANGE UP (ref 10.3–14.5)
RSV RNA NPH QL NAA+NON-PROBE: SIGNIFICANT CHANGE UP
SARS-COV-2 RNA SPEC QL NAA+PROBE: SIGNIFICANT CHANGE UP
SODIUM SERPL-SCNC: 138 MMOL/L — SIGNIFICANT CHANGE UP (ref 135–145)
WBC # BLD: 13.03 K/UL — HIGH (ref 3.8–10.5)
WBC # FLD AUTO: 13.03 K/UL — HIGH (ref 3.8–10.5)

## 2022-05-07 PROCEDURE — 99285 EMERGENCY DEPT VISIT HI MDM: CPT

## 2022-05-07 PROCEDURE — 99223 1ST HOSP IP/OBS HIGH 75: CPT

## 2022-05-07 PROCEDURE — 71046 X-RAY EXAM CHEST 2 VIEWS: CPT | Mod: 26

## 2022-05-07 RX ORDER — LIDOCAINE 4 G/100G
1 CREAM TOPICAL DAILY
Refills: 0 | Status: DISCONTINUED | OUTPATIENT
Start: 2022-05-07 | End: 2022-05-10

## 2022-05-07 RX ORDER — DEXAMETHASONE 0.5 MG/5ML
10 ELIXIR ORAL ONCE
Refills: 0 | Status: COMPLETED | OUTPATIENT
Start: 2022-05-07 | End: 2022-05-07

## 2022-05-07 RX ORDER — GABAPENTIN 400 MG/1
300 CAPSULE ORAL THREE TIMES A DAY
Refills: 0 | Status: DISCONTINUED | OUTPATIENT
Start: 2022-05-07 | End: 2022-05-10

## 2022-05-07 RX ORDER — METHOCARBAMOL 500 MG/1
750 TABLET, FILM COATED ORAL ONCE
Refills: 0 | Status: COMPLETED | OUTPATIENT
Start: 2022-05-07 | End: 2022-05-07

## 2022-05-07 RX ORDER — ONDANSETRON 8 MG/1
4 TABLET, FILM COATED ORAL EVERY 8 HOURS
Refills: 0 | Status: DISCONTINUED | OUTPATIENT
Start: 2022-05-07 | End: 2022-05-10

## 2022-05-07 RX ORDER — SENNA PLUS 8.6 MG/1
2 TABLET ORAL AT BEDTIME
Refills: 0 | Status: DISCONTINUED | OUTPATIENT
Start: 2022-05-07 | End: 2022-05-10

## 2022-05-07 RX ORDER — METHOCARBAMOL 500 MG/1
750 TABLET, FILM COATED ORAL THREE TIMES A DAY
Refills: 0 | Status: DISCONTINUED | OUTPATIENT
Start: 2022-05-07 | End: 2022-05-10

## 2022-05-07 RX ORDER — ASCORBIC ACID 60 MG
1 TABLET,CHEWABLE ORAL
Qty: 0 | Refills: 0 | DISCHARGE

## 2022-05-07 RX ORDER — IBUPROFEN 200 MG
600 TABLET ORAL EVERY 6 HOURS
Refills: 0 | Status: DISCONTINUED | OUTPATIENT
Start: 2022-05-07 | End: 2022-05-10

## 2022-05-07 RX ORDER — PANTOPRAZOLE SODIUM 20 MG/1
40 TABLET, DELAYED RELEASE ORAL
Refills: 0 | Status: DISCONTINUED | OUTPATIENT
Start: 2022-05-07 | End: 2022-05-10

## 2022-05-07 RX ORDER — OXYCODONE HYDROCHLORIDE 5 MG/1
5 TABLET ORAL EVERY 6 HOURS
Refills: 0 | Status: DISCONTINUED | OUTPATIENT
Start: 2022-05-07 | End: 2022-05-08

## 2022-05-07 RX ORDER — LEVOCETIRIZINE DIHYDROCHLORIDE 0.5 MG/ML
1 SOLUTION ORAL
Qty: 0 | Refills: 0 | DISCHARGE

## 2022-05-07 RX ORDER — ACETAMINOPHEN 500 MG
975 TABLET ORAL EVERY 8 HOURS
Refills: 0 | Status: DISCONTINUED | OUTPATIENT
Start: 2022-05-07 | End: 2022-05-10

## 2022-05-07 RX ORDER — ALBUTEROL 90 UG/1
0 AEROSOL, METERED ORAL
Qty: 0 | Refills: 0 | DISCHARGE

## 2022-05-07 RX ORDER — LANOLIN ALCOHOL/MO/W.PET/CERES
1 CREAM (GRAM) TOPICAL
Qty: 0 | Refills: 0 | DISCHARGE

## 2022-05-07 RX ORDER — LORATADINE 10 MG/1
10 TABLET ORAL DAILY
Refills: 0 | Status: DISCONTINUED | OUTPATIENT
Start: 2022-05-07 | End: 2022-05-10

## 2022-05-07 RX ORDER — LANOLIN ALCOHOL/MO/W.PET/CERES
3 CREAM (GRAM) TOPICAL AT BEDTIME
Refills: 0 | Status: DISCONTINUED | OUTPATIENT
Start: 2022-05-07 | End: 2022-05-10

## 2022-05-07 RX ORDER — HYDROMORPHONE HYDROCHLORIDE 2 MG/ML
1 INJECTION INTRAMUSCULAR; INTRAVENOUS; SUBCUTANEOUS ONCE
Refills: 0 | Status: DISCONTINUED | OUTPATIENT
Start: 2022-05-07 | End: 2022-05-08

## 2022-05-07 RX ORDER — HEPARIN SODIUM 5000 [USP'U]/ML
5000 INJECTION INTRAVENOUS; SUBCUTANEOUS EVERY 8 HOURS
Refills: 0 | Status: DISCONTINUED | OUTPATIENT
Start: 2022-05-07 | End: 2022-05-08

## 2022-05-07 RX ORDER — OXYCODONE HYDROCHLORIDE 5 MG/1
10 TABLET ORAL ONCE
Refills: 0 | Status: DISCONTINUED | OUTPATIENT
Start: 2022-05-07 | End: 2022-05-07

## 2022-05-07 RX ORDER — OMEPRAZOLE 10 MG/1
1 CAPSULE, DELAYED RELEASE ORAL
Qty: 0 | Refills: 0 | DISCHARGE

## 2022-05-07 RX ADMIN — Medication 600 MILLIGRAM(S): at 15:14

## 2022-05-07 RX ADMIN — Medication 600 MILLIGRAM(S): at 18:00

## 2022-05-07 RX ADMIN — GABAPENTIN 300 MILLIGRAM(S): 400 CAPSULE ORAL at 21:19

## 2022-05-07 RX ADMIN — METHOCARBAMOL 750 MILLIGRAM(S): 500 TABLET, FILM COATED ORAL at 14:42

## 2022-05-07 RX ADMIN — Medication 975 MILLIGRAM(S): at 21:20

## 2022-05-07 RX ADMIN — LIDOCAINE 1 PATCH: 4 CREAM TOPICAL at 21:47

## 2022-05-07 RX ADMIN — OXYCODONE HYDROCHLORIDE 10 MILLIGRAM(S): 5 TABLET ORAL at 12:42

## 2022-05-07 RX ADMIN — Medication 975 MILLIGRAM(S): at 15:14

## 2022-05-07 RX ADMIN — Medication 102 MILLIGRAM(S): at 15:14

## 2022-05-07 RX ADMIN — Medication 600 MILLIGRAM(S): at 17:22

## 2022-05-07 RX ADMIN — OXYCODONE HYDROCHLORIDE 5 MILLIGRAM(S): 5 TABLET ORAL at 17:26

## 2022-05-07 RX ADMIN — OXYCODONE HYDROCHLORIDE 10 MILLIGRAM(S): 5 TABLET ORAL at 12:03

## 2022-05-07 RX ADMIN — OXYCODONE HYDROCHLORIDE 5 MILLIGRAM(S): 5 TABLET ORAL at 23:32

## 2022-05-07 RX ADMIN — Medication 600 MILLIGRAM(S): at 16:00

## 2022-05-07 RX ADMIN — OXYCODONE HYDROCHLORIDE 5 MILLIGRAM(S): 5 TABLET ORAL at 17:30

## 2022-05-07 RX ADMIN — Medication 975 MILLIGRAM(S): at 21:50

## 2022-05-07 RX ADMIN — LIDOCAINE 1 PATCH: 4 CREAM TOPICAL at 19:47

## 2022-05-07 RX ADMIN — METHOCARBAMOL 750 MILLIGRAM(S): 500 TABLET, FILM COATED ORAL at 21:19

## 2022-05-07 RX ADMIN — HEPARIN SODIUM 5000 UNIT(S): 5000 INJECTION INTRAVENOUS; SUBCUTANEOUS at 21:19

## 2022-05-07 RX ADMIN — LIDOCAINE 1 PATCH: 4 CREAM TOPICAL at 15:14

## 2022-05-07 RX ADMIN — Medication 975 MILLIGRAM(S): at 16:00

## 2022-05-07 NOTE — ED ADULT NURSE NOTE - OBJECTIVE STATEMENT
AAOx3 c/o right sided leg pain. Denies trauma, fall, CP, SOB, NVD, HA or dizziness. Will CTM. AAOx3 c/o right sided leg pain with severe right buttock pain. Denies trauma, fall, CP, SOB, NVD, HA or dizziness. Will CTM. Given hot pack.

## 2022-05-07 NOTE — H&P ADULT - HISTORY OF PRESENT ILLNESS
52 yo female with hx of colon cancer currently being monitored presents with complaints of back pain. Patient reports that she went to [place] on vacation and had slipped and fallen down twice. She states that she started to get pain but was not focusing on it. She describes it as sharp radiating pain down her right leg with some tingling. She denies any loss of bowel or urinary function. States to have difficulty walking now. She states that the pain started to get worse and therefore she presented to the ED yesterday for an evaluation. She had a CT of lumbar spine performed which showed no acute fracture but had moderate right paracentral disc extrusion at L5-S1. She was then discharged from ED with a plan for outpatient spine/pain management evaluation and MRI. But then she called her spine doctor who advised her to return to the ED for an MRI and an epidural. ED consulted pain management and spine surgery for evaluation. Admitted to medicine for further management.

## 2022-05-07 NOTE — ED CDU PROVIDER DISPOSITION NOTE - ATTENDING CONTRIBUTION TO CARE
I agree with the PA's note and was available for any issues/concerns. I was directly involved in patient care. My brief overall assessment is as follows:    unable to have epidural in timely fashion; will admit

## 2022-05-07 NOTE — PATIENT PROFILE ADULT - VISION (WITH CORRECTIVE LENSES IF THE PATIENT USUALLY WEARS THEM):
"Chief Complaint   Patient presents with     Consult     New patient consult for abdominal pain. - Video visit.       Vitals:    12/16/20 0938   Weight: 102.1 kg (225 lb)   Height: 1.6 m (5' 3\")       Body mass index is 39.86 kg/m .    Adrianna Argueta LPN    " Normal vision: sees adequately in most situations; can see medication labels, newsprint none

## 2022-05-07 NOTE — ED ADULT TRIAGE NOTE - CHIEF COMPLAINT QUOTE
I was d/c yesterday for lower back pain, CT showed right paracentral L5-S1 disc intrusion. called my on call MD today after I started having difficulty walking on my right leg and was told to come straight to ED for possible surgery

## 2022-05-07 NOTE — CONSULT NOTE ADULT - NSCONSULTADDITIONALINFOA_GEN_ALL_CORE
NSGY Atttg:    see above    patient seen and examined by PA staff    imaging reviewed    agree with exam and plan as above  MRI LS spine pending

## 2022-05-07 NOTE — H&P ADULT - NSHPRISKHIVSCREEN_GEN_ALL_CORE
09/11/20 2134   PRE-TX-O2   O2 Device (Oxygen Therapy) room air   SpO2 96 %   Pulse Oximetry Type Intermittent   $ Pulse Oximetry - Multiple Charge Pulse Oximetry - Multiple   Pulse 86   Resp 18   Respiratory Evaluation   $ Care Plan Tech Time 15 min      Unable to offer due to clinical condition

## 2022-05-07 NOTE — H&P ADULT - ASSESSMENT
50 yo female with hx of colon cancer currently being monitored presents with complaints of back pain. Patient reports that she went to [place] on vacation and had slipped and fallen down twice. She states that she started to get pain but was not focusing on it. She describes it as sharp radiating pain down her right leg with some tingling. She denies any loss of bowel or urinary function. States to have difficulty walking now. She states that the pain started to get worse and therefore she presented to the ED yesterday for an evaluation. She had a CT of lumbar spine performed which showed no acute fracture but had moderate right paracentral disc extrusion at L5-S1. She was then discharged from ED with a plan for outpatient spine/pain management evaluation and MRI. But then she called her spine doctor who advised her to return to the ED for an MRI and an epidural. ED consulted pain management and spine surgery for evaluation. Admitted to medicine for further management.    #Back pain secondary to paracentral disc extrusion at L5-S1  CT spine obtained  MRI L spine ordered  Spine/Pain consulted by ED  Pain controlled  Plan for epidural Monday    #Leukocytosis  Likely reactive  Monitor    DVT prophylaxis: heparin

## 2022-05-07 NOTE — ED PROVIDER NOTE - CLINICAL SUMMARY MEDICAL DECISION MAKING FREE TEXT BOX
50 yo female PMHx diverticulitis s/p resection, hysterectomy, hernia presents to ED c/o back pain.  Spine called 52 yo female PMHx diverticulitis s/p resection, hysterectomy, hernia presents to ED c/o back pain.   MRI and an epidural. ED consulted pain management and spine surgery for evaluation. Admitted to medicine for further management.

## 2022-05-07 NOTE — ED PROVIDER NOTE - OBJECTIVE STATEMENT
50 yo female PMHx diverticulitis s/p resection, hysterectomy, hernia presents to ED c/o back pain radiating down rt leg.  PT fell on vaction about 2 weeks ago with worsening pain, barely able to walk this AM.  Spoke with her spine surgeon [Dr Tan] who advised her to come in.  Otherwise denies inconinence, bleeding, SOB, chest pain, abdominal pain or fevers.  Denies other pertinent medical problems.  Denies any substance use.

## 2022-05-07 NOTE — ED CDU PROVIDER INITIAL DAY NOTE - PROGRESS NOTE DETAILS
spoke to cristi vieira from pain management, await logistics to ascertain if epidural can happen over the weekend

## 2022-05-07 NOTE — ED PROVIDER NOTE - NS ED ROS FT
General: No fever, no massive bleeding  Head: No HA, no ongoing scalp bleed  ENT: no neck pain, no sore throat  Resp: No SOB, no hemoptysis  Cardiovascular: No CP, No LOC  GI: No abdominal pain, No blood in stool  : No dysuria, no hematuria   MSK: + back pain, + limb pain  Skin: No painful or bleeding lesions  Neuro: No paresthesias, No focal deficits

## 2022-05-07 NOTE — ED PROVIDER NOTE - PHYSICAL EXAMINATION
General: NAD, well appearing  HEENT: Normocephalic, atraumatic  Neck: No apparent stiffness or JVD  Pulm: Chest wall symmetric and nontender, lungs clear to ascultation   Cardiac: Regular rate and regular rhythm  Abdomen: Nontender and nondistended  Skin: Skin is warm, dry and intact without rashes or lesions.  Neuro: + straight leg test on right, motor and sensation intact  MSK: Back and rt leg pain with movement, ambulation

## 2022-05-07 NOTE — PATIENT PROFILE ADULT - FALL HARM RISK - FALLEN IN PAST
END OF SHIFT NOTE: 
 
INTAKE/OUTPUT 
02/24 0701 - 02/25 0700 In: 911 [I.V.:911] Out: -  
Voiding: YES Catheter: NO 
Drain:   
 
 
 
 
 
Flatus: Patient does have flatus present. Stool:  0 occurrences. Characteristics: 
Stool Assessment Stool Color: Kirsty Poplin Stool Appearance: Loose Stool Amount: Smear Stool Source/Status: Rectum Emesis: 0 occurrences. Characteristics: VITAL SIGNS Patient Vitals for the past 12 hrs: 
 Temp Pulse Resp BP SpO2  
02/25/19 1512     93 % 02/25/19 1450 97.7 °F (36.5 °C) 75 16 125/86 91 % 02/25/19 1437 98.1 °F (36.7 °C) 89 18 115/65 91 % 02/25/19 1129 98.1 °F (36.7 °C) 82 18 135/79 91 % 02/25/19 1022  86  109/68 95 % 02/25/19 0817 97.6 °F (36.4 °C)      
02/25/19 0738  81 16 (!) 119/38 92 % 02/25/19 0733 98.5 °F (36.9 °C) 88 20 116/75 94 % Pain Assessment Pain Intensity 1: 0 (02/25/19 1206) Pain Location 1: Ankle Pain Intervention(s) 1: Medication (see MAR) Patient Stated Pain Goal: 6 Ambulating No 
 
Shift report given to oncoming nurse at the bedside.  
 
Radha Dejesus RN 
 
 
 Accidental fall

## 2022-05-07 NOTE — ED CDU PROVIDER INITIAL DAY NOTE - ATTENDING APP SHARED VISIT CONTRIBUTION OF CARE
I agree with the PA's note and was available for any issues/concerns. I was directly involved in patient care. My brief overall assessment is as follows:    51 year old female no PMHx c/o atraumatic back pain; initial work up reviewed; admit to obs for MRI and pain management

## 2022-05-07 NOTE — H&P ADULT - NSHPPHYSICALEXAM_GEN_ALL_CORE
PHYSICAL EXAM:  Vital Signs Last 24 Hrs  T(F): 98.9 (07 May 2022 11:52), Max: 98.9 (07 May 2022 11:52)  HR: 77 (07 May 2022 11:52) (77 - 94)  BP: 99/61 (07 May 2022 11:52) (99/61 - 103/71)  RR: 18 (07 May 2022 11:52) (18 - 20)  SpO2: 95% (07 May 2022 11:52) (95% - 100%)    GENERAL: NAD, Resting in bed  Eyes: EOMI, PERRLA  ENMT: Conjunctiva and sclera clear; supple neck, No JVD  Cardiovascular: S1,S2, RRR, No murmur  Respiratory: CTA B/L, Non-labored breathing  GI: Bowel sounds present; Soft, Nontender, Nondistended. No hepatomegaly  Genitourinary: Deferred  Skin:  no breakdowns, ulcers or discharge, No rashes or lesions  Neurology: Alert & Oriented X3, non-focal and spontaneous movements of all extremities, CN 2 to 12 grossly intact   Psych: Appropriate mood and affect, calm, pleasant, Responds appropriately to questions

## 2022-05-07 NOTE — ED CDU PROVIDER DISPOSITION NOTE - CLINICAL COURSE
51 year old F presents to the ED c/o right sided lower back pain radiating to her RLE x 2 weeks. Pt states that she was in [place] x 2 weeks ago and was drinking, lost her balance and fell onto her buttocks twice. Pt states that pain has been worsening since then. Pt otherwise denies fever/chills, c/p, sob, abd pain, n/v/c/d, dysuria, numbness/tingling/weakness, saddle anesthesia, urinary/bowel incontinence and has no other complaints at this time.  Patient was evaluated in ED yesterday had CT with no FX.  Patient discussed findings with pain management MD Avalos who recommended to return to ED for MRI and epidural.  Spoke to Bhavana Dunaway from pain management, epidural to occur Monday.  Case d/w hospitalist RAMILA Banuelos.  Pending MRI and pain management consult.

## 2022-05-07 NOTE — ED CDU PROVIDER INITIAL DAY NOTE - MEDICAL DECISION MAKING DETAILS
acute on chronic back pain, verbal conversation had with ortho PA advises MRI, pain management consult

## 2022-05-07 NOTE — ED PROVIDER NOTE - ATTENDING CONTRIBUTION TO CARE
The patient seen and examined    Back Pain    I, Johnathon Xiao, performed the initial face to face bedside interview with this patient regarding history of present illness, review of symptoms and relevant past medical, social and family history.  I completed an independent physical examination.  I was the initial provider who evaluated this patient. I have signed out the follow up of any pending tests (i.e. labs, radiological studies) to the resident.  I have communicated the patient’s plan of care and disposition with the resident,

## 2022-05-07 NOTE — H&P ADULT - NSHPREVIEWOFSYSTEMS_GEN_ALL_CORE
Review of Systems:  CONSTITUTIONAL: No   fevers or chills  EYES/ENT: No visual changes;  No vertigo or throat pain   NECK: No pain or stiffness  RESPIRATORY: No cough, wheezing, hemoptysis; No shortness of breath,   CARDIOVASCULAR: No chest pain or palpitations  GASTROINTESTINAL: No abdominal or epigastric pain. No nausea, vomiting, or hematemesis; No diarrhea or constipation.   GENITOURINARY: No dysuria, frequency or hematuria  NEUROLOGICAL: +Back pain   SKIN: No itching, burning, rashes, or lesions   All other review of systems is negative unless indicated above

## 2022-05-07 NOTE — PATIENT PROFILE ADULT - FALL HARM RISK - HARM RISK INTERVENTIONS

## 2022-05-07 NOTE — CONSULT NOTE ADULT - ASSESSMENT
51 year old Female w/ PMHX of colon ca s/p resection 5/2021, hysterectomy 2/2021, C section x 3, multiple hand surgeries, GERD, asthma, presented to [facility] on 5/6 w/ back pain- CT L spine revealing moderate R paracentral disc extrusion L5-S1. Discharged home, and pain management doctor advised to return to ED on 5/7. Patient reports that she went to [place], s/p slip and fall, -LOC, around 4/20, landing on tile floor x 2.         Plan  - Imaging reviewed  - Q4 neuro checks  - Pain control PRN; pain consult  - MRI L spine    51 F w/ PMHX of colon ca s/p resection 5/2021, hysterectomy 2/2021, C section x 3, multiple hand surgeries, GERD, asthma, presented to [facility] on 5/6 w/ back pain- CT L spine revealing moderate R paracentral disc extrusion L5-S1. Discharged home, and pain management doctor advised to return to ED on 5/7. Patient reports that she went to [place], s/p slip and fall around 4/20, landing on tile floor x 2, -LOC.        Plan  - Imaging reviewed  - Q4 neuro checks  - Pain control PRN; pain consult  - Urgent MRI L spine w/ w/o contrast  - Bedrest pending imaging  - Medical management/supportive care per primary team  - To be d/w Dr. Nguyen

## 2022-05-07 NOTE — ED CDU PROVIDER INITIAL DAY NOTE - OBJECTIVE STATEMENT
51 year old F presents to the ED c/o right sided lower back pain radiating to her RLE x 2 weeks. Pt states that she was in [place] x 2 weeks ago and was drinking, lost her balance and fell onto her buttocks twice. Pt states that pain has been worsening since then. Pt otherwise denies fever/chills, c/p, sob, abd pain, n/v/c/d, dysuria, numbness/tingling/weakness, saddle anesthesia, urinary/bowel incontinence and has no other complaints at this time.

## 2022-05-07 NOTE — CONSULT NOTE ADULT - SUBJECTIVE AND OBJECTIVE BOX
Patient is a 51 year old Female who presents with a chief complaint of Back pain (07 May 2022 16:27)    HISTORY OF PRESENT ILLNESS:   51 year old Female w/ PMHX of colon ca s/p resection 2021, hysterectomy 2021, C section x 3, multiple hand surgeries, GERD, asthma, presented to [facility] on  w/ back pain- CT L spine revealing moderate R paracentral disc extrusion L5-S1. Discharged home, and pain management doctor advised to return to ED on . Patient reports that she went to [place], s/p slip and fall, -LOC, around , landing on tile floor x 2. When returning from vacation also had travelers diarrhea which distracted her from back pain. Travelers diarrhea has resolved and pain more apparent. Pain originates in R flank/gluteal region radiating down posterior thigh to ant/post lower leg. Last BM 3 days ago, patient describes it as "loss of sensation of feeling I had to go." Currently denies similar feeling however started to notice anterior pubic area dysesthesias. Denies bowel/bladder incontinence at time of exam. Unable to bear weight. Requiring wheelchair to bathroom/assistance getting dressed.     PAST MEDICAL & SURGICAL HISTORY:  GERD (gastroesophageal reflux disease)  Asthma  mild  Cervical dysplasia  Kidney stone  Malignant neoplasm of sigmoid colon  H/O  section x3 , ,   H/O hand surgery staph infection 1992  History of sinus surgery   H/O dilation and curettage  S/P hysterectomy 2021  S/P colon resection    FAMILY HISTORY:  FH: colon cancer maternal grandmother, maternal uncle  FH: lymphoma (Father)  nasopharyngeal lymphoma  FH: breast cancer (Aunt)    SOCIAL HISTORY:  Tobacco Use:  EtOH use:   Substance:    Allergies  dermabond, mupirocin (Blisters)  doxycycline (Rash)  penicillins (Rash)    REVIEW OF SYSTEMS  Negative except as noted in HPI      HOME MEDICATIONS:  Claritin 10 mg oral tablet: 1 tab(s) orally once a day (07 May 2022 16:28)  Flonase 50 mcg/inh nasal spray: 1 spray(s) nasal once a day (07 May 2022 16:15)  gabapentin 100 mg oral capsule: 2 cap(s) orally once a day (at bedtime) (07 May 2022 16:28)  omeprazole 40 mg oral delayed release capsule: 1  orally once a day (07 May 2022 16:28)      MEDICATIONS:  Antibiotics:  Neuro:  acetaminophen     Tablet .. 975 milliGRAM(s) Oral every 8 hours  gabapentin 300 milliGRAM(s) Oral three times a day  ibuprofen  Tablet. 600 milliGRAM(s) Oral every 6 hours  melatonin 3 milliGRAM(s) Oral at bedtime PRN  methocarbamol 750 milliGRAM(s) Oral three times a day  ondansetron Injectable 4 milliGRAM(s) IV Push every 8 hours PRN  oxyCODONE    IR 5 milliGRAM(s) Oral every 6 hours PRN  Anticoagulation:  heparin   Injectable 5000 Unit(s) SubCutaneous every 8 hours  OTHER:  aluminum hydroxide/magnesium hydroxide/simethicone Suspension 30 milliLiter(s) Oral every 4 hours PRN  lidocaine   4% Patch 1 Patch Transdermal daily  loratadine 10 milliGRAM(s) Oral daily  pantoprazole    Tablet 40 milliGRAM(s) Oral before breakfast  senna 2 Tablet(s) Oral at bedtime  IVF:      Vital Signs Last 24 Hrs  T(C): 37.2 (07 May 2022 11:52), Max: 37.2 (07 May 2022 11:52)  T(F): 98.9 (07 May 2022 11:52), Max: 98.9 (07 May 2022 11:52)  HR: 77 (07 May 2022 11:52) (77 - 94)  BP: 99/61 (07 May 2022 11:52) (99/61 - 103/71)  BP(mean): 74 (07 May 2022 11:52) (74 - 74)  RR: 18 (07 May 2022 11:52) (18 - 20)  SpO2: 95% (07 May 2022 11:52) (95% - 100%)      PHYSICAL EXAM:  GENERAL: NAD, well-groomed, well-developed  HEAD: Atraumatic, normocephalic  CLAUDIA COMA SCORE: E-4 V-5 M-6 = 15  MENTAL STATUS: AAO x3; Awake; Opens eyes spontaneously; Appropriately conversant without aphasia; following simple commands  CRANIAL NERVES: Visual acuity normal for age w/ corrective lenses, PERRL. EOMI without nystagmus. Facial sensation intact V1-3 distribution b/l. Face symmetric w/ normal eye closure and smile, tongue midline. Hearing grossly intact. Speech clear.   REFLEXES: + rectal, strong tone.   MOTOR: strength 5/5 b/l upper extremities, no drift. b/l LE 5/5-no focal weakness   SENSATION: grossly intact to light touch all extremities  COORDINATION: Unable to assess  CHEST/LUNG: Nonlabored breaths  BACK: Nontender to light palpation down spine; Right flank/buttock tenderness. Scattered hypopigmentation in nonspecific pattern primarily in upper back region.   SKIN: Warm, dry; no rashes or lesions    LABS:             12.7   13.03 )-----------( 369      ( 07 May 2022 15:18 )             38.9         138  |  102  |  16.2  ----------------------------<  173<H>  4.0   |  23.0  |  0.75    Ca    9.0      07 May 2022 15:18    TPro  6.7  /  Alb  4.2  /  TBili  0.4  /  DBili  x   /  AST  27  /  ALT  18  /  AlkPhos  53        CULTURES:  Culture Results:   GI PCR Results: NOT detected  *******Please Note:*******  GI panel PCR evaluates for:  Campylobacter, Plesiomonas shigelloides, Salmonella,  Vibrio, Yersinia enterocolitica, Enteroaggregative  Escherichia coli (EAEC), Enteropathogenic E.coli (EPEC),  Enterotoxigenic E. coli (ETEC) lt/st, Shiga-like  toxin-producing E. coli (STEC) stx1/stx2,  Shigella/ Enteroinvasive E. coli (EIEC), Cryptosporidium,  Cyclospora cayetanensis, Entamoeba histolytica,  Giardia lamblia, Adenovirus F 40/41, Astrovirus,  Norovirus GI/GII, Rotavirus A, Sapovirus ( @ 12:44)    RADIOLOGY & ADDITIONAL STUDIES:  CT Lumbar Spine No Cont (22 @ 10:50)   IMPRESSION:  *  NO EVIDENCE OF AN ACUTE LUMBAR SPINE FRACTURE.  *  MODERATE RIGHT PARACENTRAL DISC EXTRUSION L5-S1. FOLLOW-UP MRI MAY BE   OBTAINED FOR CONFIRMATION IF WARRANTED   Patient is a 51 year old Female who presents with a chief complaint of Back pain (07 May 2022 16:27)    HISTORY OF PRESENT ILLNESS:   51 year old Female w/ PMHX of colon ca s/p resection 2021, hysterectomy 2021, C section x 3, multiple hand surgeries, GERD, asthma, presented to [facility] on  w/ back pain- CT L spine revealing moderate R paracentral disc extrusion L5-S1. Discharged home, and pain management doctor advised to return to ED on . Patient reports that she went to [place], s/p slip and fall around , landing on tile floor x 2, -LOC. When returning from vacation, also had travelers diarrhea which distracted her from back pain. Travelers diarrhea has since resolved and pain more apparent. Pain originates in R flank/gluteal region radiating down posterior thigh to ant/post lower leg. Last BM 3 days ago, patient describes it as, loss of anal sensation but able to control motor. Currently denies similar feeling however started to notice anterior pubic area dysesthesias. Denies bowel/bladder incontinence at time of exam. Unable to bear weight. Requiring wheelchair to bathroom/assistance getting dressed.     PAST MEDICAL & SURGICAL HISTORY:  GERD (gastroesophageal reflux disease)  Asthma  mild  Cervical dysplasia  Kidney stone  Malignant neoplasm of sigmoid colon  H/O  section x3 , ,   H/O hand surgery staph infection 1992  History of sinus surgery   H/O dilation and curettage  S/P hysterectomy 2021  S/P colon resection    FAMILY HISTORY:  FH: colon cancer maternal grandmother, maternal uncle  FH: lymphoma (Father)  nasopharyngeal lymphoma  FH: breast cancer (Aunt)    SOCIAL HISTORY:  Tobacco Use: Denies  EtOH use: Social  Substance: Denies    Allergies  dermabond, mupirocin (Blisters)  doxycycline (Rash)  penicillins (Rash)    REVIEW OF SYSTEMS  Negative except as noted in HPI    HOME MEDICATIONS:  Claritin 10 mg oral tablet: 1 tab(s) orally once a day (07 May 2022 16:28)  Flonase 50 mcg/inh nasal spray: 1 spray(s) nasal once a day (07 May 2022 16:15)  gabapentin 100 mg oral capsule: 2 cap(s) orally once a day (at bedtime) (07 May 2022 16:28)  omeprazole 40 mg oral delayed release capsule: 1  orally once a day (07 May 2022 16:28)    MEDICATIONS:  Antibiotics:  Neuro:  acetaminophen     Tablet .. 975 milliGRAM(s) Oral every 8 hours  gabapentin 300 milliGRAM(s) Oral three times a day  ibuprofen  Tablet. 600 milliGRAM(s) Oral every 6 hours  melatonin 3 milliGRAM(s) Oral at bedtime PRN  methocarbamol 750 milliGRAM(s) Oral three times a day  ondansetron Injectable 4 milliGRAM(s) IV Push every 8 hours PRN  oxyCODONE    IR 5 milliGRAM(s) Oral every 6 hours PRN  Anticoagulation:  heparin   Injectable 5000 Unit(s) SubCutaneous every 8 hours  OTHER:  aluminum hydroxide/magnesium hydroxide/simethicone Suspension 30 milliLiter(s) Oral every 4 hours PRN  lidocaine   4% Patch 1 Patch Transdermal daily  loratadine 10 milliGRAM(s) Oral daily  pantoprazole    Tablet 40 milliGRAM(s) Oral before breakfast  senna 2 Tablet(s) Oral at bedtime  IVF:      Vital Signs Last 24 Hrs  T(C): 37.2 (07 May 2022 11:52), Max: 37.2 (07 May 2022 11:52)  T(F): 98.9 (07 May 2022 11:52), Max: 98.9 (07 May 2022 11:52)  HR: 77 (07 May 2022 11:52) (77 - 94)  BP: 99/61 (07 May 2022 11:52) (99/61 - 103/71)  BP(mean): 74 (07 May 2022 11:52) (74 - 74)  RR: 18 (07 May 2022 11:52) (18 - 20)  SpO2: 95% (07 May 2022 11:52) (95% - 100%)      PHYSICAL EXAM:  GENERAL: NAD, well-groomed, well-developed  HEAD: Atraumatic, normocephalic  CLAUDIA COMA SCORE: E-4 V-5 M-6 = 15  MENTAL STATUS: AAO x3; Awake; Opens eyes spontaneously; Appropriately conversant without aphasia; following simple commands  CRANIAL NERVES: Visual acuity normal for age w/ corrective lenses, PERRL. EOMI without nystagmus. Facial sensation intact V1-3 distribution b/l. Face symmetric w/ normal eye closure and smile, tongue midline. Hearing grossly intact. Speech clear.   REFLEXES: + rectal, strong tone.   MOTOR: strength 5/5 b/l upper extremities, no drift. b/l LE 5/5, no focal weakness.    SENSATION: grossly intact to light touch all extremities. RLE radiating dysesthesias to R foot   COORDINATION: Unable to assess  CHEST/LUNG: Nonlabored breaths  BACK: Nontender to light palpation down spine; Right flank/buttock tenderness.   SKIN: Warm, dry. Scattered hypopigmentation in nonspecific pattern primarily in upper back region.     LABS:             12.7   13.03 )-----------( 369      ( 07 May 2022 15:18 )             38.9         138  |  102  |  16.2  ----------------------------<  173<H>  4.0   |  23.0  |  0.75    Ca    9.0      07 May 2022 15:18    TPro  6.7  /  Alb  4.2  /  TBili  0.4  /  DBili  x   /  AST  27  /  ALT  18  /  AlkPhos  53        CULTURES:  Culture Results:   GI PCR Results: NOT detected  *******Please Note:*******  GI panel PCR evaluates for:  Campylobacter, Plesiomonas shigelloides, Salmonella,  Vibrio, Yersinia enterocolitica, Enteroaggregative  Escherichia coli (EAEC), Enteropathogenic E.coli (EPEC),  Enterotoxigenic E. coli (ETEC) lt/st, Shiga-like  toxin-producing E. coli (STEC) stx1/stx2,  Shigella/ Enteroinvasive E. coli (EIEC), Cryptosporidium,  Cyclospora cayetanensis, Entamoeba histolytica,  Giardia lamblia, Adenovirus F 40/41, Astrovirus,  Norovirus GI/GII, Rotavirus A, Sapovirus ( @ 12:44)    RADIOLOGY & ADDITIONAL STUDIES:  CT Lumbar Spine No Cont (22 @ 10:50)   IMPRESSION:  *  NO EVIDENCE OF AN ACUTE LUMBAR SPINE FRACTURE.  *  MODERATE RIGHT PARACENTRAL DISC EXTRUSION L5-S1. FOLLOW-UP MRI MAY BE   OBTAINED FOR CONFIRMATION IF WARRANTED

## 2022-05-07 NOTE — ED ADULT NURSE REASSESSMENT NOTE - NS ED NURSE REASSESS COMMENT FT1
Assumed care of pt at 19:30 as stated in report from HARMONY Shaw Charting as noted. Patient A&O x4,  complaining of back and leg pain/discomfort, denies CP/SOB. Updated on the plan of care. Call bell within reach, bed locked in lowest position. IV site flushed w/ NS. No redness, swelling or pain noted to site. No signs of acute distress noted, safety maintained. Pt pending MRI and bed placement.

## 2022-05-08 ENCOUNTER — TRANSCRIPTION ENCOUNTER (OUTPATIENT)
Age: 52
End: 2022-05-08

## 2022-05-08 LAB
BASOPHILS # BLD AUTO: 0.01 K/UL — SIGNIFICANT CHANGE UP (ref 0–0.2)
BASOPHILS NFR BLD AUTO: 0.1 % — SIGNIFICANT CHANGE UP (ref 0–2)
EOSINOPHIL # BLD AUTO: 0 K/UL — SIGNIFICANT CHANGE UP (ref 0–0.5)
EOSINOPHIL NFR BLD AUTO: 0 % — SIGNIFICANT CHANGE UP (ref 0–6)
HCT VFR BLD CALC: 36.5 % — SIGNIFICANT CHANGE UP (ref 34.5–45)
HGB BLD-MCNC: 12 G/DL — SIGNIFICANT CHANGE UP (ref 11.5–15.5)
IMM GRANULOCYTES NFR BLD AUTO: 0.6 % — SIGNIFICANT CHANGE UP (ref 0–1.5)
LYMPHOCYTES # BLD AUTO: 1.15 K/UL — SIGNIFICANT CHANGE UP (ref 1–3.3)
LYMPHOCYTES # BLD AUTO: 9.4 % — LOW (ref 13–44)
MCHC RBC-ENTMCNC: 30.9 PG — SIGNIFICANT CHANGE UP (ref 27–34)
MCHC RBC-ENTMCNC: 32.9 GM/DL — SIGNIFICANT CHANGE UP (ref 32–36)
MCV RBC AUTO: 94.1 FL — SIGNIFICANT CHANGE UP (ref 80–100)
MONOCYTES # BLD AUTO: 0.59 K/UL — SIGNIFICANT CHANGE UP (ref 0–0.9)
MONOCYTES NFR BLD AUTO: 4.8 % — SIGNIFICANT CHANGE UP (ref 2–14)
NEUTROPHILS # BLD AUTO: 10.47 K/UL — HIGH (ref 1.8–7.4)
NEUTROPHILS NFR BLD AUTO: 85.1 % — HIGH (ref 43–77)
PLATELET # BLD AUTO: 339 K/UL — SIGNIFICANT CHANGE UP (ref 150–400)
RBC # BLD: 3.88 M/UL — SIGNIFICANT CHANGE UP (ref 3.8–5.2)
RBC # FLD: 13.3 % — SIGNIFICANT CHANGE UP (ref 10.3–14.5)
WBC # BLD: 12.29 K/UL — HIGH (ref 3.8–10.5)
WBC # FLD AUTO: 12.29 K/UL — HIGH (ref 3.8–10.5)

## 2022-05-08 PROCEDURE — 72158 MRI LUMBAR SPINE W/O & W/DYE: CPT | Mod: 26

## 2022-05-08 PROCEDURE — 99233 SBSQ HOSP IP/OBS HIGH 50: CPT

## 2022-05-08 PROCEDURE — 99232 SBSQ HOSP IP/OBS MODERATE 35: CPT

## 2022-05-08 PROCEDURE — 71250 CT THORAX DX C-: CPT | Mod: 26

## 2022-05-08 RX ORDER — POLYETHYLENE GLYCOL 3350 17 G/17G
17 POWDER, FOR SOLUTION ORAL DAILY
Refills: 0 | Status: DISCONTINUED | OUTPATIENT
Start: 2022-05-08 | End: 2022-05-10

## 2022-05-08 RX ORDER — OXYCODONE HYDROCHLORIDE 5 MG/1
10 TABLET ORAL EVERY 4 HOURS
Refills: 0 | Status: DISCONTINUED | OUTPATIENT
Start: 2022-05-08 | End: 2022-05-10

## 2022-05-08 RX ORDER — ALPRAZOLAM 0.25 MG
0.25 TABLET ORAL ONCE
Refills: 0 | Status: DISCONTINUED | OUTPATIENT
Start: 2022-05-08 | End: 2022-05-08

## 2022-05-08 RX ORDER — OXYCODONE HYDROCHLORIDE 5 MG/1
5 TABLET ORAL EVERY 4 HOURS
Refills: 0 | Status: DISCONTINUED | OUTPATIENT
Start: 2022-05-08 | End: 2022-05-10

## 2022-05-08 RX ADMIN — Medication 600 MILLIGRAM(S): at 06:02

## 2022-05-08 RX ADMIN — Medication 0.25 MILLIGRAM(S): at 02:42

## 2022-05-08 RX ADMIN — PANTOPRAZOLE SODIUM 40 MILLIGRAM(S): 20 TABLET, DELAYED RELEASE ORAL at 06:02

## 2022-05-08 RX ADMIN — METHOCARBAMOL 750 MILLIGRAM(S): 500 TABLET, FILM COATED ORAL at 14:20

## 2022-05-08 RX ADMIN — Medication 600 MILLIGRAM(S): at 17:38

## 2022-05-08 RX ADMIN — Medication 975 MILLIGRAM(S): at 22:04

## 2022-05-08 RX ADMIN — OXYCODONE HYDROCHLORIDE 10 MILLIGRAM(S): 5 TABLET ORAL at 11:54

## 2022-05-08 RX ADMIN — HYDROMORPHONE HYDROCHLORIDE 1 MILLIGRAM(S): 2 INJECTION INTRAMUSCULAR; INTRAVENOUS; SUBCUTANEOUS at 01:10

## 2022-05-08 RX ADMIN — OXYCODONE HYDROCHLORIDE 10 MILLIGRAM(S): 5 TABLET ORAL at 16:37

## 2022-05-08 RX ADMIN — Medication 600 MILLIGRAM(S): at 11:16

## 2022-05-08 RX ADMIN — LIDOCAINE 1 PATCH: 4 CREAM TOPICAL at 11:09

## 2022-05-08 RX ADMIN — OXYCODONE HYDROCHLORIDE 10 MILLIGRAM(S): 5 TABLET ORAL at 15:52

## 2022-05-08 RX ADMIN — Medication 975 MILLIGRAM(S): at 14:31

## 2022-05-08 RX ADMIN — OXYCODONE HYDROCHLORIDE 5 MILLIGRAM(S): 5 TABLET ORAL at 07:17

## 2022-05-08 RX ADMIN — Medication 600 MILLIGRAM(S): at 11:09

## 2022-05-08 RX ADMIN — Medication 600 MILLIGRAM(S): at 01:25

## 2022-05-08 RX ADMIN — OXYCODONE HYDROCHLORIDE 5 MILLIGRAM(S): 5 TABLET ORAL at 00:02

## 2022-05-08 RX ADMIN — LORATADINE 10 MILLIGRAM(S): 10 TABLET ORAL at 11:09

## 2022-05-08 RX ADMIN — GABAPENTIN 300 MILLIGRAM(S): 400 CAPSULE ORAL at 22:04

## 2022-05-08 RX ADMIN — GABAPENTIN 300 MILLIGRAM(S): 400 CAPSULE ORAL at 14:22

## 2022-05-08 RX ADMIN — SENNA PLUS 2 TABLET(S): 8.6 TABLET ORAL at 22:04

## 2022-05-08 RX ADMIN — GABAPENTIN 300 MILLIGRAM(S): 400 CAPSULE ORAL at 06:02

## 2022-05-08 RX ADMIN — METHOCARBAMOL 750 MILLIGRAM(S): 500 TABLET, FILM COATED ORAL at 06:02

## 2022-05-08 RX ADMIN — POLYETHYLENE GLYCOL 3350 17 GRAM(S): 17 POWDER, FOR SOLUTION ORAL at 11:54

## 2022-05-08 RX ADMIN — HEPARIN SODIUM 5000 UNIT(S): 5000 INJECTION INTRAVENOUS; SUBCUTANEOUS at 06:03

## 2022-05-08 RX ADMIN — Medication 975 MILLIGRAM(S): at 23:46

## 2022-05-08 RX ADMIN — Medication 600 MILLIGRAM(S): at 01:10

## 2022-05-08 RX ADMIN — METHOCARBAMOL 750 MILLIGRAM(S): 500 TABLET, FILM COATED ORAL at 22:04

## 2022-05-08 RX ADMIN — Medication 600 MILLIGRAM(S): at 07:17

## 2022-05-08 RX ADMIN — HYDROMORPHONE HYDROCHLORIDE 1 MILLIGRAM(S): 2 INJECTION INTRAMUSCULAR; INTRAVENOUS; SUBCUTANEOUS at 01:25

## 2022-05-08 RX ADMIN — OXYCODONE HYDROCHLORIDE 5 MILLIGRAM(S): 5 TABLET ORAL at 06:02

## 2022-05-08 RX ADMIN — OXYCODONE HYDROCHLORIDE 10 MILLIGRAM(S): 5 TABLET ORAL at 11:44

## 2022-05-08 RX ADMIN — Medication 975 MILLIGRAM(S): at 14:22

## 2022-05-08 NOTE — PROGRESS NOTE ADULT - NS ATTEND AMEND GEN_ALL_CORE FT
NSGY Attg:    see above    patient seen and examined  LE 5/5  no saddle anesthesia    MRI reviewed -- no severe thecal sac compression    agree with exam and plan as above

## 2022-05-09 ENCOUNTER — TRANSCRIPTION ENCOUNTER (OUTPATIENT)
Age: 52
End: 2022-05-09

## 2022-05-09 ENCOUNTER — APPOINTMENT (OUTPATIENT)
Dept: GASTROENTEROLOGY | Facility: CLINIC | Age: 52
End: 2022-05-09

## 2022-05-09 LAB
BASOPHILS # BLD AUTO: 0.06 K/UL — SIGNIFICANT CHANGE UP (ref 0–0.2)
BASOPHILS NFR BLD AUTO: 1 % — SIGNIFICANT CHANGE UP (ref 0–2)
EOSINOPHIL # BLD AUTO: 0.19 K/UL — SIGNIFICANT CHANGE UP (ref 0–0.5)
EOSINOPHIL NFR BLD AUTO: 3.1 % — SIGNIFICANT CHANGE UP (ref 0–6)
HCT VFR BLD CALC: 34.7 % — SIGNIFICANT CHANGE UP (ref 34.5–45)
HGB BLD-MCNC: 11.3 G/DL — LOW (ref 11.5–15.5)
IMM GRANULOCYTES NFR BLD AUTO: 0.3 % — SIGNIFICANT CHANGE UP (ref 0–1.5)
LYMPHOCYTES # BLD AUTO: 2.73 K/UL — SIGNIFICANT CHANGE UP (ref 1–3.3)
LYMPHOCYTES # BLD AUTO: 45 % — HIGH (ref 13–44)
MCHC RBC-ENTMCNC: 30.6 PG — SIGNIFICANT CHANGE UP (ref 27–34)
MCHC RBC-ENTMCNC: 32.6 GM/DL — SIGNIFICANT CHANGE UP (ref 32–36)
MCV RBC AUTO: 94 FL — SIGNIFICANT CHANGE UP (ref 80–100)
MONOCYTES # BLD AUTO: 0.65 K/UL — SIGNIFICANT CHANGE UP (ref 0–0.9)
MONOCYTES NFR BLD AUTO: 10.7 % — SIGNIFICANT CHANGE UP (ref 2–14)
NEUTROPHILS # BLD AUTO: 2.41 K/UL — SIGNIFICANT CHANGE UP (ref 1.8–7.4)
NEUTROPHILS NFR BLD AUTO: 39.9 % — LOW (ref 43–77)
PLATELET # BLD AUTO: 285 K/UL — SIGNIFICANT CHANGE UP (ref 150–400)
RBC # BLD: 3.69 M/UL — LOW (ref 3.8–5.2)
RBC # FLD: 13.2 % — SIGNIFICANT CHANGE UP (ref 10.3–14.5)
WBC # BLD: 6.06 K/UL — SIGNIFICANT CHANGE UP (ref 3.8–10.5)
WBC # FLD AUTO: 6.06 K/UL — SIGNIFICANT CHANGE UP (ref 3.8–10.5)

## 2022-05-09 PROCEDURE — 99232 SBSQ HOSP IP/OBS MODERATE 35: CPT

## 2022-05-09 RX ORDER — SODIUM CHLORIDE 9 MG/ML
500 INJECTION INTRAMUSCULAR; INTRAVENOUS; SUBCUTANEOUS ONCE
Refills: 0 | Status: DISCONTINUED | OUTPATIENT
Start: 2022-05-09 | End: 2022-05-10

## 2022-05-09 RX ADMIN — Medication 600 MILLIGRAM(S): at 06:20

## 2022-05-09 RX ADMIN — Medication 975 MILLIGRAM(S): at 23:04

## 2022-05-09 RX ADMIN — LIDOCAINE 1 PATCH: 4 CREAM TOPICAL at 12:16

## 2022-05-09 RX ADMIN — OXYCODONE HYDROCHLORIDE 5 MILLIGRAM(S): 5 TABLET ORAL at 13:00

## 2022-05-09 RX ADMIN — OXYCODONE HYDROCHLORIDE 5 MILLIGRAM(S): 5 TABLET ORAL at 12:15

## 2022-05-09 RX ADMIN — Medication 975 MILLIGRAM(S): at 15:04

## 2022-05-09 RX ADMIN — Medication 600 MILLIGRAM(S): at 12:20

## 2022-05-09 RX ADMIN — Medication 600 MILLIGRAM(S): at 07:42

## 2022-05-09 RX ADMIN — SENNA PLUS 2 TABLET(S): 8.6 TABLET ORAL at 22:04

## 2022-05-09 RX ADMIN — Medication 975 MILLIGRAM(S): at 06:20

## 2022-05-09 RX ADMIN — Medication 3 MILLIGRAM(S): at 22:04

## 2022-05-09 RX ADMIN — METHOCARBAMOL 750 MILLIGRAM(S): 500 TABLET, FILM COATED ORAL at 14:05

## 2022-05-09 RX ADMIN — GABAPENTIN 300 MILLIGRAM(S): 400 CAPSULE ORAL at 22:04

## 2022-05-09 RX ADMIN — Medication 600 MILLIGRAM(S): at 17:32

## 2022-05-09 RX ADMIN — Medication 975 MILLIGRAM(S): at 22:04

## 2022-05-09 RX ADMIN — Medication 975 MILLIGRAM(S): at 07:42

## 2022-05-09 RX ADMIN — OXYCODONE HYDROCHLORIDE 10 MILLIGRAM(S): 5 TABLET ORAL at 20:08

## 2022-05-09 RX ADMIN — Medication 600 MILLIGRAM(S): at 01:00

## 2022-05-09 RX ADMIN — POLYETHYLENE GLYCOL 3350 17 GRAM(S): 17 POWDER, FOR SOLUTION ORAL at 12:16

## 2022-05-09 RX ADMIN — GABAPENTIN 300 MILLIGRAM(S): 400 CAPSULE ORAL at 14:04

## 2022-05-09 RX ADMIN — METHOCARBAMOL 750 MILLIGRAM(S): 500 TABLET, FILM COATED ORAL at 22:04

## 2022-05-09 RX ADMIN — Medication 975 MILLIGRAM(S): at 14:04

## 2022-05-09 RX ADMIN — Medication 600 MILLIGRAM(S): at 23:22

## 2022-05-09 RX ADMIN — LORATADINE 10 MILLIGRAM(S): 10 TABLET ORAL at 12:16

## 2022-05-09 RX ADMIN — PANTOPRAZOLE SODIUM 40 MILLIGRAM(S): 20 TABLET, DELAYED RELEASE ORAL at 06:20

## 2022-05-09 NOTE — DISCHARGE NOTE PROVIDER - NSDCFUADDINST_GEN_ALL_CORE_FT
You have appointments scheduled with your PCP and spine doctor tomorrow. Please follow up accordingly.

## 2022-05-09 NOTE — DISCHARGE NOTE PROVIDER - NSDCMRMEDTOKEN_GEN_ALL_CORE_FT
Claritin 10 mg oral tablet: 1 tab(s) orally once a day  Flonase 50 mcg/inh nasal spray: 1 spray(s) nasal once a day  gabapentin 100 mg oral capsule: 2 cap(s) orally once a day (at bedtime)  omeprazole 40 mg oral delayed release capsule: 1  orally once a day   Claritin 10 mg oral tablet: 1 tab(s) orally once a day  Flonase 50 mcg/inh nasal spray: 1 spray(s) nasal once a day  gabapentin 100 mg oral capsule: 2 cap(s) orally once a day (at bedtime)  omeprazole 40 mg oral delayed release capsule: 1  orally once a day  outpatient Physical therapy:

## 2022-05-09 NOTE — DISCHARGE NOTE PROVIDER - NSDCFUSCHEDAPPT_GEN_ALL_CORE_FT
Solomon Diane  [place] [facility] [address]  Scheduled Appointment: 05/11/2022    Nicolas Abebe  [facility]  Scheduled Appointment: 05/11/2022    Meghan Ellis  [facility] Physician [facility] [address]  Scheduled Appointment: 05/16/2022    Nicolas Abebe  [place] Phys[facility]  Ortho Surgery [address]  Scheduled Appointment: 05/24/2022    Nicolas Abebe  [facility]  Scheduled Appointment: 05/24/2022    [facility] Physician [facility]  [facility]  Scheduled Appointment: 06/08/2022

## 2022-05-09 NOTE — BRIEF OPERATIVE NOTE - OPERATION/FINDINGS
Succesful epidural contrast spread at L5/S1  Epidural injection with 2cc PF DepoMedrol 40mg/cc, 2cc PF Lidocaine 1%, 2cc PF Normal Saline

## 2022-05-09 NOTE — DISCHARGE NOTE PROVIDER - NSDCCPCAREPLAN_GEN_ALL_CORE_FT
PRINCIPAL DISCHARGE DIAGNOSIS  Diagnosis: Back pain  Assessment and Plan of Treatment: You received an Epidural today to help managment you back pain.  You will be given a few days worth of pain medication, please take as needed for pain.  Follow up with pain management upon discharge.      SECONDARY DISCHARGE DIAGNOSES  Diagnosis: Lung nodule  Assessment and Plan of Treatment: Follow up out patient for repeat CT scan in 3 months to monitor lung nodule.     PRINCIPAL DISCHARGE DIAGNOSIS  Diagnosis: Back pain  Assessment and Plan of Treatment: You received an Epidural yesterday to help managment you back pain.  You can take tylenol as needed for pain control. Follow up with pain management and spine upon discharge.      SECONDARY DISCHARGE DIAGNOSES  Diagnosis: Lung nodule  Assessment and Plan of Treatment: Follow up out patient for repeat CT scan in 3 months to monitor lung nodule.

## 2022-05-09 NOTE — DISCHARGE NOTE PROVIDER - ATTENDING DISCHARGE PHYSICAL EXAMINATION:
PHYSICAL EXAM:  Vital Signs Last 24 Hrs  T(F): 97.9 (10 May 2022 04:48), Max: 98.7 (09 May 2022 11:15)  HR: 58 (10 May 2022 04:48) (58 - 76)  BP: 99/52 (10 May 2022 04:48) (99/52 - 118/74)  RR: 19 (10 May 2022 04:48) (18 - 19)  SpO2: 95% (10 May 2022 04:48) (94% - 99%)    GENERAL: NAD, Resting in bed  Eyes: EOMI, PERRLA  ENMT: Conjunctiva and sclera clear; supple neck, No JVD  Cardiovascular: S1,S2, RRR, No murmur  Respiratory: CTA B/L, Non-labored breathing  GI: Bowel sounds present; Soft, Nontender, Nondistended. No hepatomegaly  Genitourinary: Deferred  Skin:  no breakdowns, ulcers or discharge, No rashes or lesions  Neurology: Alert & Oriented X3, non-focal and spontaneous movements of all extremities, CN 2 to 12 grossly intact   Psych: Appropriate mood and affect, calm, pleasant, Responds appropriately to questions

## 2022-05-09 NOTE — DISCHARGE NOTE PROVIDER - HOSPITAL COURSE
50 yo female with hx of colon cancer currently being monitored presents with complaints of back pain. Patient reports that she went to [place] on vacation and had slipped and fallen down twice. She states that she started to get pain but was not focusing on it. She describes it as sharp radiating pain down her right leg with some tingling. She denies any loss of bowel or urinary function. States to have difficulty walking now. She states that the pain started to get worse and therefore she presented to the ED on  for an evaluation. She had a CT of lumbar spine performed which showed no acute fracture but had moderate right paracentral disc extrusion at L5-S1. She was then discharged from ED with a plan for outpatient spine/pain management evaluation and MRI. But then she called her spine doctor who advised her to return to the ED for an MRI and an epidural. ED consulted pain management and spine surgery for evaluation. Admitted to medicine for further management. 50 yo female with hx of colon cancer currently being monitored presents with complaints of back pain. Patient reports that she went to [place] on vacation and had slipped and fallen down twice. She states that she started to get pain but was not focusing on it. She describes it as sharp radiating pain down her right leg with some tingling. She denies any loss of bowel or urinary function. States to have difficulty walking now. She states that the pain started to get worse and therefore she presented to the ED on  for an evaluation. She had a CT of lumbar spine performed which showed no acute fracture but had moderate right paracentral disc extrusion at L5-S1. She was then discharged from ED with a plan for outpatient spine/pain management evaluation and MRI. But then she called her spine doctor who advised her to return to the ED for an MRI and an epidural. ED consulted pain management and spine surgery for evaluation. Admitted to medicine for further management.  during hospitalization pt was seen by Neurosurgery and Pain management.  MRI was ordered and reviewed by consulting team and pt was schedule for epidural injection.  Pt tolerated procedure well.  PT had PT evaluation, and they recommend..... Patient seen and examined @ the bedside today. Patient clinically stable at this time. No longer requires acute in hospital level of care. Can be continually followed/managed as an outpatient. 52 yo female with hx of colon cancer currently being monitored presents with complaints of back pain. Patient reports that she went to [place] on vacation and had slipped and fallen down twice. She states that she started to get pain but was not focusing on it. She describes it as sharp radiating pain down her right leg with some tingling. She denies any loss of bowel or urinary function. States to have difficulty walking now. She states that the pain started to get worse and therefore she presented to the ED on  for an evaluation. She had a CT of lumbar spine performed which showed no acute fracture but had moderate right paracentral disc extrusion at L5-S1. She was then discharged from ED with a plan for outpatient spine/pain management evaluation and MRI. But then she called her spine doctor who advised her to return to the ED for an MRI and an epidural. ED consulted pain management and spine surgery for evaluation. Admitted to medicine for further management.  during hospitalization pt was seen by Neurosurgery and Pain management.  MRI was ordered and reviewed by consulting team and pt was schedule for epidural injection.  Pt tolerated procedure well.  PT had PT evaluation, and they recommend outpatient PT. Patient seen and examined at bedside today. Patient clinically stable at this time. No longer requires acute in hospital level of care. Can be continually followed/managed as an outpatient.

## 2022-05-09 NOTE — DISCHARGE NOTE PROVIDER - PROVIDER TOKENS
PROVIDER:[TOKEN:[[id number]],FOLLOWUP:[Routine]] PROVIDER:[TOKEN:[[id number]],FOLLOWUP:[Routine]],FREE:[LAST:[SPINE],PHONE:[(   )    -],FAX:[(   )    -],FOLLOWUP:[1-3 days]]

## 2022-05-09 NOTE — BRIEF OPERATIVE NOTE - NSICDXBRIEFPROCEDURE_GEN_ALL_CORE_FT
PROCEDURES:  Lumbar epidural steroid injection with fluoroscopy 09-May-2022 16:09:48  Gareth Montoya

## 2022-05-09 NOTE — DISCHARGE NOTE PROVIDER - CARE PROVIDER_API CALL
Gareth Montoya)  Anesthesiology; Pain Medicine  [address] [place] [address], [place] [address]  Phone: [phone]  Fax: [phone]  Follow Up Time: Routine   Gareth Montoya)  Anesthesiology; Pain Medicine  [address] [place] [address], [place] [address]  Phone: [phone]  Fax: [phone]  Follow Up Time: Routine    SPINE,   Phone: (   )    -  Fax: (   )    -  Follow Up Time: 1-3 days

## 2022-05-09 NOTE — PHYSICAL THERAPY INITIAL EVALUATION ADULT - PERTINENT HX OF CURRENT PROBLEM, REHAB EVAL
s/p fall, acute lumbar radicular pain, disc herniation L5-S1 c reactive neuritis, s/p epidural injection

## 2022-05-10 ENCOUNTER — TRANSCRIPTION ENCOUNTER (OUTPATIENT)
Age: 52
End: 2022-05-10

## 2022-05-10 VITALS
DIASTOLIC BLOOD PRESSURE: 78 MMHG | RESPIRATION RATE: 18 BRPM | SYSTOLIC BLOOD PRESSURE: 113 MMHG | OXYGEN SATURATION: 98 % | HEART RATE: 76 BPM | TEMPERATURE: 98 F

## 2022-05-10 PROCEDURE — 99239 HOSP IP/OBS DSCHRG MGMT >30: CPT

## 2022-05-10 RX ADMIN — LORATADINE 10 MILLIGRAM(S): 10 TABLET ORAL at 12:32

## 2022-05-10 RX ADMIN — Medication 600 MILLIGRAM(S): at 06:25

## 2022-05-10 RX ADMIN — Medication 975 MILLIGRAM(S): at 06:25

## 2022-05-10 RX ADMIN — METHOCARBAMOL 750 MILLIGRAM(S): 500 TABLET, FILM COATED ORAL at 05:26

## 2022-05-10 RX ADMIN — PANTOPRAZOLE SODIUM 40 MILLIGRAM(S): 20 TABLET, DELAYED RELEASE ORAL at 05:27

## 2022-05-10 RX ADMIN — LIDOCAINE 1 PATCH: 4 CREAM TOPICAL at 00:32

## 2022-05-10 RX ADMIN — OXYCODONE HYDROCHLORIDE 10 MILLIGRAM(S): 5 TABLET ORAL at 03:12

## 2022-05-10 RX ADMIN — Medication 975 MILLIGRAM(S): at 05:25

## 2022-05-10 RX ADMIN — OXYCODONE HYDROCHLORIDE 10 MILLIGRAM(S): 5 TABLET ORAL at 04:12

## 2022-05-10 RX ADMIN — Medication 600 MILLIGRAM(S): at 05:25

## 2022-05-10 RX ADMIN — Medication 600 MILLIGRAM(S): at 00:22

## 2022-05-10 RX ADMIN — GABAPENTIN 300 MILLIGRAM(S): 400 CAPSULE ORAL at 05:25

## 2022-05-10 NOTE — DISCHARGE NOTE NURSING/CASE MANAGEMENT/SOCIAL WORK - NSDCPEFALRISK_GEN_ALL_CORE
For information on Fall & Injury Prevention, visit: [email or web address] OR  [email or web address] OR  https://www.cdc.gov/steadi/patient.html

## 2022-05-10 NOTE — PROGRESS NOTE ADULT - ASSESSMENT
52 yo female with hx of colon cancer currently being monitored presents with complaints of back pain. Patient reports that she went to [place] on vacation and had slipped and fallen down twice. She states that she started to get pain but was not focusing on it. She describes it as sharp radiating pain down her right leg with some tingling. She denies any loss of bowel or urinary function. States to have difficulty walking now. She states that the pain started to get worse and therefore she presented to the ED yesterday for an evaluation. She had a CT of lumbar spine performed which showed no acute fracture but had moderate right paracentral disc extrusion at L5-S1. She was then discharged from ED with a plan for outpatient spine/pain management evaluation and MRI. But then she called her spine doctor who advised her to return to the ED for an MRI and an epidural. ED consulted pain management and spine surgery for evaluation. Admitted to medicine for further management.    #Back pain secondary to paracentral disc extrusion at L5-S1  CT spine obtained  MRI L spine completed  Spine/Pain consulted by ED  Pain controlled  S/p epidural on 5/9  PT: Outpatient PT    #Leukocytosis  Likely reactive  Monitor  Resolved    #Lung nodule  Seen on CT scan  Likely benign but advised patient that needs repeat Scan in 3 months      DVT prophylaxis: heparin    DC now  
52F  acute lumbar radic  MRI demonstrating Right paracentral and posterolateral disc herniation L5-S1 with   associated reactive neuritis.    plan for L5/S1 MORENA today    Patient interested in a lumbar epidural injection. The patient is not considered to be a candidate for surgical intervention at this point. The patient has not experienced satisfactory relief from other treatment modalities such as anti-inflammatory medications, analgesic pain medications, physical therapy, and a home exercise program.  The risks, benefits and alternatives of lumbar epidural steroid injection were discussed with the patient.  The benefits include hopeful relief of pain and hopeful improvement of symptoms and quality of life.   The alternatives include avoiding the procedure, proceeding with surgical consultation and intervention, and continuing conservative therapy with medications and physical therapy.  The patient understands that this is an invasive procedure and therefore there are inherent risks. The patient was informed of the risks of the procedure including but not limited to infection, bleeding, injury to nearby tissues, vessels, and nerves, paralysis of one or more limbs, stroke, transient headache, no decrease in pain or worsened pain, heart attack and death. In rare cases, a blood patch is necessary to alleviate a headache if the dura was punctured. The steroid portion of the injection may cause hot flashes for a few days, fluid retention, mood swings, and a transient rise in blood glucose. Cataracts and severe arthritis of the hips or shoulders are a rare complication of prolonged or excessive use of steroids. No certain guarantees have been made and patient understand that responses can vary and multiple procedures may be necessary. The patient was informed that one or more of their extremities could feel heavy or weak for 6-8 hours after the procedure, and that they should have assistance with ambulation and not drive during that time. 
51 F w/ PMHX of colon ca s/p resection 5/2021, hysterectomy 2/2021, C section x 3, multiple hand surgeries, GERD, asthma, presented to [facility] on 5/6 w/ back pain- CT L spine revealing moderate R paracentral disc extrusion L5-S1. Discharged home, and pain management doctor advised to return to ED on 5/7. Patient reports that she went to [place], s/p slip and fall around 4/20, landing on tile floor x 2, -LOC.    -case discussed w/ Dr. Nguyen   - Imaging reviewed  - Q4 neuro checks  - Pain control PRN; pain management following   - PT/OT  - Medical management/supportive care per primary team  - To be d/w Dr. Nguyen   
52 yo female with hx of colon cancer currently being monitored presents with complaints of back pain. Patient reports that she went to [place] on vacation and had slipped and fallen down twice. She states that she started to get pain but was not focusing on it. She describes it as sharp radiating pain down her right leg with some tingling. She denies any loss of bowel or urinary function. States to have difficulty walking now. She states that the pain started to get worse and therefore she presented to the ED yesterday for an evaluation. She had a CT of lumbar spine performed which showed no acute fracture but had moderate right paracentral disc extrusion at L5-S1. She was then discharged from ED with a plan for outpatient spine/pain management evaluation and MRI. But then she called her spine doctor who advised her to return to the ED for an MRI and an epidural. ED consulted pain management and spine surgery for evaluation. Admitted to medicine for further management.    #Back pain secondary to paracentral disc extrusion at L5-S1  CT spine obtained  MRI L spine completed  Spine/Pain consulted by ED  Pain controlled  Plan for epidural today    #Leukocytosis  Likely reactive  Monitor  Resolved    #Lung nodule  Seen on CT scan  Likely benign but advised patient that needs repeat Scan in 3 months      DVT prophylaxis: heparin    Possible DC today   
50 yo female with hx of colon cancer currently being monitored presents with complaints of back pain. Patient reports that she went to [place] on vacation and had slipped and fallen down twice. She states that she started to get pain but was not focusing on it. She describes it as sharp radiating pain down her right leg with some tingling. She denies any loss of bowel or urinary function. States to have difficulty walking now. She states that the pain started to get worse and therefore she presented to the ED yesterday for an evaluation. She had a CT of lumbar spine performed which showed no acute fracture but had moderate right paracentral disc extrusion at L5-S1. She was then discharged from ED with a plan for outpatient spine/pain management evaluation and MRI. But then she called her spine doctor who advised her to return to the ED for an MRI and an epidural. ED consulted pain management and spine surgery for evaluation. Admitted to medicine for further management.    #Back pain secondary to paracentral disc extrusion at L5-S1  CT spine obtained  MRI L spine ordered  Spine/Pain consulted by ED  Pain controlled  Plan for epidural Monday    #Leukocytosis  Likely reactive  Monitor    DVT prophylaxis: heparin    SPoke to patients partner Nima over the phone at patients bedside  
52F  acute lumbar radic  MRI demonstrating Right paracentral and posterolateral disc herniation L5-S1 with   associated reactive neuritis.    s/p L5/S1 MORENA    good relief    if pain returns or worsens, can follow up outpatient for repeat MORENA    pt will resume her home gabapentin   does not require opioid rx for discharge    May follow up with us as outpatient at:  [facility]  [address], [place] [address]  [phone]

## 2022-05-10 NOTE — DISCHARGE NOTE NURSING/CASE MANAGEMENT/SOCIAL WORK - PATIENT PORTAL LINK FT
You can access the FollowMyHealth Patient Portal offered by [facility] by registering at the following website: [email or web address]. By joining [employer]’s FollowMyHealth portal, you will also be able to view your health information using other applications (apps) compatible with our system.

## 2022-05-10 NOTE — PROGRESS NOTE ADULT - NSPROGADDITIONALINFOA_GEN_ALL_CORE
< from: MR Lumbar Spine w/wo IV Cont (05.08.22 @ 09:04) >    Right paracentral and posterolateral disc herniation L5-S1 with   associated reactive neuritis. Small bulge L4-5.    < end of copied text >
< from: MR Lumbar Spine w/wo IV Cont (05.08.22 @ 09:04) >    Right paracentral and posterolateral disc herniation L5-S1 with   associated reactive neuritis. Small bulge L4-5.    < end of copied text >

## 2022-05-10 NOTE — PROGRESS NOTE ADULT - PROVIDER SPECIALTY LIST ADULT
Hospitalist
Pain Medicine
Neurosurgery
Pain Medicine
Pain Medicine
Parent(s)

## 2022-05-10 NOTE — PROGRESS NOTE ADULT - SUBJECTIVE AND OBJECTIVE BOX
HPI: 52 yo female with hx of colon cancer currently being monitored, presents with complaints of back pain with radiation through buttocks and RIGHT leg through to toes.  Patient reports that she went to [place] on vacation and had slipped and fallen down twice.  She states that she started to get pain right away that gradually worsened, had developed travellers diarrhea and waited to get back to the [place] before having evaluation for the pain, had worsened to where she had difficulty walking by the time she came to the ER.  She denies any loss of bowel or urinary function.  Initially evaluated 5/6,  CT of lumbar spine showed no acute fracture, + moderate right paracentral disc extrusion at L5-S1. She was discharged home with a plan for outpatient spine/pain management evaluation and MRI.  Leg pain worsened, she called her spine doctor who advised her to return to the ED for an MRI and an epidural.  Now s/p MRI finding RIGHT paracentral and posterolateral L5/S1 extruded disc with reactive neuritis.  Now prescribed gabapentin 300mg TID and multiple adjunctive medications, nsaid, muscle relaxant, apap and oxycodone prn.  Meds are giving her moderate levels of relief although she is still unable to put weight on the right LE due to pain.  Denies weakness, numbness, paresthesia in the leg, pain is predominant symptom.  SHe will also be evaluated by Neurosurgery.       ROS:  Patient denies chest pain, SOB, abd pain, N/V, fever, chills, dysuria or any other complaints. All remainder ROS negative.     MEDICATIONS  (STANDING):  acetaminophen     Tablet .. 975 milliGRAM(s) Oral every 8 hours  gabapentin 300 milliGRAM(s) Oral three times a day  heparin   Injectable 5000 Unit(s) SubCutaneous every 8 hours  ibuprofen  Tablet. 600 milliGRAM(s) Oral every 6 hours  lidocaine   4% Patch 1 Patch Transdermal daily  loratadine 10 milliGRAM(s) Oral daily  methocarbamol 750 milliGRAM(s) Oral three times a day  pantoprazole    Tablet 40 milliGRAM(s) Oral before breakfast  senna 2 Tablet(s) Oral at bedtime    MEDICATIONS  (PRN):  aluminum hydroxide/magnesium hydroxide/simethicone Suspension 30 milliLiter(s) Oral every 4 hours PRN Dyspepsia  melatonin 3 milliGRAM(s) Oral at bedtime PRN Insomnia  ondansetron Injectable 4 milliGRAM(s) IV Push every 8 hours PRN Nausea and/or Vomiting  oxyCODONE    IR 5 milliGRAM(s) Oral every 6 hours PRN Severe Pain (7 - 10)      PHYSICAL EXAM:  Vital Signs Last 24 Hrs  T(C): 36.9 (07 May 2022 23:26), Max: 37.2 (07 May 2022 11:52)  T(F): 98.5 (07 May 2022 23:26), Max: 98.9 (07 May 2022 11:52)  HR: 61 (07 May 2022 23:26) (61 - 94)  BP: 97/60 (07 May 2022 23:26) (97/60 - 103/71)  BP(mean): 74 (07 May 2022 11:52) (74 - 74)  RR: 18 (07 May 2022 23:26) (18 - 20)  SpO2: 96% (07 May 2022 23:26) (95% - 100%)    Constitutional: NAD, Resting, uncomfortable, pleasant, good historian  ENT: Supple, No JVD  Lungs: CTA B/L, Non-labored breathing  Cardio: RRR, S1/S2, No murmur  Abdomen: Soft, Nontender, Nondistended; Bowel sounds present  Extremities: No calf tenderness, No pitting edema  Musculoskeletal:   No clubbing or cyanosis of digits; no joint swelling or tenderness to palpation  Psych: Calm, cooperative affect appropriate  Neuro: Awake and alert, oriented x 4, moves b/l U/L extremities   Skin: No rashes; no palpable lesions    LABS:                        12.0   12.29 )-----------( 339      ( 08 May 2022 03:35 )             36.5     05-07    138  |  102  |  16.2  ----------------------------<  173<H>  4.0   |  23.0  |  0.75    Ca    9.0      07 May 2022 15:18    TPro  6.7  /  Alb  4.2  /  TBili  0.4  /  DBili  x   /  AST  27  /  ALT  18  /  AlkPhos  53  05-07    MRI and CT lumbar on chart, findings as described.    · Assessment	  52 yo female presents with L5/S1 herniated disc after a fall, symptoms consistent with L5/S1 radiculitis, severe pain right lumbar region radiating posteriorly through right LE to toes.  No neurological defecit but pain is severe, constant and patient is unable to ambulate due to the pain.    Plan:  *For interventional pain procedure tomorrow AM, likely RIGHT lumbar transforamenal epidural steroid injection @ L4/5, L5/S1.  Patient was apprised of procedure, indications, expected therapeutic benefit and potential adverse side effects.  The procedure will be performed by Dr Gareth Montoya with sterile conditions and under fluroscopic guidance.  The patient has no contraindications but we are discontinuing SC heparin, last dose given 0600 today.  I spoke with Dr Banuelos regarding slightly elevated WBC,(12K, downward trend noted), he will order repeat CBC prior to procedure.  Sophia verbalizes understanding and agreement with this plan.  *continue prn medications as ordered, continue gabapentin as ordered.  Will f/u, thank you!  -Bhavana Thomas NP-C  [phone]    
Hospitalist Daily Progress Note    Chief Complaint:  Patient is a 51y old  Female who presents with a chief complaint of Back pain (09 May 2022 13:57)      SUBJECTIVE / OVERNIGHT EVENTS:  Patient was seen and examined at bedside. Pain better controlled. Seen walking around unit.   Patient denies chest pain, SOB, abd pain, N/V, fever, chills, dysuria or any other complaints. All remainder ROS negative.     MEDICATIONS  (STANDING):  acetaminophen     Tablet .. 975 milliGRAM(s) Oral every 8 hours  gabapentin 300 milliGRAM(s) Oral three times a day  ibuprofen  Tablet. 600 milliGRAM(s) Oral every 6 hours  lidocaine   4% Patch 1 Patch Transdermal daily  loratadine 10 milliGRAM(s) Oral daily  methocarbamol 750 milliGRAM(s) Oral three times a day  pantoprazole    Tablet 40 milliGRAM(s) Oral before breakfast  polyethylene glycol 3350 17 Gram(s) Oral daily  senna 2 Tablet(s) Oral at bedtime  sodium chloride 0.9% Bolus 500 milliLiter(s) IV Bolus once    MEDICATIONS  (PRN):  aluminum hydroxide/magnesium hydroxide/simethicone Suspension 30 milliLiter(s) Oral every 4 hours PRN Dyspepsia  melatonin 3 milliGRAM(s) Oral at bedtime PRN Insomnia  ondansetron Injectable 4 milliGRAM(s) IV Push every 8 hours PRN Nausea and/or Vomiting  oxyCODONE    IR 10 milliGRAM(s) Oral every 4 hours PRN Severe Pain (7 - 10)  oxyCODONE    IR 5 milliGRAM(s) Oral every 4 hours PRN Moderate Pain (4 - 6)        I&O's Summary      PHYSICAL EXAM:  Vital Signs Last 24 Hrs  T(C): 36.6 (10 May 2022 04:48), Max: 37.1 (09 May 2022 11:15)  T(F): 97.9 (10 May 2022 04:48), Max: 98.7 (09 May 2022 11:15)  HR: 58 (10 May 2022 04:48) (58 - 76)  BP: 99/52 (10 May 2022 04:48) (99/52 - 118/74)  BP(mean): --  RR: 19 (10 May 2022 04:48) (18 - 19)  SpO2: 95% (10 May 2022 04:48) (94% - 99%)      Constitutional: NAD, Resting  ENT: Supple, No JVD  Lungs: CTA B/L, Non-labored breathing  Cardio: RRR, S1/S2, No murmur  Abdomen: Soft, Nontender, Nondistended; Bowel sounds present  Extremities: No calf tenderness, No pitting edema  Musculoskeletal:   No clubbing or cyanosis of digits; no joint swelling or tenderness to palpation  Psych: Calm, cooperative affect appropriate  Neuro: Awake and alert, oriented x 4, moves all extremities  Skin: No rashes; no palpable lesions    LABS:                        11.3   6.06  )-----------( 285      ( 09 May 2022 03:48 )             34.7                     CAPILLARY BLOOD GLUCOSE            RADIOLOGY REVIEWED  
Interval Hx:  Patient seen during rounds  Patient reports pain to be mod controlled on current medications  still w difficulty ambulating  endorses pain in RLE - along posterior leg to ankle  + numbness  - weakness/incontinence  MRI reviewed  r/b/a of MORENA discussed      Analgesic Dosing for past 24 hours reviewed as below:  acetaminophen     Tablet ..   975 milliGRAM(s) Oral (05-09-22 @ 06:20)   975 milliGRAM(s) Oral (05-08-22 @ 22:04)   975 milliGRAM(s) Oral (05-08-22 @ 14:22)    gabapentin   300 milliGRAM(s) Oral (05-08-22 @ 22:04)   300 milliGRAM(s) Oral (05-08-22 @ 14:22)    ibuprofen  Tablet.   600 milliGRAM(s) Oral (05-09-22 @ 12:20)   600 milliGRAM(s) Oral (05-09-22 @ 06:20)   600 milliGRAM(s) Oral (05-08-22 @ 17:38)    methocarbamol   750 milliGRAM(s) Oral (05-08-22 @ 22:04)   750 milliGRAM(s) Oral (05-08-22 @ 14:20)    oxyCODONE    IR   10 milliGRAM(s) Oral (05-08-22 @ 15:52)    oxyCODONE    IR   5 milliGRAM(s) Oral (05-09-22 @ 12:15)          T(C): 37.1 (05-09-22 @ 11:15), Max: 37.1 (05-08-22 @ 17:00)  HR: 63 (05-09-22 @ 11:15) (59 - 70)  BP: 100/66 (05-09-22 @ 11:15) (78/49 - 113/73)  RR: 18 (05-09-22 @ 11:15) (16 - 18)  SpO2: 98% (05-09-22 @ 11:15) (96% - 99%)    PHYSICAL EXAM:    GENERAL: NAD  HEAD:  Atraumatic, Normocephalic  NERVOUS SYSTEM:  Alert & Oriented X3, Good concentration  CHEST/LUNG: Equal lung expansion bilaterally  HEART: Regular rate and rhythm  EXTREMITIES:  SLR (+) in RLE, sensation and motor grossly wnl  BACK: TTP along right paraspinal muscles    acetaminophen     Tablet .. 975 milliGRAM(s) Oral every 8 hours  aluminum hydroxide/magnesium hydroxide/simethicone Suspension 30 milliLiter(s) Oral every 4 hours PRN  gabapentin 300 milliGRAM(s) Oral three times a day  ibuprofen  Tablet. 600 milliGRAM(s) Oral every 6 hours  lidocaine   4% Patch 1 Patch Transdermal daily  loratadine 10 milliGRAM(s) Oral daily  melatonin 3 milliGRAM(s) Oral at bedtime PRN  methocarbamol 750 milliGRAM(s) Oral three times a day  ondansetron Injectable 4 milliGRAM(s) IV Push every 8 hours PRN  oxyCODONE    IR 10 milliGRAM(s) Oral every 4 hours PRN  oxyCODONE    IR 5 milliGRAM(s) Oral every 4 hours PRN  pantoprazole    Tablet 40 milliGRAM(s) Oral before breakfast  polyethylene glycol 3350 17 Gram(s) Oral daily  senna 2 Tablet(s) Oral at bedtime  sodium chloride 0.9% Bolus 500 milliLiter(s) IV Bolus once                          11.3   6.06  )-----------( 285      ( 09 May 2022 03:48 )             34.7     05-07    138  |  102  |  16.2  ----------------------------<  173<H>  4.0   |  23.0  |  0.75    Ca    9.0      07 May 2022 15:18    TPro  6.7  /  Alb  4.2  /  TBili  0.4  /  DBili  x   /  AST  27  /  ALT  18  /  AlkPhos  53  05-07          Pain Service   [phone]
Hospitalist Daily Progress Note    Chief Complaint:  Patient is a 51y old  Female who presents with a chief complaint of Back pain (07 May 2022 16:46)      SUBJECTIVE / OVERNIGHT EVENTS:  Patient was seen and examined at bedside. States that she had difficulty sleeping last night. States pain is still present. No loss of bowel/urinary control.   Patient denies chest pain, SOB, abd pain, N/V, fever, chills, dysuria or any other complaints. All remainder ROS negative.     MEDICATIONS  (STANDING):  acetaminophen     Tablet .. 975 milliGRAM(s) Oral every 8 hours  gabapentin 300 milliGRAM(s) Oral three times a day  heparin   Injectable 5000 Unit(s) SubCutaneous every 8 hours  ibuprofen  Tablet. 600 milliGRAM(s) Oral every 6 hours  lidocaine   4% Patch 1 Patch Transdermal daily  loratadine 10 milliGRAM(s) Oral daily  methocarbamol 750 milliGRAM(s) Oral three times a day  pantoprazole    Tablet 40 milliGRAM(s) Oral before breakfast  senna 2 Tablet(s) Oral at bedtime    MEDICATIONS  (PRN):  aluminum hydroxide/magnesium hydroxide/simethicone Suspension 30 milliLiter(s) Oral every 4 hours PRN Dyspepsia  melatonin 3 milliGRAM(s) Oral at bedtime PRN Insomnia  ondansetron Injectable 4 milliGRAM(s) IV Push every 8 hours PRN Nausea and/or Vomiting  oxyCODONE    IR 5 milliGRAM(s) Oral every 6 hours PRN Severe Pain (7 - 10)        I&O's Summary      PHYSICAL EXAM:  Vital Signs Last 24 Hrs  T(C): 36.9 (07 May 2022 23:26), Max: 37.2 (07 May 2022 11:52)  T(F): 98.5 (07 May 2022 23:26), Max: 98.9 (07 May 2022 11:52)  HR: 61 (07 May 2022 23:26) (61 - 94)  BP: 97/60 (07 May 2022 23:26) (97/60 - 103/71)  BP(mean): 74 (07 May 2022 11:52) (74 - 74)  RR: 18 (07 May 2022 23:26) (18 - 20)  SpO2: 96% (07 May 2022 23:26) (95% - 100%)      Constitutional: NAD, Resting  ENT: Supple, No JVD  Lungs: CTA B/L, Non-labored breathing  Cardio: RRR, S1/S2, No murmur  Abdomen: Soft, Nontender, Nondistended; Bowel sounds present  Extremities: No calf tenderness, No pitting edema  Musculoskeletal:   No clubbing or cyanosis of digits; no joint swelling or tenderness to palpation  Psych: Calm, cooperative affect appropriate  Neuro: Awake and alert, oriented x 4, moves b/l U/L extremities   Skin: No rashes; no palpable lesions    LABS:                        12.0   12.29 )-----------( 339      ( 08 May 2022 03:35 )             36.5     05-07    138  |  102  |  16.2  ----------------------------<  173<H>  4.0   |  23.0  |  0.75    Ca    9.0      07 May 2022 15:18    TPro  6.7  /  Alb  4.2  /  TBili  0.4  /  DBili  x   /  AST  27  /  ALT  18  /  AlkPhos  53  05-07              CAPILLARY BLOOD GLUCOSE            RADIOLOGY REVIEWED  
INTERVAL HPI/OVERNIGHT EVENTS:  51 year old Female w/ PMHX of colon ca s/p resection 5/2021, hysterectomy 2/2021, C section x 3, multiple hand surgeries, GERD, asthma, presented to [facility] on 5/6 w/ back pain- CT L spine revealing moderate R paracentral disc extrusion L5-S1. Discharged home, and pain management doctor advised to return to ED on 5/7. Patient reports that she went to [place], s/p slip and fall around 4/20, landing on tile floor x 2, -LOC. When returning from vacation, also had travelers diarrhea which distracted her from back pain. Travelers diarrhea has since resolved and pain more apparent. Pain originates in R flank/gluteal region radiating down posterior thigh to ant/post lower leg. Last BM 3 days ago, patient describes it as, loss of anal sensation but able to control motor. Currently denies similar feeling however started to notice anterior pubic area dysesthesias. Denies bowel/bladder incontinence at time of exam.       Vital Signs Last 24 Hrs  T(C): 36.6 (08 May 2022 12:08), Max: 37.1 (07 May 2022 23:12)  T(F): 97.8 (08 May 2022 12:08), Max: 98.7 (07 May 2022 23:12)  HR: 71 (08 May 2022 12:08) (61 - 82)  BP: 120/71 (08 May 2022 12:08) (97/60 - 120/71)  BP(mean): --  RR: 18 (08 May 2022 12:08) (18 - 18)  SpO2: 99% (08 May 2022 12:08) (96% - 99%)      PHYSICAL EXAM:  GENERAL: NAD, well-groomed  HEAD: Atraumatic, normocephalic  CLAUDIA COMA SCORE: E-4 V-5 M-6 = 15  MENTAL STATUS: AAO x3; Awake; Opens eyes spontaneously; Appropriately conversant without aphasia; following simple commands  CRANIAL NERVES: Visual acuity normal for age w/ corrective lenses, PERRL. EOMI without nystagmus. Facial sensation intact V1-3 distribution b/l. Face symmetric w/ normal eye closure and smile, tongue midline. Hearing grossly intact. Speech clear.   MOTOR: strength 5/5 b/l upper extremities, no drift. b/l LE 5/5, no focal weakness.    SENSATION: grossly intact to light touch all extremities. RLE radiating dysesthesias to R foot   COORDINATION: Unable to assess  CHEST/LUNG: Nonlabored breaths  BACK: Nontender to light palpation down spine; Right flank/buttock tenderness.   SKIN: Warm, dry. Scattered hypopigmentation in nonspecific pattern primarily in upper back region.       LABS:                        12.0   12.29 )-----------( 339      ( 08 May 2022 03:35 )             36.5     05-07    138  |  102  |  16.2  ----------------------------<  173<H>  4.0   |  23.0  |  0.75    Ca    9.0      07 May 2022 15:18    TPro  6.7  /  Alb  4.2  /  TBili  0.4  /  DBili  x   /  AST  27  /  ALT  18  /  AlkPhos  53  05-07      RADIOLOGY & ADDITIONAL TESTS:    ACC: [id number] EXAM:  MR SPINE LUMBAR WAW IC                        PROCEDURE DATE:  05/08/2022    IMPRESSION:  Right paracentral and posterolateral disc herniation L5-S1 with   associated reactive neuritis. Small bulge L4-5.      
Hospitalist Daily Progress Note    Chief Complaint:  Patient is a 51y old  Female who presents with a chief complaint of Back pain (08 May 2022 16:25)      SUBJECTIVE / OVERNIGHT EVENTS:  Patient was seen and examined at bedside. States that it was difficulty sleeping otherwise no other complaints.   Patient denies chest pain, SOB, abd pain, N/V, fever, chills, dysuria or any other complaints. All remainder ROS negative.     MEDICATIONS  (STANDING):  acetaminophen     Tablet .. 975 milliGRAM(s) Oral every 8 hours  gabapentin 300 milliGRAM(s) Oral three times a day  ibuprofen  Tablet. 600 milliGRAM(s) Oral every 6 hours  lidocaine   4% Patch 1 Patch Transdermal daily  loratadine 10 milliGRAM(s) Oral daily  methocarbamol 750 milliGRAM(s) Oral three times a day  pantoprazole    Tablet 40 milliGRAM(s) Oral before breakfast  polyethylene glycol 3350 17 Gram(s) Oral daily  senna 2 Tablet(s) Oral at bedtime  sodium chloride 0.9% Bolus 500 milliLiter(s) IV Bolus once    MEDICATIONS  (PRN):  aluminum hydroxide/magnesium hydroxide/simethicone Suspension 30 milliLiter(s) Oral every 4 hours PRN Dyspepsia  melatonin 3 milliGRAM(s) Oral at bedtime PRN Insomnia  ondansetron Injectable 4 milliGRAM(s) IV Push every 8 hours PRN Nausea and/or Vomiting  oxyCODONE    IR 10 milliGRAM(s) Oral every 4 hours PRN Severe Pain (7 - 10)  oxyCODONE    IR 5 milliGRAM(s) Oral every 4 hours PRN Moderate Pain (4 - 6)        I&O's Summary      PHYSICAL EXAM:  Vital Signs Last 24 Hrs  T(C): 37.1 (09 May 2022 11:15), Max: 37.1 (08 May 2022 17:00)  T(F): 98.7 (09 May 2022 11:15), Max: 98.8 (08 May 2022 23:32)  HR: 63 (09 May 2022 11:15) (59 - 70)  BP: 100/66 (09 May 2022 11:15) (78/49 - 113/73)  BP(mean): --  RR: 18 (09 May 2022 11:15) (16 - 18)  SpO2: 98% (09 May 2022 11:15) (96% - 99%)      Constitutional: NAD, Resting  ENT: Supple, No JVD  Lungs: CTA B/L, Non-labored breathing  Cardio: RRR, S1/S2, No murmur  Abdomen: Soft, Nontender, Nondistended; Bowel sounds present  Extremities: No calf tenderness, No pitting edema  Musculoskeletal:   No clubbing or cyanosis of digits; no joint swelling or tenderness to palpation  Psych: Calm, cooperative affect appropriate  Neuro: Awake and alert, oriented x 4, moves all extremities  Skin: No rashes; no palpable lesions    LABS:                        11.3   6.06  )-----------( 285      ( 09 May 2022 03:48 )             34.7     05-07    138  |  102  |  16.2  ----------------------------<  173<H>  4.0   |  23.0  |  0.75    Ca    9.0      07 May 2022 15:18    TPro  6.7  /  Alb  4.2  /  TBili  0.4  /  DBili  x   /  AST  27  /  ALT  18  /  AlkPhos  53  05-07              CAPILLARY BLOOD GLUCOSE            RADIOLOGY REVIEWED  
Interval Hx:  Patient seen during rounds  Patient reports pain to be controlled following MORENA  still endorses R calf numbness/discomfort  improved with ambulation  due for dc      Analgesic Dosing for past 24 hours reviewed as below:  acetaminophen     Tablet ..   975 milliGRAM(s) Oral (05-10-22 @ 05:25)   975 milliGRAM(s) Oral (05-09-22 @ 22:04)   975 milliGRAM(s) Oral (05-09-22 @ 14:04)    gabapentin   300 milliGRAM(s) Oral (05-10-22 @ 05:25)   300 milliGRAM(s) Oral (05-09-22 @ 22:04)   300 milliGRAM(s) Oral (05-09-22 @ 14:04)    ibuprofen  Tablet.   600 milliGRAM(s) Oral (05-10-22 @ 05:25)   600 milliGRAM(s) Oral (05-09-22 @ 23:22)   600 milliGRAM(s) Oral (05-09-22 @ 17:32)    melatonin   3 milliGRAM(s) Oral (05-09-22 @ 22:04)    methocarbamol   750 milliGRAM(s) Oral (05-10-22 @ 05:26)   750 milliGRAM(s) Oral (05-09-22 @ 22:04)   750 milliGRAM(s) Oral (05-09-22 @ 14:05)    oxyCODONE    IR   10 milliGRAM(s) Oral (05-10-22 @ 03:12)   10 milliGRAM(s) Oral (05-09-22 @ 20:08)          T(C): 36.6 (05-10-22 @ 11:51), Max: 37.1 (05-09-22 @ 17:16)  HR: 76 (05-10-22 @ 11:51) (58 - 76)  BP: 113/78 (05-10-22 @ 11:51) (99/52 - 118/74)  RR: 18 (05-10-22 @ 11:51) (18 - 19)  SpO2: 98% (05-10-22 @ 11:51) (94% - 99%)        acetaminophen     Tablet .. 975 milliGRAM(s) Oral every 8 hours  aluminum hydroxide/magnesium hydroxide/simethicone Suspension 30 milliLiter(s) Oral every 4 hours PRN  gabapentin 300 milliGRAM(s) Oral three times a day  ibuprofen  Tablet. 600 milliGRAM(s) Oral every 6 hours  lidocaine   4% Patch 1 Patch Transdermal daily  loratadine 10 milliGRAM(s) Oral daily  melatonin 3 milliGRAM(s) Oral at bedtime PRN  methocarbamol 750 milliGRAM(s) Oral three times a day  ondansetron Injectable 4 milliGRAM(s) IV Push every 8 hours PRN  oxyCODONE    IR 10 milliGRAM(s) Oral every 4 hours PRN  oxyCODONE    IR 5 milliGRAM(s) Oral every 4 hours PRN  pantoprazole    Tablet 40 milliGRAM(s) Oral before breakfast  polyethylene glycol 3350 17 Gram(s) Oral daily  senna 2 Tablet(s) Oral at bedtime  sodium chloride 0.9% Bolus 500 milliLiter(s) IV Bolus once                          11.3   6.06  )-----------( 285      ( 09 May 2022 03:48 )             34.7                 Pain Service   [phone]

## 2022-05-11 ENCOUNTER — OUTPATIENT (OUTPATIENT)
Dept: OUTPATIENT SERVICES | Facility: HOSPITAL | Age: 52
LOS: 1 days | End: 2022-05-11
Payer: COMMERCIAL

## 2022-05-11 ENCOUNTER — NON-APPOINTMENT (OUTPATIENT)
Age: 52
End: 2022-05-11

## 2022-05-11 ENCOUNTER — APPOINTMENT (OUTPATIENT)
Dept: PHYSICAL MEDICINE AND REHAB | Facility: CLINIC | Age: 52
End: 2022-05-11
Payer: MEDICAID

## 2022-05-11 ENCOUNTER — APPOINTMENT (OUTPATIENT)
Dept: ULTRASOUND IMAGING | Facility: CLINIC | Age: 52
End: 2022-05-11
Payer: MEDICAID

## 2022-05-11 ENCOUNTER — APPOINTMENT (OUTPATIENT)
Dept: FAMILY MEDICINE | Facility: CLINIC | Age: 52
End: 2022-05-11
Payer: MEDICAID

## 2022-05-11 ENCOUNTER — OUTPATIENT (OUTPATIENT)
Dept: OUTPATIENT SERVICES | Facility: HOSPITAL | Age: 52
LOS: 1 days | End: 2022-05-11
Payer: MEDICAID

## 2022-05-11 VITALS
OXYGEN SATURATION: 98 % | WEIGHT: 113 LBS | BODY MASS INDEX: 22.19 KG/M2 | TEMPERATURE: 97.2 F | DIASTOLIC BLOOD PRESSURE: 80 MMHG | HEIGHT: 60 IN | SYSTOLIC BLOOD PRESSURE: 110 MMHG | RESPIRATION RATE: 16 BRPM | HEART RATE: 88 BPM

## 2022-05-11 VITALS
HEART RATE: 76 BPM | BODY MASS INDEX: 22.38 KG/M2 | SYSTOLIC BLOOD PRESSURE: 138 MMHG | RESPIRATION RATE: 14 BRPM | DIASTOLIC BLOOD PRESSURE: 87 MMHG | HEIGHT: 60 IN | WEIGHT: 114 LBS

## 2022-05-11 VITALS
OXYGEN SATURATION: 98 % | HEART RATE: 90 BPM | SYSTOLIC BLOOD PRESSURE: 100 MMHG | HEIGHT: 61 IN | WEIGHT: 113.98 LBS | DIASTOLIC BLOOD PRESSURE: 60 MMHG | TEMPERATURE: 98 F | RESPIRATION RATE: 16 BRPM

## 2022-05-11 DIAGNOSIS — Z98.891 HISTORY OF UTERINE SCAR FROM PREVIOUS SURGERY: Chronic | ICD-10-CM

## 2022-05-11 DIAGNOSIS — Z98.890 OTHER SPECIFIED POSTPROCEDURAL STATES: Chronic | ICD-10-CM

## 2022-05-11 DIAGNOSIS — Z90.710 ACQUIRED ABSENCE OF BOTH CERVIX AND UTERUS: Chronic | ICD-10-CM

## 2022-05-11 DIAGNOSIS — M79.604 PAIN IN RIGHT LEG: ICD-10-CM

## 2022-05-11 DIAGNOSIS — M79.661 PAIN IN RIGHT LOWER LEG: ICD-10-CM

## 2022-05-11 DIAGNOSIS — Z90.49 ACQUIRED ABSENCE OF OTHER SPECIFIED PARTS OF DIGESTIVE TRACT: Chronic | ICD-10-CM

## 2022-05-11 DIAGNOSIS — C18.7 MALIGNANT NEOPLASM OF SIGMOID COLON: ICD-10-CM

## 2022-05-11 DIAGNOSIS — K21.9 GASTRO-ESOPHAGEAL REFLUX DISEASE WITHOUT ESOPHAGITIS: ICD-10-CM

## 2022-05-11 DIAGNOSIS — J45.909 UNSPECIFIED ASTHMA, UNCOMPLICATED: ICD-10-CM

## 2022-05-11 DIAGNOSIS — Z29.9 ENCOUNTER FOR PROPHYLACTIC MEASURES, UNSPECIFIED: ICD-10-CM

## 2022-05-11 DIAGNOSIS — M75.42 IMPINGEMENT SYNDROME OF LEFT SHOULDER: ICD-10-CM

## 2022-05-11 DIAGNOSIS — I34.0 NONRHEUMATIC MITRAL (VALVE) INSUFFICIENCY: ICD-10-CM

## 2022-05-11 DIAGNOSIS — Z01.818 ENCOUNTER FOR OTHER PREPROCEDURAL EXAMINATION: ICD-10-CM

## 2022-05-11 LAB
A1C WITH ESTIMATED AVERAGE GLUCOSE RESULT: 5.3 % — SIGNIFICANT CHANGE UP (ref 4–5.6)
ALBUMIN SERPL ELPH-MCNC: 4.1 G/DL — SIGNIFICANT CHANGE UP (ref 3.3–5.2)
ALP SERPL-CCNC: 48 U/L — SIGNIFICANT CHANGE UP (ref 40–120)
ALT FLD-CCNC: 20 U/L — SIGNIFICANT CHANGE UP
ANION GAP SERPL CALC-SCNC: 12 MMOL/L — SIGNIFICANT CHANGE UP (ref 5–17)
APTT BLD: 26.8 SEC — LOW (ref 27.5–35.5)
AST SERPL-CCNC: 25 U/L — SIGNIFICANT CHANGE UP
BASOPHILS # BLD AUTO: 0.07 K/UL — SIGNIFICANT CHANGE UP (ref 0–0.2)
BASOPHILS NFR BLD AUTO: 1 % — SIGNIFICANT CHANGE UP (ref 0–2)
BILIRUB SERPL-MCNC: 0.3 MG/DL — LOW (ref 0.4–2)
BLD GP AB SCN SERPL QL: SIGNIFICANT CHANGE UP
BUN SERPL-MCNC: 15.9 MG/DL — SIGNIFICANT CHANGE UP (ref 8–20)
CALCIUM SERPL-MCNC: 9.1 MG/DL — SIGNIFICANT CHANGE UP (ref 8.6–10.2)
CHLORIDE SERPL-SCNC: 105 MMOL/L — SIGNIFICANT CHANGE UP (ref 98–107)
CO2 SERPL-SCNC: 25 MMOL/L — SIGNIFICANT CHANGE UP (ref 22–29)
CREAT SERPL-MCNC: 0.79 MG/DL — SIGNIFICANT CHANGE UP (ref 0.5–1.3)
EGFR: 91 ML/MIN/1.73M2 — SIGNIFICANT CHANGE UP
EOSINOPHIL # BLD AUTO: 0.42 K/UL — SIGNIFICANT CHANGE UP (ref 0–0.5)
EOSINOPHIL NFR BLD AUTO: 6 % — SIGNIFICANT CHANGE UP (ref 0–6)
ESTIMATED AVERAGE GLUCOSE: 105 MG/DL — SIGNIFICANT CHANGE UP (ref 68–114)
GLUCOSE SERPL-MCNC: 107 MG/DL — HIGH (ref 70–99)
HCT VFR BLD CALC: 40.9 % — SIGNIFICANT CHANGE UP (ref 34.5–45)
HGB BLD-MCNC: 13.5 G/DL — SIGNIFICANT CHANGE UP (ref 11.5–15.5)
IMM GRANULOCYTES NFR BLD AUTO: 0.4 % — SIGNIFICANT CHANGE UP (ref 0–1.5)
INR BLD: 0.92 RATIO — SIGNIFICANT CHANGE UP (ref 0.88–1.16)
LYMPHOCYTES # BLD AUTO: 1.78 K/UL — SIGNIFICANT CHANGE UP (ref 1–3.3)
LYMPHOCYTES # BLD AUTO: 25.4 % — SIGNIFICANT CHANGE UP (ref 13–44)
MCHC RBC-ENTMCNC: 30.7 PG — SIGNIFICANT CHANGE UP (ref 27–34)
MCHC RBC-ENTMCNC: 33 GM/DL — SIGNIFICANT CHANGE UP (ref 32–36)
MCV RBC AUTO: 93 FL — SIGNIFICANT CHANGE UP (ref 80–100)
MONOCYTES # BLD AUTO: 0.6 K/UL — SIGNIFICANT CHANGE UP (ref 0–0.9)
MONOCYTES NFR BLD AUTO: 8.6 % — SIGNIFICANT CHANGE UP (ref 2–14)
NEUTROPHILS # BLD AUTO: 4.11 K/UL — SIGNIFICANT CHANGE UP (ref 1.8–7.4)
NEUTROPHILS NFR BLD AUTO: 58.6 % — SIGNIFICANT CHANGE UP (ref 43–77)
PLATELET # BLD AUTO: 355 K/UL — SIGNIFICANT CHANGE UP (ref 150–400)
POTASSIUM SERPL-MCNC: 4.3 MMOL/L — SIGNIFICANT CHANGE UP (ref 3.5–5.3)
POTASSIUM SERPL-SCNC: 4.3 MMOL/L — SIGNIFICANT CHANGE UP (ref 3.5–5.3)
PROT SERPL-MCNC: 6.4 G/DL — LOW (ref 6.6–8.7)
PROTHROM AB SERPL-ACNC: 10.7 SEC — SIGNIFICANT CHANGE UP (ref 10.5–13.4)
RBC # BLD: 4.4 M/UL — SIGNIFICANT CHANGE UP (ref 3.8–5.2)
RBC # FLD: 13.1 % — SIGNIFICANT CHANGE UP (ref 10.3–14.5)
SODIUM SERPL-SCNC: 141 MMOL/L — SIGNIFICANT CHANGE UP (ref 135–145)
WBC # BLD: 7.01 K/UL — SIGNIFICANT CHANGE UP (ref 3.8–10.5)
WBC # FLD AUTO: 7.01 K/UL — SIGNIFICANT CHANGE UP (ref 3.8–10.5)

## 2022-05-11 PROCEDURE — 93010 ELECTROCARDIOGRAM REPORT: CPT

## 2022-05-11 PROCEDURE — G0463: CPT

## 2022-05-11 PROCEDURE — 93971 EXTREMITY STUDY: CPT | Mod: 26,RT

## 2022-05-11 PROCEDURE — 93005 ELECTROCARDIOGRAM TRACING: CPT

## 2022-05-11 PROCEDURE — 99213 OFFICE O/P EST LOW 20 MIN: CPT

## 2022-05-11 PROCEDURE — 93971 EXTREMITY STUDY: CPT

## 2022-05-11 PROCEDURE — 99214 OFFICE O/P EST MOD 30 MIN: CPT | Mod: GC

## 2022-05-11 RX ORDER — ASCORBIC ACID 60 MG
0 TABLET,CHEWABLE ORAL
Qty: 0 | Refills: 0 | DISCHARGE

## 2022-05-11 RX ORDER — CHOLECALCIFEROL (VITAMIN D3) 125 MCG
0 CAPSULE ORAL
Qty: 0 | Refills: 0 | DISCHARGE

## 2022-05-11 RX ORDER — GABAPENTIN 400 MG/1
3 CAPSULE ORAL
Qty: 0 | Refills: 0 | DISCHARGE

## 2022-05-11 RX ORDER — GABAPENTIN 400 MG/1
2 CAPSULE ORAL
Qty: 0 | Refills: 0 | DISCHARGE

## 2022-05-11 NOTE — H&P PST ADULT - PROBLEM SELECTOR PLAN 2
Medical and cardiology clearance pending for  left shoulder arthroscopy possible rotator cuff repair vs. debridement subacromial decompression synovectomy possible open subpectoral bicep tenodesis on 5/24/22 with Dr. Nicolas Abebe.

## 2022-05-11 NOTE — H&P PST ADULT - MUSCULOSKELETAL
No joint pain, swelling or deformity; no limitation of movement details… RLE, no erythema, edema noted of RLE or LLE, no warmth to touch of BLEs/normal/ROM intact/no joint swelling/no joint erythema/no joint warmth/normal strength/decreased ROM/decreased ROM due to pain/calf tenderness detailed exam

## 2022-05-11 NOTE — H&P PST ADULT - NEGATIVE GENERAL SYMPTOMS
no fever/no chills/no sweating/no anorexia/no weight loss/no weight gain/no polyphagia/no polydipsia

## 2022-05-11 NOTE — H&P PST ADULT - OTHER CARE PROVIDERS
Dr. Hartman [phone], Cardiology Dr. Son [phone], Oncology Dr. Aviles, Dr. Chávez pain specialty

## 2022-05-11 NOTE — H&P PST ADULT - PROBLEM SELECTOR PLAN 7
Medical and cardiology clearance pending. PCP/Cardiology for medical management and follow up as indicated.

## 2022-05-11 NOTE — PHYSICAL EXAM
[FreeTextEntry1] : PE:\par Constitutional: In NAD, calm and cooperative\par \par MSK (Back)\par Inspection: no gross swelling identified\par Palpation: Tenderness of the right lower lumbar paraspinals\par ROM: Pain at end lumbar flexion\par Strength: 4/5 strength in right lower extremity due to pain, 5/5 on the left\par Reflexes: 2+ Patella reflex bilaterally, 2+ Achilles reflex bilaterally, negative clonus bilaterally\par Sensation: decreased to light touch right lateral calf\par Special tests:\par SLR: positive on the right\par \par \par \par

## 2022-05-11 NOTE — ASSESSMENT
[FreeTextEntry1] : Ms. LE OWEN is a 51 year old female who presents with low back pain and right lower extremity pain, numbness, and tingling after a fall, likely secondary to L5-S1 herniated disc. She underwent MORENA on 5/9 for which she is waiting to see how much it would help. She has also been seen for her bilateral shoulder/neck pain, which has been more controlled lately since she has been on new pain medications for her LBP/radicular pain. Denies any red flag signs. Will recommend: \par -Neurosurgical consult for her neurologic symptoms down her right leg with significant L5-S1 herniation\par -Pt planned to undergo Doppler US today  to r/o DVT in the right lower extremity\par -continue with Gabapentin, Methocarbamol\par - Will give refill of Mobic 7.5mg BID PRN. Patient advised on cardiac/gi/renal side effects. Patient encouraged to take medication with food and not with other NSAIDs. \par -Patient to consider postponing shoulder surgery to address this low back issue. \par \par Return to clinic after seeing Neurosurgery. Patient in agreement with plan. Patient aware of red flag signs including any changes to their bowel/bladder control, groin numbness or new weakness. Patient knows to seek immediate attention by calling 911 or going to nearest ER if these symptoms appear. \par

## 2022-05-11 NOTE — HISTORY OF PRESENT ILLNESS
[FreeTextEntry1] : Ms. LE OWEN is a 51 year old  female who presents for follow up. At last visit, she was continued on MObic, Robaxin and started on Gabapentin. She was also advised to see Dr. Abebe.\par \par She now has a new pain of low back into her RLE. Since then she had a fall onto her buttocks with pain down her RLE. She went to [facility] ED where CT showed R moderately sized L5-S1 disc extrusion. Since has since had an MRI done and underwent an MORENA with an outside pain management doctor. She was in the hospital from Saturday, 5/7 to Tuesday 5/10 and had the injection while she was an inpatient. She is continuing to have pain, numbness, and tingling down the right leg, and there was concern for DVT; she is planning on undergoing a Doppler later today.\par \par \par Location:R low back. \par Onset: 2 weeks ago after a fall\par Provocation/Palliative: sitting, walking, driving makes the pain worse. Lying on her left side helps a little\par Quality: Achy, cramping, numbness, and tingling\par Radiation: Started in the low back and radiates to the right foot, the lateral 3 toes\par Severity: 7/10 currently, can reach 10/10\par Timing: All day\par \par Has associated numbness and leg weakness right leg. Denies any loss of bowel/bladder control or any groin numbness.\par Previous medications trialed: Oxycodone, Methocarbamol, Gabapentin all with some relief\par Previous procedures relevant to complaint: L5-S1 ILESI in the hospital on 5/9

## 2022-05-11 NOTE — H&P PST ADULT - NSICDXPASTSURGICALHX_GEN_ALL_CORE_FT
PAST SURGICAL HISTORY:  H/O  section x3 , ,     H/O dilation and curettage     H/O hand surgery staph infection 1992 x 3 surgeries    History of sinus surgery     S/P colon resection 21    S/P hysterectomy 2021

## 2022-05-11 NOTE — DATA REVIEWED
[FreeTextEntry1] : MR L SPine\par \par ACC: [id number] EXAM: MR SPINE LUMBAR WAW IC\par \par PROCEDURE DATE: 05/08/2022\par \par \par \par INTERPRETATION: INDICATION: History of disc herniation L5-S1. Right lower extremity pain.\par TECHNIQUE: Multiplanar lumbar imaging was conducted.. T1 and T2 techniques were incorporated. Post contrast imaging was incorporated as well, with 6 cc Gadavist administered.\par COMPARISON EXAMINATION: CT dated 5/6/2022.\par \par FINDINGS:\par ALIGNMENT: A slight retrolisthesis is noted at L5-S1.\par VERTEBRAL BODIES: Unremarkable marrow pattern. No fracture or destructive lesion suggested. Grossly intact sacrum and sacroiliac joints.\par DISC SPACES: Mild desiccation noted in the lower lumbar region\par Lower Thoracic: No disc herniation is noted.\par L1-2: Unremarkable\par L2-3: Mild\par L3-4: Unremarkable\par L4-5: Mild bulging is noted\par L5-S1: A right paracentral and posterolateral herniation is noted that. Extruded fragment is noted in the right ventral canal and right lateral recess. Following contrast administration, there is enhancement surrounding the herniated disc at, and there is suggestion mild reactive neuritis that. There is enhancement with T2 hyperintensity in the S1 and S2 nerve roots predominantly right-sided.\par POSTERIOR ELEMENTS: Appear intact.\par CANAL AND FORAMINA: No bony stenosis noted.\par INTRADURAL SPACE: No intradural abnormality. The distal cord is unremarkable in contour and signal.\par MISCELLANEOUS: The prevertebral and paraspinal soft tissues are unremarkable.\par \par IMPRESSION:\par \par Right paracentral and posterolateral disc herniation L5-S1 with associated reactive neuritis. Small bulge L4-5.\par \par --- End of Report ---\par \par \par \par \par \par MEY ZAVAAL MD; Attending Radiologist\par This document has been electronically signed. May 8 2022 10:16AM\par \par \par ACC: [id number] EXAM: CT PELVIS BONY ONLY\par \par PROCEDURE DATE: 05/06/2022\par \par \par \par INTERPRETATION: CT PELVIS BONY dated 5/6/2022 10:49 AM\par \par INDICATION: Status post fall with pain\par \par COMPARISON: None available.\par \par TECHNIQUE: CT imaging of the pelvis was performed. The data was reformatted in the axial, coronal, and sagittal planes. Additionally, 3-D reformatted imaging was created.\par \par FINDINGS:\par \par OSSEOUS STRUCTURES: No fracture or dislocation is seen. There is mild bilateral hip joint space narrowing that is worse on the left. Small subchondral cysts at the left anterior acetabulum. No dislocation is seen. Severe narrowing the pubic symphysis with subchondral sclerosis noted. Spurring of both sacroiliac joints.\par SYNOVIUM/ JOINT FLUID: No hip joint effusion.\par TENDONS: Intact tendons.\par MUSCLES: No muscle hematoma.\par NEUROVASCULAR STRUCTURES: Preserved\par INTRAPELVIC SOFT TISSUES: Suture material within the sigmoid colon. Fat-containing umbilical hernia. Small right inguinal hernia.\par SUBCUTANEOUS SOFT TISSUES: No soft tissue swelling. Calcification within the right posterior soft tissues inferior to the gluteus shanell muscle.\par \par 3-D reformatted imaging confirms these findings.\par \par IMPRESSION:\par \par No acute fracture.\par Degenerative changes of the hips and pubic symphysis.\par \par --- End of Report ---\par \par \par \par \par \par JUVENAL DUFF MD; Attending Radiologist\par This document has been electronically signed. May 6 2022 10:56AM\par

## 2022-05-11 NOTE — H&P PST ADULT - ADDITIONAL PE
Pt. with noted right calf pain on exam, in severe discomfort unable to sit in one position for prolonged periods  of time,

## 2022-05-11 NOTE — H&P PST ADULT - ASSESSMENT
52 y/o female presents today to PST pending left shoulder arthroscopy possible rotator cuff repair vs. debridement subacromial decompression synovectomy possible open subpectoral bicep tenodesis on 22 with Dr. Nicolas Abebe. Pt. with history of colon cancer  s/p surgical colon resection 21, MR, GERD, asthma, states she has not used inhaler recently, denies recent hospitalizations for asthma, feels fatigue, denies SOB, coughing or wheezing, kidney stones. Pt. states she was in the hospital on Saturday as she could not walk. Pt. states she was seen by pain management Dr. Chávez who gave her injection epidural in her spine. Pt. states she has an old shoulder injury that was aggregated. Pain in left shoulder is constant rated 6-7/10, described as sharp burning pain, relieved with pain medication, radiates down arm, worse with overhead movements of arms, driving, has been going to PT since 2021, admits to numbness and tingling down the arm, pt. is right handed she states. Pt. with noted right calf pain on exam, in severe discomfort unable to sit in one position for prolonged periods  of time, pt. is anxious. Pt. advised to contact PCP regarding symptoms immediately and seek medical attention via 911 should she develop worsening pain, SOB or CP, pt. verbalized agreement and understanding. Spoke to Renny LOPES at PCP office regarding the above who states pt. will be contacted for evaluation. Email sent to surgeon regarding these symptoms.     Pt. to have medical clearance with Dr. Hartman 22 and pt. verbalized agreement and understanding.  Pt. to have cardiac clearance with Dr. Gipson, states she was seen, pt. verbalized agreement and understanding. Email sent to surgical coordinator to obtain copy.   Patient educated on surgical scrub, COVID testing-pt. advised to contact surgeons office regarding scheduling COVID PCR, preadmission instructions, medical clearance and day of procedure medications as per policy and pt. verbalizes understanding and agreement.   Pt. educated and instructed regarding all preoperative instructions and education as per policy via both verbal and written means of communication and pt. verbalized agreement and understanding.  Pt instructed to stop vitamins/supplements/herbal medications/NSAIDS for one week prior to surgery and pt. verbalizes understanding and agreement.     CAPRINI SCORE    AGE RELATED RISK FACTORS                                                             [x ] Age 41-60 years                                            (1 Point)  [ ] Age: 61-74 years                                           (2 Points)                 [ ] Age= 75 years                                                (3 Points)             DISEASE RELATED RISK FACTORS                                                       [ ] Edema in the lower extremities                 (1 Point)                     [ ] Varicose veins                                               (1 Point)                                 [ ] BMI > 25 Kg/m2                                            (1 Point)                                  [ ] Serious infection (ie PNA)                            (1 Point)                     [ ] Lung disease ( COPD, Emphysema)            (1 Point)                                                                          [ ] Acute myocardial infarction                         (1 Point)                  [ ] Congestive heart failure (in the previous month)  (1 Point)         [ ] Inflammatory bowel disease                            (1 Point)                  [ ] Central venous access, PICC or Port               (2 points)       (within the last month)                                                                [ ] Stroke (in the previous month)                        (5 Points)    [ ] Previous or present malignancy                       (2 points)                                                                                                                                                         HEMATOLOGY RELATED FACTORS                                                         [ ] Prior episodes of VTE                                     (3 Points)                     [ ] Positive family history for VTE                      (3 Points)                  [ ] Prothrombin 86806 A                                     (3 Points)                     [ ] Factor V Leiden                                                (3 Points)                        [ ] Lupus anticoagulants                                      (3 Points)                                                           [ ] Anticardiolipin antibodies                              (3 Points)                                                       [ ] High homocysteine in the blood                   (3 Points)                                             [ ] Other congenital or acquired thrombophilia      (3 Points)                                                [ ] Heparin induced thrombocytopenia                  (3 Points)                                        MOBILITY RELATED FACTORS  [ ] Bed rest                                                         (1 Point)  [ ] Plaster cast                                                    (2 points)  [ ] Bed bound for more than 72 hours           (2 Points)    GENDER SPECIFIC FACTORS  [ ] Pregnancy or had a baby within the last month   (1 Point)  [ ] Post-partum < 6 weeks                                   (1 Point)  [ ] Hormonal therapy  or oral contraception   (1 Point)  [ ] History of pregnancy complications              (1 point)  [ ] Unexplained or recurrent              (1 Point)    OTHER RISK FACTORS                                           (1 Point)  [ ] BMI >40, smoking, diabetes requiring insulin, chemotherapy  blood transfusions and length of surgery over 2 hours    SURGERY RELATED RISK FACTORS  [ ]  Section within the last month     (1 Point)  [ ] Minor surgery                                                  (1 Point)  [ ] Arthroscopic surgery                                       (2 Points)  [ x] Planned major surgery lasting more            (2 Points)      than 45 minutes     [ ] Elective hip or knee joint replacement       (5 points)       surgery                                                TRAUMA RELATED RISK FACTORS  [ ] Fracture of the hip, pelvis, or leg                       (5 Points)  [ ] Spinal cord injury resulting in paralysis             (5 points)       (in the previous month)    [ ] Paralysis  (less than 1 month)                             (5 Points)  [ ] Multiple Trauma within 1 month                        (5 Points)    Total Score [     3   ]    Caprini Score 0-2: Low Risk, NO VTE prophylaxis required for most patients, encourage ambulation  Caprini Score 3-6: Moderate Risk , pharmacologic VTE prophylaxis is indicated for most patients (in the absence of contraindications)  Caprini Score Greater than or =7: High risk, pharmocologic VTE prophylaxis indicated for most patients (in the absence of contraindications)        OPIOID RISK TOOL    NIECY EACH BOX THAT APPLIES AND ADD TOTALS AT THE END    FAMILY HISTORY OF SUBSTANCE ABUSE                 FEMALE         MALE                                                Alcohol                             [  ]1 pt          [  ]3pts                                               Illegal Durgs                     [  ]2 pts        [  ]3pts                                               Rx Drugs                           [  ]4 pts        [  ]4 pts    PERSONAL HISTORY OF SUBSTANCE ABUSE                                                                                          Alcohol                             [  ]3 pts       [  ]3 pts                                               Illegal Drugs                     [  ]4 pts        [  ]4 pts                                               Rx Drugs                           [  ]5 pts        [  ]5 pts    AGE BETWEEN 16-45 YEARS                                      [  ]1 pt         [  ]1 pt    HISTORY OF PREADOLESCENT   SEXUAL ABUSE                                                             [  ]3 pts        [  ]0pts    PSYCHOLOGICAL DISEASE                     ADD, OCD, Bipolar, Schizophrenia        [  ]2 pts         [  ]2 pts                      Depression                                               [  ]1 pt           [  ]1 pt           SCORING TOTAL   (add numbers and type here)              (0)                                     A score of 3 or lower indicated LOW risk for future opioid abuse  A score of 4 to 7 indicated moderate risk for future opioid abuse  A score of 8 or higher indicates a high risk for opioid abuse

## 2022-05-11 NOTE — H&P PST ADULT - ACTIVITY
Admits to SOB with 1 flight of stairs, denies CP with moderate physical exertion, denies CP or SOB with mild physical exertion.

## 2022-05-11 NOTE — H&P PST ADULT - NSICDXPASTMEDICALHX_GEN_ALL_CORE_FT
PAST MEDICAL HISTORY:  2019 novel coronavirus disease (COVID-19) 1/2022    Asthma mild    Cervical dysplasia     COVID-19 vaccine series completed     GERD (gastroesophageal reflux disease)     Impingement syndrome of left shoulder     Incisional hernia x2-pt. states requires repair    Kidney stone     Malignant neoplasm of sigmoid colon     Seasonal allergies

## 2022-05-11 NOTE — H&P PST ADULT - HISTORY OF PRESENT ILLNESS
52 y/o female presents today to PST pending left shoulder arthroscopy possible rotator cuff repair vs. debridement subacromial decompression synovectomy possible open subpectoral bicep tenodesis on 5/24/22 with Dr. Nicolas Abebe. Pt. with history of colon cancer 2021 s/p surgical colon resection 5/28/21, MR, GERD, asthma, states she has not used inhaler recently, denies recent hospitalizations for asthma, feels fatigue, denies SOB, coughing or wheezing, kidney stones. Pt. states she was in the hospital on Saturday as she could not walk. Pt. states she was seen by pain management Dr. Chávez who gave her injection epidural in her spine. Pt. states she has an old shoulder injury that was aggregated. Pain in left shoulder is constant rated 6-7/10, described as sharp burning pain, relieved with pain medication, radiates down arm, worse with overhead movements of arms, driving, has been going to PT since 12/2021, admits to numbness and tingling down the arm, pt. is right handed she states.

## 2022-05-11 NOTE — H&P PST ADULT - NEGATIVE MUSCULOSKELETAL SYMPTOMS
no arthralgia/no arthritis/no joint swelling/no myalgia/no muscle cramps/no muscle weakness/no stiffness/no neck pain/no arm pain R/no back pain/no leg pain L

## 2022-05-11 NOTE — H&P PST ADULT - LAB RESULTS AND INTERPRETATION
labs pending labs pending  5/11/22 14:13 All available labs noted as documented, all abnormal labs noted as documented and have been faxed to PCP with whom medical clearance is pending. T&S pending. Robin MS, FNP-BC

## 2022-05-11 NOTE — H&P PST ADULT - NSCAFFEINETYPE_GEN_ALL_CORE_SD
Patient here for consult for in home PT through [facility] and [facility]. He is living at [facility] in [place]. Tressa Schmitt LPN .......................7/9/2018  3:48 PM     coffee

## 2022-05-12 PROBLEM — M75.42 IMPINGEMENT SYNDROME OF LEFT SHOULDER: Chronic | Status: ACTIVE | Noted: 2022-05-11

## 2022-05-12 PROBLEM — K43.2 INCISIONAL HERNIA WITHOUT OBSTRUCTION OR GANGRENE: Chronic | Status: ACTIVE | Noted: 2022-05-11

## 2022-05-12 PROBLEM — Z92.29 PERSONAL HISTORY OF OTHER DRUG THERAPY: Chronic | Status: ACTIVE | Noted: 2022-05-11

## 2022-05-12 PROBLEM — J30.2 OTHER SEASONAL ALLERGIC RHINITIS: Chronic | Status: ACTIVE | Noted: 2022-05-11

## 2022-05-12 PROBLEM — U07.1 COVID-19: Chronic | Status: ACTIVE | Noted: 2022-05-11

## 2022-05-13 ENCOUNTER — APPOINTMENT (OUTPATIENT)
Dept: NEUROSURGERY | Facility: CLINIC | Age: 52
End: 2022-05-13
Payer: MEDICAID

## 2022-05-13 VITALS
TEMPERATURE: 98.6 F | OXYGEN SATURATION: 98 % | BODY MASS INDEX: 21.34 KG/M2 | HEIGHT: 61 IN | DIASTOLIC BLOOD PRESSURE: 77 MMHG | SYSTOLIC BLOOD PRESSURE: 116 MMHG | WEIGHT: 113 LBS | HEART RATE: 92 BPM

## 2022-05-13 PROCEDURE — 99213 OFFICE O/P EST LOW 20 MIN: CPT

## 2022-05-13 NOTE — ASSESSMENT
[FreeTextEntry1] : Pt presents c/o right leg pain after complains at presurgical testing\par -Doubt DVT but will get LE Doppler, pain likely chronic in the setting of Chronic low back pain with sciatica

## 2022-05-13 NOTE — HISTORY OF PRESENT ILLNESS
[FreeTextEntry8] : Pt presents for complains of RIGHT LE pain and tenderness while getting presurgical testing for upcoming surgery, pt states that the pain is travelling down from her upper LE down to the lower calf. Pt also states that the pain has been going on for a while, has PMHx of chronic RIGHT LE. Denies any CP, SOB, dizziness or palpitations.

## 2022-05-16 ENCOUNTER — APPOINTMENT (OUTPATIENT)
Dept: FAMILY MEDICINE | Facility: CLINIC | Age: 52
End: 2022-05-16

## 2022-05-18 ENCOUNTER — APPOINTMENT (OUTPATIENT)
Dept: PHYSICAL MEDICINE AND REHAB | Facility: CLINIC | Age: 52
End: 2022-05-18
Payer: MEDICAID

## 2022-05-18 PROCEDURE — 99442: CPT

## 2022-05-18 NOTE — ASSESSMENT
[FreeTextEntry1] : Ms. LE OWEN is a 51 year old female who presents with low back pain and right lower extremity pain, numbness, and tingling after a fall, likely secondary to L5-S1 herniated disc. She underwent MORENA on 5/9 for which she has now noticed relief. She has also been seen for her bilateral shoulder/neck pain, which has been more controlled lately since she has been on new pain medications for her LBP/radicular pain. Denies any red flag signs. Will recommend: \par -She will continue Gabapentin but try to titrate down on it slowly\par - No longer requiring Mobic or Robaxin\par - Will start PT 2-3x/week for stretching, strengthening (especially of core muscles), ROM exercises, HEP and modalities PRN including myofascial release, moist heat, and TENS therapy \par -Patient to consider postponing shoulder surgery to address this low back issue, but will speak with Dr. Abebe\par \par Return to clinic in 3-4 weeks. Patient in agreement with plan. Patient aware of red flag signs including any changes to their bowel/bladder control, groin numbness or new weakness. Patient knows to seek immediate attention by calling 911 or going to nearest ER if these symptoms appear. \par \par I spent a total of 11 minutes on the phone with this patient for this encounter.

## 2022-05-18 NOTE — HISTORY OF PRESENT ILLNESS
[FreeTextEntry1] : This visit was conducted via phone call. Patient confirmed they were at home at [address] [facility]\[place] [address]. Dr. Diane was the office at [address], [place]. Patient consented to the televisit. \par \par Ms. LE OWEN is a 51 year old female who presents for follow up. Since last visit, patient has seen neurosurgery who recommended to continue conservative care at this time. Overall she has been feeling better. No longer taking the robaxin or mobic, just the gabapentin, for which she is trying to titrate off of. Denies any new symptoms. Says her radicular pain has definitely improved. Doppler US was negative for DVT. \par \par Location:R low back. \par Onset: 3 weeks ago after a fall\par Provocation/Palliative: sitting, walking, driving makes the pain worse. Lying on her left side helps a little\par Quality: Achy, cramping, numbness, and tingling\par Radiation: Started in the low back and radiates to the right foot, the lateral 3 toes\par Severity: 5/10\par Timing: All day\par \par No bowel/bladder changes. No groin numbness.

## 2022-05-19 ENCOUNTER — APPOINTMENT (OUTPATIENT)
Dept: ORTHOPEDIC SURGERY | Facility: CLINIC | Age: 52
End: 2022-05-19
Payer: MEDICAID

## 2022-05-19 VITALS
DIASTOLIC BLOOD PRESSURE: 78 MMHG | HEART RATE: 85 BPM | WEIGHT: 113 LBS | BODY MASS INDEX: 21.34 KG/M2 | HEIGHT: 61 IN | SYSTOLIC BLOOD PRESSURE: 118 MMHG

## 2022-05-19 DIAGNOSIS — M25.512 PAIN IN RIGHT SHOULDER: ICD-10-CM

## 2022-05-19 DIAGNOSIS — M25.511 PAIN IN RIGHT SHOULDER: ICD-10-CM

## 2022-05-19 PROCEDURE — 99213 OFFICE O/P EST LOW 20 MIN: CPT

## 2022-05-19 NOTE — ASSESSMENT
[FreeTextEntry1] : Ms. Marie presents for follow-up with interval history as above.  She is to follow-up with Dr. Diane.  I will see her back in 6 weeks to assess her progress with continued conservative measures.  Ms. Marie knows to call the office with any new or concerning symptoms in the interim.

## 2022-05-19 NOTE — DISCUSSION/SUMMARY
[de-identified] : Assessment: 51-year-old female with bilateral shoulder pain which is worse on her left secondary to a high-grade partial-thickness rotator cuff tear and biceps tendinopathy\par \par Plan: I had a long discussion with the patient today regarding the nature of their diagnosis and treatment plan. We discussed the risks and benefits of no treatment as well as nonoperative and operative treatments.  The patient is interested in undergoing a left shoulder arthroscopy, however, due to these other issues involving her radiculopathy and hernias we will delay the surgery at this time.  He is other issues should be completely resolved prior to proceeding with surgery for the shoulder.  She will continue to follow-up with her neurosurgeon regarding her lumbar radiculopathy and she will follow-up with her general surgeon regarding her hernias.  When she has clearance for these issues and she is ready to proceed we will rebook the surgery.  I explained to the patient that new imaging may be required depending on the timeframe she is interested in.  She will continue to treat herself symptomatically on her own in the meantime.\par \par The patient verbalizes their understanding and agrees with the plan.  All questions were answered to their satisfaction.

## 2022-05-19 NOTE — DATA REVIEWED
[de-identified] : MRI shows a relatively small paracentral L5-S1 disk herniation eccentric to the right.

## 2022-05-19 NOTE — PHYSICAL EXAM
[General Appearance - Alert] : alert [General Appearance - In No Acute Distress] : in no acute distress [General Appearance - Well Nourished] : well nourished [General Appearance - Well Developed] : well developed [General Appearance - Well-Appearing] : healthy appearing [Oriented To Time, Place, And Person] : oriented to person, place, and time [Person] : oriented to person [Place] : oriented to place [Time] : oriented to time [Short Term Intact] : short term memory intact [Concentration Intact] : normal concentrating ability [Fluency] : fluency intact [Cranial Nerves Optic (II)] : visual acuity intact bilaterally,  pupils equal round and reactive to light [Cranial Nerves Oculomotor (III)] : extraocular motion intact [Motor Tone] : muscle tone was normal in all four extremities [Motor Strength] : muscle strength was normal in all four extremities

## 2022-05-19 NOTE — HISTORY OF PRESENT ILLNESS
[de-identified] : 5/19/2022: Sophia is a 51-year-old right-hand-dominant female who returns the office today for reevaluation of her left shoulder pain.  At her previous office visit we had a surgical discussion and she is currently booked for surgery with me on Tuesday, 5/24/2022.  However, since patient's last visit she has developed acute right lower extremity radiculopathy which required an emergency department visit.  She is now under the care of a neurosurgeon and recently had an epidural injection which has improved her symptoms somewhat.  She also has been seeing a general surgeon regarding her recent hernia.  We will postpone her shoulder surgery due to these other issues.  The symptoms in her shoulder otherwise on unchanged.  The patient denies any fevers, chills, sweats, recent illnesses, numbness, tingling, weakness, or pain elsewhere at this time.\par \par 3/31/2022: Sophia is a pleasant 51-year-old right-hand-dominant female who returns to the office today for reevaluation of her bilateral shoulder pain which is worse on her left side.  At her previous office visit with [facility] Strauss, MRIs of both shoulders were ordered due to lack of improvement with conservative treatment.  These imaging results were reviewed over the phone with her.  Her right shoulder demonstrates tendinopathy of the rotator cuff and biceps in the absence of any full-thickness tearing.  Her left shoulder MRI does show a partial-thickness tear of her rotator cuff as well as impingement and tendinopathy of the biceps tendon.  The patient has been going to physical therapy for the past several months.  She has had bilateral subacromial cortisone injections without any lasting relief.  She has been taking Tylenol as needed for pain.  At this point she is unhappy with her progress and would now like to consider surgical treatment.  She comes in to discuss her options with me today.  The patient denies any fevers, chills, sweats, recent illnesses, numbness, tingling, weakness.  The patient is being treated for neck pain with Dr. Diane.

## 2022-05-19 NOTE — PHYSICAL EXAM
[de-identified] : General:\par Awake, alert, no acute distress, Patient was cooperative and appropriate during the examination.\par \par The patient is of normal weight for height and age.\par \par Walks without an antalgic gait.\par \par Full, painless range of motion of the neck and back.\par \par Exam of the bilateral lower extremities is intact and symmetric with regards to dermatologic, vascular, and neurologic exam. Bilateral lower extremity sensation is grossly intact to light touch in the DP/SP/T/S/S nerve distributions. Intact DF/PF/EHL. BIlateral lower extremities warm and well-perfused with brisk capillary refill.\par \par \par Pulmonary:\par Regular, nonlabored breathing\par \par Abdomen:\par Soft, nontender, nondistended.\par \par Lymphatic:\par No evidence of axillary lymphadenopathy \par \par Bilateral shoulder Exam:\par Physical exam of the shoulder demonstrates normal skin without signs of skin changes or abnormalities. No erythema, warmth, or joint effusion appreciated. \par \par Sensation intact light touch C5-T1\par Palpable radial pulse\par Radial/ulnar/median/axillary/musculocutaneous/AIN/PIN nerves grossly intact\par \par Range of motion:\par Forward Flexion: 180 with pain at the terminal degrees of flexion bilaterally \par Abduction: 150 bilaterally\par External Rotation: 85 bilaterally\par Internal Rotation: mid lumbar level bilaterally\par \par Palpation:\par Not tender to palpation over the glenohumeral joint\par Mildly tender palpation over the rotator cuff insertion on the greater tuberosity\par Not tender to palpation over the AC joint\par Exquisitely tender to palpation of the biceps tendon/bicipital groove on the left, moderately on the right\par \par Strength testing:\par Supraspinatus: 4+/5 on the left with pain, 5/5 on the right\par Infraspinatus: 5/5 bilaterally \par Subscapularis: 5/5 bilaterally\par \par Special test:\par Empty can test positive bilaterally\par Hernandez impingement test positive bilaterally, worse on the left\par Neer test positive bilaterally \par Speeds test positive bilaterally, worse on the left\par [place]'s test negative bilaterally\par Lift-off test negative bilaterally\par Bell-press test negative bilaterally\par Cross-arm adduction test negative bilaterally \par \par \par  [de-identified] : MRI of the left shoulder from a [facility] imaging facility on 3/7/2022 was reviewed the patient today in the office.  There is a focal high-grade tear of the anterior supraspinatus tendon at its insertion without any full-thickness tearing.  The patient has tenosynovitis about the long head of the biceps tendon without any subluxation.  No significant arthritis.  AC joint is intact.  Subacromial pinching and bursitis appreciated.\par \par MRI of the right shoulder from a [facility] imaging facility on 3/7/2022 was reviewed with the patient today in the office.  The patient has tendinopathy of her rotator cuff in the absence of any full-thickness tearing.  She also has tenosynovitis about the long head of the biceps tendon without any subluxation.\par \par \par \par X-rays 4 views of the bilateral shoulders taken in the office on 12/14/2021 shows no acute fracture or dislocation with a possible calcific deposit on the right and no other acute findings.

## 2022-05-19 NOTE — HISTORY OF PRESENT ILLNESS
[FreeTextEntry1] : Ms. Marie presents for follow-up.  She was recently seen and evaluated for a right L5-S1 disk herniation with RLE radiculopathy at [facility].  The patient underwent an inpatient MORENA.  At the time of today's follow-up, she does report some improvement s/p MORENA.

## 2022-05-21 NOTE — ED PROVIDER NOTE - HISTORY ATTESTATION, MLM
Pedialyte given to patient     Castro Weber RN  05/20/22 5650
I have reviewed and confirmed nurses' notes...

## 2022-05-24 ENCOUNTER — APPOINTMENT (OUTPATIENT)
Dept: ORTHOPEDIC SURGERY | Facility: HOSPITAL | Age: 52
End: 2022-05-24

## 2022-05-26 ENCOUNTER — NON-APPOINTMENT (OUTPATIENT)
Age: 52
End: 2022-05-26

## 2022-05-30 PROCEDURE — 80053 COMPREHEN METABOLIC PANEL: CPT

## 2022-05-30 PROCEDURE — 71046 X-RAY EXAM CHEST 2 VIEWS: CPT

## 2022-05-30 PROCEDURE — 76000 FLUOROSCOPY <1 HR PHYS/QHP: CPT

## 2022-05-30 PROCEDURE — 85025 COMPLETE CBC W/AUTO DIFF WBC: CPT

## 2022-05-30 PROCEDURE — 96374 THER/PROPH/DIAG INJ IV PUSH: CPT

## 2022-05-30 PROCEDURE — 36415 COLL VENOUS BLD VENIPUNCTURE: CPT

## 2022-05-30 PROCEDURE — 72158 MRI LUMBAR SPINE W/O & W/DYE: CPT

## 2022-05-30 PROCEDURE — 97163 PT EVAL HIGH COMPLEX 45 MIN: CPT

## 2022-05-30 PROCEDURE — 84702 CHORIONIC GONADOTROPIN TEST: CPT

## 2022-05-30 PROCEDURE — 99285 EMERGENCY DEPT VISIT HI MDM: CPT | Mod: 25

## 2022-05-30 PROCEDURE — 87637 SARSCOV2&INF A&B&RSV AMP PRB: CPT

## 2022-05-30 PROCEDURE — 71250 CT THORAX DX C-: CPT

## 2022-06-02 ENCOUNTER — APPOINTMENT (OUTPATIENT)
Dept: ORTHOPEDIC SURGERY | Facility: CLINIC | Age: 52
End: 2022-06-02

## 2022-06-03 ENCOUNTER — APPOINTMENT (OUTPATIENT)
Dept: PHYSICAL MEDICINE AND REHAB | Facility: CLINIC | Age: 52
End: 2022-06-03
Payer: MEDICAID

## 2022-06-03 PROCEDURE — 99442: CPT

## 2022-06-03 NOTE — HISTORY OF PRESENT ILLNESS
[FreeTextEntry1] : This visit was conducted via phone call. Patient confirmed they were at home at [address] [facility]\[place] [address] . Dr. Diane was the office at [address] [place] [address]. Patient consented to the televisit. \par \par Ms. LE OWEN is a 51 year old  female who presents for follow up. Since last visit, she hasn't been able to start PT for her back/leg until 6/13. Last night, she had an intense pain flare up, no specific triggering event. She had to take a motrin and gabapentin. \par \par Location:R low back. \par Onset: In late 4/2022 after fall\par Provocation/Palliative: sitting, walking, driving makes the pain worse. Lying on her left side helps a little\par Quality: Achy, cramping, numbness, and tingling\par Radiation: Started in the low back and radiates to the right foot, the lateral 3 toes\par Severity: 5/10\par Timing: All day\par \par No bowel/bladder changes. No groin numbness.

## 2022-06-03 NOTE — ASSESSMENT
[FreeTextEntry1] : Ms. LE OWEN is a 51 year old female who presents with low back pain and right lower extremity pain, numbness, and tingling after a fall, likely secondary to L5-S1 herniated disc. She underwent MORENA on 5/9 for which she has now noticed relief. She has also been seen for her bilateral shoulder/neck pain, which has been more controlled lately but still bothersome. She is now s/p a worsening of her low back/leg pain yesterday, although improved today.  Denies any red flag signs. Will recommend: \par -She will continue Gabapentin for now given recent flare up. She will try to take 200-300mg Qhs. \par - She will take Ibuprofen as needed (1-2x/day) if needed. \par - She will be starting PT 2-3x/week for stretching, strengthening (especially of core muscles), ROM exercises, HEP and modalities PRN including myofascial release, moist heat, and TENS therapy \par - Patient has postponed shoulder surgery for now as we try to get this low back pain under better control. \par \par Return to clinic in 3-4 weeks. Patient in agreement with plan. Patient aware of red flag signs including any changes to their bowel/bladder control, groin numbness or new weakness. Patient knows to seek immediate attention by calling 911 or going to nearest ER if these symptoms appear. \par \par I spent a total of 12 minutes on the phone with this patient for this encounter.

## 2022-06-06 ENCOUNTER — OUTPATIENT (OUTPATIENT)
Dept: OUTPATIENT SERVICES | Facility: HOSPITAL | Age: 52
LOS: 1 days | Discharge: HOME | End: 2022-06-06

## 2022-06-06 ENCOUNTER — NON-APPOINTMENT (OUTPATIENT)
Age: 52
End: 2022-06-06

## 2022-06-06 DIAGNOSIS — Z98.890 OTHER SPECIFIED POSTPROCEDURAL STATES: Chronic | ICD-10-CM

## 2022-06-06 DIAGNOSIS — Z90.710 ACQUIRED ABSENCE OF BOTH CERVIX AND UTERUS: Chronic | ICD-10-CM

## 2022-06-06 DIAGNOSIS — Z90.49 ACQUIRED ABSENCE OF OTHER SPECIFIED PARTS OF DIGESTIVE TRACT: Chronic | ICD-10-CM

## 2022-06-06 DIAGNOSIS — C18.9 MALIGNANT NEOPLASM OF COLON, UNSPECIFIED: ICD-10-CM

## 2022-06-06 DIAGNOSIS — Z98.891 HISTORY OF UTERINE SCAR FROM PREVIOUS SURGERY: Chronic | ICD-10-CM

## 2022-06-07 ENCOUNTER — RESULT REVIEW (OUTPATIENT)
Age: 52
End: 2022-06-07

## 2022-06-07 ENCOUNTER — APPOINTMENT (OUTPATIENT)
Dept: HEMATOLOGY ONCOLOGY | Facility: CLINIC | Age: 52
End: 2022-06-07
Payer: MEDICAID

## 2022-06-07 VITALS
HEART RATE: 78 BPM | HEIGHT: 61 IN | BODY MASS INDEX: 21.71 KG/M2 | SYSTOLIC BLOOD PRESSURE: 111 MMHG | OXYGEN SATURATION: 97 % | DIASTOLIC BLOOD PRESSURE: 73 MMHG | WEIGHT: 115.01 LBS

## 2022-06-07 LAB
BASOPHILS # BLD AUTO: 0.1 K/UL — SIGNIFICANT CHANGE UP (ref 0–0.2)
BASOPHILS NFR BLD AUTO: 2.4 % — HIGH (ref 0–2)
EOSINOPHIL # BLD AUTO: 0.3 K/UL — SIGNIFICANT CHANGE UP (ref 0–0.5)
EOSINOPHIL NFR BLD AUTO: 6.9 % — HIGH (ref 0–6)
HCT VFR BLD CALC: 39 % — SIGNIFICANT CHANGE UP (ref 34.5–45)
HGB BLD-MCNC: 12.7 G/DL — SIGNIFICANT CHANGE UP (ref 11.5–15.5)
LYMPHOCYTES # BLD AUTO: 1.6 K/UL — SIGNIFICANT CHANGE UP (ref 1–3.3)
LYMPHOCYTES # BLD AUTO: 40.6 % — SIGNIFICANT CHANGE UP (ref 13–44)
MCHC RBC-ENTMCNC: 30.7 PG — SIGNIFICANT CHANGE UP (ref 27–34)
MCHC RBC-ENTMCNC: 32.6 G/DL — SIGNIFICANT CHANGE UP (ref 32–36)
MCV RBC AUTO: 94.1 FL — SIGNIFICANT CHANGE UP (ref 80–100)
MONOCYTES # BLD AUTO: 0.6 K/UL — SIGNIFICANT CHANGE UP (ref 0–0.9)
MONOCYTES NFR BLD AUTO: 15.7 % — HIGH (ref 2–14)
NEUTROPHILS # BLD AUTO: 1.4 K/UL — LOW (ref 1.8–7.4)
NEUTROPHILS NFR BLD AUTO: 34.4 % — LOW (ref 43–77)
PLATELET # BLD AUTO: 293 K/UL — SIGNIFICANT CHANGE UP (ref 150–400)
RBC # BLD: 4.14 M/UL — SIGNIFICANT CHANGE UP (ref 3.8–5.2)
RBC # FLD: 12.8 % — SIGNIFICANT CHANGE UP (ref 10.3–14.5)
WBC # BLD: 4 K/UL — SIGNIFICANT CHANGE UP (ref 3.8–10.5)
WBC # FLD AUTO: 4 K/UL — SIGNIFICANT CHANGE UP (ref 3.8–10.5)

## 2022-06-07 PROCEDURE — 99215 OFFICE O/P EST HI 40 MIN: CPT

## 2022-06-07 NOTE — ASSESSMENT
[FreeTextEntry1] : pT3 N0 M0 ADenocarcinoma SIgmoid colon, in Jun 2021 \par \par - Invasive moderately differentiated adenocarcinoma invading through the muscularis propria into pericolorectal tissue.\par - Tattoo is present.\par - Diverticulosis.\par - 24 lymph nodes, negative for carcinoma (0/24).\par - Margins of resection are not involved.\par \par Resected node-negative (stage II) disease, the benefits of chemotherapy are controversial, as is the relative benefit of an oxaliplatin-based as compared with a non-oxaliplatin-based regimen.\par Benefits of chemotherapy is based on high risk features \par Patient does not have any high risk features, No LVI   NO neural invasion 25 LN examined, NO Perforation \par \par GIven no high risk features no benefit of adjuvant chemotherapy\par Recent CT Abd Pelvis from 2/25/22: with no evidence of recurrent or metastatic disease in the CAP \par \par - Today's Labs with Normal WBC \par - F/u visit with labs cbc/ cmp/ CEA q 3months x 2 y and then q 6 months from Y 3-5 \par - CT CAP q 6 months x 5 years and Y3-5 and consider spacing q 8-12 months\par - 1 year colonoscopy showed no evidence of recurrence. \par - Follow up in 3 months to review imaging. \par

## 2022-06-07 NOTE — RESULTS/DATA
[FreeTextEntry1] : 4/28/22: \par Colonscopy \par Impressions:  \par  Few diverticulosis in the ascending colon-.  \par  Grade/Stage II internal hemorrhoids.  \par  -Anastomosis site 18 cm from anal verge. Random biopsy. NO recurrence.  \par \par 2/25/22 CT Abdomen/chest/pelvis \par IMPRESSION:\par Status post sigmoid colon resection without evidence of recurrent or metastatic disease within the chest, abdomen, or pelvis.\par \par 8/25/21 CT Abdomen Chest Pelvis IMPRESSION: Status post sigmoid resection. No evidence of locoregional recurrence or metastatic disease.\par \par PATHOLOGY 5/28/21 \par - Adenocarcinoma, moderately differentiated.\par - Sigmoid colon \par - Invasive moderately differentiated adenocarcinoma extending to pericolorectal\par adipose tissue (pT3).\par \par MLH1, MSH2, MSH6, PMS2:   Intact nuclear expression These results show low probability of microsatellite instability-high (MSI-H).\par There is no evidence of DNA mismatch repair deficiency in the analyzed tissue, indicating there is a low probability for Delgado\par syndrome (a common cause hereditary non polyposis colorectal\par cancer or HNPCC).\par \par CK-7: focal positive. CK-20, CDX2: positive\par \par COLONOSCOPY: 5/17/2021: Intramucosal adenocarcinoma \par \par CT CAP 5/2021 :  LUNGS AND LARGE AIRWAYS: Patent central airways. There is 3 mm nodular thickening of the minor fissure on image 80, likely postinflammatory. There is a 2 mm calcified granuloma in the left lower lobe on image 125. No confluent airspace opacity is identified. There is no evidence of interstitial lung disease, bronchiectasis, or fibrosis.\par

## 2022-06-07 NOTE — REVIEW OF SYSTEMS
[Abdominal Pain] : no abdominal pain [Vomiting] : no vomiting [Skin Rash] : no skin rash [FreeTextEntry2] : as per interval history

## 2022-06-07 NOTE — HISTORY OF PRESENT ILLNESS
[Date: ____________] : Patient's last distress assessment performed on [unfilled]. [9 - Distress Level] : Distress Level: 9 [Referred Patient  to social work for follow-up] : Patient was referred to social work for follow-up [de-identified] : 50-year-old female presents to our office with a new diagnosis of Sigmoid colon cancer \par \par She has had a variety of symptoms with chronic pelvic pain over the last 10 years and recently underwent a hysterectomy for adenomyosis by Dr. Cruz in February 2021. Several of her pelvic symptoms did improve. After that she had intermittent blood in her stool and some changes in the caliber of her stool. Her appetite has been fine and she has not had any significant weight loss. She underwent a colonoscopy which showed a mass in the sigmoid colon. \par \par COLONOSCOPY: 5/17/2021: Intramucosal adenocarcinoma \par CT CAP 5/2021 :  LUNGS AND LARGE AIRWAYS: Patent central airways. There is 3 mm nodular thickening of the minor fissure on image 80, likely postinflammatory. There is a 2 mm calcified granuloma in the left lower lobe on image 125. No confluent airspace opacity is identified. There is no evidence of interstitial lung disease, bronchiectasis, or fibrosis.\par \par S/p Robotic sigmoid colon resection for colon cancer on 5/28/21 T3N0 stage II colon cancer, MSI Stable, No high risk features. The patient did not receive adjuvant treatment. Subsequent surveillance imaging in 2/2022 did not reveal evidence of recurrence. Colonosopy in 4/28/22 was also unremarkable. \par  [de-identified] : Today the patient feels well, but brings up concerns about hernias that were identified on previous CT scans. She denies recent abdominal pain, redness, nausea, vomiting, and constipation. Last BM was yesterday. Her weight and appetite are okay. She has chronic pain in the shoulder and back, and reports that she is also now undergoing treatment for sciatica 2/2 herniated disk.  [FreeTextEntry7] : John Paul saw patient during appointment

## 2022-06-08 ENCOUNTER — APPOINTMENT (OUTPATIENT)
Dept: HEMATOLOGY ONCOLOGY | Facility: CLINIC | Age: 52
End: 2022-06-08

## 2022-06-08 ENCOUNTER — RX RENEWAL (OUTPATIENT)
Age: 52
End: 2022-06-08

## 2022-06-08 LAB
ALBUMIN SERPL ELPH-MCNC: 4.5 G/DL
ALP BLD-CCNC: 68 U/L
ALT SERPL-CCNC: 22 U/L
ANION GAP SERPL CALC-SCNC: 13 MMOL/L
AST SERPL-CCNC: 28 U/L
BILIRUB SERPL-MCNC: 0.2 MG/DL
BUN SERPL-MCNC: 12 MG/DL
CALCIUM SERPL-MCNC: 9.2 MG/DL
CEA SERPL-MCNC: 1.5 NG/ML
CHLORIDE SERPL-SCNC: 105 MMOL/L
CO2 SERPL-SCNC: 25 MMOL/L
CREAT SERPL-MCNC: 0.76 MG/DL
EGFR: 95 ML/MIN/1.73M2
GLUCOSE SERPL-MCNC: 82 MG/DL
MAGNESIUM SERPL-MCNC: 2.1 MG/DL
POTASSIUM SERPL-SCNC: 4.2 MMOL/L
PROT SERPL-MCNC: 6.5 G/DL
SODIUM SERPL-SCNC: 143 MMOL/L

## 2022-06-14 ENCOUNTER — RX RENEWAL (OUTPATIENT)
Age: 52
End: 2022-06-14

## 2022-06-28 ENCOUNTER — APPOINTMENT (OUTPATIENT)
Dept: DERMATOLOGY | Facility: CLINIC | Age: 52
End: 2022-06-28
Payer: MEDICAID

## 2022-06-28 PROCEDURE — 99214 OFFICE O/P EST MOD 30 MIN: CPT

## 2022-07-14 ENCOUNTER — APPOINTMENT (OUTPATIENT)
Dept: OBGYN | Facility: CLINIC | Age: 52
End: 2022-07-14

## 2022-07-14 VITALS
WEIGHT: 116 LBS | SYSTOLIC BLOOD PRESSURE: 110 MMHG | DIASTOLIC BLOOD PRESSURE: 70 MMHG | BODY MASS INDEX: 21.9 KG/M2 | HEIGHT: 61 IN

## 2022-07-14 DIAGNOSIS — N76.0 ACUTE VAGINITIS: ICD-10-CM

## 2022-07-14 DIAGNOSIS — R39.9 UNSPECIFIED SYMPTOMS AND SIGNS INVOLVING THE GENITOURINARY SYSTEM: ICD-10-CM

## 2022-07-14 PROCEDURE — 99213 OFFICE O/P EST LOW 20 MIN: CPT

## 2022-07-14 PROCEDURE — 81003 URINALYSIS AUTO W/O SCOPE: CPT | Mod: QW

## 2022-07-14 NOTE — DISCUSSION/SUMMARY
[FreeTextEntry1] : I discussed possibility of urinary tract infection given her symptoms and urinalysis.  Prescription for Bactrim DS was provided.  I discussed the culture will inform us about what bacteria is causing her infection and its sensitivities.\par \par Regarding her vaginal itching I reassured patient that I did not see any evidence of a discharge.  Yeast and bacterial cultures were performed.  Her symptoms also could be secondary to lack of use of her Vagifem as she has run out.  This was refilled as well.

## 2022-07-14 NOTE — HISTORY OF PRESENT ILLNESS
[FreeTextEntry1] : 51-year-old white female para 3 presents with complaint of dysuria and urgency as well as frequency.  She is also reporting vaginal itching.  She denies any vaginal discharge.  Is sexually active and monogamous.  Patient has had a TRH, bilateral salpingectomy as well as colon resection for colon cancer.  Patient has been using also Vagifem as estrogen supplementation vaginally but has run out.  Patient reports that she gets CT of the abdomen and pelvis for screening and had one a few months ago with normal pelvis.\par \par Urine dip today suggests moderate blood and leukocytes as well as some protein.

## 2022-07-15 ENCOUNTER — LABORATORY RESULT (OUTPATIENT)
Age: 52
End: 2022-07-15

## 2022-07-15 LAB
BILIRUB UR QL STRIP: NORMAL
CLARITY UR: CLEAR
COLLECTION METHOD: NORMAL
GLUCOSE UR-MCNC: NORMAL
HCG UR QL: 0.2 EU/DL
HGB UR QL STRIP.AUTO: NORMAL
KETONES UR-MCNC: NORMAL
LEUKOCYTE ESTERASE UR QL STRIP: NORMAL
NITRITE UR QL STRIP: NORMAL
PH UR STRIP: 5.5
PROT UR STRIP-MCNC: NORMAL
SP GR UR STRIP: 1.02

## 2022-07-18 ENCOUNTER — LABORATORY RESULT (OUTPATIENT)
Age: 52
End: 2022-07-18

## 2022-07-18 ENCOUNTER — APPOINTMENT (OUTPATIENT)
Dept: FAMILY MEDICINE | Facility: CLINIC | Age: 52
End: 2022-07-18

## 2022-07-18 VITALS
HEART RATE: 93 BPM | BODY MASS INDEX: 21.52 KG/M2 | WEIGHT: 114 LBS | HEIGHT: 61 IN | SYSTOLIC BLOOD PRESSURE: 100 MMHG | TEMPERATURE: 97.3 F | DIASTOLIC BLOOD PRESSURE: 70 MMHG | OXYGEN SATURATION: 98 % | RESPIRATION RATE: 12 BRPM

## 2022-07-18 DIAGNOSIS — R22.1 LOCALIZED SWELLING, MASS AND LUMP, NECK: ICD-10-CM

## 2022-07-18 DIAGNOSIS — M54.12 RADICULOPATHY, CERVICAL REGION: ICD-10-CM

## 2022-07-18 PROCEDURE — 99215 OFFICE O/P EST HI 40 MIN: CPT | Mod: 25

## 2022-07-18 PROCEDURE — 36415 COLL VENOUS BLD VENIPUNCTURE: CPT

## 2022-07-18 RX ORDER — LORATADINE 5 MG/5 ML
10 SOLUTION, ORAL ORAL
Refills: 0 | Status: DISCONTINUED | COMMUNITY
End: 2022-07-18

## 2022-07-18 NOTE — REVIEW OF SYSTEMS
[Fatigue] : fatigue [Joint Pain] : joint pain [Joint Stiffness] : joint stiffness [Joint Swelling] : joint swelling [Muscle Pain] : muscle pain [Hair Changes] : hair changes [Muscle Weakness] : no muscle weakness

## 2022-07-18 NOTE — HEALTH RISK ASSESSMENT
[Never] : Never [Yes] : Yes [No] : In the past 12 months have you used drugs other than those required for medical reasons? No [de-identified] : Oncology, GI, PM&R, NeuroSx. [de-identified] : social

## 2022-07-18 NOTE — HISTORY OF PRESENT ILLNESS
[FreeTextEntry1] : Chronic fatigue, pain [de-identified] : 52 y/o F with medical hx significant for  GERD, chronic sinusitis, sigmoid colon cancer s/p colectomy, and adenomyosis s/p hysterectomy/salpingectomy.\par Pt  presents today for evaluation of Chronic fatigue with adequate sleeping, Joints aches and stiffness in AM, Shoulders, elbows, Kness, hands, She also reports hair loss, dry mouth with plenty hydration, muscle weakness.\par She states has all this symptoms for long time.\par She has Chronic Shoulder/ neck pain> Schedule surgery postponed with Dr Abebe.\par She was seen by Gyn last week.\par She f/up's regularly with Oncology, GI.

## 2022-07-18 NOTE — ASSESSMENT
[FreeTextEntry1] : 52 y/o F with Dx Colon cancer post Robotic sigmoid resection on 05/28/21. No adjuvant chemo recommended. Doing well, TRH, B/L Salpingectomy, presenst today with multiple symptoms:\par \par Chronic Fatigue, malaise, Hair loss:\par -TFT, R/O Rheumatoid disorder,\par -USG thyroid,\par -Further recommendations with lab results.\par \par Mammo and Gyn: seen now with Dr rios.\par \par Colonoscopy: 05/2021\par -needs to continue surveillance colonosocpy in 1 year\par \par Colon cancer s/p Robotic sigmoid resection\par -F/up with Oncology\par -Needs CEA q 6 months.\par \par Lumbar and cervical radiculopathy. :\par -On Gabapentin.\par -Seen by Neurosx, dr Nguyen.\par -Seen by PM&R Dr MEDINA.\par \par B/L Shoulder pain:\par -Seen by Orthopedic< Dr BOBBY\par

## 2022-07-19 ENCOUNTER — OUTPATIENT (OUTPATIENT)
Dept: OUTPATIENT SERVICES | Facility: HOSPITAL | Age: 52
LOS: 1 days | End: 2022-07-19
Payer: MEDICAID

## 2022-07-19 ENCOUNTER — APPOINTMENT (OUTPATIENT)
Dept: ULTRASOUND IMAGING | Facility: CLINIC | Age: 52
End: 2022-07-19

## 2022-07-19 DIAGNOSIS — Z98.891 HISTORY OF UTERINE SCAR FROM PREVIOUS SURGERY: Chronic | ICD-10-CM

## 2022-07-19 DIAGNOSIS — Z90.49 ACQUIRED ABSENCE OF OTHER SPECIFIED PARTS OF DIGESTIVE TRACT: Chronic | ICD-10-CM

## 2022-07-19 DIAGNOSIS — Z98.890 OTHER SPECIFIED POSTPROCEDURAL STATES: Chronic | ICD-10-CM

## 2022-07-19 DIAGNOSIS — R22.1 LOCALIZED SWELLING, MASS AND LUMP, NECK: ICD-10-CM

## 2022-07-19 DIAGNOSIS — Z90.710 ACQUIRED ABSENCE OF BOTH CERVIX AND UTERUS: Chronic | ICD-10-CM

## 2022-07-19 PROCEDURE — 76536 US EXAM OF HEAD AND NECK: CPT | Mod: 26

## 2022-07-19 PROCEDURE — 76536 US EXAM OF HEAD AND NECK: CPT

## 2022-07-20 ENCOUNTER — RX RENEWAL (OUTPATIENT)
Age: 52
End: 2022-07-20

## 2022-07-20 LAB
BACTERIA UR CULT: ABNORMAL
CK SERPL-CCNC: 92 U/L
CRP SERPL-MCNC: 3 MG/L
ERYTHROCYTE [SEDIMENTATION RATE] IN BLOOD BY WESTERGREN METHOD: 4 MM/HR
FOLATE SERPL-MCNC: >20 NG/ML
RHEUMATOID FACT SER QL: 18 IU/ML
T4 FREE SERPL-MCNC: 1 NG/DL
THYROGLOB AB SERPL-ACNC: <20 IU/ML
THYROPEROXIDASE AB SERPL IA-ACNC: <10 IU/ML
TSH SERPL-ACNC: 1.33 UIU/ML
VIT B12 SERPL-MCNC: 585 PG/ML

## 2022-07-21 LAB
ANA PAT FLD IF-IMP: NORMAL
ANA SER IF-ACNC: ABNORMAL

## 2022-07-27 ENCOUNTER — APPOINTMENT (OUTPATIENT)
Dept: PHYSICAL MEDICINE AND REHAB | Facility: CLINIC | Age: 52
End: 2022-07-27

## 2022-07-27 ENCOUNTER — APPOINTMENT (OUTPATIENT)
Dept: FAMILY MEDICINE | Facility: CLINIC | Age: 52
End: 2022-07-27

## 2022-07-27 VITALS
WEIGHT: 118 LBS | HEART RATE: 91 BPM | RESPIRATION RATE: 12 BRPM | OXYGEN SATURATION: 98 % | SYSTOLIC BLOOD PRESSURE: 110 MMHG | HEIGHT: 61 IN | DIASTOLIC BLOOD PRESSURE: 80 MMHG | TEMPERATURE: 97 F | BODY MASS INDEX: 22.28 KG/M2

## 2022-07-27 VITALS
WEIGHT: 118 LBS | RESPIRATION RATE: 12 BRPM | DIASTOLIC BLOOD PRESSURE: 63 MMHG | HEIGHT: 61 IN | BODY MASS INDEX: 22.28 KG/M2 | HEART RATE: 80 BPM | SYSTOLIC BLOOD PRESSURE: 109 MMHG

## 2022-07-27 DIAGNOSIS — M25.562 PAIN IN LEFT KNEE: ICD-10-CM

## 2022-07-27 DIAGNOSIS — Z23 ENCOUNTER FOR IMMUNIZATION: ICD-10-CM

## 2022-07-27 PROCEDURE — 20552 NJX 1/MLT TRIGGER POINT 1/2: CPT | Mod: LT

## 2022-07-27 PROCEDURE — 99214 OFFICE O/P EST MOD 30 MIN: CPT | Mod: 25

## 2022-07-27 NOTE — ASSESSMENT
[FreeTextEntry1] : Ms. LE OWEN is a 51 year old female who presented with low back pain and right lower extremity pain, numbness, and tingling after a fall, likely secondary to L5-S1 herniated disc. She underwent MORENA on 5/9 for which she has now noticed relief. She has also been seen for her bilateral shoulder/neck pain, which has been more controlled lately but still bothersome. She is now complaining of more left sided low back pain, myofascial in nature, as well as intermittent knee swelling. Patient is currently pending a rheumatology consult for possible rheumatological disease. Denies any red flag signs. Will recommend: \par - Increase Gabapentin to 600mg Qhs. Patient denies any side effects at 300mg Qhs. \par - She will take Ibuprofen as needed (2-3x/day) if needed. \par - Patient to take Robaxin 500mg Qhs, for which she has at home. Patient knows side effects including sedation. \par - Stressed importance of rheumatological consult for which she will try to get an earlier appointment than late 8/2022. \par - Will give Medrol dose radha Rx given, to take if pain persists despite gabapentin and Robaxin. Patient advised on potential side effects including but not limited to increased risk of elevated blood sugar, blood pressure, and infection. Patient encouraged to take medication with food and not with NSAIDs. \par - Patient has postponed shoulder surgery for now as we try to get this low back pain under better control. \par \par Return to clinic after rheumatological consultation. Patient in agreement with plan. Patient aware of red flag signs including any changes to their bowel/bladder control, groin numbness or new weakness. Patient knows to seek immediate attention by calling 911 or going to nearest ER if these symptoms appear.

## 2022-07-27 NOTE — HISTORY OF PRESENT ILLNESS
[FreeTextEntry1] : Ms. LE OWEN is a 51 year old  female who presents for follow up. At last visit, she continued on Gabapentin, Ibuprofen PRN, was pending to start PT. Since last visit, she has been doing a workup for possible rheumatological disease. More recently over last few days, she has developed worsening L lower back pain, non-radicular. She is taking Gabapentin 300mg Qhs without much relief. She has not gone to PT for low back due to insurance reasons. She also complains of L knee pain and swelling, usually after prolonged activity\par \par Location:Previously in R low back, now in L low back\par Onset: In late 4/2022 after fall\par Provocation/Palliative: sitting, walking, driving makes the pain worse. Lying on her left side helps a little\par Quality: Achy, cramping, numbness, and tingling\par Radiation: No significant radicular pain at this time\par Severity: 9/10 \par Timing: Worst at night\par \par No bowel/bladder changes. No groin numbness.

## 2022-07-27 NOTE — PROCEDURE
[de-identified] : Reason for procedure: Myalgia\par \par Procedure: Trigger Point Injections\par Physician: Dr. Diane\par Medication injected: Lidocaine 1%, 2cc total\par Sedation medications: None\par Estimated blood loss: None\par Complications: None\par \par Technique: R/B/A to trigger point injection reviewed with patient. The patient is agreeable and wishes to proceed.   The patient was placed in prone position and trigger points of the left lumbar paraspinals were identified. The area was prepped in normal sterile fashion with Chloroprep. A 27 gauge 1.25 inch needle was advanced into the palpable trigger points with reproduction of pain. After negative aspiration of heme, the above medications were injected into the trigger areas. Needle was then removed, bandaid placed over injection sites. There was no complications, the patient was provided with post injection instructions.

## 2022-07-27 NOTE — PLAN
[FreeTextEntry1] : Shingrix Vaccine #1 given in left deltoid, she tolerated well.  States she had Chicken Pox as child.  Discussed possible s/e's.  She questioned how would she know if she was getting the actual virus-I told her she would develop a rash with liquid filled little blisters and pain upon touch.  Education sheet given.  She verbalized understanding about getting the second shot within 6 months of the first.  OLGA RN

## 2022-07-27 NOTE — PHYSICAL EXAM
[FreeTextEntry1] : PE:\par Constitutional: In NAD, calm and cooperative\par \par MSK (Back)\par Inspection: no gross swelling identified\par Palpation: Tenderness of the left lower lumbar paraspinals, trigger points felt\par ROM: Pain at end lumbar flexion, no pain with extension\par Strength: Grossly intact in bilateral LE\par Reflexes: 2+ Patella reflex bilaterally, 2+ Achilles reflex bilaterally, negative clonus bilaterally\par Sensation: grossly intact to light touch in bilateral\par Special tests:\par SLR: negative bilaterally\par \par L knee: No TTP, no gross swelling, erythema or increased temperature felt\par \par \par \par

## 2022-08-01 ENCOUNTER — APPOINTMENT (OUTPATIENT)
Dept: ANTEPARTUM | Facility: CLINIC | Age: 52
End: 2022-08-01

## 2022-08-01 ENCOUNTER — APPOINTMENT (OUTPATIENT)
Dept: OBGYN | Facility: CLINIC | Age: 52
End: 2022-08-01

## 2022-08-01 ENCOUNTER — ASOB RESULT (OUTPATIENT)
Age: 52
End: 2022-08-01

## 2022-08-01 PROCEDURE — 76830 TRANSVAGINAL US NON-OB: CPT

## 2022-08-01 PROCEDURE — 76856 US EXAM PELVIC COMPLETE: CPT | Mod: 59

## 2022-08-03 ENCOUNTER — RESULT REVIEW (OUTPATIENT)
Age: 52
End: 2022-08-03

## 2022-08-03 ENCOUNTER — LABORATORY RESULT (OUTPATIENT)
Age: 52
End: 2022-08-03

## 2022-08-03 ENCOUNTER — APPOINTMENT (OUTPATIENT)
Dept: RHEUMATOLOGY | Facility: CLINIC | Age: 52
End: 2022-08-03

## 2022-08-03 ENCOUNTER — APPOINTMENT (OUTPATIENT)
Dept: OBGYN | Facility: CLINIC | Age: 52
End: 2022-08-03

## 2022-08-03 VITALS
BODY MASS INDEX: 22.28 KG/M2 | OXYGEN SATURATION: 98 % | SYSTOLIC BLOOD PRESSURE: 102 MMHG | HEIGHT: 61 IN | DIASTOLIC BLOOD PRESSURE: 68 MMHG | WEIGHT: 118 LBS | HEART RATE: 67 BPM

## 2022-08-03 VITALS
DIASTOLIC BLOOD PRESSURE: 70 MMHG | HEIGHT: 61 IN | WEIGHT: 116 LBS | SYSTOLIC BLOOD PRESSURE: 106 MMHG | BODY MASS INDEX: 21.9 KG/M2

## 2022-08-03 DIAGNOSIS — M79.10 MYALGIA, UNSPECIFIED SITE: ICD-10-CM

## 2022-08-03 DIAGNOSIS — N83.201 UNSPECIFIED OVARIAN CYST, RIGHT SIDE: ICD-10-CM

## 2022-08-03 PROCEDURE — 99213 OFFICE O/P EST LOW 20 MIN: CPT

## 2022-08-03 PROCEDURE — 36415 COLL VENOUS BLD VENIPUNCTURE: CPT

## 2022-08-03 PROCEDURE — 99204 OFFICE O/P NEW MOD 45 MIN: CPT | Mod: 25

## 2022-08-03 NOTE — HISTORY OF PRESENT ILLNESS
[FreeTextEntry1] : 51-year-old white female para 2 here for follow-up visit for pelvic pressure.  Patient was seen and had a UTI for which she was prescribed antibiotics that needed to be adjusted after the sensitivities were revealed.  Patient reports feeling better but is still not feeling 100%.  She presents today for ultrasound and discussion.  She has had a TRH BS as well as prior surgery for colon cancer.  Additionally patient is continuing on her vaginal estrogen and doing well with it.\par \par Ultrasound today reveals a surgically absent uterus and normal left adnexa the right ovary with a 2.8 cm simple cyst.

## 2022-08-03 NOTE — DISCUSSION/SUMMARY
[FreeTextEntry1] : A repeat urine culture is sent.\par \par Regarding ovarian cyst I had an extensive discussion.  I discussed high incidence of ovarian cysts and extremely low likelihood of malignancy and a small simple cyst in a perimenopausal patient.  I discussed high likelihood of spontaneous regression.  We will repeat the sonogram in 3 months.  All her questions were answered.

## 2022-08-04 LAB
ALBUMIN SERPL ELPH-MCNC: 4.8 G/DL
ALP BLD-CCNC: 68 U/L
ALT SERPL-CCNC: 16 U/L
ANION GAP SERPL CALC-SCNC: 11 MMOL/L
AST SERPL-CCNC: 25 U/L
BASOPHILS # BLD AUTO: 0.07 K/UL
BASOPHILS NFR BLD AUTO: 1.2 %
BILIRUB SERPL-MCNC: 0.2 MG/DL
BUN SERPL-MCNC: 12 MG/DL
C3 SERPL-MCNC: 110 MG/DL
C4 SERPL-MCNC: 35 MG/DL
CALCIUM SERPL-MCNC: 10.1 MG/DL
CCP AB SER IA-ACNC: 203 UNITS
CHLORIDE SERPL-SCNC: 104 MMOL/L
CK SERPL-CCNC: 94 U/L
CO2 SERPL-SCNC: 28 MMOL/L
CREAT SERPL-MCNC: 0.77 MG/DL
CRP SERPL-MCNC: 4 MG/L
DEPRECATED KAPPA LC FREE/LAMBDA SER: 1.16 RATIO
DSDNA AB SER-ACNC: <12 IU/ML
EGFR: 94 ML/MIN/1.73M2
EOSINOPHIL # BLD AUTO: 0.31 K/UL
EOSINOPHIL NFR BLD AUTO: 5.5 %
ERYTHROCYTE [SEDIMENTATION RATE] IN BLOOD BY WESTERGREN METHOD: 2 MM/HR
GLUCOSE SERPL-MCNC: 93 MG/DL
HCT VFR BLD CALC: 40.4 %
HGB BLD-MCNC: 13.5 G/DL
IGA SER QL IEP: 175 MG/DL
IGG SER QL IEP: 895 MG/DL
IGM SER QL IEP: 190 MG/DL
IMM GRANULOCYTES NFR BLD AUTO: 0.4 %
KAPPA LC CSF-MCNC: 1.47 MG/DL
KAPPA LC SERPL-MCNC: 1.71 MG/DL
LYMPHOCYTES # BLD AUTO: 1.74 K/UL
LYMPHOCYTES NFR BLD AUTO: 30.6 %
MAN DIFF?: NORMAL
MCHC RBC-ENTMCNC: 31.1 PG
MCHC RBC-ENTMCNC: 33.4 GM/DL
MCV RBC AUTO: 93.1 FL
MONOCYTES # BLD AUTO: 0.52 K/UL
MONOCYTES NFR BLD AUTO: 9.2 %
NEUTROPHILS # BLD AUTO: 3.02 K/UL
NEUTROPHILS NFR BLD AUTO: 53.1 %
PLATELET # BLD AUTO: 335 K/UL
POTASSIUM SERPL-SCNC: 4.5 MMOL/L
PROT SERPL-MCNC: 7.1 G/DL
RBC # BLD: 4.34 M/UL
RBC # FLD: 13.3 %
RF+CCP IGG SER-IMP: ABNORMAL
SODIUM SERPL-SCNC: 143 MMOL/L
WBC # FLD AUTO: 5.68 K/UL

## 2022-08-05 LAB
ALBUMIN MFR SERPL ELPH: 61.5 %
ALBUMIN SERPL-MCNC: 4.5 G/DL
ALBUMIN/GLOB SERPL: 1.6 RATIO
ALPHA1 GLOB MFR SERPL ELPH: 4.4 %
ALPHA1 GLOB SERPL ELPH-MCNC: 0.3 G/DL
ALPHA2 GLOB MFR SERPL ELPH: 9.4 %
ALPHA2 GLOB SERPL ELPH-MCNC: 0.7 G/DL
B-GLOBULIN MFR SERPL ELPH: 11.3 %
B-GLOBULIN SERPL ELPH-MCNC: 0.8 G/DL
ENA JO1 AB SER IA-ACNC: <0.2 AL
ENA RNP AB SER IA-ACNC: 0.4 AL
ENA SM AB SER IA-ACNC: <0.2 AL
ENA SS-A AB SER IA-ACNC: <0.2 AL
ENA SS-B AB SER IA-ACNC: <0.2 AL
GAMMA GLOB FLD ELPH-MCNC: 1 G/DL
GAMMA GLOB MFR SERPL ELPH: 13.4 %
INTERPRETATION SERPL IEP-IMP: NORMAL
PROT SERPL-MCNC: 7.3 G/DL
PROT SERPL-MCNC: 7.3 G/DL

## 2022-08-06 ENCOUNTER — OUTPATIENT (OUTPATIENT)
Dept: OUTPATIENT SERVICES | Facility: HOSPITAL | Age: 52
LOS: 1 days | End: 2022-08-06
Payer: MEDICAID

## 2022-08-06 ENCOUNTER — APPOINTMENT (OUTPATIENT)
Dept: RADIOLOGY | Facility: CLINIC | Age: 52
End: 2022-08-06

## 2022-08-06 ENCOUNTER — RESULT REVIEW (OUTPATIENT)
Age: 52
End: 2022-08-06

## 2022-08-06 DIAGNOSIS — R53.82 CHRONIC FATIGUE, UNSPECIFIED: ICD-10-CM

## 2022-08-06 DIAGNOSIS — Z98.890 OTHER SPECIFIED POSTPROCEDURAL STATES: Chronic | ICD-10-CM

## 2022-08-06 DIAGNOSIS — Z98.891 HISTORY OF UTERINE SCAR FROM PREVIOUS SURGERY: Chronic | ICD-10-CM

## 2022-08-06 DIAGNOSIS — Z90.49 ACQUIRED ABSENCE OF OTHER SPECIFIED PARTS OF DIGESTIVE TRACT: Chronic | ICD-10-CM

## 2022-08-06 DIAGNOSIS — Z90.710 ACQUIRED ABSENCE OF BOTH CERVIX AND UTERUS: Chronic | ICD-10-CM

## 2022-08-06 PROCEDURE — 73562 X-RAY EXAM OF KNEE 3: CPT

## 2022-08-06 PROCEDURE — 73562 X-RAY EXAM OF KNEE 3: CPT | Mod: 26,LT,RT

## 2022-08-06 PROCEDURE — 73130 X-RAY EXAM OF HAND: CPT | Mod: 26,LT,RT

## 2022-08-06 PROCEDURE — 73630 X-RAY EXAM OF FOOT: CPT | Mod: 26,LT,RT

## 2022-08-06 PROCEDURE — 73130 X-RAY EXAM OF HAND: CPT

## 2022-08-06 PROCEDURE — 73630 X-RAY EXAM OF FOOT: CPT

## 2022-08-08 LAB
ACE BLD-CCNC: 75 U/L
ALDOLASE SERPL-CCNC: 4.8 U/L
ANA PAT FLD IF-IMP: ABNORMAL
ANA SER IF-ACNC: ABNORMAL

## 2022-08-09 ENCOUNTER — NON-APPOINTMENT (OUTPATIENT)
Age: 52
End: 2022-08-09

## 2022-08-09 LAB
BACTERIA UR CULT: NORMAL
HISTONE AB SER QL: 1 UNITS

## 2022-08-10 LAB
HBV CORE IGG+IGM SER QL: NONREACTIVE
HBV SURFACE AB SER QL: REACTIVE
HBV SURFACE AG SER QL: NONREACTIVE
HCV AB SER QL: NONREACTIVE
HCV S/CO RATIO: 0.11 S/CO

## 2022-08-14 LAB — 14-3-3 ETA AG SER IA-MCNC: <0.2 NG/ML

## 2022-08-16 LAB — HLA-B27 RELATED AG QL: NEGATIVE

## 2022-08-18 ENCOUNTER — RX RENEWAL (OUTPATIENT)
Age: 52
End: 2022-08-18

## 2022-08-21 ENCOUNTER — INPATIENT (INPATIENT)
Facility: HOSPITAL | Age: 52
LOS: 3 days | Discharge: HOME | DRG: 390 | End: 2022-08-25
Attending: SURGERY | Admitting: SURGERY
Payer: COMMERCIAL

## 2022-08-21 DIAGNOSIS — Z98.890 OTHER SPECIFIED POSTPROCEDURAL STATES: Chronic | ICD-10-CM

## 2022-08-21 DIAGNOSIS — Z90.49 ACQUIRED ABSENCE OF OTHER SPECIFIED PARTS OF DIGESTIVE TRACT: Chronic | ICD-10-CM

## 2022-08-21 DIAGNOSIS — Z98.891 HISTORY OF UTERINE SCAR FROM PREVIOUS SURGERY: Chronic | ICD-10-CM

## 2022-08-21 DIAGNOSIS — Z90.710 ACQUIRED ABSENCE OF BOTH CERVIX AND UTERUS: Chronic | ICD-10-CM

## 2022-08-21 PROCEDURE — 99285 EMERGENCY DEPT VISIT HI MDM: CPT

## 2022-08-22 VITALS
OXYGEN SATURATION: 98 % | HEIGHT: 61 IN | SYSTOLIC BLOOD PRESSURE: 139 MMHG | RESPIRATION RATE: 16 BRPM | WEIGHT: 115.08 LBS | HEART RATE: 101 BPM | DIASTOLIC BLOOD PRESSURE: 99 MMHG | TEMPERATURE: 98 F

## 2022-08-22 DIAGNOSIS — K56.609 UNSPECIFIED INTESTINAL OBSTRUCTION, UNSPECIFIED AS TO PARTIAL VERSUS COMPLETE OBSTRUCTION: ICD-10-CM

## 2022-08-22 LAB
ALBUMIN SERPL ELPH-MCNC: 4.2 G/DL — SIGNIFICANT CHANGE UP (ref 3.3–5.2)
ALP SERPL-CCNC: 62 U/L — SIGNIFICANT CHANGE UP (ref 40–120)
ALT FLD-CCNC: 17 U/L — SIGNIFICANT CHANGE UP
ANION GAP SERPL CALC-SCNC: 14 MMOL/L — SIGNIFICANT CHANGE UP (ref 5–17)
APTT BLD: 22.9 SEC — LOW (ref 27.5–35.5)
AST SERPL-CCNC: 25 U/L — SIGNIFICANT CHANGE UP
BASOPHILS # BLD AUTO: 0 K/UL — SIGNIFICANT CHANGE UP (ref 0–0.2)
BASOPHILS NFR BLD AUTO: 0 % — SIGNIFICANT CHANGE UP (ref 0–2)
BILIRUB SERPL-MCNC: 0.4 MG/DL — SIGNIFICANT CHANGE UP (ref 0.4–2)
BLD GP AB SCN SERPL QL: SIGNIFICANT CHANGE UP
BUN SERPL-MCNC: 14 MG/DL — SIGNIFICANT CHANGE UP (ref 8–20)
CALCIUM SERPL-MCNC: 8.9 MG/DL — SIGNIFICANT CHANGE UP (ref 8.4–10.5)
CHLORIDE SERPL-SCNC: 104 MMOL/L — SIGNIFICANT CHANGE UP (ref 98–107)
CO2 SERPL-SCNC: 23 MMOL/L — SIGNIFICANT CHANGE UP (ref 22–29)
CREAT SERPL-MCNC: 0.75 MG/DL — SIGNIFICANT CHANGE UP (ref 0.5–1.3)
EGFR: 97 ML/MIN/1.73M2 — SIGNIFICANT CHANGE UP
EOSINOPHIL # BLD AUTO: 0 K/UL — SIGNIFICANT CHANGE UP (ref 0–0.5)
EOSINOPHIL NFR BLD AUTO: 0 % — SIGNIFICANT CHANGE UP (ref 0–6)
GLUCOSE SERPL-MCNC: 95 MG/DL — SIGNIFICANT CHANGE UP (ref 70–99)
HCT VFR BLD CALC: 39.5 % — SIGNIFICANT CHANGE UP (ref 34.5–45)
HGB BLD-MCNC: 13.5 G/DL — SIGNIFICANT CHANGE UP (ref 11.5–15.5)
INR BLD: 1.03 RATIO — SIGNIFICANT CHANGE UP (ref 0.88–1.16)
LYMPHOCYTES # BLD AUTO: 0.09 K/UL — LOW (ref 1–3.3)
LYMPHOCYTES # BLD AUTO: 0.9 % — LOW (ref 13–44)
MANUAL SMEAR VERIFICATION: SIGNIFICANT CHANGE UP
MCHC RBC-ENTMCNC: 30.6 PG — SIGNIFICANT CHANGE UP (ref 27–34)
MCHC RBC-ENTMCNC: 34.2 GM/DL — SIGNIFICANT CHANGE UP (ref 32–36)
MCV RBC AUTO: 89.6 FL — SIGNIFICANT CHANGE UP (ref 80–100)
METAMYELOCYTES # FLD: 2.6 % — HIGH (ref 0–0)
MONOCYTES # BLD AUTO: 0.27 K/UL — SIGNIFICANT CHANGE UP (ref 0–0.9)
MONOCYTES NFR BLD AUTO: 2.6 % — SIGNIFICANT CHANGE UP (ref 2–14)
NEUTROPHILS # BLD AUTO: 9.58 K/UL — HIGH (ref 1.8–7.4)
NEUTROPHILS NFR BLD AUTO: 78.1 % — HIGH (ref 43–77)
NEUTS BAND # BLD: 15.8 % — HIGH (ref 0–8)
PLAT MORPH BLD: NORMAL — SIGNIFICANT CHANGE UP
PLATELET # BLD AUTO: 250 K/UL — SIGNIFICANT CHANGE UP (ref 150–400)
POTASSIUM SERPL-MCNC: 4.2 MMOL/L — SIGNIFICANT CHANGE UP (ref 3.5–5.3)
POTASSIUM SERPL-SCNC: 4.2 MMOL/L — SIGNIFICANT CHANGE UP (ref 3.5–5.3)
PROT SERPL-MCNC: 6.7 G/DL — SIGNIFICANT CHANGE UP (ref 6.6–8.7)
PROTHROM AB SERPL-ACNC: 11.9 SEC — SIGNIFICANT CHANGE UP (ref 10.5–13.4)
RAPID RVP RESULT: DETECTED
RBC # BLD: 4.41 M/UL — SIGNIFICANT CHANGE UP (ref 3.8–5.2)
RBC # FLD: 12.6 % — SIGNIFICANT CHANGE UP (ref 10.3–14.5)
RBC BLD AUTO: NORMAL — SIGNIFICANT CHANGE UP
RV+EV RNA SPEC QL NAA+PROBE: DETECTED
SARS-COV-2 RNA SPEC QL NAA+PROBE: SIGNIFICANT CHANGE UP
SODIUM SERPL-SCNC: 141 MMOL/L — SIGNIFICANT CHANGE UP (ref 135–145)
WBC # BLD: 10.2 K/UL — SIGNIFICANT CHANGE UP (ref 3.8–10.5)
WBC # FLD AUTO: 10.2 K/UL — SIGNIFICANT CHANGE UP (ref 3.8–10.5)

## 2022-08-22 PROCEDURE — 74177 CT ABD & PELVIS W/CONTRAST: CPT | Mod: 26,MA

## 2022-08-22 PROCEDURE — 71045 X-RAY EXAM CHEST 1 VIEW: CPT | Mod: 26

## 2022-08-22 PROCEDURE — 93010 ELECTROCARDIOGRAM REPORT: CPT

## 2022-08-22 PROCEDURE — 74018 RADEX ABDOMEN 1 VIEW: CPT | Mod: 26

## 2022-08-22 RX ORDER — ACETAMINOPHEN 500 MG
1000 TABLET ORAL ONCE
Refills: 0 | Status: COMPLETED | OUTPATIENT
Start: 2022-08-22 | End: 2022-08-22

## 2022-08-22 RX ORDER — ACETAMINOPHEN 500 MG
1000 TABLET ORAL EVERY 6 HOURS
Refills: 0 | Status: COMPLETED | OUTPATIENT
Start: 2022-08-22 | End: 2022-08-23

## 2022-08-22 RX ORDER — ONDANSETRON 8 MG/1
4 TABLET, FILM COATED ORAL EVERY 4 HOURS
Refills: 0 | Status: DISCONTINUED | OUTPATIENT
Start: 2022-08-22 | End: 2022-08-25

## 2022-08-22 RX ORDER — SODIUM CHLORIDE 9 MG/ML
1000 INJECTION, SOLUTION INTRAVENOUS
Refills: 0 | Status: DISCONTINUED | OUTPATIENT
Start: 2022-08-22 | End: 2022-08-23

## 2022-08-22 RX ORDER — ONDANSETRON 8 MG/1
4 TABLET, FILM COATED ORAL ONCE
Refills: 0 | Status: COMPLETED | OUTPATIENT
Start: 2022-08-22 | End: 2022-08-22

## 2022-08-22 RX ORDER — SODIUM CHLORIDE 9 MG/ML
1000 INJECTION INTRAMUSCULAR; INTRAVENOUS; SUBCUTANEOUS ONCE
Refills: 0 | Status: COMPLETED | OUTPATIENT
Start: 2022-08-22 | End: 2022-08-22

## 2022-08-22 RX ORDER — SODIUM CHLORIDE 9 MG/ML
1000 INJECTION, SOLUTION INTRAVENOUS ONCE
Refills: 0 | Status: COMPLETED | OUTPATIENT
Start: 2022-08-22 | End: 2022-08-22

## 2022-08-22 RX ORDER — KETOROLAC TROMETHAMINE 30 MG/ML
10 SYRINGE (ML) INJECTION EVERY 4 HOURS
Refills: 0 | Status: DISCONTINUED | OUTPATIENT
Start: 2022-08-22 | End: 2022-08-25

## 2022-08-22 RX ORDER — ENOXAPARIN SODIUM 100 MG/ML
40 INJECTION SUBCUTANEOUS EVERY 24 HOURS
Refills: 0 | Status: DISCONTINUED | OUTPATIENT
Start: 2022-08-22 | End: 2022-08-25

## 2022-08-22 RX ORDER — METHOCARBAMOL 500 MG/1
500 TABLET, FILM COATED ORAL AT BEDTIME
Refills: 0 | Status: DISCONTINUED | OUTPATIENT
Start: 2022-08-22 | End: 2022-08-25

## 2022-08-22 RX ORDER — GABAPENTIN 400 MG/1
300 CAPSULE ORAL AT BEDTIME
Refills: 0 | Status: DISCONTINUED | OUTPATIENT
Start: 2022-08-22 | End: 2022-08-25

## 2022-08-22 RX ORDER — FAMOTIDINE 10 MG/ML
20 INJECTION INTRAVENOUS ONCE
Refills: 0 | Status: COMPLETED | OUTPATIENT
Start: 2022-08-22 | End: 2022-08-22

## 2022-08-22 RX ORDER — FLUTICASONE PROPIONATE 50 MCG
1 SPRAY, SUSPENSION NASAL DAILY
Refills: 0 | Status: DISCONTINUED | OUTPATIENT
Start: 2022-08-22 | End: 2022-08-25

## 2022-08-22 RX ORDER — FAMOTIDINE 10 MG/ML
20 INJECTION INTRAVENOUS ONCE
Refills: 0 | Status: DISCONTINUED | OUTPATIENT
Start: 2022-08-22 | End: 2022-08-22

## 2022-08-22 RX ORDER — DIATRIZOATE MEGLUMINE 180 MG/ML
2000 INJECTION, SOLUTION INTRAVESICAL ONCE
Refills: 0 | Status: DISCONTINUED | OUTPATIENT
Start: 2022-08-22 | End: 2022-08-22

## 2022-08-22 RX ORDER — PANTOPRAZOLE SODIUM 20 MG/1
40 TABLET, DELAYED RELEASE ORAL
Refills: 0 | Status: DISCONTINUED | OUTPATIENT
Start: 2022-08-22 | End: 2022-08-25

## 2022-08-22 RX ORDER — ACETAMINOPHEN 500 MG
650 TABLET ORAL EVERY 6 HOURS
Refills: 0 | Status: DISCONTINUED | OUTPATIENT
Start: 2022-08-23 | End: 2022-08-25

## 2022-08-22 RX ADMIN — Medication 400 MILLIGRAM(S): at 01:24

## 2022-08-22 RX ADMIN — Medication 400 MILLIGRAM(S): at 17:22

## 2022-08-22 RX ADMIN — Medication 1000 MILLIGRAM(S): at 13:30

## 2022-08-22 RX ADMIN — Medication 400 MILLIGRAM(S): at 23:45

## 2022-08-22 RX ADMIN — SODIUM CHLORIDE 125 MILLILITER(S): 9 INJECTION, SOLUTION INTRAVENOUS at 17:22

## 2022-08-22 RX ADMIN — ONDANSETRON 4 MILLIGRAM(S): 8 TABLET, FILM COATED ORAL at 07:14

## 2022-08-22 RX ADMIN — FAMOTIDINE 20 MILLIGRAM(S): 10 INJECTION INTRAVENOUS at 03:51

## 2022-08-22 RX ADMIN — GABAPENTIN 300 MILLIGRAM(S): 400 CAPSULE ORAL at 21:56

## 2022-08-22 RX ADMIN — Medication 10 MILLIGRAM(S): at 20:11

## 2022-08-22 RX ADMIN — SODIUM CHLORIDE 1000 MILLILITER(S): 9 INJECTION, SOLUTION INTRAVENOUS at 07:14

## 2022-08-22 RX ADMIN — Medication 400 MILLIGRAM(S): at 12:32

## 2022-08-22 RX ADMIN — SODIUM CHLORIDE 1000 MILLILITER(S): 9 INJECTION INTRAMUSCULAR; INTRAVENOUS; SUBCUTANEOUS at 01:24

## 2022-08-22 RX ADMIN — ENOXAPARIN SODIUM 40 MILLIGRAM(S): 100 INJECTION SUBCUTANEOUS at 17:22

## 2022-08-22 RX ADMIN — ONDANSETRON 4 MILLIGRAM(S): 8 TABLET, FILM COATED ORAL at 01:24

## 2022-08-22 RX ADMIN — METHOCARBAMOL 500 MILLIGRAM(S): 500 TABLET, FILM COATED ORAL at 21:56

## 2022-08-22 NOTE — ED ADULT TRIAGE NOTE - CHIEF COMPLAINT QUOTE
Pt A&Ox4 states "I have been throwing up and diarrhea for 3 days and I just feel so weak."  BIBA c/o abd pain nausea vomiting and diarrhea. Patient has abd history.

## 2022-08-22 NOTE — ED ADULT NURSE REASSESSMENT NOTE - NS ED NURSE REASSESS COMMENT FT1
Received pt from prior RN, pt is a&0x4, ambulatory, airway patent, does not show any s/s of respiratory distress, breathing is unlabored, color is culturally appropriate,  vs are WNL, pt assisted to bathroom, Pending NG tube administration by resident
spoke with a person from surgical team authored RN mentioned that the pt is uncomfortable and is still in pain, pt requesting for NG tube to come out. They stated they will come by bedside to reassess pt
Resident Morteza seen on unit, informed him that patient is requesting NGT removal and that there is no order for NGT. MD aware.  No further orders at this time.
patient c/o discomfort with NGT, requesting removal.  call placed to colorectal phone, mailbox is full.

## 2022-08-22 NOTE — ED PROVIDER NOTE - ATTENDING CONTRIBUTION TO CARE
Prior CA history with colon resection, abdominal pain c/w SBO confirmed on CT. Will be admitted to colorectal surgery for further management.

## 2022-08-22 NOTE — H&P ADULT - HISTORY OF PRESENT ILLNESS
HPI: 51y Female with PSHx of L colectomy () , 3x c section, hysterectomy (2021)  present to ED with presented to ED of 1 day of nausea/vomiting, patient states her symptoms started 1 day ago after her dinner as Left lower abdominal pain with nasuea and vomiting. on presentation patient states her pain is 4/10 on LLQ with multiple vomiting episodes few hours after her dinner. patient reports diarrhea. last BM was today am and she passing flatus. denies fevers/chills. patient reports she have had the same symptoms before 1 week ago which resolved by itself.    ROS: 10-system review is otherwise negative except HPI above.      PAST MEDICAL & SURGICAL HISTORY:  GERD (gastroesophageal reflux disease)      Asthma  mild      Cervical dysplasia      Kidney stone      Malignant neoplasm of sigmoid colon      COVID-19 vaccine series completed       novel coronavirus disease (COVID-19)  2022      Impingement syndrome of left shoulder      Seasonal allergies      Incisional hernia  x2-pt. states requires repair      H/O  section  x3 , ,       H/O hand surgery  staph infection 1992 x 3 surgeries      History of sinus surgery        H/O dilation and curettage      S/P hysterectomy  2021      S/P colon resection  21        FAMILY HISTORY:  FH: colon cancer  maternal grandmother, maternal uncle    FH: lymphoma (Father)  nasopharyngeal lymphoma    FH: breast cancer (Aunt)      Family history not pertinent as reviewed with the patient.    SOCIAL HISTORY:  Denies any toxic habits    ALLERGIES: NKA dermabond, mupirocin (Blisters)  doxycycline (Other)  penicillins (Rash)      HOME MEDICATIONS: ***  Home Medications:  Claritin 10 mg oral tablet: 1 tab(s) orally once a day (11 May 2022 10:42)  Flonase 50 mcg/inh nasal spray: 1 spray(s) nasal once a day, As Needed (11 May 2022 10:42)  gabapentin 100 mg oral capsule: 3 cap(s) orally once a day (at bedtime) (11 May 2022 10:42)  methocarbamol 500 mg oral tablet: 1 tab(s) orally once a day (at bedtime) (11 May 2022 10:42)  Multiple Vitamins oral tablet: 1 tab(s) orally once a day (11 May 2022 10:42)  omeprazole 40 mg oral delayed release capsule: 1  orally once a day (11 May 2022 10:42)  oxycodone-acetaminophen 2.5 mg-325 mg oral tablet: 1 tab(s) orally every 8 hours, As Needed (11 May 2022 10:42)  Vitamin C: orally once a day (11 May 2022 10:42)  Vitamin D3: orally once a day (11 May 2022 10:42)  Yuvafem 10 mcg vaginal tablet: 1 tab(s) vaginal 2 times a week (11 May 2022 10:42)      --------------------------------------------------------------------------------------------    PHYSICAL EXAM:   General: NAD  Neuro: A+Ox3  HEENT:  MMM  Cardio: RRR  Resp: Non labored breathing on RA  GI/Abd: Soft, non tender , non distended  Vascular: All 4 extremities warm and well perfused.     --------------------------------------------------------------------------------------------    LABS                 13.5   10.20  )----------(  250       ( 22 Aug 2022 01:05 )               39.5      141    |  104    |  14.0   ----------------------------<  95         ( 22 Aug 2022 01:05 )  4.2     |  23.0   |  0.75     Ca    8.9        ( 22 Aug 2022 01:05 )    TPro  6.7    /  Alb  4.2    /  TBili  0.4    /  DBili  x      /  AST  25     /  ALT  17     /  AlkPhos  62     ( 22 Aug 2022 01:05 )    LIVER FUNCTIONS - ( 22 Aug 2022 01:05 )  Alb: 4.2 g/dL / Pro: 6.7 g/dL / ALK PHOS: 62 U/L / ALT: 17 U/L / AST: 25 U/L / GGT: x           PT/INR -  11.9 sec / 1.03 ratio   ( 22 Aug 2022 06:54 )       PTT -  22.9 sec   ( 22 Aug 2022 06:54 )  CAPILLARY BLOOD GLUCOSE              --------------------------------------------------------------------------------------------  IMAGING  < from: CT Abdomen and Pelvis w/ IV Cont (22 @ 06:07) >  IMPRESSION:    Findings compatible with small bowel obstruction. Transition point   identified in right medial lower abdomen (46-49:5). Jejunal folds distal   to the transition point appears collapsed, limiting detailed evaluation.   No significant mesenteric edema, pneumatosis or portal venous gas.   Correlate with lactic acid.    Mild bladder wall thickening, difficult to assess secondary to inadequate   distention. Correlate with urinalysis and lab values to assess for   cystitis and/or ascending urinary tract infection.      --- End of Report ---    < end of copied text >

## 2022-08-22 NOTE — PATIENT PROFILE ADULT - NSPROGENOTHERPROVIDER_GEN_A_NUR
Medicare Wellness Visit  Plan for Preventive Care    A good way for you to stay healthy is to use preventive care.  Medicare covers many services that can help you stay healthy.* The goal of these services is to find any health problems as quickly as possible. Finding problems early can help make them easier to treat.  Your personal plan below lists the services you may need and when they are due.     Health Maintenance Summary     Hepatitis C Screening (Once)  Ordered on 11/19/2021    Shingles Vaccine (1 of 2)  Postponed until 11/19/2021    COVID-19 Vaccine (1)  Postponed until 11/20/2021    Influenza Vaccine (1)  Postponed until 6/30/2022    Pneumococcal Vaccine 65+ (1 of 1 - PPSV23)  Postponed until 11/19/2022    Medicare Wellness Visit (Yearly)  Next due on 11/19/2022    Depression Screening (Yearly)  Next due on 11/19/2022    DTaP/Tdap/Td Vaccine (2 - Td or Tdap)  Next due on 6/17/2025    Colorectal Cancer Screen- (Colonoscopy - Every 10 Years)  Ordered on 11/19/2021    Hepatitis B Vaccine   Aged Out    Meningococcal Vaccine   Aged Out    HPV Vaccine   Aged Out           Preventive Care for Women and Men    Heart Screenings (Cardiovascular):  · Blood tests are used to check your cholesterol, lipid and triglyceride levels. High levels can increase your risk for heart disease and stroke. High levels can be treated with medications, diet and exercise. Lowering your levels can help keep your heart and blood vessels healthy.  Your provider will order these tests if they are needed.    · An ultrasound is done to see if you have an abdominal aortic aneurysm (AAA).  This is an enlargement of one of the main blood vessels that delivers blood to the body.   In the [place], 9,000 deaths are caused by AAA.  You may not even know you have this problem and as many as 1 in 3 people will have a serious problem if it is not treated.  Early diagnosis allows for more effective treatment and cure.  If you have a family  history of AAA or are a male age 65-75 who has smoked, you are at higher risk of an AAA.  Your provider can order this test, if needed.    Colorectal Screening:  · There are many tests that are used to check for cancer of your colon and rectum. You and your provider should discuss what test is best for you and when to have it done.  Options include:  · Screening Colonoscopy: exam of the entire colon, seen through a flexible lighted tube.  · Flexible Sigmoidoscopy: exam of the last third (sigmoid portion) of the colon and rectum, seen through a flexible lighted tube.  · Cologuard DNA stool test: a sample of your stool is used to screen for cancer and unseen blood in your stool.  · Fecal Occult Blood Test: a sample of your stool is studied to find any unseen blood    Flu Shot:  · An immunization that helps to prevent influenza (the flu). You should get this every year. The best time to get the shot is in the fall.    Pneumococcal Shot:  • Vaccines are available that can help prevent pneumococcal disease, which is any type of infection caused by Streptococcus pneumoniae bacteria.   Their use can prevent some cases of pneumonia, meningitis, and sepsis. There are two types of pneumococcal vaccines:   o Conjugate vaccines (PCV-13 or Prevnar 13®) - helps protect against the 13 types of pneumococcal bacteria that are the most common causes of serious infections in children and adults.    o Polysaccharide vaccine (PPSV23 or [id number]®) - helps protect against 23 types of pneumococcal bacteria for patients who are recommended to get it.  These vaccines should be given at least 12 months apart.  A booster is usually not needed.     Hepatitis B Shot:  · An immunization that helps to protect people from getting Hepatitis B. Hepatitis B is a virus that spreads through contact with infected blood or body fluids. Many people with the virus do not have symptoms.  The virus can lead to serious problems, such as liver disease. Some  people are at higher risk than others. Your doctor will tell you if you need this shot.     Diabetes Screening:  · A test to measure sugar (glucose) in your blood is called a fasting blood sugar. Fasting means you cannot have food or drink for at least 8 hours before the test. This test can detect diabetes long before you may notice symptoms.    Glaucoma Screening:  · Glaucoma screening is performed by your eye doctor. The test measures the fluid pressure inside your eyes to determine if you have glaucoma.     Hepatitis C Screening:  · A blood test to see if you have the hepatitis C virus.  Hepatitis C attacks the liver and is a major cause of chronic liver disease.  Medicare will cover a single screening for all adults born between 1945 & 1965, or high risk patients (people who have injected illegal drugs or people who have had blood transfusions).  High risk patients who continue to inject illegal drugs can be screened for Hepatitis C every year.    Smoking and Tobacco-Use Cessation Counseling:  · Tobacco is the single greatest cause of disease and early death in our country today. Medication and counseling together can increase a person’s chance of quitting for good.   · Medicare covers two quitting attempts per year, with four counseling sessions per attempt (eight sessions in a 12 month period)    Preventive Screening tests for Women    Screening Mammograms and Breast Exams:  · An x-ray of your breasts to check for breast cancer before you or your doctor may be able to feel it.  If breast cancer is found early it can usually be treated with success.    Pelvic Exams and Pap Tests:  · An exam to check for cervical and vaginal cancer. A Pap test is a lab test in which cells are taken from your cervix and sent to the lab to look for signs of cervical cancer. If cancer of the cervix is found early, chances for a cure are good. Testing can generally end at age 65, or if a woman has a hysterectomy for a benign  condition. Your provider may recommend more frequent testing if certain abnormal results are found.    Bone Mass Measurements:  · A painless x-ray of your bone density to see if you are at risk for a broken bone. Bone density refers to the thickness of bones or how tightly the bone tissue is packed.    Preventive Screening tests for Men    Prostate Screening:  · Should you have a prostate cancer test (PSA)?  It is up to you to decide if you want a prostate cancer test. Talk to your clinician to find out if the test is right for you.  Things for you to consider and talk about should include:  · Benefits and harms of the test  · Your family history  · How your race/ethnicity may influence the test  · If the test may impact other medical conditions you have  · Your values on screenings and treatments    *Medicare pays for many preventive services to keep you healthy. For some of these services, you might have to pay a deductible, coinsurance, and / or copayment.  The amounts vary depending on the type of services you need and the kind of Medicare health plan you have.    For further details on screenings offered by Medicare please visit: https://www.medicare.gov/coverage/preventive-screening-services    none

## 2022-08-22 NOTE — ED PROVIDER NOTE - PHYSICAL EXAMINATION
General: ill appearing, NAD  Head: NC, AT  EENT: EOMI, no scleral icterus  Cardiac: RRR, no apparent murmurs, no lower extremity edema  Respiratory: CTABL, no respiratory distress   Abdomen: soft, diffusely tender, ND, nonperitonitic, no rebound tenderness, no guarding  MSK/Vascular: full ROM, soft compartments, warm extremities  Neuro: AAOx3, sensation to light touch intact  Psych: calm, cooperative

## 2022-08-22 NOTE — PROGRESS NOTE ADULT - SUBJECTIVE AND OBJECTIVE BOX
Patient is known to me for a history of colon cancer for which she underwent a sigmoidectomy last year. She presents with abdominal pain, nausea, emesis, and changes in bowel habits. She had an episode last week and was having diarrhea and emesis. She thinks it may be something she ate. She improved but then over the past 48 hours, has been having nausea, emesis and stools have been of thin caliber. On exam, she is uncomfortable from NG tube which has not had much output since placement. Abdomen is soft, non distended, tender centrally without rebound or guarding. CT scan reviewed and reports bowel obstruction with transition point although I am unable to clearly see a transition. I see some thickening of jejunum and more concerned about enteritis. Will remove NG now as it is not putting out much. Will hydrate and continue serial abdominal exams. Admit.

## 2022-08-22 NOTE — ED PROVIDER NOTE - CLINICAL SUMMARY MEDICAL DECISION MAKING FREE TEXT BOX
52 y/o with sudden onset abd pain, nausea, vomiting, and diarrhea. IVF + Zofran + Tylenol somewhat improved sx but pt still felt uncomfortable. Abd CT showed SBO. Surgery consulted for recs.

## 2022-08-22 NOTE — H&P ADULT - ASSESSMENT
51y Female with PSHx of L colectomy (2021) , 3x c section, hysterectomy (2/2021)  present to ED with presented to ED of 1 day of nausea/vomiting. patient is not clinically obstructed passing flatus and having BM. CT scan with IV contrast SBO with transition point on R medial lower abdomen with mild dilation of small bowel loops. patient symptoms and imaging consistent with partial SBO     Plan:   Admit to ACS with Dr Nichols   NPO/IV fluids   pain control with limited narcotic use   Antiemetics   CT with PO contrast   Monitor BF   serial abdominal exam   NGT: patient refuse NGT at this time  DVT ppx   Continue home meds  51y Female with PSHx of L colectomy (2021) , 3x c section, hysterectomy (2/2021)  present to ED with presented to ED of 1 day of nausea/vomiting. patient is not clinically obstructed passing flatus and having BM. CT scan with IV contrast SBO with transition point on R medial lower abdomen with mild dilation of small bowel loops. patient symptoms and imaging consistent with partial SBO     Plan:   Admit to ACS with Dr Nichols   NPO/IV fluids   pain control with limited narcotic use   Antiemetics   CT with PO contrast   Encourage OOB / ambulate  Monitor BF   serial abdominal exam   NGT: patient refuse NGT at this time  DVT ppx   Continue home meds  51y Female with PSHx of L colectomy (2021) , 3x c section, hysterectomy (2/2021)  present to ED with presented to ED of 1 day of nausea/vomiting. patient is not clinically obstructed passing flatus and having BM. CT scan with IV contrast SBO with transition point on R medial lower abdomen with mild dilation of small bowel loops. patient symptoms and imaging consistent with partial SBO     Plan:   Admit to CRS with Dr Johnson   NPO/IV fluids   pain control with limited narcotic use   Antiemetics   CT with PO contrast   Encourage OOB / ambulate  Monitor BF   serial abdominal exam   NGT: patient refuse NGT at this time  DVT ppx   Continue home meds

## 2022-08-22 NOTE — PATIENT PROFILE ADULT - FALL HARM RISK - HARM RISK INTERVENTIONS

## 2022-08-22 NOTE — ED PROVIDER NOTE - OBJECTIVE STATEMENT
50 y/o female w/PMHx of Colon CA s/p resection, MR, GERD, Asthma, and Hysterectomy presents with 4-6 hours of N/V/D and Abd Pain. This started within an hour after eating dinner. Associated with shakiness and chills. Pt states she has had greater difficulty passing bowel movements prior to these episodes of diarrhea. She has not been able to tolerate PO since. Also c/o headaches, weakness, and fatigue. Denies CP, sob, blood in the stool, and urinary sx.

## 2022-08-22 NOTE — ED PROVIDER NOTE - NS ED ROS FT
Constitutional: fever, chills  Head: NC, AT   Eyes: no redness   ENMT: no nasal congestion/drainage, no sore throat   CV: no chest pain, no edema  Resp: no cough, no dyspnea  GI: abdominal pain, nausea, vomiting, and diarrhea  : no dysuria, no hematuria   Skin: no lesions, no rashes   Neuro: weakness, headache, no LOC, no sensory deficits No

## 2022-08-22 NOTE — ED PROVIDER NOTE - ADMIT DISPOSITION PRESENT ON ADMISSION SEPSIS
Preventive Health Recommendations  Male Ages 26 - 39    Yearly exam:             See your health care provider every year in order to  o   Review health changes.   o   Discuss preventive care.    o   Review your medicines if your doctor has prescribed any.    You should be tested each year for STDs (sexually transmitted diseases), if you re at risk.     After age 35, talk to your provider about cholesterol testing. If you are at risk for heart disease, have your cholesterol tested at least every 5 years.     If you are at risk for diabetes, you should have a diabetes test (fasting glucose).  Shots: Get a flu shot each year. Get a tetanus shot every 10 years.     Nutrition:    Eat at least 5 servings of fruits and vegetables daily.     Eat whole-grain bread, whole-wheat pasta and brown rice instead of white grains and rice.     Get adequate Calcium and Vitamin D.     Lifestyle    Exercise for at least 150 minutes a week (30 minutes a day, 5 days a week). This will help you control your weight and prevent disease.     Limit alcohol to one drink per day.     No smoking.     Wear sunscreen to prevent skin cancer.     See your dentist every six months for an exam and cleaning.     
No

## 2022-08-23 ENCOUNTER — APPOINTMENT (OUTPATIENT)
Dept: FAMILY MEDICINE | Facility: CLINIC | Age: 52
End: 2022-08-23

## 2022-08-23 LAB
ALBUMIN SERPL ELPH-MCNC: 3.4 G/DL — SIGNIFICANT CHANGE UP (ref 3.3–5.2)
ALP SERPL-CCNC: 47 U/L — SIGNIFICANT CHANGE UP (ref 40–120)
ALT FLD-CCNC: 13 U/L — SIGNIFICANT CHANGE UP
ANION GAP SERPL CALC-SCNC: 18 MMOL/L — HIGH (ref 5–17)
APTT BLD: 34.7 SEC — SIGNIFICANT CHANGE UP (ref 27.5–35.5)
AST SERPL-CCNC: 17 U/L — SIGNIFICANT CHANGE UP
BASOPHILS # BLD AUTO: 0.03 K/UL — SIGNIFICANT CHANGE UP (ref 0–0.2)
BASOPHILS NFR BLD AUTO: 0.4 % — SIGNIFICANT CHANGE UP (ref 0–2)
BILIRUB SERPL-MCNC: 0.4 MG/DL — SIGNIFICANT CHANGE UP (ref 0.4–2)
BUN SERPL-MCNC: 12 MG/DL — SIGNIFICANT CHANGE UP (ref 8–20)
CALCIUM SERPL-MCNC: 8.6 MG/DL — SIGNIFICANT CHANGE UP (ref 8.4–10.5)
CHLORIDE SERPL-SCNC: 104 MMOL/L — SIGNIFICANT CHANGE UP (ref 98–107)
CO2 SERPL-SCNC: 17 MMOL/L — LOW (ref 22–29)
CREAT SERPL-MCNC: 0.75 MG/DL — SIGNIFICANT CHANGE UP (ref 0.5–1.3)
EGFR: 97 ML/MIN/1.73M2 — SIGNIFICANT CHANGE UP
EOSINOPHIL # BLD AUTO: 0.29 K/UL — SIGNIFICANT CHANGE UP (ref 0–0.5)
EOSINOPHIL NFR BLD AUTO: 3.5 % — SIGNIFICANT CHANGE UP (ref 0–6)
GLUCOSE BLDC GLUCOMTR-MCNC: 142 MG/DL — HIGH (ref 70–99)
GLUCOSE BLDC GLUCOMTR-MCNC: 45 MG/DL — CRITICAL LOW (ref 70–99)
GLUCOSE BLDC GLUCOMTR-MCNC: 58 MG/DL — LOW (ref 70–99)
GLUCOSE BLDC GLUCOMTR-MCNC: 68 MG/DL — LOW (ref 70–99)
GLUCOSE BLDC GLUCOMTR-MCNC: 72 MG/DL — SIGNIFICANT CHANGE UP (ref 70–99)
GLUCOSE SERPL-MCNC: 47 MG/DL — CRITICAL LOW (ref 70–99)
HCT VFR BLD CALC: 35.8 % — SIGNIFICANT CHANGE UP (ref 34.5–45)
HGB BLD-MCNC: 11.6 G/DL — SIGNIFICANT CHANGE UP (ref 11.5–15.5)
IMM GRANULOCYTES NFR BLD AUTO: 0.2 % — SIGNIFICANT CHANGE UP (ref 0–1.5)
INR BLD: 1.09 RATIO — SIGNIFICANT CHANGE UP (ref 0.88–1.16)
LYMPHOCYTES # BLD AUTO: 1.71 K/UL — SIGNIFICANT CHANGE UP (ref 1–3.3)
LYMPHOCYTES # BLD AUTO: 20.9 % — SIGNIFICANT CHANGE UP (ref 13–44)
MCHC RBC-ENTMCNC: 30.3 PG — SIGNIFICANT CHANGE UP (ref 27–34)
MCHC RBC-ENTMCNC: 32.4 GM/DL — SIGNIFICANT CHANGE UP (ref 32–36)
MCV RBC AUTO: 93.5 FL — SIGNIFICANT CHANGE UP (ref 80–100)
MONOCYTES # BLD AUTO: 0.65 K/UL — SIGNIFICANT CHANGE UP (ref 0–0.9)
MONOCYTES NFR BLD AUTO: 7.9 % — SIGNIFICANT CHANGE UP (ref 2–14)
NEUTROPHILS # BLD AUTO: 5.5 K/UL — SIGNIFICANT CHANGE UP (ref 1.8–7.4)
NEUTROPHILS NFR BLD AUTO: 67.1 % — SIGNIFICANT CHANGE UP (ref 43–77)
PLATELET # BLD AUTO: 241 K/UL — SIGNIFICANT CHANGE UP (ref 150–400)
POTASSIUM SERPL-MCNC: 3.3 MMOL/L — LOW (ref 3.5–5.3)
POTASSIUM SERPL-SCNC: 3.3 MMOL/L — LOW (ref 3.5–5.3)
PROT SERPL-MCNC: 5.7 G/DL — LOW (ref 6.6–8.7)
PROTHROM AB SERPL-ACNC: 12.7 SEC — SIGNIFICANT CHANGE UP (ref 10.5–13.4)
RBC # BLD: 3.83 M/UL — SIGNIFICANT CHANGE UP (ref 3.8–5.2)
RBC # FLD: 12.8 % — SIGNIFICANT CHANGE UP (ref 10.3–14.5)
SODIUM SERPL-SCNC: 139 MMOL/L — SIGNIFICANT CHANGE UP (ref 135–145)
WBC # BLD: 8.2 K/UL — SIGNIFICANT CHANGE UP (ref 3.8–10.5)
WBC # FLD AUTO: 8.2 K/UL — SIGNIFICANT CHANGE UP (ref 3.8–10.5)

## 2022-08-23 PROCEDURE — 99232 SBSQ HOSP IP/OBS MODERATE 35: CPT

## 2022-08-23 RX ORDER — POTASSIUM CHLORIDE 20 MEQ
20 PACKET (EA) ORAL ONCE
Refills: 0 | Status: COMPLETED | OUTPATIENT
Start: 2022-08-23 | End: 2022-08-23

## 2022-08-23 RX ORDER — DEXTROSE 50 % IN WATER 50 %
25 SYRINGE (ML) INTRAVENOUS ONCE
Refills: 0 | Status: DISCONTINUED | OUTPATIENT
Start: 2022-08-23 | End: 2022-08-23

## 2022-08-23 RX ORDER — DEXTROSE 10 % IN WATER 10 %
250 INTRAVENOUS SOLUTION INTRAVENOUS
Refills: 0 | Status: DISCONTINUED | OUTPATIENT
Start: 2022-08-23 | End: 2022-08-23

## 2022-08-23 RX ORDER — DEXTROSE 10 % IN WATER 10 %
250 INTRAVENOUS SOLUTION INTRAVENOUS
Refills: 0 | Status: COMPLETED | OUTPATIENT
Start: 2022-08-23 | End: 2022-08-23

## 2022-08-23 RX ORDER — SODIUM CHLORIDE 9 MG/ML
1000 INJECTION, SOLUTION INTRAVENOUS
Refills: 0 | Status: DISCONTINUED | OUTPATIENT
Start: 2022-08-23 | End: 2022-08-24

## 2022-08-23 RX ORDER — DEXTROSE 50 % IN WATER 50 %
15 SYRINGE (ML) INTRAVENOUS ONCE
Refills: 0 | Status: DISCONTINUED | OUTPATIENT
Start: 2022-08-23 | End: 2022-08-25

## 2022-08-23 RX ORDER — GLUCAGON INJECTION, SOLUTION 0.5 MG/.1ML
1 INJECTION, SOLUTION SUBCUTANEOUS ONCE
Refills: 0 | Status: DISCONTINUED | OUTPATIENT
Start: 2022-08-23 | End: 2022-08-25

## 2022-08-23 RX ORDER — DEXTROSE 50 % IN WATER 50 %
25 SYRINGE (ML) INTRAVENOUS ONCE
Refills: 0 | Status: DISCONTINUED | OUTPATIENT
Start: 2022-08-23 | End: 2022-08-25

## 2022-08-23 RX ORDER — SODIUM CHLORIDE 9 MG/ML
1000 INJECTION, SOLUTION INTRAVENOUS ONCE
Refills: 0 | Status: COMPLETED | OUTPATIENT
Start: 2022-08-23 | End: 2022-08-23

## 2022-08-23 RX ORDER — DEXTROSE 50 % IN WATER 50 %
12.5 SYRINGE (ML) INTRAVENOUS ONCE
Refills: 0 | Status: DISCONTINUED | OUTPATIENT
Start: 2022-08-23 | End: 2022-08-25

## 2022-08-23 RX ORDER — SODIUM CHLORIDE 9 MG/ML
1000 INJECTION, SOLUTION INTRAVENOUS
Refills: 0 | Status: DISCONTINUED | OUTPATIENT
Start: 2022-08-23 | End: 2022-08-25

## 2022-08-23 RX ADMIN — SODIUM CHLORIDE 50 MILLILITER(S): 9 INJECTION, SOLUTION INTRAVENOUS at 14:03

## 2022-08-23 RX ADMIN — GABAPENTIN 300 MILLIGRAM(S): 400 CAPSULE ORAL at 21:29

## 2022-08-23 RX ADMIN — SODIUM CHLORIDE 125 MILLILITER(S): 9 INJECTION, SOLUTION INTRAVENOUS at 15:37

## 2022-08-23 RX ADMIN — Medication 10 MILLIGRAM(S): at 15:52

## 2022-08-23 RX ADMIN — ONDANSETRON 4 MILLIGRAM(S): 8 TABLET, FILM COATED ORAL at 01:22

## 2022-08-23 RX ADMIN — Medication 50 MILLIEQUIVALENT(S): at 16:58

## 2022-08-23 RX ADMIN — Medication 400 MILLIGRAM(S): at 05:07

## 2022-08-23 RX ADMIN — ENOXAPARIN SODIUM 40 MILLIGRAM(S): 100 INJECTION SUBCUTANEOUS at 18:32

## 2022-08-23 RX ADMIN — Medication 1000 MILLILITER(S): at 07:41

## 2022-08-23 RX ADMIN — Medication 10 MILLIGRAM(S): at 15:38

## 2022-08-23 RX ADMIN — PANTOPRAZOLE SODIUM 40 MILLIGRAM(S): 20 TABLET, DELAYED RELEASE ORAL at 05:08

## 2022-08-23 RX ADMIN — Medication 1000 MILLIGRAM(S): at 06:00

## 2022-08-23 RX ADMIN — SODIUM CHLORIDE 1000 MILLILITER(S): 9 INJECTION, SOLUTION INTRAVENOUS at 06:25

## 2022-08-23 RX ADMIN — ONDANSETRON 4 MILLIGRAM(S): 8 TABLET, FILM COATED ORAL at 05:59

## 2022-08-23 RX ADMIN — METHOCARBAMOL 500 MILLIGRAM(S): 500 TABLET, FILM COATED ORAL at 21:29

## 2022-08-23 RX ADMIN — SODIUM CHLORIDE 200 MILLILITER(S): 9 INJECTION, SOLUTION INTRAVENOUS at 08:55

## 2022-08-23 NOTE — PROGRESS NOTE ADULT - SUBJECTIVE AND OBJECTIVE BOX
HPI/OVERNIGHT EVENTS: Patient seen and examined at bedside this AM. still having diarrhea, pain is well controlled , denies N/V , gastrografin challenge shows contrast in colon     Vital Signs Last 24 Hrs  T(C): 37 (22 Aug 2022 20:05), Max: 37.2 (22 Aug 2022 08:01)  T(F): 98.6 (22 Aug 2022 20:05), Max: 98.9 (22 Aug 2022 08:01)  HR: 68 (22 Aug 2022 20:05) (68 - 80)  BP: 104/66 (22 Aug 2022 20:05) (94/67 - 111/73)  BP(mean): --  RR: 20 (22 Aug 2022 20:05) (18 - 20)  SpO2: 97% (22 Aug 2022 20:05) (97% - 99%)    Parameters below as of 22 Aug 2022 20:05  Patient On (Oxygen Delivery Method): room air        I&O's Detail      General: NAD  Neuro: A+Ox3  HEENT:  MMM  Cardio: RRR  Resp: Non labored breathing on RA  GI/Abd: Soft, non tender , non distended  Vascular: All 4 extremities warm and well perfused.   LABS:                        13.5   10.20 )-----------( 250      ( 22 Aug 2022 01:05 )             39.5     08-22    141  |  104  |  14.0  ----------------------------<  95  4.2   |  23.0  |  0.75    Ca    8.9      22 Aug 2022 01:05    TPro  6.7  /  Alb  4.2  /  TBili  0.4  /  DBili  x   /  AST  25  /  ALT  17  /  AlkPhos  62  08-22    PT/INR - ( 22 Aug 2022 06:54 )   PT: 11.9 sec;   INR: 1.03 ratio         PTT - ( 22 Aug 2022 06:54 )  PTT:22.9 sec      MEDICATIONS  (STANDING):  acetaminophen   IVPB .. 1000 milliGRAM(s) IV Intermittent every 6 hours  enoxaparin Injectable 40 milliGRAM(s) SubCutaneous every 24 hours  gabapentin 300 milliGRAM(s) Oral at bedtime  lactated ringers. 1000 milliLiter(s) (200 mL/Hr) IV Continuous <Continuous>  lactated ringers. 1000 milliLiter(s) (125 mL/Hr) IV Continuous <Continuous>  methocarbamol 500 milliGRAM(s) Oral at bedtime  pantoprazole    Tablet 40 milliGRAM(s) Oral before breakfast    MEDICATIONS  (PRN):  acetaminophen     Tablet .. 650 milliGRAM(s) Oral every 6 hours PRN Mild Pain (1 - 3)  fluticasone propionate (50 MICROgram(s)/actuation) Nasal Spray - Peds 1 Spray(s) Alternating Nostrils daily PRN nasal symptoms  ketorolac   Injectable 10 milliGRAM(s) IV Push every 4 hours PRN Moderate Pain (4 - 6)  ondansetron Injectable 4 milliGRAM(s) IV Push every 4 hours PRN Nausea and/or Vomiting      MICRO:   Cultures     STUDIES:   EKG, CXR, U/S, CT, MRI

## 2022-08-23 NOTE — PROVIDER CONTACT NOTE (HYPOGLYCEMIA EVENT) - NS PROVIDER CONTACT BACKGROUND-HYPO
A&Ox4     Age: 51y    Gender: Female    POCT Blood Glucose:  45 mg/dL (08-23-22 @ 13:51)  142 mg/dL (08-23-22 @ 09:13)      eMAR:

## 2022-08-24 ENCOUNTER — NON-APPOINTMENT (OUTPATIENT)
Age: 52
End: 2022-08-24

## 2022-08-24 ENCOUNTER — TRANSCRIPTION ENCOUNTER (OUTPATIENT)
Age: 52
End: 2022-08-24

## 2022-08-24 LAB
ANION GAP SERPL CALC-SCNC: 11 MMOL/L — SIGNIFICANT CHANGE UP (ref 5–17)
BUN SERPL-MCNC: 5 MG/DL — LOW (ref 8–20)
CALCIUM SERPL-MCNC: 8.6 MG/DL — SIGNIFICANT CHANGE UP (ref 8.4–10.5)
CHLORIDE SERPL-SCNC: 105 MMOL/L — SIGNIFICANT CHANGE UP (ref 98–107)
CO2 SERPL-SCNC: 24 MMOL/L — SIGNIFICANT CHANGE UP (ref 22–29)
CREAT SERPL-MCNC: 0.77 MG/DL — SIGNIFICANT CHANGE UP (ref 0.5–1.3)
EGFR: 94 ML/MIN/1.73M2 — SIGNIFICANT CHANGE UP
GLUCOSE BLDC GLUCOMTR-MCNC: 102 MG/DL — HIGH (ref 70–99)
GLUCOSE BLDC GLUCOMTR-MCNC: 115 MG/DL — HIGH (ref 70–99)
GLUCOSE BLDC GLUCOMTR-MCNC: 129 MG/DL — HIGH (ref 70–99)
GLUCOSE SERPL-MCNC: 108 MG/DL — HIGH (ref 70–99)
HCT VFR BLD CALC: 33.2 % — LOW (ref 34.5–45)
HGB BLD-MCNC: 10.9 G/DL — LOW (ref 11.5–15.5)
MAGNESIUM SERPL-MCNC: 1.8 MG/DL — SIGNIFICANT CHANGE UP (ref 1.6–2.6)
MCHC RBC-ENTMCNC: 30.1 PG — SIGNIFICANT CHANGE UP (ref 27–34)
MCHC RBC-ENTMCNC: 32.8 GM/DL — SIGNIFICANT CHANGE UP (ref 32–36)
MCV RBC AUTO: 91.7 FL — SIGNIFICANT CHANGE UP (ref 80–100)
PHOSPHATE SERPL-MCNC: 3 MG/DL — SIGNIFICANT CHANGE UP (ref 2.4–4.7)
PLATELET # BLD AUTO: 223 K/UL — SIGNIFICANT CHANGE UP (ref 150–400)
POTASSIUM SERPL-MCNC: 3.4 MMOL/L — LOW (ref 3.5–5.3)
POTASSIUM SERPL-SCNC: 3.4 MMOL/L — LOW (ref 3.5–5.3)
RBC # BLD: 3.62 M/UL — LOW (ref 3.8–5.2)
RBC # FLD: 12.7 % — SIGNIFICANT CHANGE UP (ref 10.3–14.5)
SODIUM SERPL-SCNC: 140 MMOL/L — SIGNIFICANT CHANGE UP (ref 135–145)
WBC # BLD: 4.15 K/UL — SIGNIFICANT CHANGE UP (ref 3.8–10.5)
WBC # FLD AUTO: 4.15 K/UL — SIGNIFICANT CHANGE UP (ref 3.8–10.5)

## 2022-08-24 RX ORDER — FAMOTIDINE 10 MG/ML
20 INJECTION INTRAVENOUS DAILY
Refills: 0 | Status: DISCONTINUED | OUTPATIENT
Start: 2022-08-24 | End: 2022-08-24

## 2022-08-24 RX ORDER — MAGNESIUM SULFATE 500 MG/ML
2 VIAL (ML) INJECTION ONCE
Refills: 0 | Status: COMPLETED | OUTPATIENT
Start: 2022-08-24 | End: 2022-08-24

## 2022-08-24 RX ORDER — FAMOTIDINE 10 MG/ML
10 INJECTION INTRAVENOUS AT BEDTIME
Refills: 0 | Status: DISCONTINUED | OUTPATIENT
Start: 2022-08-24 | End: 2022-08-25

## 2022-08-24 RX ORDER — POTASSIUM CHLORIDE 20 MEQ
40 PACKET (EA) ORAL ONCE
Refills: 0 | Status: COMPLETED | OUTPATIENT
Start: 2022-08-24 | End: 2022-08-24

## 2022-08-24 RX ORDER — LORATADINE 10 MG/1
10 TABLET ORAL DAILY
Refills: 0 | Status: DISCONTINUED | OUTPATIENT
Start: 2022-08-24 | End: 2022-08-25

## 2022-08-24 RX ORDER — POTASSIUM CHLORIDE 20 MEQ
20 PACKET (EA) ORAL ONCE
Refills: 0 | Status: DISCONTINUED | OUTPATIENT
Start: 2022-08-24 | End: 2022-08-24

## 2022-08-24 RX ORDER — POLYETHYLENE GLYCOL 3350 17 G/17G
17 POWDER, FOR SOLUTION ORAL ONCE
Refills: 0 | Status: COMPLETED | OUTPATIENT
Start: 2022-08-24 | End: 2022-08-24

## 2022-08-24 RX ADMIN — FAMOTIDINE 10 MILLIGRAM(S): 10 INJECTION INTRAVENOUS at 22:35

## 2022-08-24 RX ADMIN — POLYETHYLENE GLYCOL 3350 17 GRAM(S): 17 POWDER, FOR SOLUTION ORAL at 14:11

## 2022-08-24 RX ADMIN — Medication 650 MILLIGRAM(S): at 00:06

## 2022-08-24 RX ADMIN — Medication 650 MILLIGRAM(S): at 01:00

## 2022-08-24 RX ADMIN — GABAPENTIN 300 MILLIGRAM(S): 400 CAPSULE ORAL at 22:37

## 2022-08-24 RX ADMIN — ENOXAPARIN SODIUM 40 MILLIGRAM(S): 100 INJECTION SUBCUTANEOUS at 18:02

## 2022-08-24 RX ADMIN — PANTOPRAZOLE SODIUM 40 MILLIGRAM(S): 20 TABLET, DELAYED RELEASE ORAL at 05:14

## 2022-08-24 RX ADMIN — Medication 40 MILLIEQUIVALENT(S): at 12:14

## 2022-08-24 RX ADMIN — Medication 25 GRAM(S): at 12:06

## 2022-08-24 RX ADMIN — LORATADINE 10 MILLIGRAM(S): 10 TABLET ORAL at 12:14

## 2022-08-24 RX ADMIN — Medication 1 SPRAY(S): at 05:14

## 2022-08-24 NOTE — DISCHARGE NOTE PROVIDER - CARE PROVIDER_API CALL
Sanjana Johnson)  ColonRectal Surgery; Surgery  [address] [address]  Phone: [phone]  Fax: [phone]  Follow Up Time: 2 weeks

## 2022-08-24 NOTE — DISCHARGE NOTE PROVIDER - NSDCCPCAREPLAN_GEN_ALL_CORE_FT
PRINCIPAL DISCHARGE DIAGNOSIS  Diagnosis: Small bowel obstruction  Assessment and Plan of Treatment:        PRINCIPAL DISCHARGE DIAGNOSIS  Diagnosis: Small bowel obstruction  Assessment and Plan of Treatment: Follow up with your primary medical doctor within 2-3 days   Flip regular diet   Call [phone] for an appointment   If you develop abdominal pain, fever, chills, vomiting, inability to pass gas please call       PRINCIPAL DISCHARGE DIAGNOSIS  Diagnosis: Small bowel obstruction  Assessment and Plan of Treatment: Follow up with your primary medical doctor within 2-3 days   Continue regular diet   Take Miralax 17g daily  Call [phone] for an appointment   Please follow up with Dr. Johnson within 2 weeks  If you develop abdominal pain, fever, chills, vomiting, inability to pass gas please call

## 2022-08-24 NOTE — DISCHARGE NOTE PROVIDER - NSDCFUSCHEDAPPT_GEN_ALL_CORE_FT
[facility] Physician [facility]  [address]  Scheduled Appointment: 09/01/2022    [place]ivo Key  [facility] Physician [facility]  [address]  Scheduled Appointment: 09/06/2022    Rohini Alvarado  [facility]  [address]  Scheduled Appointment: 09/12/2022     [facility] Physician [facility]  [address]  Scheduled Appointment: 09/01/2022    Rohini Alvarado  [facility] Physician [facility]  [facility]  Scheduled Appointment: 09/12/2022    Meghan Ellis  [facility] Physician [facility]  FAMILYMED [address]  Scheduled Appointment: 09/26/2022    [place]ivo Key  [facility] Physician [facility]  R[address]UM [address]  Scheduled Appointment: 10/03/2022

## 2022-08-24 NOTE — DISCHARGE NOTE PROVIDER - NSDCMRMEDTOKEN_GEN_ALL_CORE_FT
Claritin 10 mg oral tablet: 1 tab(s) orally once a day  Flonase 50 mcg/inh nasal spray: 1 spray(s) nasal once a day, As Needed  gabapentin 100 mg oral capsule: 3 cap(s) orally once a day (at bedtime)  methocarbamol 500 mg oral tablet: 1 tab(s) orally once a day (at bedtime)  Multiple Vitamins oral tablet: 1 tab(s) orally once a day  omeprazole 40 mg oral delayed release capsule: 1  orally once a day  outpatient Physical therapy:   oxycodone-acetaminophen 2.5 mg-325 mg oral tablet: 1 tab(s) orally every 8 hours, As Needed  Vitamin C: orally once a day  Vitamin D3: orally once a day  Yuvafem 10 mcg vaginal tablet: 1 tab(s) vaginal 2 times a week   acetaminophen 325 mg oral tablet: 2 tab(s) orally every 6 hours, As needed, Mild Pain (1 - 3)  famotidine 10 mg oral tablet: 1 tab(s) orally once a day (at bedtime)  Flonase 50 mcg/inh nasal spray: 1 spray(s) nasal once a day, As Needed  gabapentin 100 mg oral capsule: 3 cap(s) orally once a day (at bedtime)  loratadine 10 mg oral tablet: 1 tab(s) orally once a day  methocarbamol 500 mg oral tablet: 1 tab(s) orally once a day (at bedtime)  Multiple Vitamins oral tablet: 1 tab(s) orally once a day  omeprazole 40 mg oral delayed release capsule: 1  orally once a day  outpatient Physical therapy:   Vitamin C: orally once a day  Vitamin D3: orally once a day  Yuvafem 10 mcg vaginal tablet: 1 tab(s) vaginal 2 times a week   acetaminophen 325 mg oral tablet: 2 tab(s) orally every 6 hours, As needed, Mild Pain (1 - 3)  famotidine 10 mg oral tablet: 1 tab(s) orally once a day (at bedtime)  Flonase 50 mcg/inh nasal spray: 1 spray(s) nasal once a day, As Needed  gabapentin 100 mg oral capsule: 3 cap(s) orally once a day (at bedtime)  loratadine 10 mg oral tablet: 1 tab(s) orally once a day  methocarbamol 500 mg oral tablet: 1 tab(s) orally once a day (at bedtime)  Multiple Vitamins oral tablet: 1 tab(s) orally once a day  omeprazole 40 mg oral delayed release capsule: 1  orally once a day  outpatient Physical therapy:   polyethylene glycol 3350 oral powder for reconstitution: 17 gram(s) orally once a day  Vitamin C: orally once a day  Vitamin D3: orally once a day  Yuvafem 10 mcg vaginal tablet: 1 tab(s) vaginal 2 times a week

## 2022-08-24 NOTE — PROGRESS NOTE ADULT - SUBJECTIVE AND OBJECTIVE BOX
Subjective:  The patient reports that she is feeling better since the start of her sips and chips. She has not had another episode of loose stools. The patient denies fevers, chills, nausea, vomiting, no loose stool, chest pain, and shortness of pain.       MEDICATIONS  (STANDING):  dextrose 5%. 1000 milliLiter(s) (50 mL/Hr) IV Continuous <Continuous>  dextrose 5%. 1000 milliLiter(s) (50 mL/Hr) IV Continuous <Continuous>  dextrose 5%. 1000 milliLiter(s) (125 mL/Hr) IV Continuous <Continuous>  dextrose 50% Injectable 25 Gram(s) IV Push once  dextrose 50% Injectable 25 Gram(s) IV Push once  dextrose 50% Injectable 12.5 Gram(s) IV Push once  dextrose 50% Injectable 25 Gram(s) IV Push once  dextrose Oral Gel 15 Gram(s) Oral once  enoxaparin Injectable 40 milliGRAM(s) SubCutaneous every 24 hours  gabapentin 300 milliGRAM(s) Oral at bedtime  glucagon  Injectable 1 milliGRAM(s) IntraMuscular once  methocarbamol 500 milliGRAM(s) Oral at bedtime  pantoprazole    Tablet 40 milliGRAM(s) Oral before breakfast    MEDICATIONS  (PRN):  acetaminophen     Tablet .. 650 milliGRAM(s) Oral every 6 hours PRN Mild Pain (1 - 3)  fluticasone propionate (50 MICROgram(s)/actuation) Nasal Spray - Peds 1 Spray(s) Alternating Nostrils daily PRN nasal symptoms  ketorolac   Injectable 10 milliGRAM(s) IV Push every 4 hours PRN Moderate Pain (4 - 6)  ondansetron Injectable 4 milliGRAM(s) IV Push every 4 hours PRN Nausea and/or Vomiting      Vital Signs Last 24 Hrs  T(C): 36.8 (23 Aug 2022 23:54), Max: 37.1 (23 Aug 2022 19:50)  T(F): 98.3 (23 Aug 2022 23:54), Max: 98.8 (23 Aug 2022 19:50)  HR: 69 (23 Aug 2022 23:54) (61 - 81)  BP: 104/58 (23 Aug 2022 23:54) (83/47 - 127/78)  BP(mean): --  RR: 18 (23 Aug 2022 23:54) (18 - 18)  SpO2: 97% (23 Aug 2022 23:54) (95% - 97%)    Parameters below as of 23 Aug 2022 23:54  Patient On (Oxygen Delivery Method): room air    08-23  -  08-24  --------------------------------------------------------  IN:    dextrose 5%: 625 mL  Total IN: 625 mL    OUT:  Total OUT: 0 mL    Total NET: 625 mL    Physical Exam:    Constitutional: NAD, lying in bed  Respiratory: Respirations non-labored, no accessory muscle use  Cardiovascular: Regular rate & rhythm  Gastrointestinal: Soft, non-tender, non-distended      LABS:                        11.6   8.20  )-----------( 241      ( 23 Aug 2022 05:10 )             35.8     08-23    139  |  104  |  12.0  ----------------------------<  47<LL>  3.3<L>   |  17.0<L>  |  0.75    Ca    8.6      23 Aug 2022 05:10    TPro  5.7<L>  /  Alb  3.4  /  TBili  0.4  /  DBili  x   /  AST  17  /  ALT  13  /  AlkPhos  47  08-23    PT/INR - ( 23 Aug 2022 05:10 )   PT: 12.7 sec;   INR: 1.09 ratio         PTT - ( 23 Aug 2022 05:10 )  PTT:34.7 sec    the patient present with enteritis.  - sips and chips  - pain control  - IVF for hydration   - monitor blood sugars due events of hypoglycemia  - monitor electroyles

## 2022-08-25 ENCOUNTER — TRANSCRIPTION ENCOUNTER (OUTPATIENT)
Age: 52
End: 2022-08-25

## 2022-08-25 VITALS
OXYGEN SATURATION: 96 % | TEMPERATURE: 98 F | SYSTOLIC BLOOD PRESSURE: 132 MMHG | HEART RATE: 79 BPM | RESPIRATION RATE: 18 BRPM | DIASTOLIC BLOOD PRESSURE: 83 MMHG

## 2022-08-25 LAB
ANION GAP SERPL CALC-SCNC: 11 MMOL/L — SIGNIFICANT CHANGE UP (ref 5–17)
BUN SERPL-MCNC: 6.8 MG/DL — LOW (ref 8–20)
CALCIUM SERPL-MCNC: 9.4 MG/DL — SIGNIFICANT CHANGE UP (ref 8.4–10.5)
CHLORIDE SERPL-SCNC: 101 MMOL/L — SIGNIFICANT CHANGE UP (ref 98–107)
CO2 SERPL-SCNC: 26 MMOL/L — SIGNIFICANT CHANGE UP (ref 22–29)
CREAT SERPL-MCNC: 0.81 MG/DL — SIGNIFICANT CHANGE UP (ref 0.5–1.3)
EGFR: 88 ML/MIN/1.73M2 — SIGNIFICANT CHANGE UP
GLUCOSE SERPL-MCNC: 95 MG/DL — SIGNIFICANT CHANGE UP (ref 70–99)
HCT VFR BLD CALC: 39.8 % — SIGNIFICANT CHANGE UP (ref 34.5–45)
HGB BLD-MCNC: 12.6 G/DL — SIGNIFICANT CHANGE UP (ref 11.5–15.5)
MAGNESIUM SERPL-MCNC: 2 MG/DL — SIGNIFICANT CHANGE UP (ref 1.6–2.6)
MCHC RBC-ENTMCNC: 29.4 PG — SIGNIFICANT CHANGE UP (ref 27–34)
MCHC RBC-ENTMCNC: 31.7 GM/DL — LOW (ref 32–36)
MCV RBC AUTO: 93 FL — SIGNIFICANT CHANGE UP (ref 80–100)
PHOSPHATE SERPL-MCNC: 4 MG/DL — SIGNIFICANT CHANGE UP (ref 2.4–4.7)
PLATELET # BLD AUTO: 283 K/UL — SIGNIFICANT CHANGE UP (ref 150–400)
POTASSIUM SERPL-MCNC: 4 MMOL/L — SIGNIFICANT CHANGE UP (ref 3.5–5.3)
POTASSIUM SERPL-SCNC: 4 MMOL/L — SIGNIFICANT CHANGE UP (ref 3.5–5.3)
RBC # BLD: 4.28 M/UL — SIGNIFICANT CHANGE UP (ref 3.8–5.2)
RBC # FLD: 12.8 % — SIGNIFICANT CHANGE UP (ref 10.3–14.5)
SODIUM SERPL-SCNC: 138 MMOL/L — SIGNIFICANT CHANGE UP (ref 135–145)
WBC # BLD: 4.88 K/UL — SIGNIFICANT CHANGE UP (ref 3.8–10.5)
WBC # FLD AUTO: 4.88 K/UL — SIGNIFICANT CHANGE UP (ref 3.8–10.5)

## 2022-08-25 PROCEDURE — 74018 RADEX ABDOMEN 1 VIEW: CPT

## 2022-08-25 PROCEDURE — 84100 ASSAY OF PHOSPHORUS: CPT

## 2022-08-25 PROCEDURE — 85730 THROMBOPLASTIN TIME PARTIAL: CPT

## 2022-08-25 PROCEDURE — 0225U NFCT DS DNA&RNA 21 SARSCOV2: CPT

## 2022-08-25 PROCEDURE — 96375 TX/PRO/DX INJ NEW DRUG ADDON: CPT

## 2022-08-25 PROCEDURE — 36415 COLL VENOUS BLD VENIPUNCTURE: CPT

## 2022-08-25 PROCEDURE — 86850 RBC ANTIBODY SCREEN: CPT

## 2022-08-25 PROCEDURE — 85025 COMPLETE CBC W/AUTO DIFF WBC: CPT

## 2022-08-25 PROCEDURE — 99285 EMERGENCY DEPT VISIT HI MDM: CPT | Mod: 25

## 2022-08-25 PROCEDURE — 86900 BLOOD TYPING SEROLOGIC ABO: CPT

## 2022-08-25 PROCEDURE — 86901 BLOOD TYPING SEROLOGIC RH(D): CPT

## 2022-08-25 PROCEDURE — 93005 ELECTROCARDIOGRAM TRACING: CPT

## 2022-08-25 PROCEDURE — 74177 CT ABD & PELVIS W/CONTRAST: CPT | Mod: MA

## 2022-08-25 PROCEDURE — 85610 PROTHROMBIN TIME: CPT

## 2022-08-25 PROCEDURE — 94640 AIRWAY INHALATION TREATMENT: CPT

## 2022-08-25 PROCEDURE — 96374 THER/PROPH/DIAG INJ IV PUSH: CPT

## 2022-08-25 PROCEDURE — 80048 BASIC METABOLIC PNL TOTAL CA: CPT

## 2022-08-25 PROCEDURE — 80053 COMPREHEN METABOLIC PANEL: CPT

## 2022-08-25 PROCEDURE — 85027 COMPLETE CBC AUTOMATED: CPT

## 2022-08-25 PROCEDURE — 71045 X-RAY EXAM CHEST 1 VIEW: CPT

## 2022-08-25 PROCEDURE — 96376 TX/PRO/DX INJ SAME DRUG ADON: CPT

## 2022-08-25 PROCEDURE — 82962 GLUCOSE BLOOD TEST: CPT

## 2022-08-25 PROCEDURE — 83735 ASSAY OF MAGNESIUM: CPT

## 2022-08-25 RX ORDER — POLYETHYLENE GLYCOL 3350 17 G/17G
17 POWDER, FOR SOLUTION ORAL
Qty: 0 | Refills: 0 | DISCHARGE
Start: 2022-08-25

## 2022-08-25 RX ORDER — POLYETHYLENE GLYCOL 3350 17 G/17G
17 POWDER, FOR SOLUTION ORAL DAILY
Refills: 0 | Status: DISCONTINUED | OUTPATIENT
Start: 2022-08-25 | End: 2022-08-25

## 2022-08-25 RX ORDER — LORATADINE 10 MG/1
1 TABLET ORAL
Qty: 0 | Refills: 0 | DISCHARGE

## 2022-08-25 RX ORDER — LORATADINE 10 MG/1
1 TABLET ORAL
Qty: 0 | Refills: 0 | DISCHARGE
Start: 2022-08-25

## 2022-08-25 RX ORDER — FAMOTIDINE 10 MG/ML
1 INJECTION INTRAVENOUS
Qty: 0 | Refills: 0 | DISCHARGE
Start: 2022-08-25

## 2022-08-25 RX ORDER — ACETAMINOPHEN 500 MG
2 TABLET ORAL
Qty: 0 | Refills: 0 | DISCHARGE
Start: 2022-08-25

## 2022-08-25 RX ADMIN — LORATADINE 10 MILLIGRAM(S): 10 TABLET ORAL at 11:18

## 2022-08-25 RX ADMIN — Medication 1 SPRAY(S): at 11:21

## 2022-08-25 RX ADMIN — PANTOPRAZOLE SODIUM 40 MILLIGRAM(S): 20 TABLET, DELAYED RELEASE ORAL at 06:07

## 2022-08-25 NOTE — PROGRESS NOTE ADULT - ATTENDING COMMENTS
Patient seen and examined at bedside this morning. Patient reports feeling improved in comparison to yesterday, she reports mild nausea this morning, no episodes of emesis, reports flatus, denies bowel movement. Abdomen is soft, nondistended, minimal tenderness on deep palpation over the LLQ, no guarding or rebound tenderness.    Vital Signs Last 24 Hrs  T(C): 36.7 (23 Aug 2022 08:49), Max: 37 (22 Aug 2022 11:34)  T(F): 98 (23 Aug 2022 08:49), Max: 98.6 (22 Aug 2022 11:34)  HR: 75 (23 Aug 2022 08:49) (68 - 83)  BP: 107/62 (23 Aug 2022 08:49) (83/47 - 111/73)  BP(mean): --  RR: 18 (23 Aug 2022 08:49) (18 - 20)  SpO2: 96% (23 Aug 2022 08:49) (96% - 99%)    Parameters below as of 23 Aug 2022 08:49  Patient On (Oxygen Delivery Method): room air                          11.6   8.20  )-----------( 241      ( 23 Aug 2022 05:10 )             35.8     CT A/P  BOWEL: Multiple mildly dilated fluid-filled small bowel loops, compatible with small bowel obstruction. Transition point identified in right medial lower abdomen (46-49:5). Jejunal folds distal to the transition point appears collapsed, limiting detailed evaluation. No significant mesenteric edema, pneumatosis or portal venous gas. Status post resection of the mid sigmoid colon with colocolonic anastomosis. Normal appendix.  PERITONEUM: No ascites.  VESSELS: Within normal limits.  RETROPERITONEUM/LYMPH NODES: No lymphadenopathy.  ABDOMINAL WALL: A small fat containing umbilical hernia is noted. Reidentified soft tissue calcification at the level of the right posteromedial gluteal fold  BONES: Mild multilevel degenerative spine. Degenerative disc disease L5-S1 containing vacuum phenomena.    IMPRESSION:    Findings compatible with small bowel obstruction. Transition point identified in right medial lower abdomen (46-49:5). Jejunal folds distal to the transition point appears collapsed, limiting detailed evaluation. No significant mesenteric edema, pneumatosis or portal venous gas. Correlate with lactic acid.    Mild bladder wall thickening, difficult to assess secondary to inadequate distention. Correlate with urinalysis and lab values to assess for cystitis and/or ascending urinary tract infection.    A/P  Patient having bowel function, likely pSBO or revolving SBO  Advance to sips of water and ice chips  Continue to monitor bowel function  Correct potassium  OOB with frequent ambulation  DVT ppx while admitted  Continue supportive care
Patient seen and examined. Feeling well. Passing flatus. No BM yesterday. Plan for discharge home today with follow up in office.

## 2022-08-25 NOTE — DISCHARGE NOTE NURSING/CASE MANAGEMENT/SOCIAL WORK - PATIENT PORTAL LINK FT
You can access the FollowMyHealth Patient Portal offered by [facility] by registering at the following website: [email or web address]. By joining [employer]’s FollowMyHealth portal, you will also be able to view your health information using other applications (apps) compatible with our system.

## 2022-08-25 NOTE — PROGRESS NOTE ADULT - SUBJECTIVE AND OBJECTIVE BOX
INTERVAL HPI/OVERNIGHT EVENTS:    Patient evaluated at bedside. No acute distress. No acute events overnight.  Tolerating diet   Passing flatus      MEDICATIONS  (STANDING):  dextrose 5%. 1000 milliLiter(s) (50 mL/Hr) IV Continuous <Continuous>  dextrose 50% Injectable 25 Gram(s) IV Push once  dextrose 50% Injectable 25 Gram(s) IV Push once  dextrose 50% Injectable 12.5 Gram(s) IV Push once  dextrose 50% Injectable 25 Gram(s) IV Push once  dextrose Oral Gel 15 Gram(s) Oral once  enoxaparin Injectable 40 milliGRAM(s) SubCutaneous every 24 hours  famotidine    Tablet 10 milliGRAM(s) Oral at bedtime  gabapentin 300 milliGRAM(s) Oral at bedtime  glucagon  Injectable 1 milliGRAM(s) IntraMuscular once  loratadine 10 milliGRAM(s) Oral daily  methocarbamol 500 milliGRAM(s) Oral at bedtime  pantoprazole    Tablet 40 milliGRAM(s) Oral before breakfast    MEDICATIONS  (PRN):  acetaminophen     Tablet .. 650 milliGRAM(s) Oral every 6 hours PRN Mild Pain (1 - 3)  fluticasone propionate (50 MICROgram(s)/actuation) Nasal Spray - Peds 1 Spray(s) Alternating Nostrils daily PRN nasal symptoms  ketorolac   Injectable 10 milliGRAM(s) IV Push every 4 hours PRN Moderate Pain (4 - 6)  ondansetron Injectable 4 milliGRAM(s) IV Push every 4 hours PRN Nausea and/or Vomiting      Vital Signs Last 24 Hrs  T(C): 36.9 (25 Aug 2022 00:35), Max: 36.9 (25 Aug 2022 00:35)  T(F): 98.4 (25 Aug 2022 00:35), Max: 98.4 (25 Aug 2022 00:35)  HR: 66 (25 Aug 2022 00:35) (52 - 70)  BP: 100/60 (25 Aug 2022 00:35) (97/60 - 131/79)  BP(mean): --  RR: 17 (25 Aug 2022 00:35) (17 - 18)  SpO2: 94% (25 Aug 2022 00:35) (94% - 96%)    Parameters below as of 25 Aug 2022 00:35  Patient On (Oxygen Delivery Method): room air        Physical Exam:    Constitutional: NAD, lying in bed  Respiratory: Respirations non-labored, no accessory muscle use  Cardiovascular: Regular rate & rhythm  Gastrointestinal: Soft, non-tender, mild-distention  LE: no edema      I&O's Detail    23 Aug 2022 07:01  -  24 Aug 2022 07:00  --------------------------------------------------------  IN:    dextrose 5%: 625 mL  Total IN: 625 mL    OUT:  Total OUT: 0 mL    Total NET: 625 mL          LABS:                        10.9   4.15  )-----------( 223      ( 24 Aug 2022 05:55 )             33.2     08-24    140  |  105  |  5.0<L>  ----------------------------<  108<H>  3.4<L>   |  24.0  |  0.77    Ca    8.6      24 Aug 2022 05:55  Phos  3.0     08-24  Mg     1.8     08-24    TPro  5.7<L>  /  Alb  3.4  /  TBili  0.4  /  DBili  x   /  AST  17  /  ALT  13  /  AlkPhos  47  08-23    PT/INR - ( 23 Aug 2022 05:10 )   PT: 12.7 sec;   INR: 1.09 ratio         PTT - ( 23 Aug 2022 05:10 )  PTT:34.7 sec      RADIOLOGY & ADDITIONAL STUDIES: INTERVAL HPI/OVERNIGHT EVENTS:    Patient evaluated at bedside. No acute distress. No acute events overnight.  Tolerating diet   Passing flatus      MEDICATIONS  (STANDING):  dextrose 5%. 1000 milliLiter(s) (50 mL/Hr) IV Continuous <Continuous>  dextrose 50% Injectable 25 Gram(s) IV Push once  dextrose 50% Injectable 25 Gram(s) IV Push once  dextrose 50% Injectable 12.5 Gram(s) IV Push once  dextrose 50% Injectable 25 Gram(s) IV Push once  dextrose Oral Gel 15 Gram(s) Oral once  enoxaparin Injectable 40 milliGRAM(s) SubCutaneous every 24 hours  famotidine    Tablet 10 milliGRAM(s) Oral at bedtime  gabapentin 300 milliGRAM(s) Oral at bedtime  glucagon  Injectable 1 milliGRAM(s) IntraMuscular once  loratadine 10 milliGRAM(s) Oral daily  methocarbamol 500 milliGRAM(s) Oral at bedtime  pantoprazole    Tablet 40 milliGRAM(s) Oral before breakfast    MEDICATIONS  (PRN):  acetaminophen     Tablet .. 650 milliGRAM(s) Oral every 6 hours PRN Mild Pain (1 - 3)  fluticasone propionate (50 MICROgram(s)/actuation) Nasal Spray - Peds 1 Spray(s) Alternating Nostrils daily PRN nasal symptoms  ketorolac   Injectable 10 milliGRAM(s) IV Push every 4 hours PRN Moderate Pain (4 - 6)  ondansetron Injectable 4 milliGRAM(s) IV Push every 4 hours PRN Nausea and/or Vomiting      Vital Signs Last 24 Hrs  T(C): 36.9 (25 Aug 2022 00:35), Max: 36.9 (25 Aug 2022 00:35)  T(F): 98.4 (25 Aug 2022 00:35), Max: 98.4 (25 Aug 2022 00:35)  HR: 66 (25 Aug 2022 00:35) (52 - 70)  BP: 100/60 (25 Aug 2022 00:35) (97/60 - 131/79)  BP(mean): --  RR: 17 (25 Aug 2022 00:35) (17 - 18)  SpO2: 94% (25 Aug 2022 00:35) (94% - 96%)    Parameters below as of 25 Aug 2022 00:35  Patient On (Oxygen Delivery Method): room air        Physical Exam:    Constitutional: NAD, lying in bed  Respiratory: Respirations non-labored, no accessory muscle use  Cardiovascular: Regular rate & rhythm  Gastrointestinal: Soft, non-tender, mild-distention  LE: no edema      I&O's Detail    23 Aug 2022 07:01  -  24 Aug 2022 07:00  --------------------------------------------------------  IN:    dextrose 5%: 625 mL  Total IN: 625 mL    OUT:  Total OUT: 0 mL    Total NET: 625 mL      LABS:                        10.9   4.15  )-----------( 223      ( 24 Aug 2022 05:55 )             33.2     08-24    140  |  105  |  5.0<L>  ----------------------------<  108<H>  3.4<L>   |  24.0  |  0.77    Ca    8.6      24 Aug 2022 05:55  Phos  3.0     08-24  Mg     1.8     08-24    TPro  5.7<L>  /  Alb  3.4  /  TBili  0.4  /  DBili  x   /  AST  17  /  ALT  13  /  AlkPhos  47  08-23    PT/INR - ( 23 Aug 2022 05:10 )   PT: 12.7 sec;   INR: 1.09 ratio         PTT - ( 23 Aug 2022 05:10 )  PTT:34.7 sec      RADIOLOGY & ADDITIONAL STUDIES:

## 2022-08-25 NOTE — PROGRESS NOTE ADULT - ASSESSMENT
51y Female with PSHx of L colectomy (2021) , 3x c section, hysterectomy (2/2021)  present to ED with presented to ED of 1 day of nausea/vomiting. patient is not clinically obstructed passing flatus and having BM. CT scan with IV contrast SBO with transition point on R medial lower abdomen with mild dilation of small bowel loops. patient symptoms and imaging consistent with partial SBO , gastrografin challenge shows contrast in colon, now tolerating diet, and pain improved,.    Plan:   Continue  pain control with limited narcotic use   Encourage OOB / ambulate  Monitor BF   serial abdominal exam   DVT ppx   Continue home meds     Dispo: Discharge home today
51y Female with PSHx of L colectomy (2021) , 3x c section, hysterectomy (2/2021)  present to ED with presented to ED of 1 day of nausea/vomiting. patient is not clinically obstructed passing flatus and having BM. CT scan with IV contrast SBO with transition point on R medial lower abdomen with mild dilation of small bowel loops. patient symptoms and imaging consistent with partial SBO , gastrografin challenge shows contrast in colon     Plan:   NPO/IV fluids   pain control with limited narcotic use   Antiemetics   Encourage OOB / ambulate  Monitor BF   serial abdominal exam   DVT ppx   Continue home meds

## 2022-08-26 ENCOUNTER — APPOINTMENT (OUTPATIENT)
Dept: RHEUMATOLOGY | Facility: CLINIC | Age: 52
End: 2022-08-26

## 2022-08-26 ENCOUNTER — NON-APPOINTMENT (OUTPATIENT)
Age: 52
End: 2022-08-26

## 2022-08-29 ENCOUNTER — NON-APPOINTMENT (OUTPATIENT)
Age: 52
End: 2022-08-29

## 2022-09-01 ENCOUNTER — APPOINTMENT (OUTPATIENT)
Dept: CT IMAGING | Facility: CLINIC | Age: 52
End: 2022-09-01

## 2022-09-01 ENCOUNTER — OUTPATIENT (OUTPATIENT)
Dept: OUTPATIENT SERVICES | Facility: HOSPITAL | Age: 52
LOS: 1 days | End: 2022-09-01
Payer: MEDICAID

## 2022-09-01 DIAGNOSIS — C18.7 MALIGNANT NEOPLASM OF SIGMOID COLON: ICD-10-CM

## 2022-09-01 DIAGNOSIS — Z98.890 OTHER SPECIFIED POSTPROCEDURAL STATES: Chronic | ICD-10-CM

## 2022-09-01 DIAGNOSIS — Z98.891 HISTORY OF UTERINE SCAR FROM PREVIOUS SURGERY: Chronic | ICD-10-CM

## 2022-09-01 DIAGNOSIS — Z90.710 ACQUIRED ABSENCE OF BOTH CERVIX AND UTERUS: Chronic | ICD-10-CM

## 2022-09-01 DIAGNOSIS — Z90.49 ACQUIRED ABSENCE OF OTHER SPECIFIED PARTS OF DIGESTIVE TRACT: Chronic | ICD-10-CM

## 2022-09-01 PROCEDURE — 71260 CT THORAX DX C+: CPT | Mod: 26

## 2022-09-01 PROCEDURE — 71260 CT THORAX DX C+: CPT

## 2022-09-07 ENCOUNTER — OUTPATIENT (OUTPATIENT)
Dept: OUTPATIENT SERVICES | Facility: HOSPITAL | Age: 52
LOS: 1 days | Discharge: HOME | End: 2022-09-07

## 2022-09-07 DIAGNOSIS — Z98.890 OTHER SPECIFIED POSTPROCEDURAL STATES: Chronic | ICD-10-CM

## 2022-09-07 DIAGNOSIS — Z98.891 HISTORY OF UTERINE SCAR FROM PREVIOUS SURGERY: Chronic | ICD-10-CM

## 2022-09-07 DIAGNOSIS — C18.9 MALIGNANT NEOPLASM OF COLON, UNSPECIFIED: ICD-10-CM

## 2022-09-07 DIAGNOSIS — Z90.49 ACQUIRED ABSENCE OF OTHER SPECIFIED PARTS OF DIGESTIVE TRACT: Chronic | ICD-10-CM

## 2022-09-07 DIAGNOSIS — Z90.710 ACQUIRED ABSENCE OF BOTH CERVIX AND UTERUS: Chronic | ICD-10-CM

## 2022-09-12 ENCOUNTER — RESULT REVIEW (OUTPATIENT)
Age: 52
End: 2022-09-12

## 2022-09-12 ENCOUNTER — APPOINTMENT (OUTPATIENT)
Dept: HEMATOLOGY ONCOLOGY | Facility: CLINIC | Age: 52
End: 2022-09-12

## 2022-09-12 VITALS
WEIGHT: 110.05 LBS | HEART RATE: 78 BPM | OXYGEN SATURATION: 96 % | HEIGHT: 61 IN | SYSTOLIC BLOOD PRESSURE: 117 MMHG | DIASTOLIC BLOOD PRESSURE: 80 MMHG | BODY MASS INDEX: 20.78 KG/M2

## 2022-09-12 LAB
BASOPHILS # BLD AUTO: 0.1 K/UL — SIGNIFICANT CHANGE UP (ref 0–0.2)
BASOPHILS NFR BLD AUTO: 1.8 % — SIGNIFICANT CHANGE UP (ref 0–2)
CEA SERPL-MCNC: 1.4 NG/ML
EOSINOPHIL # BLD AUTO: 0.2 K/UL — SIGNIFICANT CHANGE UP (ref 0–0.5)
EOSINOPHIL NFR BLD AUTO: 4 % — SIGNIFICANT CHANGE UP (ref 0–6)
HCT VFR BLD CALC: 40.6 % — SIGNIFICANT CHANGE UP (ref 34.5–45)
HGB BLD-MCNC: 13.7 G/DL — SIGNIFICANT CHANGE UP (ref 11.5–15.5)
LYMPHOCYTES # BLD AUTO: 1.8 K/UL — SIGNIFICANT CHANGE UP (ref 1–3.3)
LYMPHOCYTES # BLD AUTO: 37.1 % — SIGNIFICANT CHANGE UP (ref 13–44)
MCHC RBC-ENTMCNC: 30.4 PG — SIGNIFICANT CHANGE UP (ref 27–34)
MCHC RBC-ENTMCNC: 33.8 G/DL — SIGNIFICANT CHANGE UP (ref 32–36)
MCV RBC AUTO: 90.2 FL — SIGNIFICANT CHANGE UP (ref 80–100)
MONOCYTES # BLD AUTO: 0.6 K/UL — SIGNIFICANT CHANGE UP (ref 0–0.9)
MONOCYTES NFR BLD AUTO: 11.7 % — SIGNIFICANT CHANGE UP (ref 2–14)
NEUTROPHILS # BLD AUTO: 2.2 K/UL — SIGNIFICANT CHANGE UP (ref 1.8–7.4)
NEUTROPHILS NFR BLD AUTO: 45.4 % — SIGNIFICANT CHANGE UP (ref 43–77)
PLATELET # BLD AUTO: 301 K/UL — SIGNIFICANT CHANGE UP (ref 150–400)
RBC # BLD: 4.5 M/UL — SIGNIFICANT CHANGE UP (ref 3.8–5.2)
RBC # FLD: 12.8 % — SIGNIFICANT CHANGE UP (ref 10.3–14.5)
WBC # BLD: 4.8 K/UL — SIGNIFICANT CHANGE UP (ref 3.8–10.5)
WBC # FLD AUTO: 4.8 K/UL — SIGNIFICANT CHANGE UP (ref 3.8–10.5)

## 2022-09-12 PROCEDURE — 99215 OFFICE O/P EST HI 40 MIN: CPT

## 2022-09-13 LAB
ALBUMIN SERPL ELPH-MCNC: 4.4 G/DL
ALP BLD-CCNC: 63 U/L
ALT SERPL-CCNC: 15 U/L
ANION GAP SERPL CALC-SCNC: 13 MMOL/L
AST SERPL-CCNC: 26 U/L
BILIRUB SERPL-MCNC: 0.4 MG/DL
BUN SERPL-MCNC: 12 MG/DL
CALCIUM SERPL-MCNC: 9.6 MG/DL
CHLORIDE SERPL-SCNC: 104 MMOL/L
CO2 SERPL-SCNC: 25 MMOL/L
CREAT SERPL-MCNC: 0.76 MG/DL
EGFR: 95 ML/MIN/1.73M2
GLUCOSE SERPL-MCNC: 86 MG/DL
MAGNESIUM SERPL-MCNC: 2.1 MG/DL
POTASSIUM SERPL-SCNC: 4.2 MMOL/L
PROT SERPL-MCNC: 6.9 G/DL
SODIUM SERPL-SCNC: 142 MMOL/L

## 2022-09-14 ENCOUNTER — NON-APPOINTMENT (OUTPATIENT)
Age: 52
End: 2022-09-14

## 2022-09-14 NOTE — RESULTS/DATA
[FreeTextEntry1] : 9/1/22: CT chest \par IMPRESSION:\par \par Interval resolution of subpleural triangular nodule within posterior left lower lobe . No new or enlarging nodule. No evidence of metastatic disease.\par \par 8/22/22 CT Abdomen \par IMPRESSION:\par \par Findings compatible with small bowel obstruction. Transition point \par identified in right medial lower abdomen (46-49:5). Jejunal folds distal \par to the transition point appears collapsed, limiting detailed evaluation. \par No significant mesenteric edema, pneumatosis or portal venous gas. \par Correlate with lactic acid.\par \par Mild bladder wall thickening, difficult to assess secondary to inadequate \par distention. Correlate with urinalysis and lab values to assess for \par cystitis and/or ascending urinary tract infection.\par \par \par 4/28/22: \par Colonscopy \par Impressions:  \par  Few diverticulosis in the ascending colon-.  \par  Grade/Stage II internal hemorrhoids.  \par  -Anastomosis site 18 cm from anal verge. Random biopsy. NO recurrence.  \par \par 2/25/22 CT Abdomen/chest/pelvis \par IMPRESSION:\par Status post sigmoid colon resection without evidence of recurrent or metastatic disease within the chest, abdomen, or pelvis.\par \par 8/25/21 CT Abdomen Chest Pelvis IMPRESSION: Status post sigmoid resection. No evidence of locoregional recurrence or metastatic disease.\par \par PATHOLOGY 5/28/21 \par - Adenocarcinoma, moderately differentiated.\par - Sigmoid colon \par - Invasive moderately differentiated adenocarcinoma extending to pericolorectal\par adipose tissue (pT3).\par \par MLH1, MSH2, MSH6, PMS2:   Intact nuclear expression These results show low probability of microsatellite instability-high (MSI-H).\par There is no evidence of DNA mismatch repair deficiency in the analyzed tissue, indicating there is a low probability for Delgado\par syndrome (a common cause hereditary non polyposis colorectal\par cancer or HNPCC).\par \par CK-7: focal positive. CK-20, CDX2: positive\par \par COLONOSCOPY: 5/17/2021: Intramucosal adenocarcinoma \par \par CT CAP 5/2021 :  LUNGS AND LARGE AIRWAYS: Patent central airways. There is 3 mm nodular thickening of the minor fissure on image 80, likely postinflammatory. There is a 2 mm calcified granuloma in the left lower lobe on image 125. No confluent airspace opacity is identified. There is no evidence of interstitial lung disease, bronchiectasis, or fibrosis.\par

## 2022-09-14 NOTE — HISTORY OF PRESENT ILLNESS
[de-identified] : 50-year-old female presents to our office with a new diagnosis of Sigmoid colon cancer \par \par She has had a variety of symptoms with chronic pelvic pain over the last 10 years and recently underwent a hysterectomy for adenomyosis by Dr. Cruz in February 2021. Several of her pelvic symptoms did improve. After that she had intermittent blood in her stool and some changes in the caliber of her stool. Her appetite has been fine and she has not had any significant weight loss. She underwent a colonoscopy which showed a mass in the sigmoid colon. \par \par COLONOSCOPY: 5/17/2021: Intramucosal adenocarcinoma \par CT CAP 5/2021 :  LUNGS AND LARGE AIRWAYS: Patent central airways. There is 3 mm nodular thickening of the minor fissure on image 80, likely postinflammatory. There is a 2 mm calcified granuloma in the left lower lobe on image 125. No confluent airspace opacity is identified. There is no evidence of interstitial lung disease, bronchiectasis, or fibrosis.\par \par S/p Robotic sigmoid colon resection for colon cancer on 5/28/21 T3N0 stage II colon cancer, MSI Stable, No high risk features. The patient did not receive adjuvant treatment. Subsequent surveillance imaging in 2/2022 did not reveal evidence of recurrence. Colonosopy in 4/28/22 was also unremarkable. \par  [de-identified] : The patient continues to have abdominal pain and chronic joint pain that is unchanged from previous visit. Otherwise, patient is doing well. Patient recently admitted to the hospital for small bowel obstruction. Patient is having bowel movements.

## 2022-09-14 NOTE — ASSESSMENT
[FreeTextEntry1] : pT3 N0 M0 ADenocarcinoma SIgmoid colon, in Jun 2021 \par \par - Invasive moderately differentiated adenocarcinoma invading through the muscularis propria into pericolorectal tissue.\par - Tattoo is present.\par - Diverticulosis.\par - 24 lymph nodes, negative for carcinoma (0/24).\par - Margins of resection are not involved.\par \par Resected node-negative (stage II) disease, the benefits of chemotherapy are controversial, as is the relative benefit of an oxaliplatin-based as compared with a non-oxaliplatin-based regimen.\par Benefits of chemotherapy is based on high risk features \par Patient does not have any high risk features, No LVI   NO neural invasion 25 LN examined, NO Perforation \par \par GIven no high risk features no benefit of adjuvant chemotherapy\par Recent CT Abd Pelvis from 2/25/22: with no evidence of recurrent or metastatic disease in the CAP \par \par - Today's Labs with Normal WBC \par - F/u visit with labs cbc/ cmp/ CEA q 3months x 2 y and then q 6 months from Y 3-5 \par - CT CAP q 6 months x 5 years and Y3-5 and consider spacing q 8-12 months\par - 1 year colonoscopy showed no evidence of recurrence. \par - Ct imaging reviewed with patient. No evidence of recurrence noted on imaging from 8/2022 and 9/2022.  \par - follow up in 3 months \par

## 2022-09-14 NOTE — REVIEW OF SYSTEMS
[Abdominal Pain] : abdominal pain [Joint Pain] : joint pain [Vomiting] : no vomiting [Skin Rash] : no skin rash [FreeTextEntry2] : as per interval history

## 2022-09-16 ENCOUNTER — TRANSCRIPTION ENCOUNTER (OUTPATIENT)
Age: 52
End: 2022-09-16

## 2022-09-22 ENCOUNTER — APPOINTMENT (OUTPATIENT)
Dept: GASTROENTEROLOGY | Facility: CLINIC | Age: 52
End: 2022-09-22

## 2022-09-22 VITALS
HEART RATE: 80 BPM | BODY MASS INDEX: 20.77 KG/M2 | OXYGEN SATURATION: 98 % | SYSTOLIC BLOOD PRESSURE: 114 MMHG | HEIGHT: 61 IN | RESPIRATION RATE: 14 BRPM | TEMPERATURE: 98.2 F | WEIGHT: 110 LBS | DIASTOLIC BLOOD PRESSURE: 82 MMHG

## 2022-09-22 PROCEDURE — 99214 OFFICE O/P EST MOD 30 MIN: CPT

## 2022-09-22 RX ORDER — POLYETHYLENE GLYCOL-3350 AND ELECTROLYTES WITH FLAVOR PACK 240; 5.84; 2.98; 6.72; 22.72 G/278.26G; G/278.26G; G/278.26G; G/278.26G; G/278.26G
240 POWDER, FOR SOLUTION ORAL
Qty: 1 | Refills: 0 | Status: DISCONTINUED | COMMUNITY
Start: 2022-04-12 | End: 2022-09-22

## 2022-09-22 RX ORDER — PEG-3350, SODIUM SULFATE, SODIUM CHLORIDE, POTASSIUM CHLORIDE, SODIUM ASCORBATE AND ASCORBIC ACID 7.5-2.691G
100 KIT ORAL
Qty: 1 | Refills: 0 | Status: DISCONTINUED | COMMUNITY
Start: 2022-04-08 | End: 2022-09-22

## 2022-09-22 RX ORDER — OMEPRAZOLE 40 MG/1
40 CAPSULE, DELAYED RELEASE ORAL
Refills: 0 | Status: DISCONTINUED | COMMUNITY
End: 2022-09-22

## 2022-09-22 RX ORDER — ESTRADIOL 10 UG/1
10 TABLET, FILM COATED VAGINAL
Qty: 90 | Refills: 1 | Status: DISCONTINUED | COMMUNITY
Start: 2022-04-13 | End: 2022-09-22

## 2022-09-22 RX ORDER — SULFAMETHOXAZOLE AND TRIMETHOPRIM 800; 160 MG/1; MG/1
800-160 TABLET ORAL TWICE DAILY
Qty: 6 | Refills: 1 | Status: DISCONTINUED | COMMUNITY
Start: 2022-07-14 | End: 2022-09-22

## 2022-09-22 RX ORDER — MELOXICAM 15 MG/1
15 TABLET ORAL
Refills: 0 | Status: DISCONTINUED | COMMUNITY
End: 2022-09-22

## 2022-09-22 RX ORDER — FOLIC ACID 1 MG/1
1 TABLET ORAL DAILY
Qty: 90 | Refills: 1 | Status: DISCONTINUED | COMMUNITY
Start: 2022-08-09 | End: 2022-09-22

## 2022-09-22 RX ORDER — OMEPRAZOLE 40 MG/1
40 CAPSULE, DELAYED RELEASE ORAL DAILY
Qty: 90 | Refills: 2 | Status: DISCONTINUED | COMMUNITY
Start: 2021-10-14 | End: 2022-09-22

## 2022-09-22 RX ORDER — METHOTREXATE 2.5 MG/1
2.5 TABLET ORAL
Qty: 24 | Refills: 2 | Status: DISCONTINUED | COMMUNITY
Start: 2022-08-09 | End: 2022-09-22

## 2022-09-22 RX ORDER — GABAPENTIN 100 MG/1
100 CAPSULE ORAL
Qty: 90 | Refills: 0 | Status: DISCONTINUED | COMMUNITY
Start: 2022-03-23 | End: 2022-09-22

## 2022-09-22 RX ORDER — METHYLPREDNISOLONE 4 MG/1
4 TABLET ORAL
Qty: 21 | Refills: 0 | Status: DISCONTINUED | COMMUNITY
Start: 2022-07-27 | End: 2022-09-22

## 2022-09-22 RX ORDER — FLUCONAZOLE 150 MG/1
150 TABLET ORAL
Qty: 1 | Refills: 2 | Status: DISCONTINUED | COMMUNITY
Start: 2022-04-25 | End: 2022-09-22

## 2022-09-22 RX ORDER — MELOXICAM 15 MG/1
15 TABLET ORAL DAILY
Qty: 30 | Refills: 0 | Status: DISCONTINUED | COMMUNITY
Start: 2022-03-09 | End: 2022-09-22

## 2022-09-22 RX ORDER — FAMOTIDINE 40 MG/1
40 TABLET, FILM COATED ORAL
Qty: 30 | Refills: 3 | Status: DISCONTINUED | COMMUNITY
Start: 2022-08-12 | End: 2022-09-22

## 2022-09-22 RX ORDER — FLUTICASONE PROPIONATE 50 MCG
SPRAY, SUSPENSION NASAL
Refills: 0 | Status: DISCONTINUED | COMMUNITY
End: 2022-09-22

## 2022-09-22 RX ORDER — CIPROFLOXACIN HYDROCHLORIDE 250 MG/1
250 TABLET, FILM COATED ORAL
Qty: 6 | Refills: 0 | Status: DISCONTINUED | COMMUNITY
Start: 2022-07-20 | End: 2022-09-22

## 2022-09-22 RX ORDER — METRONIDAZOLE 7.5 MG/G
0.75 GEL VAGINAL
Qty: 1 | Refills: 1 | Status: DISCONTINUED | COMMUNITY
Start: 2022-04-25 | End: 2022-09-22

## 2022-09-22 NOTE — HISTORY OF PRESENT ILLNESS
[FreeTextEntry1] : GERD, hx of colon cancer in 2021 \par      Patient with history of GERD since 2020.  Symptoms were controlled in 2020 with Prilosec 20 mg a day.  Patient stopped the Prilosec as she was recently diagnosed with rheumatoid arthritis.  She was to start methotrexate and knew that the omeprazole can increase the levels of methotrexate.  He stopped the omeprazole in late August 2022.  He then switched to famotidine 40 mg a day.  After she started the methotrexate she had severe heartburn and burning in her throat.  She stopped the methotrexate and the symptoms have become much improved.  EGD in 2020 was normal\par    Colonoscopy in 2021 showed sigmoid mass.  Repeat colonoscopy 2022 after surgery showed diverticulosis and hemorrhoids.  September 2021 CEA was normal CT chest September 2022 was negative.

## 2022-09-22 NOTE — ASSESSMENT
[FreeTextEntry1] : A/P\par The patient is here for GERD which became worse after starting methotrexate.  Methotrexate stopped and symptoms have improved.  Patient states that she her rheumatologist is going to switch her from oral to IV methotrexate to see if she tolerates this better\par \par In the meantime she will take Pepcid 40 mg a day.  She may take Carafate 1 g up to 4 times daily as needed.  If she is having breakthrough symptoms more than 4 times a week then she will go to Pepcid 40 mg twice daily.\par \par Patient will call if heartburn is uncontrolled\par \par \par Patient is due for colonoscopy in 1 year-history of colon cancer 2021\par \par Follow-up in  3 month\par \par \par \par

## 2022-09-22 NOTE — ASSESSMENT
[FreeTextEntry1] : 52 y/o female w/ GERD, sigmoid colon cancer s/p resection 5/2021 referred to rheumatology for elevated KELLY (1:320 DFS70) and RF (18). \par Pt has been having increasing L lower back pain for which she is taking gabapentin 300mg QHS without much relief. Pt has not gone to PT for insurance reasons. Pt also reports feeling of swelling of knees, hands, elbows. Pt was seen by PM&R who increased gabapentin to 600mg QHS and Rx Robaxin PRN.\par Pt has neck, shoulders, hands, hips, knees pains. Pt reports AM stiffness and pain of hands.\par Pt reports pain worse with moving after resting.\par Pt has known herniated disc and L shoulder (rotator cuff and biceps tendons) pending surgery.\par Reports recurrent heat sensation of chest that radiates down to b/l arms. Hx of L hand surgery due to trauma.\par Pt also reports chronic fatigue, oral ulcers, hair loss, dry mouth, muscle weakness.\par Reports areas of erythema of different parts of the body.\par Hx of hysterectomy, colon cancer s/p resection.\par Referred to rheum for positive KELLY and mild positive RF. Normal CRP, CPK, Lyme, thyroid function, thyroid Abs.\par Pt has been physically active throughout her life\par Pt currently takes ibuprofen 600mg PRN, methocarbamol PRN for the pains.\par Pt would like to try acupuncture, yoga and other pain therapies.\par Mother - RA, SLE\par \par Pt has significant polyarthralgia, some of which is explained by imaging (L>R rotator cuff impingement/tendinitis, cervical disc herniation). Pt's hands have OA changes and knee crepitus likely related to soft tissue injury such as meniscus tear. Pt's joint pains are not clearly inflammatory. Pt also has various symptoms that are non-specific.\par \par Patient has minimally positive RF with normal inflammatory marker and inflammatory arthritis, and no erosions or inflammatory changes on imaging - clinically I do not believe pt has RA.\par Pt has positive KELLY, but pt's various symptoms are too non-specific for diagnosis of SLE or other autoimmune diseases at this time.\par \par - Obtain labwork to evaluate for signs of autoimmune inflammatory arthritis, abnormal labwork\par - Obtain XR of b/l hands, knees, feet\par - Will consider advanced imaging based on above results and pt's symptoms\par - Agree with patient in trying non-medication pain therapies such as acupuncture and yoga. Pt is currently taking NSAIDs and muscle relaxants PRN\par - RTC 1 month for follow up\par \par ADDENDUM (9/22/22):\par As written on subsequent telephone notes, labwork with positive RF, CCP with XRs showing inflammatory changes. Based on these additional information, patient is clearly diagnosed with seropositive rheumatoid arthritis and methotrexate is indicated for the patient.\par

## 2022-09-22 NOTE — HISTORY OF PRESENT ILLNESS
[FreeTextEntry1] : 50 y/o female w/ GERD, sigmoid colon cancer s/p resection 5/2021 referred to rheumatology for elevated KELLY (1:320 DFS70) and RF (18). \par Pt has been having increasing L lower back pain for which she is taking gabapentin 300mg QHS without much relief. Pt has not gone to PT for insurance reasons. Pt also reports feeling of swelling of knees, hands, elbows. Pt was seen by PM&R who increased gabapentin to 600mg QHS and Rx Robaxin PRN.\par Pt has neck, shoulders, hands, hips, knees pains. Pt reports AM stiffness and pain of hands.\par Pt reports pain worse with moving after resting.\par Pt has known herniated disc and L shoulder (rotator cuff and biceps tendons) pending surgery.\par Reports recurrent heat sensation of chest that radiates down to b/l arms. Hx of L hand surgery due to trauma.\par Pt also reports chronic fatigue, oral ulcers, hair loss, dry mouth, muscle weakness.\par Reports areas of erythema of different parts of the body.\par Hx of hysterectomy, colon cancer s/p resection.\par Referred to rheum for positive KELLY and mild positive RF. Normal CRP, CPK, Lyme, thyroid function, thyroid Abs.\par Pt has been physically active throughout her life\par Pt currently takes ibuprofen 600mg PRN, methocarbamol PRN for the pains.\par Pt would like to try acupuncture, yoga and other pain therapies.\par \par Mother - RA, SLE

## 2022-09-22 NOTE — PHYSICAL EXAM
[Alert] : alert [Healthy Appearing] : healthy appearing [No Acute Distress] : no acute distress [Normal Appearance] : the appearance of the neck was normal [No Neck Mass] : no neck mass was observed [No Respiratory Distress] : no respiratory distress [No Acc Muscle Use] : no accessory muscle use [Respiration, Rhythm And Depth] : normal respiratory rhythm and effort [Auscultation Breath Sounds / Voice Sounds] : lungs were clear to auscultation bilaterally [Heart Rate And Rhythm] : heart rate was normal and rhythm regular [Normal S1, S2] : normal S1 and S2 [Murmurs] : no murmurs [Bowel Sounds] : normal bowel sounds [Abdomen Tenderness] : non-tender [No Masses] : no abdominal mass palpated [Oriented To Time, Place, And Person] : oriented to person, place, and time

## 2022-09-23 RX ORDER — METHOTREXATE 25 MG/.5ML
25 INJECTION, SOLUTION SUBCUTANEOUS
Qty: 4 | Refills: 2 | Status: DISCONTINUED | COMMUNITY
Start: 2022-09-21 | End: 2022-09-22

## 2022-09-26 ENCOUNTER — APPOINTMENT (OUTPATIENT)
Dept: FAMILY MEDICINE | Facility: CLINIC | Age: 52
End: 2022-09-26

## 2022-09-26 VITALS
HEIGHT: 61 IN | RESPIRATION RATE: 16 BRPM | WEIGHT: 109 LBS | SYSTOLIC BLOOD PRESSURE: 118 MMHG | DIASTOLIC BLOOD PRESSURE: 70 MMHG | BODY MASS INDEX: 20.58 KG/M2 | TEMPERATURE: 97.9 F | OXYGEN SATURATION: 98 % | HEART RATE: 93 BPM

## 2022-09-26 DIAGNOSIS — R53.81 CHRONIC FATIGUE, UNSPECIFIED: ICD-10-CM

## 2022-09-26 DIAGNOSIS — Z02.71 ENCOUNTER FOR DISABILITY DETERMINATION: ICD-10-CM

## 2022-09-26 DIAGNOSIS — K56.609 UNSPECIFIED INTESTINAL OBSTRUCTION, UNSPECIFIED AS TO PARTIAL VERSUS COMPLETE OBSTRUCTION: ICD-10-CM

## 2022-09-26 DIAGNOSIS — R53.82 CHRONIC FATIGUE, UNSPECIFIED: ICD-10-CM

## 2022-09-26 PROCEDURE — 99214 OFFICE O/P EST MOD 30 MIN: CPT

## 2022-09-26 NOTE — HEALTH RISK ASSESSMENT
[Never] : Never [Yes] : Yes [No] : In the past 12 months have you used drugs other than those required for medical reasons? No [No falls in past year] : Patient reported no falls in the past year [0] : 2) Feeling down, depressed, or hopeless: Not at all (0) [de-identified] : Oncology, GI, PM&R, NeuroSx. [de-identified] : social [[id number]] : 0

## 2022-09-26 NOTE — HISTORY OF PRESENT ILLNESS
[FreeTextEntry1] : Encounter for Disability determination. [de-identified] : 50 y/o F with medical hx significant for  GERD, chronic sinusitis, sigmoid colon cancer s/p colectomy, and adenomyosis s/p hysterectomy/salpingectomy.\par Pt was seen in 07/2022 with Chronic fatigue with adequate sleeping, Joints aches and stiffness in AM, Shoulders, elbows, Knees, hands, She was seen by Rheumatology and started on MTX, which did not tolerate. Now on the process to start injectable MTX.\par Admitted 8/22/22 with [facility], d/c on 8/25/22.\par Seen by Gyn. She is on Yuvafem. She reports persistent dyspareunia.\par She has Chronic Shoulder/ neck pain> Schedule surgery postponed with Dr Abebe.\par She f/up's regularly with Oncology, GI.\par In the process for temporary Disability.

## 2022-09-26 NOTE — ASSESSMENT
[FreeTextEntry1] : 52 y/o F with Dx Colon cancer post Robotic sigmoid resection on 05/28/21. No adjuvant chemo recommended. Doing well, TRH, B/L Salpingectomy, presents today for f/up\par \par # Hospital F/up> [facility]\par -Admitted on 08/22/22-08/25/22\par \par #Chronic Fatigue, malaise> Rheumatoid arthritis:\par -F/up with Rheumatology.\par -Fail MTX PO.\par -On the process for injectable MTX..\par \par Mammo and Gyn: seen now with Dr rios.\par \par Colonoscopy: 05/2021\par -needs to continue surveillance colonosocpy in 1 year\par \par # Colon cancer s/p Robotic sigmoid resection\par -F/up with Oncology\par -Needs CEA q 6 months.\par \par # Lumbar and cervical radiculopathy. :\par -On Gabapentin.\par -Seen by Neurosx, dr Nguyen.\par -Seen by PM&R Dr MEDINA.\par \par B/L Shoulder pain:\par -Seen by Orthopedic< Dr BOBBY\par \par # Encounter for Disability determination> Pt apply to social security\par

## 2022-09-30 ENCOUNTER — APPOINTMENT (OUTPATIENT)
Dept: FAMILY MEDICINE | Facility: CLINIC | Age: 52
End: 2022-09-30

## 2022-09-30 VITALS
OXYGEN SATURATION: 98 % | TEMPERATURE: 98.2 F | WEIGHT: 109 LBS | RESPIRATION RATE: 16 BRPM | SYSTOLIC BLOOD PRESSURE: 120 MMHG | HEIGHT: 61 IN | HEART RATE: 86 BPM | DIASTOLIC BLOOD PRESSURE: 70 MMHG | BODY MASS INDEX: 20.58 KG/M2

## 2022-09-30 PROCEDURE — 90471 IMMUNIZATION ADMIN: CPT

## 2022-09-30 PROCEDURE — 90686 IIV4 VACC NO PRSV 0.5 ML IM: CPT

## 2022-09-30 NOTE — ADDENDUM
[FreeTextEntry1] : FLUE VACCINE ADM. TODAY ON LEFT DELTOID, WELL TOLERATED, TO PLACE ICE PACKS FOR ARM SORENESS. PT DAMIAN.UNDERSTANDING. LUIS RN

## 2022-09-30 NOTE — HISTORY OF PRESENT ILLNESS
[FreeTextEntry1] : PT HERE FOR PFIZER AND FLU VACCINE.  [de-identified] : PT HAS QUESTIONS REGARDING GETTING VACCINES TODAY SINCE SHE IS STARTING AN INJECTION TX ON MONDAY AT RHEUMATOLOGIST OFFICE, PT RECOMMENDED TO HAVE ONLY FLU VACCINE AND CONSULT WITH RHEUMA ON MONDAY WHEN SHE CAN GET HER COVID VACCINE. PT DAMIAN.UNDERSTANDING AND AGREES WITH POC.

## 2022-10-03 ENCOUNTER — APPOINTMENT (OUTPATIENT)
Dept: RHEUMATOLOGY | Facility: CLINIC | Age: 52
End: 2022-10-03

## 2022-10-05 ENCOUNTER — NON-APPOINTMENT (OUTPATIENT)
Age: 52
End: 2022-10-05

## 2022-10-05 ENCOUNTER — APPOINTMENT (OUTPATIENT)
Dept: ORTHOPEDIC SURGERY | Facility: CLINIC | Age: 52
End: 2022-10-05

## 2022-10-05 VITALS
BODY MASS INDEX: 20.96 KG/M2 | DIASTOLIC BLOOD PRESSURE: 82 MMHG | SYSTOLIC BLOOD PRESSURE: 135 MMHG | WEIGHT: 111 LBS | HEIGHT: 61 IN | HEART RATE: 85 BPM

## 2022-10-05 DIAGNOSIS — M25.561 PAIN IN RIGHT KNEE: ICD-10-CM

## 2022-10-05 PROCEDURE — 99213 OFFICE O/P EST LOW 20 MIN: CPT

## 2022-10-06 ENCOUNTER — APPOINTMENT (OUTPATIENT)
Dept: ORTHOPEDIC SURGERY | Facility: CLINIC | Age: 52
End: 2022-10-06

## 2022-10-06 VITALS
SYSTOLIC BLOOD PRESSURE: 113 MMHG | HEART RATE: 80 BPM | WEIGHT: 111 LBS | HEIGHT: 61 IN | DIASTOLIC BLOOD PRESSURE: 70 MMHG | BODY MASS INDEX: 20.96 KG/M2

## 2022-10-06 DIAGNOSIS — G89.29 MYALGIA, OTHER SITE: ICD-10-CM

## 2022-10-06 DIAGNOSIS — M79.18 MYALGIA, OTHER SITE: ICD-10-CM

## 2022-10-06 PROCEDURE — 72100 X-RAY EXAM L-S SPINE 2/3 VWS: CPT

## 2022-10-06 PROCEDURE — 99213 OFFICE O/P EST LOW 20 MIN: CPT

## 2022-10-06 NOTE — DISCUSSION/SUMMARY
[de-identified] : Patient wishes to exhaust conservative care at this point in time she also wishes to wean herself off gabapentin which I think is reasonable patient wishes to follow-up with  for consideration of an epidural injection.  She states she got great relief with 1 of these back in May for short-term patient is can be provided a physical therapy prescription.  Patient fails repeat course of physical therapy epidural injection and wishes to discuss an L5-S1 right discectomy I be happy to see this very pleasant woman back for further discussion

## 2022-10-06 NOTE — PHYSICAL EXAM
[Normal] : Gait: normal [de-identified] : CONSTITUTIONAL: The patient is a very pleasant individual who is well-nourished and who appears stated age.\par PSYCHIATRIC: The patient is alert and oriented X 3 and in no apparent distress, and participates with orthopedic evaluation well.\par HEAD: Atraumatic and is nonsyndromic in appearance.\par EENT: No visible thyromegaly, EOMI.\par RESPIRATORY: Respiratory rate is regular, not dyspneic on examination.\par LYMPHATICS: There is no inguinal lymphadenopathy\par INTEGUMENTARY: Skin is clean, dry, and intact about the bilateral lower extremities and lumbar spine.\par VASCULAR: There is brisk capillary refill about the bilateral lower extremities.\par NEUROLOGIC: There are no pathologic reflexes. There is a mild decrease in sensation of the right lower extremities on Wartenberg pinwheel examination and light touch consistent with an L5 and S1 distribution. Deep tendon reflexes are well maintained at 2+/4 of the bilateral lower extremities and are symmetric..\par MUSCULOSKELETAL: There is no visible muscular atrophy. Manual motor strength is well maintained in the bilateral lower extremities. Range of motion of lumbar spine is well maintained. The patient ambulates in a non-myelopathic manner.  Positive tension sign and straight leg raise bilaterally. Quad extension, ankle dorsiflexion, EHL, plantar flexion, and ankle eversion are well preserved. Normal secondary orthopaedic exam of bilateral hips, greater trochanteric area, knees and ankles [de-identified] : MRI from May 2022 lumbar spine shows a isolated L5-S1 right disc protrusion.  This was treated at [facility].\par \par MRI from March 2022 cervical spine demonstrates isolated C5-C6 C6-C7 cervical degenerative disc disease\par \par Lumbar x-ray obtained on today's date shows well-maintained lordosis mild loss of disc height at L5-S1

## 2022-10-06 NOTE — HISTORY OF PRESENT ILLNESS
[de-identified] : Very pleasant woman presents for evaluation of a known right sided L5-S1 herniated disc she was initially evaluated back in May in the emergency department had emergent epidural injection provided and she got significant relief the last treatment has been since May.  Overall she is doing very well she does have some shoulder issues and from gross medical standpoint patient is recently recovering from partial colectomy for colon cancer.  Overall her leg has slightly worsened.  No motor deficits.  Patient is on gabapentin [Stable] : stable [0] : a current pain level of 0/10 [10] : a maximum pain level of 10/10 [Intermit.] : ~He/She~ states the symptoms seem to be intermittent [Bending] : worsened by bending [Lifting] : worsened by lifting [Walking] : worsened by walking [Weight Bearing] : worsened by weight bearing [NSAIDs] : relieved by nonsteroidal anti-inflammatory drugs [Rest] : relieved by rest [Ataxia] : no ataxia [Incontinence] : no incontinence [Loss of Dexterity] : good dexterity [Urinary Ret.] : no urinary retention

## 2022-10-07 ENCOUNTER — APPOINTMENT (OUTPATIENT)
Dept: OBGYN | Facility: CLINIC | Age: 52
End: 2022-10-07
Payer: MEDICAID

## 2022-10-07 VITALS
SYSTOLIC BLOOD PRESSURE: 120 MMHG | BODY MASS INDEX: 20.96 KG/M2 | DIASTOLIC BLOOD PRESSURE: 70 MMHG | WEIGHT: 111 LBS | HEIGHT: 61 IN

## 2022-10-07 PROCEDURE — 99213 OFFICE O/P EST LOW 20 MIN: CPT

## 2022-10-10 ENCOUNTER — APPOINTMENT (OUTPATIENT)
Dept: RHEUMATOLOGY | Facility: CLINIC | Age: 52
End: 2022-10-10

## 2022-10-10 VITALS
HEART RATE: 88 BPM | DIASTOLIC BLOOD PRESSURE: 72 MMHG | TEMPERATURE: 97 F | WEIGHT: 111 LBS | SYSTOLIC BLOOD PRESSURE: 106 MMHG | OXYGEN SATURATION: 98 % | HEIGHT: 61 IN | BODY MASS INDEX: 20.96 KG/M2 | RESPIRATION RATE: 18 BRPM

## 2022-10-10 LAB — BACTERIA UR CULT: NORMAL

## 2022-10-10 PROCEDURE — 99214 OFFICE O/P EST MOD 30 MIN: CPT

## 2022-10-10 NOTE — ASSESSMENT
[FreeTextEntry1] : 52 y/o female w/ GERD, sigmoid colon cancer s/p resection 5/2021 presents as follow up for seropositive RA. \par Pt has been having increasing L lower back pain for which she is taking gabapentin 300mg QHS without much relief. Pt has not gone to PT for insurance reasons. Pt also reports feeling of swelling of knees, hands, elbows. Pt was seen by PM&R who increased gabapentin to 600mg QHS and Rx Robaxin PRN. \par Pt has neck, shoulders, hands, hips, knees pains. Pt reports AM stiffness and pain of hands. \par Pt reports pain worse with moving after resting. \par Pt has known herniated disc and L shoulder (rotator cuff and biceps tendons) pending surgery. \par Reports recurrent heat sensation of chest that radiates down to b/l arms. Hx of L hand surgery due to trauma. \par Pt also reports chronic fatigue, oral ulcers, hair loss, dry mouth, muscle weakness. \par Reports areas of erythema of different parts of the body. \par Hx of hysterectomy, colon cancer s/p resection. \par Referred to rheum for positive KELLY and mild positive RF. Normal CRP, CPK, Lyme, thyroid function, thyroid Abs. \par Pt has been physically active throughout her life \par Pt currently takes ibuprofen 600mg PRN, methocarbamol PRN for the pains. \par Pt would like to try acupuncture, yoga and other pain therapies. \par Mother - RA, SLE \par \par Workup by me with KELLY+, RF 18, . XRs with ankylosis and acro-osteolysis. Pt was diagnosed with RA based on RA+, CCP+, erosive arthritis. Pt was started on MTX by me in 8/2022 for RA but stopped due to severe heartburn. PT’s MTX was switched to SQ in 10/2022.\par \par - c/w MTX SQ 15mg/week with daily folic acid. Side effects of MTX were discussed with the patient in detail including but not limited to oral ulcers, alopecia, elevated LFTs, abdominal discomfort, pneumonitis, and cytopenias. This is a high-risk therapy requiring intense monitoring for toxicity. Will evaluate with frequent monitoring of CBC and LFTs. Advised patient to take folic acid daily to prevent complications of MTX.\par Counseled to minimize alcohol intake (no more than 3 drinks/week) and to avoid pregnancy while on MTX.\par Hep B/C panel negative 8/2022.\par - Pt has not been taking MTX PO due to severe GI side effects. Will obtain safety labs on next visit (including Quantiferon TB)\par Phuc Pt is following with ortho for potential epidural injection for her RLE neuropathy symptoms.\par - RTC 2 months for follow up \par

## 2022-10-10 NOTE — HISTORY OF PRESENT ILLNESS
[FreeTextEntry1] : HISTORY: \par 50 y/o female w/ GERD, sigmoid colon cancer s/p resection 5/2021 presents as follow up for seropositive RA. \par Pt has been having increasing L lower back pain for which she is taking gabapentin 300mg QHS without much relief. Pt has not gone to PT for insurance reasons. Pt also reports feeling of swelling of knees, hands, elbows. Pt was seen by PM&R who increased gabapentin to 600mg QHS and Rx Robaxin PRN. \par Pt has neck, shoulders, hands, hips, knees pains. Pt reports AM stiffness and pain of hands. \par Pt reports pain worse with moving after resting. \par Pt has known herniated disc and L shoulder (rotator cuff and biceps tendons) pending surgery. \par Reports recurrent heat sensation of chest that radiates down to b/l arms. Hx of L hand surgery due to trauma. \par Pt also reports chronic fatigue, oral ulcers, hair loss, dry mouth, muscle weakness. \par Reports areas of erythema of different parts of the body. \par Hx of hysterectomy, colon cancer s/p resection. \par Referred to rheum for positive KELLY and mild positive RF. Normal CRP, CPK, Lyme, thyroid function, thyroid Abs. \par Pt has been physically active throughout her life \par Pt currently takes ibuprofen 600mg PRN, methocarbamol PRN for the pains. \par Pt would like to try acupuncture, yoga and other pain therapies. \par Mother - RA, SLE \par \par INTERVAL HISTORY: \par Workup by me with KELLY+, RF 18, . XRs with ankylosis and acro-osteolysis. Pt was diagnosed with RA based on RA+, CCP+, erosive arthritis. Pt was started on MTX by me in 8/2022 for RA but stopped due to severe heartburn. 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 Severe arthrosis between proximal and distal carpal rows and L 2nd and 3rd MCPs with areas of ankylosis consistent with inflammatory arthropathy. \par XR b/l knees (8/2022): Mild patellofemoral compartment OA \par XR b/l feet (8/2022): Normal\par

## 2022-10-11 ENCOUNTER — APPOINTMENT (OUTPATIENT)
Dept: PHYSICAL MEDICINE AND REHAB | Facility: CLINIC | Age: 52
End: 2022-10-11

## 2022-10-11 PROCEDURE — 99214 OFFICE O/P EST MOD 30 MIN: CPT | Mod: 95

## 2022-10-11 NOTE — ASSESSMENT
[FreeTextEntry1] : Ms. LE OWEN is a 51 year old female who presented with low back pain and right lower extremity pain, numbness, and tingling after a fall, likely secondary to L5-S1 herniated disc. She underwent MORENA on 5/9/22 for which she has now noticed relief. She has also been seen for her bilateral shoulder/neck pain, which has been more controlled lately but still bothersome. She recently has been following with rheumatology for rheumatoid arthritis, for which she is on subQ MTX. Her right lower back pain and leg pain has been flaring up more recently.  Denies any red flag signs. Will recommend: \par - Patient will wean off Gabapentin as needed. \par - Discussed with patient the risks (including but not limited to bleeding, infection, nerve damage, etc), benefits and alternatives to an epidural steroid injection for which patient understands. Will plan for a R S1 TFESI. Patient will need COVID PCR test prior. \par - Patient to continue following with rheumatology\par \par Return for procedure.  Patient in agreement with plan. Patient aware of red flag signs including any changes to their bowel/bladder control, groin numbness or new weakness. Patient knows to seek immediate attention by calling 911 or going to nearest ER if these symptoms appear.

## 2022-10-11 NOTE — HISTORY OF PRESENT ILLNESS
[FreeTextEntry1] : This visit was conducted via an audiovisual call. Patient confirmed they were at home at [address] [facility]\[place] [address] . Dr. Diane was the office at [address] [place] [address] . Patient consented to the televisit. \par \par Ms. LE OWEN is a 51 year old  female who presents for follow up. Since last visit, she was hospitalized for a bowel obstruction which has resolved. She is being followed by rheumatology for RA, but she is unable to tolerate oral MTX due to GI side effects. She is now taking MTX subQ. She is weaning herself off Gabapentin (currently at 300mg Qhs) as she doesn't like the way it feels. \par \par Location:Middle of low back\par Onset: In late 4/2022 after fall\par Provocation/Palliative: sitting, walking, driving makes the pain worse. Lying on her left side helps a little\par Quality: Achy, cramping, numbness, and tingling\par Radiation: Down her RLE at times\par Severity: mild now, but can be moderate to severe\par Timing: Worst at night\par \par No bowel/bladder changes. No groin numbness.

## 2022-10-17 NOTE — HISTORY OF PRESENT ILLNESS
[FreeTextEntry1] : 51-year-old white female para 2 here with complaint again of pelvic pressure and discomfort.  Patient did have a UTI a few months ago that was treated.  She does have a history of total robotic hysterectomy bilateral salpingectomy as well as prior surgery for colon cancer.  She currently denies any GI or  symptoms.  Patient is concerned about adhesions and scar tissue.

## 2022-10-17 NOTE — PHYSICAL EXAM
[Soft] : soft [Non-tender] : non-tender [Labia Majora] : normal [Labia Minora] : normal [Normal] : normal [Absent] : absent [Uterine Adnexae] : normal

## 2022-11-02 ENCOUNTER — NON-APPOINTMENT (OUTPATIENT)
Age: 52
End: 2022-11-02

## 2022-11-03 ENCOUNTER — APPOINTMENT (OUTPATIENT)
Dept: PHYSICAL MEDICINE AND REHAB | Facility: AMBULATORY SURGERY CENTER | Age: 52
End: 2022-11-03

## 2022-11-29 ENCOUNTER — RESULT REVIEW (OUTPATIENT)
Age: 52
End: 2022-11-29

## 2022-11-30 ENCOUNTER — OUTPATIENT (OUTPATIENT)
Dept: OUTPATIENT SERVICES | Facility: HOSPITAL | Age: 52
LOS: 1 days | End: 2022-11-30
Payer: MEDICAID

## 2022-11-30 ENCOUNTER — APPOINTMENT (OUTPATIENT)
Dept: CT IMAGING | Facility: CLINIC | Age: 52
End: 2022-11-30
Payer: MEDICAID

## 2022-11-30 DIAGNOSIS — C18.6 MALIGNANT NEOPLASM OF DESCENDING COLON: ICD-10-CM

## 2022-11-30 PROCEDURE — 74177 CT ABD & PELVIS W/CONTRAST: CPT | Mod: 26

## 2022-11-30 PROCEDURE — 71260 CT THORAX DX C+: CPT

## 2022-11-30 PROCEDURE — 71260 CT THORAX DX C+: CPT | Mod: 26

## 2022-11-30 PROCEDURE — 74177 CT ABD & PELVIS W/CONTRAST: CPT

## 2022-12-05 ENCOUNTER — OUTPATIENT (OUTPATIENT)
Dept: OUTPATIENT SERVICES | Facility: HOSPITAL | Age: 52
LOS: 1 days | Discharge: HOME | End: 2022-12-05

## 2022-12-05 DIAGNOSIS — Z98.890 OTHER SPECIFIED POSTPROCEDURAL STATES: Chronic | ICD-10-CM

## 2022-12-05 DIAGNOSIS — C18.9 MALIGNANT NEOPLASM OF COLON, UNSPECIFIED: ICD-10-CM

## 2022-12-05 DIAGNOSIS — R14.0 ABDOMINAL DISTENSION (GASEOUS): ICD-10-CM

## 2022-12-05 DIAGNOSIS — Z90.49 ACQUIRED ABSENCE OF OTHER SPECIFIED PARTS OF DIGESTIVE TRACT: Chronic | ICD-10-CM

## 2022-12-05 DIAGNOSIS — Z90.710 ACQUIRED ABSENCE OF BOTH CERVIX AND UTERUS: Chronic | ICD-10-CM

## 2022-12-05 DIAGNOSIS — Z98.891 HISTORY OF UTERINE SCAR FROM PREVIOUS SURGERY: Chronic | ICD-10-CM

## 2022-12-07 ENCOUNTER — OUTPATIENT (OUTPATIENT)
Dept: OUTPATIENT SERVICES | Facility: HOSPITAL | Age: 52
LOS: 1 days | End: 2022-12-07

## 2022-12-07 ENCOUNTER — APPOINTMENT (OUTPATIENT)
Dept: ULTRASOUND IMAGING | Facility: CLINIC | Age: 52
End: 2022-12-07

## 2022-12-07 DIAGNOSIS — Z00.8 ENCOUNTER FOR OTHER GENERAL EXAMINATION: ICD-10-CM

## 2022-12-07 PROCEDURE — 76700 US EXAM ABDOM COMPLETE: CPT | Mod: 26

## 2022-12-09 ENCOUNTER — RESULT REVIEW (OUTPATIENT)
Age: 52
End: 2022-12-09

## 2022-12-09 ENCOUNTER — APPOINTMENT (OUTPATIENT)
Dept: HEMATOLOGY ONCOLOGY | Facility: CLINIC | Age: 52
End: 2022-12-09

## 2022-12-09 VITALS
SYSTOLIC BLOOD PRESSURE: 125 MMHG | OXYGEN SATURATION: 97 % | WEIGHT: 112.01 LBS | HEIGHT: 61 IN | BODY MASS INDEX: 21.15 KG/M2 | HEART RATE: 79 BPM | DIASTOLIC BLOOD PRESSURE: 81 MMHG

## 2022-12-09 LAB
BASOPHILS # BLD AUTO: 0.1 K/UL — SIGNIFICANT CHANGE UP (ref 0–0.2)
BASOPHILS NFR BLD AUTO: 1.1 % — SIGNIFICANT CHANGE UP (ref 0–2)
EOSINOPHIL # BLD AUTO: 0.3 K/UL — SIGNIFICANT CHANGE UP (ref 0–0.5)
EOSINOPHIL NFR BLD AUTO: 3.9 % — SIGNIFICANT CHANGE UP (ref 0–6)
HCT VFR BLD CALC: 40.9 % — SIGNIFICANT CHANGE UP (ref 34.5–45)
HGB BLD-MCNC: 14.2 G/DL — SIGNIFICANT CHANGE UP (ref 11.5–15.5)
LYMPHOCYTES # BLD AUTO: 1.6 K/UL — SIGNIFICANT CHANGE UP (ref 1–3.3)
LYMPHOCYTES # BLD AUTO: 23.9 % — SIGNIFICANT CHANGE UP (ref 13–44)
MCHC RBC-ENTMCNC: 32 PG — SIGNIFICANT CHANGE UP (ref 27–34)
MCHC RBC-ENTMCNC: 34.7 G/DL — SIGNIFICANT CHANGE UP (ref 32–36)
MCV RBC AUTO: 92.4 FL — SIGNIFICANT CHANGE UP (ref 80–100)
MONOCYTES # BLD AUTO: 0.5 K/UL — SIGNIFICANT CHANGE UP (ref 0–0.9)
MONOCYTES NFR BLD AUTO: 7.5 % — SIGNIFICANT CHANGE UP (ref 2–14)
NEUTROPHILS # BLD AUTO: 4.3 K/UL — SIGNIFICANT CHANGE UP (ref 1.8–7.4)
NEUTROPHILS NFR BLD AUTO: 63.6 % — SIGNIFICANT CHANGE UP (ref 43–77)
PLATELET # BLD AUTO: 328 K/UL — SIGNIFICANT CHANGE UP (ref 150–400)
RBC # BLD: 4.42 M/UL — SIGNIFICANT CHANGE UP (ref 3.8–5.2)
RBC # FLD: 13.4 % — SIGNIFICANT CHANGE UP (ref 10.3–14.5)
WBC # BLD: 6.7 K/UL — SIGNIFICANT CHANGE UP (ref 3.8–10.5)
WBC # FLD AUTO: 6.7 K/UL — SIGNIFICANT CHANGE UP (ref 3.8–10.5)

## 2022-12-09 PROCEDURE — 99215 OFFICE O/P EST HI 40 MIN: CPT

## 2022-12-09 NOTE — RESULTS/DATA
[FreeTextEntry1] : 12/5/22: CT chest/abd/pelvis\par IMPRESSION:\par No definite evidence of metastatic disease identified within the chest, abdomen and pelvis.\par \par Findings within both breasts that may be secondary to cystic disease and follow-up evaluation with dedicated breast imaging is recommended.\par \par 9/1/22: CT chest \par IMPRESSION:\par \par Interval resolution of subpleural triangular nodule within posterior left lower lobe . No new or enlarging nodule. No evidence of metastatic disease.\par \par 8/22/22 CT Abdomen \par IMPRESSION:\par \par Findings compatible with small bowel obstruction. Transition point \par identified in right medial lower abdomen (46-49:5). Jejunal folds distal \par to the transition point appears collapsed, limiting detailed evaluation. \par No significant mesenteric edema, pneumatosis or portal venous gas. \par Correlate with lactic acid.\par \par Mild bladder wall thickening, difficult to assess secondary to inadequate \par distention. Correlate with urinalysis and lab values to assess for \par cystitis and/or ascending urinary tract infection.\par \par \par 4/28/22: \par Colonscopy \par Impressions:  \par  Few diverticulosis in the ascending colon-.  \par  Grade/Stage II internal hemorrhoids.  \par  -Anastomosis site 18 cm from anal verge. Random biopsy. NO recurrence.  \par \par 2/25/22 CT Abdomen/chest/pelvis \par IMPRESSION:\par Status post sigmoid colon resection without evidence of recurrent or metastatic disease within the chest, abdomen, or pelvis.\par \par 8/25/21 CT Abdomen Chest Pelvis IMPRESSION: Status post sigmoid resection. No evidence of locoregional recurrence or metastatic disease.\par \par PATHOLOGY 5/28/21 \par - Adenocarcinoma, moderately differentiated.\par - Sigmoid colon \par - Invasive moderately differentiated adenocarcinoma extending to pericolorectal\par adipose tissue (pT3).\par \par MLH1, MSH2, MSH6, PMS2:   Intact nuclear expression These results show low probability of microsatellite instability-high (MSI-H).\par There is no evidence of DNA mismatch repair deficiency in the analyzed tissue, indicating there is a low probability for Delgado\par syndrome (a common cause hereditary non polyposis colorectal\par cancer or HNPCC).\par \par CK-7: focal positive. CK-20, CDX2: positive\par \par COLONOSCOPY: 5/17/2021: Intramucosal adenocarcinoma \par \par CT CAP 5/2021 :  LUNGS AND LARGE AIRWAYS: Patent central airways. There is 3 mm nodular thickening of the minor fissure on image 80, likely postinflammatory. There is a 2 mm calcified granuloma in the left lower lobe on image 125. No confluent airspace opacity is identified. There is no evidence of interstitial lung disease, bronchiectasis, or fibrosis.\par

## 2022-12-09 NOTE — HISTORY OF PRESENT ILLNESS
[de-identified] : 50-year-old female presents to our office with a new diagnosis of Sigmoid colon cancer \par \par She has had a variety of symptoms with chronic pelvic pain over the last 10 years and recently underwent a hysterectomy for adenomyosis by Dr. Cruz in February 2021. Several of her pelvic symptoms did improve. After that she had intermittent blood in her stool and some changes in the caliber of her stool. Her appetite has been fine and she has not had any significant weight loss. She underwent a colonoscopy which showed a mass in the sigmoid colon. \par \par COLONOSCOPY: 5/17/2021: Intramucosal adenocarcinoma \par CT CAP 5/2021 :  LUNGS AND LARGE AIRWAYS: Patent central airways. There is 3 mm nodular thickening of the minor fissure on image 80, likely postinflammatory. There is a 2 mm calcified granuloma in the left lower lobe on image 125. No confluent airspace opacity is identified. There is no evidence of interstitial lung disease, bronchiectasis, or fibrosis.\par \par S/p Robotic sigmoid colon resection for colon cancer on 5/28/21 T3N0 stage II colon cancer, MSI Stable, No high risk features. The patient did not receive adjuvant treatment. Subsequent surveillance imaging in 2/2022 did not reveal evidence of recurrence. Colonosopy in 4/28/22 was also unremarkable. \par  [de-identified] : The patient continues to have abdominal pain and chronic joint pain that is unchanged from previous visit. The pain is throughout the abdomen, sometimes radiating from the back. The patient denies weight loss, poor appetite, and bleeding.

## 2022-12-09 NOTE — ASSESSMENT
[FreeTextEntry1] : pT3 N0 M0 ADenocarcinoma SIgmoid colon, in Jun 2021 \par \par - Invasive moderately differentiated adenocarcinoma invading through the muscularis propria into pericolorectal tissue.\par - Tattoo is present.\par - Diverticulosis.\par - 24 lymph nodes, negative for carcinoma (0/24).\par - Margins of resection are not involved.\par \par Resected node-negative (stage II) disease, the benefits of chemotherapy are controversial, as is the relative benefit of an oxaliplatin-based as compared with a non-oxaliplatin-based regimen.\par Benefits of chemotherapy is based on high risk features \par Patient does not have any high risk features, No LVI   NO neural invasion 25 LN examined, NO Perforation \par \par GIven no high risk features no benefit of adjuvant chemotherapy\par Recent CT Abd Pelvis from 2/25/22: with no evidence of recurrent or metastatic disease in the CAP \par \par - Today's Labs with Normal WBC \par - F/u visit with labs cbc/ cmp/ CEA q 3months x 2 y and then q 6 months from Y 3-5 \par - CT CAP q 6 months x 5 years and Y3-5 and consider spacing q 8-12 months\par - 1 year colonoscopy showed no evidence of recurrence. \par - Ct imaging reviewed with patient. No evidence of recurrence noted on imaging from 8/2022 and 9/2022.  \par - Imaging from 12/2022 without evidence of disease; results discussed with patient.  \par - Given findings on CT from 12/2022; discussed mammogram.  Patient does mammogram and sonogram yearly--will pull records from [facility]. \par

## 2022-12-12 ENCOUNTER — APPOINTMENT (OUTPATIENT)
Dept: RHEUMATOLOGY | Facility: CLINIC | Age: 52
End: 2022-12-12

## 2022-12-12 VITALS
TEMPERATURE: 98 F | BODY MASS INDEX: 21.14 KG/M2 | HEIGHT: 61 IN | WEIGHT: 112 LBS | SYSTOLIC BLOOD PRESSURE: 106 MMHG | DIASTOLIC BLOOD PRESSURE: 69 MMHG | OXYGEN SATURATION: 99 % | HEART RATE: 68 BPM

## 2022-12-12 LAB
ALBUMIN SERPL ELPH-MCNC: 4.3 G/DL
ALP BLD-CCNC: 60 U/L
ALT SERPL-CCNC: 15 U/L
ANION GAP SERPL CALC-SCNC: 12 MMOL/L
AST SERPL-CCNC: 21 U/L
BILIRUB SERPL-MCNC: 0.3 MG/DL
BUN SERPL-MCNC: 10 MG/DL
CALCIUM SERPL-MCNC: 9.4 MG/DL
CEA SERPL-MCNC: 1.9 NG/ML
CHLORIDE SERPL-SCNC: 105 MMOL/L
CO2 SERPL-SCNC: 25 MMOL/L
CREAT SERPL-MCNC: 0.79 MG/DL
EGFR: 91 ML/MIN/1.73M2
GLUCOSE SERPL-MCNC: 101 MG/DL
MAGNESIUM SERPL-MCNC: 2.1 MG/DL
POTASSIUM SERPL-SCNC: 4 MMOL/L
PROT SERPL-MCNC: 6.5 G/DL
SODIUM SERPL-SCNC: 141 MMOL/L

## 2022-12-12 PROCEDURE — 99214 OFFICE O/P EST MOD 30 MIN: CPT

## 2022-12-12 NOTE — HISTORY OF PRESENT ILLNESS
[FreeTextEntry1] : HISTORY: \par 52 y/o female w/ GERD, sigmoid colon cancer s/p resection 5/2021 presents as follow up for seropositive RA.  \par Pt has been having increasing L lower back pain for which she is taking gabapentin 300mg QHS without much relief. Pt has not gone to PT for insurance reasons. Pt also reports feeling of swelling of knees, hands, elbows. Pt was seen by PM&R who increased gabapentin to 600mg QHS and Rx Robaxin PRN.  \par Pt has neck, shoulders, hands, hips, knees pains. Pt reports AM stiffness and pain of hands.  \par Pt reports pain worse with moving after resting.  \par Pt has known herniated disc and L shoulder (rotator cuff and biceps tendons) pending surgery.  \par Reports recurrent heat sensation of chest that radiates down to b/l arms. Hx of L hand surgery due to trauma.  \par Pt also reports chronic fatigue, oral ulcers, hair loss, dry mouth, muscle weakness.  \par Reports areas of erythema of different parts of the body.  \par Hx of hysterectomy, colon cancer s/p resection.  \par Referred to rheum for positive KELLY and mild positive RF. Normal CRP, CPK, Lyme, thyroid function, thyroid Abs.  \par Pt has been physically active throughout her life  \par Pt currently takes ibuprofen 600mg PRN, methocarbamol PRN for the pains.  \par Pt would like to try acupuncture, yoga and other pain therapies.  \par Mother - RA, SLE    \par \par Workup by me with KELLY+, RF 18, . XRs with ankylosis and acro-osteolysis. Pt was diagnosed with RA based on RA+, CCP+, erosive arthritis. Pt was started on MTX by me in 8/2022 for RA but stopped due to severe heartburn. PT’s MTX was switched to SQ in 10/2022. \par  \par INTERVAL HISTORY: \par Pt’s MTX was increased from 15mg/week to 20mg/week due to incomplete response. 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 Severe arthrosis between proximal and distal carpal rows and L 2nd and 3rd MCPs with areas of ankylosis consistent with inflammatory arthropathy.  \par XR b/l knees (8/2022): Mild patellofemoral compartment OA  \par XR b/l feet (8/2022): Normal

## 2022-12-12 NOTE — ASSESSMENT
[FreeTextEntry1] : 52 y/o female w/ GERD, sigmoid colon cancer s/p resection 5/2021 presents as follow up for seropositive RA.  \par Pt has been having increasing L lower back pain for which she is taking gabapentin 300mg QHS without much relief. Pt has not gone to PT for insurance reasons. Pt also reports feeling of swelling of knees, hands, elbows. Pt was seen by PM&R who increased gabapentin to 600mg QHS and Rx Robaxin PRN.  \par Pt has neck, shoulders, hands, hips, knees pains. Pt reports AM stiffness and pain of hands.  \par Pt reports pain worse with moving after resting.  \par Pt has known herniated disc and L shoulder (rotator cuff and biceps tendons) pending surgery.  \par Reports recurrent heat sensation of chest that radiates down to b/l arms. Hx of L hand surgery due to trauma.  \par Pt also reports chronic fatigue, oral ulcers, hair loss, dry mouth, muscle weakness.  \par Reports areas of erythema of different parts of the body.  \par Hx of hysterectomy, colon cancer s/p resection.  \par Referred to rheum for positive KELLY and mild positive RF. Normal CRP, CPK, Lyme, thyroid function, thyroid Abs.  \par Pt has been physically active throughout her life  \par Pt currently takes ibuprofen 600mg PRN, methocarbamol PRN for the pains.  \par Pt would like to try acupuncture, yoga and other pain therapies.  \par Mother - RA, SLE    \par \par Workup by me with KELLY+, RF 18, . XRs with ankylosis and acro-osteolysis. Pt was diagnosed with RA based on RA+, CCP+, erosive arthritis. Pt was started on MTX by me in 8/2022 for RA but stopped due to severe heartburn. PT’s MTX was switched to SQ in 10/2022. Pt's folic acid changed to leucovorin due to side effects of significant malaise, nausea the day after MTX.\par \par - Pt has safety labwork from oncology last week. Does not need labwork from me today. Will obtain next visit with [facility].\par - c/w MTX SQ 20mg/week with daily folic acid. Side effects of MTX were discussed with the patient in detail including but not limited to oral ulcers, alopecia, elevated LFTs, abdominal discomfort, pneumonitis, and cytopenias. This is a high-risk therapy requiring intense monitoring for toxicity. Will evaluate with frequent monitoring of CBC and LFTs. Advised patient to take folic acid daily to prevent complications of MTX. \par Counseled to minimize alcohol intake (no more than 3 drinks/week) and to avoid pregnancy while on MTX. \par Hep B/C panel negative 8/2022. \par - Pt failed MTX PO due to severe GI side effects.\par - If refractory, will consider increase in MTX up to 30mg/day, and adding biologics such as TNFi (e.g. Humira)\par - RTC 2 months for follow up \par

## 2022-12-13 ENCOUNTER — APPOINTMENT (OUTPATIENT)
Dept: FAMILY MEDICINE | Facility: CLINIC | Age: 52
End: 2022-12-13

## 2022-12-13 VITALS
HEART RATE: 86 BPM | SYSTOLIC BLOOD PRESSURE: 110 MMHG | DIASTOLIC BLOOD PRESSURE: 70 MMHG | HEIGHT: 70 IN | BODY MASS INDEX: 15.75 KG/M2 | TEMPERATURE: 98 F | RESPIRATION RATE: 16 BRPM | OXYGEN SATURATION: 98 % | WEIGHT: 110 LBS

## 2022-12-13 DIAGNOSIS — Z00.00 ENCOUNTER FOR GENERAL ADULT MEDICAL EXAMINATION W/OUT ABNORMAL FINDINGS: ICD-10-CM

## 2022-12-13 PROCEDURE — 90750 HZV VACC RECOMBINANT IM: CPT

## 2022-12-13 PROCEDURE — 90471 IMMUNIZATION ADMIN: CPT

## 2022-12-13 PROCEDURE — 99396 PREV VISIT EST AGE 40-64: CPT | Mod: 25

## 2022-12-13 RX ORDER — GABAPENTIN 300 MG/1
300 CAPSULE ORAL
Qty: 60 | Refills: 0 | Status: DISCONTINUED | COMMUNITY
Start: 2022-07-27 | End: 2022-12-13

## 2022-12-13 NOTE — HISTORY OF PRESENT ILLNESS
[FreeTextEntry1] : Annual physical [de-identified] : 50 y/o F with medical hx significant for GERD, chronic sinusitis, sigmoid colon cancer s/p colectomy, and adenomyosis s/p hysterectomy/salpingectomy.\par Pt with BRIGID, She was seen by Rheumatology and started on MTX, which did not tolerate. Now on Leucovorin and rasuvo.\par Admitted 8/22/22 with [facility], d/c on 8/25/22.\par Seen by Gyn. \par She has Chronic Shoulder/ neck pain> Schedule surgery postponed with Dr Abebe.\par She f/up's regularly with Oncology, GI.\par reports persistent pelvic pain.\par In the process for temporary Disability. \par

## 2022-12-13 NOTE — HEALTH RISK ASSESSMENT
[Fair] : ~his/her~ current health as fair  [Good] : ~his/her~  mood as  good [Never] : Never [No] : In the past 12 months have you used drugs other than those required for medical reasons? No [No falls in past year] : Patient reported no falls in the past year [0] : 2) Feeling down, depressed, or hopeless: Not at all (0) [PHQ-2 Negative - No further assessment needed] : PHQ-2 Negative - No further assessment needed [Patient reported mammogram was normal] : Patient reported mammogram was normal [Patient reported PAP Smear was abnormal] : Patient reported PAP Smear was abnormal [Patient reported colonoscopy was normal] : Patient reported colonoscopy was normal [HIV test declined] : HIV test declined [Hepatitis C test declined] : Hepatitis C test declined [None] : None [With Family] : lives with family [Significant Other] : lives with significant other [# Of Children ___] : has [unfilled] children [Feels Safe at Home] : Feels safe at home [Fully functional (bathing, dressing, toileting, transferring, walking, feeding)] : Fully functional (bathing, dressing, toileting, transferring, walking, feeding) [Fully functional (using the telephone, shopping, preparing meals, housekeeping, doing laundry, using] : Fully functional and needs no help or supervision to perform IADLs (using the telephone, shopping, preparing meals, housekeeping, doing laundry, using transportation, managing medications and managing finances) [Smoke Detector] : smoke detector [Safety elements used in home] : safety elements used in home [Seat Belt] :  uses seat belt [With Patient/Caregiver] : , with patient/caregiver [Designated Healthcare Proxy] : Designated healthcare proxy [Name: ___] : Health Care Proxy's Name: [unfilled]  [Relationship: ___] : Relationship: [unfilled] [FreeTextEntry1] : Feeling tired all the time [de-identified] : Rheumatology, GYN [[id number]] : 0 [High Risk Behavior] : no high risk behavior [Reports changes in hearing] : Reports no changes in hearing [Reports changes in vision] : Reports no changes in vision [Reports changes in dental health] : Reports no changes in dental health [TB Exposure] : is not being exposed to tuberculosis [MammogramDate] : 04/22 [MammogramComments] : GYN: Dr Howard [PapSmearDate] : 03/22 [PapSmearComments] : + HPV [ColonoscopyDate] : 05/21 [de-identified] : 15 y/o daughter and partner [AdvancecareDate] : 12/22

## 2022-12-13 NOTE — ASSESSMENT
[FreeTextEntry1] : 50 y/o F with Dx Colon cancer post Robotic sigmoid resection on 05/28/21. No adjuvant chemo recommended. Doing well, TRH, B/L Salpingectomy, presents today for CPE.\par \par # Hospital F/up> [facility]\par -Admitted on 08/22/22-08/25/22\par \par #Chronic Fatigue, malaise> Rheumatoid arthritis:\par -F/up with Rheumatology.\par -Fail MTX PO.\par -Now on leucovorin and Rosuvo\par \par Mammo and Gyn: seen now with Dr rios.\par \par Colonoscopy: 05/2021\par -needs to continue surveillance colonosocpy in 1 year\par \par # Colon cancer s/p Robotic sigmoid resection\par -F/up with Oncology\par -Needs CEA q 6 months.\par \par # Lumbar and cervical radiculopathy. :\par -On Gabapentin.\par -Seen by Neurosx, dr Nguyen.\par -Seen by PM&R Dr MEDINA.\par \par B/L Shoulder pain:\par -Seen by Orthopedic< Dr BOBBY\par \par # pelvic pain\par -Orthopedic eval advise\par \par # immunizations:\par -Shingrix 2nd dose today\par \par # Encounter for Disability determination> Pt apply to social security\par

## 2022-12-14 LAB
CHOLEST SERPL-MCNC: 173 MG/DL
HDLC SERPL-MCNC: 63 MG/DL
LDLC SERPL CALC-MCNC: 83 MG/DL
NONHDLC SERPL-MCNC: 111 MG/DL
TRIGL SERPL-MCNC: 140 MG/DL
TSH SERPL-ACNC: 1.39 UIU/ML

## 2022-12-28 ENCOUNTER — APPOINTMENT (OUTPATIENT)
Dept: GASTROENTEROLOGY | Facility: CLINIC | Age: 52
End: 2022-12-28

## 2022-12-28 VITALS
TEMPERATURE: 97.2 F | SYSTOLIC BLOOD PRESSURE: 120 MMHG | WEIGHT: 110 LBS | HEIGHT: 61 IN | HEART RATE: 75 BPM | OXYGEN SATURATION: 98 % | BODY MASS INDEX: 20.77 KG/M2 | DIASTOLIC BLOOD PRESSURE: 76 MMHG | RESPIRATION RATE: 14 BRPM

## 2022-12-28 PROCEDURE — 99214 OFFICE O/P EST MOD 30 MIN: CPT

## 2022-12-28 NOTE — HISTORY OF PRESENT ILLNESS
[FreeTextEntry1] : The patient is here with history of GERD for 2 years.  She has a history of rheumatoid arthritis on 6-MP.  As PPI can increase the 6-MP levels she stopped her PPI.  She had a short course off of oral methotrexate and her symptoms resolved.  I placed her on Pepcid 40 mg twice daily symptoms were much better controlled.  However she is now taking methotrexate injection and her heartburn regurgitation sore throat have resumed.  EGD in 2020 was negative.  She has not been using her Carafate consistently\par    Patient with a history of colon cancer in 2020 (sigmoid).  Colonoscopy in 2021 showed diverticulosis and internal hemorrhoids.  Colonoscopy was last performed May 2022.  Which showed diverticulosis and hemorrhoids.  In December 2022 CEA was 1.9 LFTs and CBC normal.  She had a CAT scan done in November–no liver mets no recurrence of the colon mass.  Patient tends to have some intermittent constipation.  No rectal bleed

## 2022-12-28 NOTE — ASSESSMENT
[FreeTextEntry1] : A/P\par Patient with GERD–not controlled with Pepcid 40 mg twice daily.  May take Carafate 3 times daily as needed breakthrough symptoms\par -Patient is on methotrexate for her rheumatoid arthritis.  She is not on PPI as this can cause increased levels of the 6-MP\par -Patient will be more consistent with the Carafate.  If combination of Pepcid 40 mg twice daily and Carafate 3 times daily do not control the symptoms, patient may need to rediscuss methotrexate with her rheumatologist.  It may be the methotrexate that seems to trigger her reflux.  (Patient states the reflux seems to be worse after her methotrexate injection)\par -If methotrexate is unable to be switched then we will have to consider a consult with Dr. Chelsea langford from motility.  We will have to consider a manometry and Bravo study, then consider option of Nissen fundoplication if her symptoms are uncontrolled with H2 blocker\par Follow-up in 3\par \par Patient due for surveillance colonoscopy for colon cancer in May 2023\par -High-fiber diet for mild constipation

## 2023-01-18 ENCOUNTER — APPOINTMENT (OUTPATIENT)
Dept: ORTHOPEDIC SURGERY | Facility: CLINIC | Age: 53
End: 2023-01-18
Payer: MEDICAID

## 2023-01-18 VITALS
BODY MASS INDEX: 20.77 KG/M2 | HEART RATE: 91 BPM | WEIGHT: 110 LBS | HEIGHT: 61 IN | SYSTOLIC BLOOD PRESSURE: 151 MMHG | DIASTOLIC BLOOD PRESSURE: 86 MMHG

## 2023-01-18 PROCEDURE — 99214 OFFICE O/P EST MOD 30 MIN: CPT

## 2023-01-18 PROCEDURE — 73523 X-RAY EXAM HIPS BI 5/> VIEWS: CPT

## 2023-01-18 NOTE — PHYSICAL EXAM
[de-identified] : GENERAL APPEARANCE: Well nourished and hydrated, pleasant, alert, and oriented x 3. Appears their stated age. \par HEENT: Normocephalic, extraocular eye motion intact. Nasal septum midline. Oral cavity clear. External auditory canal clear. \par RESPIRATORY: Breath sounds clear and audible in all lobes. No wheezing, No accessory muscle use.\par CARDIOVASCULAR: No apparent abnormalities. No lower leg edema. No varicosities. Pedal pulses are palpable.\par NEUROLOGIC: Sensation is normal, no muscle weakness in the upper or lower extremities.\par DERMATOLOGIC: No apparent skin lesions, moist, warm, no rash.\par SPINE: Cervical spine appears normal and moves freely; thoracic spine appears normal and moves freely; lumbosacral spine appears normal and moves freely, normal, nontender.\par MUSCULOSKELETAL: Hands, wrists, and elbows are normal and move freely, shoulders are normal and move freely. \par Psychiatric: Oriented to person, place, and time, insight and judgement were intact and the affect was normal. \par Musculoskeletal: ambulates normally. Right hip exam showed no groin pain with SLR, ROM is full flexion with 70 degrees of external rotation and 30 degrees of internal rotation, INGRID negative, FADIR mildly positive\par Left hip exam showed no groin pain with SLR, ROM is full flexion with 70 degrees of external rotation and 30 degrees of internal rotation, INGRID negative, FADIR mildly positive\par 5/5 motor strength in bilateral lower extremities. Sensory: Intact in bilateral lower extremities. DTRs: Biceps, brachioradialis, triceps, patellar, ankle and plantar 2+ and symmetric bilaterally. Pulses: dorsalis pedis, posterior tibial, femoral, popliteal, and radial 2+ and symmetric bilaterally.  [de-identified] : AP pelvis and 2 views of bilateral hips obtained the office today show no acute fracture dislocation.  Mild degenerative changes left hip noted.  Severe arthritis of pubic symphysis noted

## 2023-01-18 NOTE — HISTORY OF PRESENT ILLNESS
[Worsening] : worsening [Constant] : ~He/She~ states the symptoms seem to be constant [Direct Pressure] : worsened by direct pressure [Hip Movement] : worsened by hip movement [Sitting] : worsened by sitting [Walking] : worsened by walking [Recumbency] : relieved by recumbency [Rest] : relieved by rest [de-identified] : 52-year-old female with past medical history of colon cancer status post sigmoid resection (2021), rheumatoid arthritis on methotrexate, asthma presents to the office for initial evaluation of bilateral hip and pubic pain x 1 year.  The patient states that she fell on a wet tile floor in [place] last year and since then she has been experiencing severe pain in her pubic area that radiates straight through to her lower back.  She went to the ER in the spring 2022 and got a CT scan which showed pubic symphysis narrowing and mild degenerative changes of the left hip.  She states that her pain is constant made worse by bearing down and during intimacy and sitting, relieved by laying down.  Does not currently take any pain medication and states that she has to avoid NSAIDs due to the medication for her rheumatoid arthritis.  She has lower back pain but sees a specialist regularly.  Has been to PT for her lower back but not for her hip, states that she lives a very active lifestyle.  Has not had any surgeries or injections in the hip.  Admits to pain in bilateral knees.  Denies use of assistive device for ambulation, recent injuries falls or trauma, mechanical symptoms of the hip, numbness or tingling in the lower extremities, swelling of the lower extremities.\par \par Denies a history of smoking, admits to occasional recreational marijuana use, admits to social alcohol consumption.

## 2023-01-18 NOTE — DISCUSSION/SUMMARY
[Medication Risks Reviewed] : Medication risks reviewed [Surgical risks reviewed] : Surgical risks reviewed [de-identified] : Patient is a 52-year-old female with mild bilateral hip osteoarthritis as well as pubic symphysis osteoarthritis presenting today for initial evaluation.  The majority the pain she is experiencing is coming from her pubic symphysis.  I have therefore given her referral back to physiatry for a possible ultrasound-guided cortisone injection into her pubic symphysis.  I did discuss with her that she may require a fusion of her pubic symphysis in the future if her pain continues to be severe and does not respond to conservative management including cortisone injections and physical therapy.  I will see her back on an as-needed basis for her pubic symphysis.  All questions were asked and answered

## 2023-01-18 NOTE — REASON FOR VISIT
[Initial Visit] : an initial visit for [FreeTextEntry2] : bilateral hip pain may 6,22 pt fell and injured went to ED

## 2023-01-30 ENCOUNTER — NON-APPOINTMENT (OUTPATIENT)
Age: 53
End: 2023-01-30

## 2023-02-06 ENCOUNTER — APPOINTMENT (OUTPATIENT)
Dept: PHYSICAL MEDICINE AND REHAB | Facility: CLINIC | Age: 53
End: 2023-02-06
Payer: MEDICAID

## 2023-02-06 DIAGNOSIS — M25.551 PAIN IN RIGHT HIP: ICD-10-CM

## 2023-02-06 DIAGNOSIS — M25.552 PAIN IN RIGHT HIP: ICD-10-CM

## 2023-02-06 PROCEDURE — 99214 OFFICE O/P EST MOD 30 MIN: CPT

## 2023-02-06 NOTE — PHYSICAL EXAM
[FreeTextEntry1] : NAD\par A&Ox3\par Non-obese\par ROM L-spine: 3/4 full forward flexion before pain; 10-15' extension before pain\par ROM Hips: relatively smooth IR/ER w/ groin pain\par Pelvic tilt: + b/l\par Seated slump test: neg b/l\par SLR: neg b/l\par INGRID's: slightly + right; neg left\par FADIR's: slightly + right; neg left\par DTR's: 2+ knees; 1+ right ankle; 2+ left ankle\par MMT: 5/5 b/l LE except 4+/5 pain-related weakness right HF\par Sensation: SILT\par Toe & Heel Walk: Yes\par Palpation: pubic symphysis VTTP\par

## 2023-02-06 NOTE — HISTORY OF PRESENT ILLNESS
[FreeTextEntry1] : 52 y.o. F w/ h/o RA (on MTX injection), s/p hysterectomy (Feb 2021) and colon cancer s/p resection from sigmoid colon (May 2021 - no CTX or RTX) presents to office w/ c/o pelvic pain.  Pt. states that she was in [facility] ED (May 2022) for inability to walk on right leg 2' pain.  Found to have L5-S1 HNP on MRI L-spine then had LESI which helped somewhat.  Of note, pt. reports h/o 3 C/S deliveries (last child delivered 2006) which is when her pelvic pain started.  Pt. states that her anterior pelvic pain improved somewhat after her hysterectomy but never truly went away.  Pt. states that her pain didn't necessarily worsen after her colon cancer surgery but has intensified over last two years.  Pain is both anterior and posterior pelvis.  No significant pain down legs.  Intermittent N/T in right leg.  Not taking any NSAIDs.  Last P.T. stopped few months ago which was helpful.  Had LESI in May 2022.

## 2023-02-06 NOTE — ASSESSMENT
[FreeTextEntry1] : 52 y.o. F w/ h/o RA (on MTX injection), s/p hysterectomy (Feb 2021) and colon cancer s/p resection from sigmoid colon (May 2021 - no CTX or RTX) presents to office w/ c/o chronic pelvic pain.  I spent most of today's office visit (40 minutes) reviewing the patient's relevant imaging studies, discussing etiology, pathogenesis, further diagnostic work-up and nonoperative versus operative management.  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  P.o. NSAIDs are relatively contraindicated secondary to the patient's medical history.  We discussed the utility of an ultrasound versus fluoroscopic pubic symphysis local anesthetic injection; however, given the advanced nature of her arthrosis and joint space narrowing, I explained that it would be very difficult to place the needle and any significant volume of injectate.  I provided the patient with a prescription for pelvic floor rehabilitation which I believe would benefit her.  Uncertain if she would benefit from surgical plating across the symphysis pubis given her other issues.  She would like me to visualize the symphysis pubis under ultrasound and see if she is amenable to a test injection which we will do at the next visit.  The patient is in agreement with the plan.  All questions have been answered.  Return to office for ultrasound.

## 2023-02-06 NOTE — DATA REVIEWED
[MRI] : MRI [FreeTextEntry1] : MRI L-spine (May 2022): Right paracentral and posterolateral disc herniation L5-S1 with associated reactive neuritis. Small bulge L4-5.\par \par CT Pelvis (May 2022)\par OSSEOUS STRUCTURES: No fracture or dislocation is seen. There is mild bilateral hip joint space narrowing that is worse on the left. Small subchondral cysts at the left anterior acetabulum. No dislocation is seen. Severe narrowing the pubic symphysis with subchondral sclerosis noted. Spurring of both sacroiliac joints.\par SYNOVIUM/ JOINT FLUID: No hip joint effusion.\par TENDONS: Intact tendons.\par MUSCLES: No muscle hematoma.\par NEUROVASCULAR STRUCTURES: Preserved\par INTRAPELVIC SOFT TISSUES: Suture material within the sigmoid colon. Fat-containing umbilical hernia. Small right inguinal hernia.\par SUBCUTANEOUS SOFT TISSUES: No soft tissue swelling. Calcification within the right posterior soft tissues inferior to the gluteus shanell muscle.\par

## 2023-02-07 ENCOUNTER — APPOINTMENT (OUTPATIENT)
Dept: OBGYN | Facility: CLINIC | Age: 53
End: 2023-02-07
Payer: MEDICAID

## 2023-02-07 VITALS
WEIGHT: 113 LBS | SYSTOLIC BLOOD PRESSURE: 96 MMHG | DIASTOLIC BLOOD PRESSURE: 62 MMHG | HEIGHT: 61 IN | BODY MASS INDEX: 21.34 KG/M2

## 2023-02-07 DIAGNOSIS — B37.31 ACUTE CANDIDIASIS OF VULVA AND VAGINA: ICD-10-CM

## 2023-02-07 PROCEDURE — 99214 OFFICE O/P EST MOD 30 MIN: CPT

## 2023-02-07 NOTE — PHYSICAL EXAM
[Chaperone Declined] : Patient declined chaperone [Appropriately responsive] : appropriately responsive [Alert] : alert [No Acute Distress] : no acute distress [Soft] : soft [No Lesions] : no lesions [Oriented x3] : oriented x3 [Labia Majora] : normal [Labia Minora] : normal [Discharge] : a  ~M vaginal discharge was present [Normal] : normal [Uterine Adnexae] : normal [FreeTextEntry4] : ph 4

## 2023-02-07 NOTE — HISTORY OF PRESENT ILLNESS
[FreeTextEntry1] : 53 yo with a lot of mediacal issues, states that in fall of 21 she had bv treated with oral 1 week of flagel.\madison  was given uvafem by Dr MASON in 22 using that.\madison Had a refill on bv med took that recently and feels she has a yeast infection . sotpped the vaginal pill didn’t know if it caused this. \madison has LS strain and pubic symphysis arthritis.

## 2023-02-07 NOTE — DISCUSSION/SUMMARY
[FreeTextEntry1] : Discussed with pt dove soap, Detergents and not changing soaps or detergents, Antibiotics, pad or tampon changes. Encouraged to take probiotics Azo,  ,Alexandre or kevita, \par Rx sent to pharmacy.\par encouraged after rx to go back to vag estrogen

## 2023-02-09 ENCOUNTER — NON-APPOINTMENT (OUTPATIENT)
Age: 53
End: 2023-02-09

## 2023-02-09 LAB
CANDIDA VAG CYTO: NOT DETECTED
G VAGINALIS+PREV SP MTYP VAG QL MICRO: DETECTED
T VAGINALIS VAG QL WET PREP: NOT DETECTED

## 2023-02-13 ENCOUNTER — APPOINTMENT (OUTPATIENT)
Dept: RHEUMATOLOGY | Facility: CLINIC | Age: 53
End: 2023-02-13
Payer: MEDICAID

## 2023-02-13 VITALS
WEIGHT: 113 LBS | HEART RATE: 63 BPM | OXYGEN SATURATION: 100 % | BODY MASS INDEX: 21.34 KG/M2 | HEIGHT: 61 IN | DIASTOLIC BLOOD PRESSURE: 72 MMHG | TEMPERATURE: 97.2 F | SYSTOLIC BLOOD PRESSURE: 110 MMHG

## 2023-02-13 PROCEDURE — 99214 OFFICE O/P EST MOD 30 MIN: CPT | Mod: 25

## 2023-02-13 RX ORDER — LEUCOVORIN CALCIUM 5 MG/1
5 TABLET ORAL
Qty: 4 | Refills: 2 | Status: COMPLETED | COMMUNITY
Start: 2022-11-02 | End: 2023-02-13

## 2023-02-13 RX ORDER — METHOTREXATE 20 MG/.4ML
20 INJECTION, SOLUTION SUBCUTANEOUS
Qty: 4 | Refills: 2 | Status: COMPLETED | COMMUNITY
Start: 2022-09-24 | End: 2023-02-13

## 2023-02-13 NOTE — ASSESSMENT
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  \par Pt has been having increasing L lower back pain for which she is taking gabapentin 300mg QHS without much relief. Pt has not gone to PT for insurance reasons. Pt also reports feeling of swelling of knees, hands, elbows. Pt was seen by PM&R who increased gabapentin to 600mg QHS and Rx Robaxin PRN.  \par Pt has neck, shoulders, hands, hips, knees pains. Pt reports AM stiffness and pain of hands.  \par Pt reports pain worse with moving after resting.  \par Pt has known herniated disc and L shoulder (rotator cuff and biceps tendons) pending surgery.  \par Reports recurrent heat sensation of chest that radiates down to b/l arms. Hx of L hand surgery due to trauma.  \par Pt also reports chronic fatigue, oral ulcers, hair loss, dry mouth, muscle weakness.  \par Reports areas of erythema of different parts of the body.  \par Hx of hysterectomy, colon cancer s/p resection.  \par Referred to rheum for positive KELLY and mild positive RF. Normal CRP, CPK, Lyme, thyroid function, thyroid Abs.  \par Pt has been physically active throughout her life  \par Pt currently takes ibuprofen 600mg PRN, methocarbamol PRN for the pains.  \par Pt would like to try acupuncture, yoga and other pain therapies.  \par Mother - RA, SLE  \par \par Workup by me with KELLY+, RF 18, . XRs with ankylosis and acro-osteolysis. Pt was diagnosed with RA based on RA+, CCP+, erosive arthritis. Pt was started on MTX by me in 8/2022 for RA but stopped due to severe heartburn. PT’s MTX was switched to SQ in 10/2022. Pt's folic acid changed to leucovorin due to side effects of significant malaise, nausea the day after MTX. 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 Given pt's severe GI issues, other oral DMARDs should not be tried. Given pt's seropositive disease with erosions, pt would likely need biologics.\par \par - Obtain medication safety labs and disease activity labs. \par - Continue off MTX due to severe GI side effects, not improved with change to SQ and changing folic acid to leucovorin.\par - Start Humira 40mg SQ T3Mtbub. Pt is unable to maneuver syringe due to her RA, will order pen injector. Side effects of Humira were discussed with the pt in detail including increased risk of infection, demyelinating disease, re-activation of latent TB, possible increased risk of solid and skin tumors. Advised pt to seek medical care ASAP if he/she has an infection and advised to stop the medication until infection resolves.\par This is a high-risk therapy requiring intense monitoring for toxicity.\par - Rx celecoxib 200mg PO BID PRN given pt's RA inflammatory arthritis and severe GERD symptoms. Advised to discuss with GI before starting. I advised that the NSAID should be taken with food.  In addition while taking the prescribed NSAID, no over the counter or other NSAIDs should be used, such as ibuprofen (Motrin or Advil) or naproxen (Aleve) as this can cause stomach upset or other side effects.  If needed for fever or breakthrough pain Tylenol can be used.\par - Continue follow up with other specialists for mechanical joint diseases\par - RTC 3 months for follow up \par

## 2023-02-13 NOTE — HISTORY OF PRESENT ILLNESS
[FreeTextEntry1] : HISTORY: \par 51 y/o female w/ GERD, sigmoid colon cancer s/p resection 5/2021 presents as follow up for seropositive RA.  \par Pt has been having increasing L lower back pain for which she is taking gabapentin 300mg QHS without much relief. Pt has not gone to PT for insurance reasons. Pt also reports feeling of swelling of knees, hands, elbows. Pt was seen by PM&R who increased gabapentin to 600mg QHS and Rx Robaxin PRN.  \par Pt has neck, shoulders, hands, hips, knees pains. Pt reports AM stiffness and pain of hands.  \par Pt reports pain worse with moving after resting.  \par Pt has known herniated disc and L shoulder (rotator cuff and biceps tendons) pending surgery.  \par Reports recurrent heat sensation of chest that radiates down to b/l arms. Hx of L hand surgery due to trauma.  \par Pt also reports chronic fatigue, oral ulcers, hair loss, dry mouth, muscle weakness.  \par Reports areas of erythema of different parts of the body.  \par Hx of hysterectomy, colon cancer s/p resection.  \par Referred to rheum for positive KELLY and mild positive RF. Normal CRP, CPK, Lyme, thyroid function, thyroid Abs.  \par Pt has been physically active throughout her life  \par Pt currently takes ibuprofen 600mg PRN, methocarbamol PRN for the pains.  \par Pt would like to try acupuncture, yoga and other pain therapies.  \par Mother - RA, SLE  \par \par Workup by me with KELLY+, RF 18, . XRs with ankylosis and acro-osteolysis. Pt was diagnosed with RA based on RA+, CCP+, erosive arthritis. Pt was started on MTX by me in 8/2022 for RA but stopped due to severe heartburn. PT’s MTX was switched to SQ in 10/2022. Pt's folic acid changed to leucovorin due to side effects of significant malaise, nausea the day after MTX. \par \par INTERVAL HISTORY: \par Pt was seen by ortho for b/l hip pains. XR shows mild b/l hip OA and severe OA of pubic symphysis. Given that pt’s pain seemed to be localized to pubic symphysis, pt was referred back to PM&R for US guided pubic symphysis steroid injection (which may not be possible given severe OA). Pt was started on PT.\par Pt has been off MTX since end of 12/2023 to see if her GI symptoms improved. 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 Severe arthrosis between proximal and distal carpal rows and L 2nd and 3rd MCPs with areas of ankylosis consistent with inflammatory arthropathy.  \par XR b/l knees (8/2022): Mild patellofemoral compartment OA  \par XR b/l feet (8/2022): Normal\par

## 2023-02-14 LAB
ALBUMIN SERPL ELPH-MCNC: 4.1 G/DL
ALP BLD-CCNC: 57 U/L
ALT SERPL-CCNC: 15 U/L
ANION GAP SERPL CALC-SCNC: 8 MMOL/L
AST SERPL-CCNC: 20 U/L
BASOPHILS # BLD AUTO: 0.09 K/UL
BASOPHILS NFR BLD AUTO: 1.5 %
BILIRUB SERPL-MCNC: 0.3 MG/DL
BUN SERPL-MCNC: 10 MG/DL
CALCIUM SERPL-MCNC: 10 MG/DL
CHLORIDE SERPL-SCNC: 104 MMOL/L
CO2 SERPL-SCNC: 26 MMOL/L
CREAT SERPL-MCNC: 0.79 MG/DL
CRP SERPL-MCNC: <3 MG/L
EGFR: 91 ML/MIN/1.73M2
EOSINOPHIL # BLD AUTO: 0.28 K/UL
EOSINOPHIL NFR BLD AUTO: 4.7 %
GLUCOSE SERPL-MCNC: 89 MG/DL
HCT VFR BLD CALC: 42.6 %
HGB BLD-MCNC: 13.6 G/DL
IMM GRANULOCYTES NFR BLD AUTO: 0.3 %
LYMPHOCYTES # BLD AUTO: 1.44 K/UL
LYMPHOCYTES NFR BLD AUTO: 24.3 %
MAN DIFF?: NORMAL
MCHC RBC-ENTMCNC: 31.9 GM/DL
MCHC RBC-ENTMCNC: 32.2 PG
MCV RBC AUTO: 100.9 FL
MONOCYTES # BLD AUTO: 0.63 K/UL
MONOCYTES NFR BLD AUTO: 10.6 %
NEUTROPHILS # BLD AUTO: 3.47 K/UL
NEUTROPHILS NFR BLD AUTO: 58.6 %
PLATELET # BLD AUTO: 312 K/UL
POTASSIUM SERPL-SCNC: 4.9 MMOL/L
PROT SERPL-MCNC: 6.3 G/DL
RBC # BLD: 4.22 M/UL
RBC # FLD: 13.3 %
SODIUM SERPL-SCNC: 138 MMOL/L
WBC # FLD AUTO: 5.93 K/UL

## 2023-02-15 LAB — ERYTHROCYTE [SEDIMENTATION RATE] IN BLOOD BY WESTERGREN METHOD: 3 MM/HR

## 2023-02-17 LAB
M TB IFN-G BLD-IMP: NEGATIVE
QUANTIFERON TB PLUS MITOGEN MINUS NIL: >10 IU/ML
QUANTIFERON TB PLUS NIL: 0.03 IU/ML
QUANTIFERON TB PLUS TB1 MINUS NIL: -0.01 IU/ML
QUANTIFERON TB PLUS TB2 MINUS NIL: -0.02 IU/ML

## 2023-02-27 LAB
ESTRADIOL SERPL-MCNC: <5 PG/ML
FSH SERPL-MCNC: 49.6 IU/L

## 2023-03-06 ENCOUNTER — NON-APPOINTMENT (OUTPATIENT)
Age: 53
End: 2023-03-06

## 2023-03-08 ENCOUNTER — APPOINTMENT (OUTPATIENT)
Dept: PHYSICAL MEDICINE AND REHAB | Facility: CLINIC | Age: 53
End: 2023-03-08
Payer: MEDICAID

## 2023-03-08 VITALS
HEART RATE: 60 BPM | BODY MASS INDEX: 21.34 KG/M2 | DIASTOLIC BLOOD PRESSURE: 81 MMHG | HEIGHT: 61 IN | SYSTOLIC BLOOD PRESSURE: 127 MMHG | WEIGHT: 113 LBS

## 2023-03-08 PROCEDURE — 99214 OFFICE O/P EST MOD 30 MIN: CPT

## 2023-03-08 PROCEDURE — 76882 US LMTD JT/FCL EVL NVASC XTR: CPT

## 2023-03-08 NOTE — ASSESSMENT
[FreeTextEntry1] : 52 y.o. F w/ h/o RA (on MTX injection), s/p hysterectomy (Feb 2021) and colon cancer s/p resection from sigmoid colon (May 2021 - no CTX or RTX) w/ c/o chronic anterior pelvic pain.  I spent most of today's office visit (25 minutes) reviewing and performing the patient's ultrasound examination pubic symphysis, discussing etiology, pathogenesis, further diagnostic work-up and nonoperative versus operative management.  Her ultrasound is consistent with arthrosis of the pubic symphysis characterized by irregular appearing pubic bone endplates and capsular hypertrophy.  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  P.o. NSAIDs remain relatively contraindicated secondary to the patient's medical history.  We again discussed the utility of an ultrasound guided pubic symphysis local anesthetic injection now that there appears to be some space to place a small amount of injectate.  The patient would like to confer with her rheumatologist regarding a corticosteroid injection into the area.  I would not offer her PRP.  I advised her to continue with her course of pelvic floor rehabilitation.  Uncertain if she would benefit from surgical plating across the symphysis pubis given her other issues.  The patient is in agreement with the plan.  All questions have been answered.  The patient will call the office and let us know how she wishes to proceed.

## 2023-03-08 NOTE — HISTORY OF PRESENT ILLNESS
[FreeTextEntry1] : 52 y.o. F w/ h/o RA (on MTX injection), s/p hysterectomy (Feb 2021) and colon cancer s/p sigmoid colon resection (May 2021 - no CTX or RTX) w/ c/o chronic pelvic pain returns to office for f/u and US examination pelvis.  Results detailed below.  The patient states that she has started working with a physical therapist.  She has recently started on Humira by her rheumatologist.

## 2023-03-08 NOTE — PHYSICAL EXAM
[FreeTextEntry1] : NAD\par A&Ox3\par Non-obese\par No significant change in today's office visit.\par ROM L-spine: 3/4 full forward flexion before pain; 10-15' extension before pain\par ROM Hips: relatively smooth IR/ER w/ groin pain\par Pelvic tilt: + b/l\par Seated slump test: neg b/l\par SLR: neg b/l\par INGRID's: slightly + right; neg left\par FADIR's: slightly + right; neg left\par DTR's: 2+ knees; 1+ right ankle; 2+ left ankle\par MMT: 5/5 b/l LE except 4+/5 pain-related weakness right HF\par Sensation: SILT\par Toe & Heel Walk: Yes\par Palpation: pubic symphysis VTTP (static)\par

## 2023-03-08 NOTE — PROCEDURE
[de-identified] : MSI US EXAMINATION PUBIC SYMPHYSIS\par \par Arthrosis of the pubic symphysis characterized by irregular appearing pubic bone endplates and capsular hypertrophy.

## 2023-03-09 ENCOUNTER — APPOINTMENT (OUTPATIENT)
Dept: PHYSICAL MEDICINE AND REHAB | Facility: CLINIC | Age: 53
End: 2023-03-09
Payer: MEDICAID

## 2023-03-09 DIAGNOSIS — M47.812 SPONDYLOSIS W/OUT MYELOPATHY OR RADICULOPATHY, CERVICAL REGION: ICD-10-CM

## 2023-03-09 PROCEDURE — 99442: CPT

## 2023-03-09 NOTE — ASSESSMENT
[FreeTextEntry1] : Ms. LE OWEN is a 52year old female who presented with low back pain and right lower extremity pain, numbness, and tingling after a fall, likely secondary to L5-S1 herniated disc. She underwent MORENA on 5/9/22 for which she has now noticed relief. She has also been seen for her bilateral shoulder/neck pain, which has been more controlled lately but still bothersome. She recently has been following with rheumatology for rheumatoid arthritis, for which she was recently started on Humira. She is now also seeing Dr. Tan for advanced pubic symphysis OA and pain.   Denies any red flag signs. Will recommend: \par - Patient to undergo local anesthetic injection to the pubic symphysis to see if it significant improves her pain\par - Patient to continue following with rheumatology\par - For her low back/neck pain, she will continue HEP and the recently started Humira via rheumatology to see if it helps\par \par Return after injection.  Patient in agreement with plan. Patient aware of red flag signs including any changes to their bowel/bladder control, groin numbness or new weakness. Patient knows to seek immediate attention by calling 911 or going to nearest ER if these symptoms appear. \par \par I spent a total of 12 minutes on the phone with the patient for this encounter.

## 2023-03-09 NOTE — HISTORY OF PRESENT ILLNESS
[FreeTextEntry1] : This visit was conducted via phone call. Patient confirmed they were at home at [address] [facility]\[place] [address] . Dr. Dinae was the office at [address] [place] [address]. Patient consented to the televisit. \par \par Ms. LE OWEN is a 52 year old  female who presents for follow up. At last visit, she was ordered an MORENA, weaned off gabapentin and told to f/u with rheum. She has since seen Dr. Watt/Dr. Tan who ordered an X-ray/US of her bony pelvis which showed advanced OA of pubic symphysis. Dr. Tan offered an US guided pubic symphysis local anesthestic injection. She has also just started Humira injections. She still has low back pain but is now more focused on her pubic symphysis pain\par \par Location:Middle of low back, also now in pubic symphysis\par Onset:Chronic, In late 4/2022 after fall\par Provocation/Palliative: sitting, walking, driving makes the pain worse. Lying on her left side helps a little\par Quality: Achy, cramping, numbness, and tingling\par Radiation: No significant radiation at this time\par Severity: mild now, but can be moderate to severe\par Timing: Worst at night\par \par No bowel/bladder changes. No groin numbness.

## 2023-03-13 ENCOUNTER — APPOINTMENT (OUTPATIENT)
Dept: CARDIOLOGY | Facility: CLINIC | Age: 53
End: 2023-03-13
Payer: MEDICAID

## 2023-03-13 ENCOUNTER — NON-APPOINTMENT (OUTPATIENT)
Age: 53
End: 2023-03-13

## 2023-03-13 VITALS
OXYGEN SATURATION: 95 % | HEIGHT: 61 IN | DIASTOLIC BLOOD PRESSURE: 72 MMHG | BODY MASS INDEX: 21.14 KG/M2 | HEART RATE: 66 BPM | WEIGHT: 112 LBS | SYSTOLIC BLOOD PRESSURE: 120 MMHG | TEMPERATURE: 98.3 F

## 2023-03-13 DIAGNOSIS — I34.0 NONRHEUMATIC MITRAL (VALVE) INSUFFICIENCY: ICD-10-CM

## 2023-03-13 PROCEDURE — 93000 ELECTROCARDIOGRAM COMPLETE: CPT

## 2023-03-13 PROCEDURE — 99204 OFFICE O/P NEW MOD 45 MIN: CPT | Mod: 25

## 2023-03-13 RX ORDER — SUCRALFATE 1 G/1
1 TABLET ORAL 4 TIMES DAILY
Qty: 360 | Refills: 1 | Status: DISCONTINUED | COMMUNITY
Start: 2020-05-26 | End: 2023-03-13

## 2023-03-15 ENCOUNTER — OUTPATIENT (OUTPATIENT)
Dept: OUTPATIENT SERVICES | Facility: HOSPITAL | Age: 53
LOS: 1 days | Discharge: HOME | End: 2023-03-15

## 2023-03-15 DIAGNOSIS — Z98.890 OTHER SPECIFIED POSTPROCEDURAL STATES: Chronic | ICD-10-CM

## 2023-03-15 DIAGNOSIS — Z90.49 ACQUIRED ABSENCE OF OTHER SPECIFIED PARTS OF DIGESTIVE TRACT: Chronic | ICD-10-CM

## 2023-03-15 DIAGNOSIS — Z98.891 HISTORY OF UTERINE SCAR FROM PREVIOUS SURGERY: Chronic | ICD-10-CM

## 2023-03-15 DIAGNOSIS — C18.9 MALIGNANT NEOPLASM OF COLON, UNSPECIFIED: ICD-10-CM

## 2023-03-15 DIAGNOSIS — Z90.710 ACQUIRED ABSENCE OF BOTH CERVIX AND UTERUS: Chronic | ICD-10-CM

## 2023-03-16 ENCOUNTER — APPOINTMENT (OUTPATIENT)
Dept: OBGYN | Facility: CLINIC | Age: 53
End: 2023-03-16
Payer: MEDICAID

## 2023-03-16 ENCOUNTER — ASOB RESULT (OUTPATIENT)
Age: 53
End: 2023-03-16

## 2023-03-16 ENCOUNTER — APPOINTMENT (OUTPATIENT)
Dept: ANTEPARTUM | Facility: CLINIC | Age: 53
End: 2023-03-16
Payer: MEDICAID

## 2023-03-16 VITALS — SYSTOLIC BLOOD PRESSURE: 114 MMHG | DIASTOLIC BLOOD PRESSURE: 68 MMHG | HEIGHT: 61 IN

## 2023-03-16 DIAGNOSIS — R10.2 PELVIC AND PERINEAL PAIN: ICD-10-CM

## 2023-03-16 DIAGNOSIS — Z78.0 ASYMPTOMATIC MENOPAUSAL STATE: ICD-10-CM

## 2023-03-16 PROCEDURE — 76856 US EXAM PELVIC COMPLETE: CPT | Mod: 59

## 2023-03-16 PROCEDURE — 76830 TRANSVAGINAL US NON-OB: CPT

## 2023-03-16 PROCEDURE — 99214 OFFICE O/P EST MOD 30 MIN: CPT

## 2023-03-16 NOTE — REVIEW OF SYSTEMS
[Night Sweats] : night sweats [Abdominal Pain] : abdominal pain [Pelvic pain] : pelvic pain [Hot Flashes] : hot flashes [Negative] : Heme/Lymph

## 2023-03-17 ENCOUNTER — APPOINTMENT (OUTPATIENT)
Dept: GASTROENTEROLOGY | Facility: CLINIC | Age: 53
End: 2023-03-17
Payer: MEDICAID

## 2023-03-17 VITALS
TEMPERATURE: 97.3 F | RESPIRATION RATE: 14 BRPM | SYSTOLIC BLOOD PRESSURE: 120 MMHG | HEIGHT: 61 IN | DIASTOLIC BLOOD PRESSURE: 79 MMHG | WEIGHT: 112 LBS | OXYGEN SATURATION: 98 % | HEART RATE: 74 BPM | BODY MASS INDEX: 21.14 KG/M2

## 2023-03-17 DIAGNOSIS — Z85.038 PERSONAL HISTORY OF OTHER MALIGNANT NEOPLASM OF LARGE INTESTINE: ICD-10-CM

## 2023-03-17 PROCEDURE — 99214 OFFICE O/P EST MOD 30 MIN: CPT

## 2023-03-17 NOTE — ASSESSMENT
[FreeTextEntry1] : A/P\par  hx of colon cancer\par  I discussed the risks and benefits of colonoscopy and patient was given opportunity to ask questions. Colonoscopy to r/o colon cancer, polyps, AVM's. Patient is medically optimized for the procedure\par miralax prep\par \par GERD\par Today's instructions for acid reflux include avoid provocative foods. For example citrus alcohol coffee chocolate mints. Smaller meals, no eating 3 hours prior to bedtime and elevate head of the bed prior to sleep.\par pepcid 40 mg qd- can do pepcid 40 mg bid for breakthrough

## 2023-03-17 NOTE — HISTORY OF PRESENT ILLNESS
[FreeTextEntry1] : Patient with history of GERD for 2 years.  Patient does not take PPI as she was previously on 6-MP.  There is interaction with 6-MP and PPI so she stopped taking the PPI.  However she was switched to methotrexate both p.o. then IV.  With both forms of methotrexate she continued to have bad heartburn regurgitation.  She has now switched to Humira.  Symptoms are controlled with Pepcid 40 mg daily.  Rarely needs twice daily Pepcid dosing for breakthrough symptoms\par     Patient diagnosed with colon cancer in 2020..  She had a sigmoid lesion.  She is followed with heme/oncology.  As well as colorectal surgery.  Her last CT in November 2022 showed no liver mets.  CEA was normal in December 2022.  Her last colonoscopy 2000 22 May showed diverticulosis and hemorrhoids

## 2023-03-17 NOTE — PHYSICAL EXAM
[Alert] : alert [Normal Voice/Communication] : normal voice/communication [Healthy Appearing] : healthy appearing [No Acc Muscle Use] : no accessory muscle use [No Respiratory Distress] : no respiratory distress [Respiration, Rhythm And Depth] : normal respiratory rhythm and effort [Auscultation Breath Sounds / Voice Sounds] : lungs were clear to auscultation bilaterally [Heart Rate And Rhythm] : heart rate was normal and rhythm regular [Normal S1, S2] : normal S1 and S2 [Murmurs] : no murmurs [Bowel Sounds] : normal bowel sounds [Abdomen Tenderness] : non-tender [No Masses] : no abdominal mass palpated [Abdomen Soft] : soft [Oriented To Time, Place, And Person] : oriented to person, place, and time

## 2023-03-19 NOTE — HISTORY OF PRESENT ILLNESS
[FreeTextEntry1] : 53 yo pt here for fu visit. using yuvafem for vag dryness, helping somewhat.\par dxd w ra, has severe pelvic symphyseal arthritis.\par had hyst and colon resection 2 yrs ago. getting bad hot flashes\par has issues w bad pelvic pain, feels like period, but no uterus.\par hyst w dr Be for adenomyosis, pain. 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 \par had colon resection for colon cancer. may 21 , detected on colonoscopy after friend  at 48 from colon ca. \par fam hx both sides of family. \par fsh 49 last month. \par had us today for pelvic pain.

## 2023-03-19 NOTE — PLAN
[FreeTextEntry1] : pelvic pain, arthritis .\par us wnl\par men sx. \par rba hrt dw pt, stat w e2 patches. dw pt incr risk thromboembolism and mitigation by transdermal delivery.\par spent 35 min in encounter.

## 2023-03-20 ENCOUNTER — RESULT REVIEW (OUTPATIENT)
Age: 53
End: 2023-03-20

## 2023-03-20 ENCOUNTER — APPOINTMENT (OUTPATIENT)
Dept: HEMATOLOGY ONCOLOGY | Facility: CLINIC | Age: 53
End: 2023-03-20
Payer: MEDICAID

## 2023-03-20 VITALS
WEIGHT: 112 LBS | HEIGHT: 61 IN | DIASTOLIC BLOOD PRESSURE: 73 MMHG | BODY MASS INDEX: 21.14 KG/M2 | SYSTOLIC BLOOD PRESSURE: 107 MMHG | OXYGEN SATURATION: 98 % | HEART RATE: 80 BPM

## 2023-03-20 LAB
BASOPHILS # BLD AUTO: 0.1 K/UL — SIGNIFICANT CHANGE UP (ref 0–0.2)
BASOPHILS NFR BLD AUTO: 2.6 % — HIGH (ref 0–2)
CEA SERPL-MCNC: 1.5 NG/ML
EOSINOPHIL # BLD AUTO: 0.2 K/UL — SIGNIFICANT CHANGE UP (ref 0–0.5)
EOSINOPHIL NFR BLD AUTO: 6.5 % — HIGH (ref 0–6)
HCT VFR BLD CALC: 41.6 % — SIGNIFICANT CHANGE UP (ref 34.5–45)
HGB BLD-MCNC: 14.7 G/DL — SIGNIFICANT CHANGE UP (ref 11.5–15.5)
LYMPHOCYTES # BLD AUTO: 1.4 K/UL — SIGNIFICANT CHANGE UP (ref 1–3.3)
LYMPHOCYTES # BLD AUTO: 37.8 % — SIGNIFICANT CHANGE UP (ref 13–44)
MCHC RBC-ENTMCNC: 32.4 PG — SIGNIFICANT CHANGE UP (ref 27–34)
MCHC RBC-ENTMCNC: 35.4 G/DL — SIGNIFICANT CHANGE UP (ref 32–36)
MCV RBC AUTO: 91.4 FL — SIGNIFICANT CHANGE UP (ref 80–100)
MONOCYTES # BLD AUTO: 0.5 K/UL — SIGNIFICANT CHANGE UP (ref 0–0.9)
MONOCYTES NFR BLD AUTO: 13.7 % — SIGNIFICANT CHANGE UP (ref 2–14)
NEUTROPHILS # BLD AUTO: 1.5 K/UL — LOW (ref 1.8–7.4)
NEUTROPHILS NFR BLD AUTO: 39.6 % — LOW (ref 43–77)
PLATELET # BLD AUTO: 272 K/UL — SIGNIFICANT CHANGE UP (ref 150–400)
RBC # BLD: 4.55 M/UL — SIGNIFICANT CHANGE UP (ref 3.8–5.2)
RBC # FLD: 11.7 % — SIGNIFICANT CHANGE UP (ref 10.3–14.5)
WBC # BLD: 3.7 K/UL — LOW (ref 3.8–10.5)
WBC # FLD AUTO: 3.7 K/UL — LOW (ref 3.8–10.5)

## 2023-03-20 PROCEDURE — 99214 OFFICE O/P EST MOD 30 MIN: CPT

## 2023-03-20 NOTE — HISTORY OF PRESENT ILLNESS
[de-identified] : 50-year-old female presents to our office with a new diagnosis of Sigmoid colon cancer \par \par She has had a variety of symptoms with chronic pelvic pain over the last 10 years and recently underwent a hysterectomy for adenomyosis by Dr. Cruz in February 2021. Several of her pelvic symptoms did improve. After that she had intermittent blood in her stool and some changes in the caliber of her stool. Her appetite has been fine and she has not had any significant weight loss. She underwent a colonoscopy which showed a mass in the sigmoid colon. \par \par COLONOSCOPY: 5/17/2021: Intramucosal adenocarcinoma \par CT CAP 5/2021 :  LUNGS AND LARGE AIRWAYS: Patent central airways. There is 3 mm nodular thickening of the minor fissure on image 80, likely postinflammatory. There is a 2 mm calcified granuloma in the left lower lobe on image 125. No confluent airspace opacity is identified. There is no evidence of interstitial lung disease, bronchiectasis, or fibrosis.\par \par S/p Robotic sigmoid colon resection for colon cancer on 5/28/21 T3N0 stage II colon cancer, MSI Stable, No high risk features. The patient did not receive adjuvant treatment. Subsequent surveillance imaging in 2/2022 did not reveal evidence of recurrence. Colonosopy in 4/28/22 was also unremarkable. \par  [de-identified] : The patient continues to have abdominal pain and chronic joint pain that is unchanged from previous visit but now has plans to undergo injections to manage the pain. The pain is throughout the abdomen in the left lower and right lower abdomen. The patient denies weight loss, poor appetite, and bleeding.

## 2023-03-20 NOTE — PHYSICAL EXAM
[Fully active, able to carry on all pre-disease performance without restriction] : Status 0 - Fully active, able to carry on all pre-disease performance without restriction [Normal] : affect appropriate [de-identified] : left lower and right lower abdominal pain

## 2023-03-20 NOTE — ASSESSMENT
[FreeTextEntry1] : pT3 N0 M0 ADenocarcinoma SIgmoid colon, in Jun 2021 \par \par - Invasive moderately differentiated adenocarcinoma invading through the muscularis propria into pericolorectal tissue.\par - Tattoo is present.\par - Diverticulosis.\par - 24 lymph nodes, negative for carcinoma (0/24).\par - Margins of resection are not involved.\par \par Resected node-negative (stage II) disease, the benefits of chemotherapy are controversial, as is the relative benefit of an oxaliplatin-based as compared with a non-oxaliplatin-based regimen.\par Benefits of chemotherapy is based on high risk features \par Patient does not have any high risk features, No LVI   NO neural invasion 25 LN examined, NO Perforation \par \par GIven no high risk features no benefit of adjuvant chemotherapy. The patient was started on surveillance. \par \par - Today's Labs with Normal WBC \par - F/u visit with labs cbc/ cmp/ CEA q 3months x 2 y and then q 6 months from Y 3-5 \par - CT CAP q 6 months x 5 years and Y3-5 and consider spacing q 8-12 months\par - 1 year colonoscopy showed no evidence of recurrence. \par - Ct imaging reviewed with patient. No evidence of recurrence noted on imaging from 8/2022 and 9/2022.  \par - Imaging from 12/2022 without evidence of disease; results discussed with patient.  \par - Will reorder imaging to be done within the next two months \par - Patient getting annual mammograms with PCP; seeing gynecology and counseled on getting pap smears. Patient to get follow up colonoscopies as well. \par - follow up with new MD in 3 months to discuss CT results and for continued surveillance. \par \par

## 2023-03-23 ENCOUNTER — RESULT REVIEW (OUTPATIENT)
Age: 53
End: 2023-03-23

## 2023-03-27 ENCOUNTER — NON-APPOINTMENT (OUTPATIENT)
Age: 53
End: 2023-03-27

## 2023-03-27 ENCOUNTER — APPOINTMENT (OUTPATIENT)
Dept: CARDIOLOGY | Facility: CLINIC | Age: 53
End: 2023-03-27

## 2023-03-27 NOTE — ADDENDUM
[FreeTextEntry1] : Patient has no baseline TTE in the chart and would like TTE to assess for any significant valvulopathy to assess for fatigue and dizziness. EKG Sinus  Rhythm @ 68 bpm WITHIN NORMAL LIMITS. \par \par Ciara Ryan D.O. FACMARITZA\par Cardiology/Vascular Cardiology -[facility]\par Telephone # [phone]\par

## 2023-03-27 NOTE — ASSESSMENT
[FreeTextEntry1] : 51 y/o F with PMH of Rheumatoid arthritis, Colon cancer (dx in 5/2021, Stage 2 s/p colonic resection), presents for cardiovascular evaluation \par \par 1) Hx of mitral regurgitation \par - patient notes that she has been feeling fatigued \par - possible last TTE done 3 yrs ago and will refer for TTE to assess LV function and valvulopathy \par - EKG reviewed this visit normal sinus rhythm \par - blood pressure controlled \par - will obtain records from prior cardiology office and from there will decide if patient requires any further cardiovascular work up\par \par Ciara Ryan D.O., FACC\par Cardiology/Vascular Cardiology -[facility]\par Telephone # [phone]\par

## 2023-03-27 NOTE — REASON FOR VISIT
[Symptom and Test Evaluation] : symptom and test evaluation [FreeTextEntry1] : 51 y/o F with PMH of Rheumatoid arthritis, Colon cancer (dx in 5/2021, Stage 2 s/p colonic resection), presents for cardiovascular evaluation \par \par Prior Cardiologist: [facility] \par \par Patient notes that she  Feb 2021 Hysterectomy, had 3 section last 2006 by Dr. Cruz and hx of colon cancer - Colon resection - Cancer 3 months later Stage 2, no chemotherapy, checking in with Dr. Alvarado, CT abdomen every 3, [facility]. \par Possible Chest pain ? acid reflux found to have MR, felt like she had something stuck in her throat. \par \par For the most part currently pretty good, mostly affected by rheumatoid arthritis and herniated disc with pelvic floor dysfunction, feels tiredness, but no chest pain or shortness of breath with no lower extremity edema orthopnea or PND \par \par \par Surgical Hx: hysterectomy and colon resection \par smoking: none \par Alcohol use: weekends \par Family Hx: Grandfather stroke \par

## 2023-04-03 ENCOUNTER — APPOINTMENT (OUTPATIENT)
Dept: MAMMOGRAPHY | Facility: CLINIC | Age: 53
End: 2023-04-03
Payer: MEDICAID

## 2023-04-03 ENCOUNTER — RESULT REVIEW (OUTPATIENT)
Age: 53
End: 2023-04-03

## 2023-04-03 ENCOUNTER — OUTPATIENT (OUTPATIENT)
Dept: OUTPATIENT SERVICES | Facility: HOSPITAL | Age: 53
LOS: 1 days | End: 2023-04-03
Payer: MEDICAID

## 2023-04-03 ENCOUNTER — APPOINTMENT (OUTPATIENT)
Dept: ULTRASOUND IMAGING | Facility: CLINIC | Age: 53
End: 2023-04-03
Payer: MEDICAID

## 2023-04-03 DIAGNOSIS — Z98.891 HISTORY OF UTERINE SCAR FROM PREVIOUS SURGERY: Chronic | ICD-10-CM

## 2023-04-03 DIAGNOSIS — Z98.890 OTHER SPECIFIED POSTPROCEDURAL STATES: Chronic | ICD-10-CM

## 2023-04-03 DIAGNOSIS — R92.8 OTHER ABNORMAL AND INCONCLUSIVE FINDINGS ON DIAGNOSTIC IMAGING OF BREAST: ICD-10-CM

## 2023-04-03 DIAGNOSIS — Z90.49 ACQUIRED ABSENCE OF OTHER SPECIFIED PARTS OF DIGESTIVE TRACT: Chronic | ICD-10-CM

## 2023-04-03 DIAGNOSIS — R92.2 INCONCLUSIVE MAMMOGRAM: ICD-10-CM

## 2023-04-03 DIAGNOSIS — Z12.39 ENCOUNTER FOR OTHER SCREENING FOR MALIGNANT NEOPLASM OF BREAST: ICD-10-CM

## 2023-04-03 DIAGNOSIS — Z00.8 ENCOUNTER FOR OTHER GENERAL EXAMINATION: ICD-10-CM

## 2023-04-03 DIAGNOSIS — Z90.710 ACQUIRED ABSENCE OF BOTH CERVIX AND UTERUS: Chronic | ICD-10-CM

## 2023-04-03 PROCEDURE — 76641 ULTRASOUND BREAST COMPLETE: CPT | Mod: 26,50

## 2023-04-03 PROCEDURE — 76641 ULTRASOUND BREAST COMPLETE: CPT

## 2023-04-03 PROCEDURE — 77067 SCR MAMMO BI INCL CAD: CPT

## 2023-04-03 PROCEDURE — 77067 SCR MAMMO BI INCL CAD: CPT | Mod: 26

## 2023-04-03 PROCEDURE — 77063 BREAST TOMOSYNTHESIS BI: CPT

## 2023-04-03 PROCEDURE — 77063 BREAST TOMOSYNTHESIS BI: CPT | Mod: 26

## 2023-04-05 ENCOUNTER — APPOINTMENT (OUTPATIENT)
Dept: CT IMAGING | Facility: CLINIC | Age: 53
End: 2023-04-05
Payer: MEDICAID

## 2023-04-05 ENCOUNTER — OUTPATIENT (OUTPATIENT)
Dept: OUTPATIENT SERVICES | Facility: HOSPITAL | Age: 53
LOS: 1 days | End: 2023-04-05
Payer: MEDICAID

## 2023-04-05 DIAGNOSIS — Z98.890 OTHER SPECIFIED POSTPROCEDURAL STATES: Chronic | ICD-10-CM

## 2023-04-05 DIAGNOSIS — Z90.710 ACQUIRED ABSENCE OF BOTH CERVIX AND UTERUS: Chronic | ICD-10-CM

## 2023-04-05 DIAGNOSIS — Z98.891 HISTORY OF UTERINE SCAR FROM PREVIOUS SURGERY: Chronic | ICD-10-CM

## 2023-04-05 DIAGNOSIS — C18.6 MALIGNANT NEOPLASM OF DESCENDING COLON: ICD-10-CM

## 2023-04-05 DIAGNOSIS — Z90.49 ACQUIRED ABSENCE OF OTHER SPECIFIED PARTS OF DIGESTIVE TRACT: Chronic | ICD-10-CM

## 2023-04-05 PROCEDURE — 74177 CT ABD & PELVIS W/CONTRAST: CPT | Mod: 26

## 2023-04-05 PROCEDURE — 71260 CT THORAX DX C+: CPT

## 2023-04-05 PROCEDURE — 71260 CT THORAX DX C+: CPT | Mod: 26

## 2023-04-05 PROCEDURE — 74177 CT ABD & PELVIS W/CONTRAST: CPT

## 2023-04-06 ENCOUNTER — APPOINTMENT (OUTPATIENT)
Dept: OBGYN | Facility: CLINIC | Age: 53
End: 2023-04-06
Payer: MEDICAID

## 2023-04-06 VITALS
WEIGHT: 112 LBS | DIASTOLIC BLOOD PRESSURE: 69 MMHG | HEART RATE: 73 BPM | BODY MASS INDEX: 21.14 KG/M2 | SYSTOLIC BLOOD PRESSURE: 108 MMHG | HEIGHT: 61 IN

## 2023-04-06 PROCEDURE — 99396 PREV VISIT EST AGE 40-64: CPT

## 2023-04-06 NOTE — HISTORY OF PRESENT ILLNESS
[FreeTextEntry1] : 51 yo with a lot of mediacal issues, colon cancer 2 yrs ago, also had hysterevtomy for adenomyosis\par braca -\par \par has LS strain and pubic symphysis arthritis.\par no prob today , she hasn’t started the estrogen patch yet. \par Taking an herb for hot flasehs amberen and it seems better but she is also not drinking this month.

## 2023-04-06 NOTE — DISCUSSION/SUMMARY
[FreeTextEntry1] : hysterecttomy\par herbs for hot flashes\par mammo sono good\par disc plantbase with her rA\par continuie pelvic floor pt

## 2023-04-13 LAB
CYTOLOGY CVX/VAG DOC THIN PREP: NORMAL
HPV HIGH+LOW RISK DNA PNL CVX: DETECTED

## 2023-04-27 ENCOUNTER — TRANSCRIPTION ENCOUNTER (OUTPATIENT)
Age: 53
End: 2023-04-27

## 2023-04-28 ENCOUNTER — APPOINTMENT (OUTPATIENT)
Dept: GASTROENTEROLOGY | Facility: GI CENTER | Age: 53
End: 2023-04-28
Payer: MEDICAID

## 2023-04-28 ENCOUNTER — APPOINTMENT (OUTPATIENT)
Dept: HEMATOLOGY ONCOLOGY | Facility: CLINIC | Age: 53
End: 2023-04-28
Payer: MEDICAID

## 2023-04-28 ENCOUNTER — OUTPATIENT (OUTPATIENT)
Dept: OUTPATIENT SERVICES | Facility: HOSPITAL | Age: 53
LOS: 1 days | End: 2023-04-28
Payer: COMMERCIAL

## 2023-04-28 VITALS
OXYGEN SATURATION: 98 % | BODY MASS INDEX: 20.96 KG/M2 | SYSTOLIC BLOOD PRESSURE: 133 MMHG | HEART RATE: 72 BPM | WEIGHT: 111.03 LBS | HEIGHT: 61 IN | DIASTOLIC BLOOD PRESSURE: 84 MMHG

## 2023-04-28 DIAGNOSIS — Z98.891 HISTORY OF UTERINE SCAR FROM PREVIOUS SURGERY: Chronic | ICD-10-CM

## 2023-04-28 DIAGNOSIS — Z98.890 OTHER SPECIFIED POSTPROCEDURAL STATES: Chronic | ICD-10-CM

## 2023-04-28 DIAGNOSIS — Z85.038 PERSONAL HISTORY OF OTHER MALIGNANT NEOPLASM OF LARGE INTESTINE: ICD-10-CM

## 2023-04-28 DIAGNOSIS — Z90.49 ACQUIRED ABSENCE OF OTHER SPECIFIED PARTS OF DIGESTIVE TRACT: Chronic | ICD-10-CM

## 2023-04-28 DIAGNOSIS — Z90.710 ACQUIRED ABSENCE OF BOTH CERVIX AND UTERUS: Chronic | ICD-10-CM

## 2023-04-28 PROCEDURE — 45378 DIAGNOSTIC COLONOSCOPY: CPT | Mod: 33

## 2023-04-28 PROCEDURE — 45378 DIAGNOSTIC COLONOSCOPY: CPT

## 2023-04-28 PROCEDURE — 99214 OFFICE O/P EST MOD 30 MIN: CPT

## 2023-04-28 NOTE — ASSESSMENT
[FreeTextEntry1] : A/P\par hx of colon cancer\par surveillence colonoscopy\par  I discussed the risks and benefits of colonoscopy and patient was given opportunity to ask questions. Colonoscopy to r/o colon cancer, polyps, AVM's. Patient is medically optimized for the procedure\par

## 2023-04-28 NOTE — PHYSICAL EXAM
[Alert] : alert [Normal Voice/Communication] : normal voice/communication [Healthy Appearing] : healthy appearing [No Respiratory Distress] : no respiratory distress [Respiration, Rhythm And Depth] : normal respiratory rhythm and effort [Auscultation Breath Sounds / Voice Sounds] : lungs were clear to auscultation bilaterally [Heart Rate And Rhythm] : heart rate was normal and rhythm regular [Normal S1, S2] : normal S1 and S2 [Bowel Sounds] : normal bowel sounds [Abdomen Tenderness] : non-tender [No Masses] : no abdominal mass palpated [Abdomen Soft] : soft [Oriented To Time, Place, And Person] : oriented to person, place, and time

## 2023-05-03 NOTE — ADDENDUM
[FreeTextEntry1] : Documented by Alice Hurtado acting as scribe for Dr. Alvarado on 04/28/2023 \par \par All Medical record entries made by the Scribe were at my, Dr. Alvarado's, direction and personally dictated by me on 04/28/2023 . I have reviewed the chart and agree that the record accurately reflects my personal performance of the history, physical exam, assessment and plan. I have also personally directed, reviewed, and agreed with the discharge instructions.\par \par

## 2023-05-03 NOTE — HISTORY OF PRESENT ILLNESS
[de-identified] : 50-year-old female presents to our office with a new diagnosis of Sigmoid colon cancer \par \par She has had a variety of symptoms with chronic pelvic pain over the last 10 years and recently underwent a hysterectomy for adenomyosis by Dr. Cruz in February 2021. Several of her pelvic symptoms did improve. After that she had intermittent blood in her stool and some changes in the caliber of her stool. Her appetite has been fine and she has not had any significant weight loss. She underwent a colonoscopy which showed a mass in the sigmoid colon. \par \par COLONOSCOPY: 5/17/2021: Intramucosal adenocarcinoma \par CT CAP 5/2021 :  LUNGS AND LARGE AIRWAYS: Patent central airways. There is 3 mm nodular thickening of the minor fissure on image 80, likely postinflammatory. There is a 2 mm calcified granuloma in the left lower lobe on image 125. No confluent airspace opacity is identified. There is no evidence of interstitial lung disease, bronchiectasis, or fibrosis.\par \par S/p Robotic sigmoid colon resection for colon cancer on 5/28/21 T3N0 stage II colon cancer, MSI Stable, No high risk features. The patient did not receive adjuvant treatment. Subsequent surveillance imaging in 2/2022 did not reveal evidence of recurrence. Colonosopy in 4/28/22 was also unremarkable. \par  [de-identified] : The patient continues chronic joint pain 2/2 to menopause and RA that is unchanged from previous. RA currently managed on humara, with some improvement in knees. Menopause managed on estrogen patches, she recently started.\par The patient denies weight loss, poor appetite, and bleeding. \par Continued abdominal pain, undergoing pelvic floor therapy\par Patient has colonoscopy scheduled today

## 2023-05-03 NOTE — RESULTS/DATA
[FreeTextEntry1] : 4/5/23: CT CAP\par IMPRESSION:\par Stable chest abdomen pelvis CT without evidence of metastatic disease\par \par \par \par 12/5/22: CT chest/abd/pelvis\par IMPRESSION:\par No definite evidence of metastatic disease identified within the chest, abdomen and pelvis.\par \par Findings within both breasts that may be secondary to cystic disease and follow-up evaluation with dedicated breast imaging is recommended.\par \par 9/1/22: CT chest \par IMPRESSION:\par \par Interval resolution of subpleural triangular nodule within posterior left lower lobe . No new or enlarging nodule. No evidence of metastatic disease.\par \par 8/22/22 CT Abdomen \par IMPRESSION:\par \par Findings compatible with small bowel obstruction. Transition point \par identified in right medial lower abdomen (46-49:5). Jejunal folds distal \par to the transition point appears collapsed, limiting detailed evaluation. \par No significant mesenteric edema, pneumatosis or portal venous gas. \par Correlate with lactic acid.\par \par Mild bladder wall thickening, difficult to assess secondary to inadequate \par distention. Correlate with urinalysis and lab values to assess for \par cystitis and/or ascending urinary tract infection.\par \par \par 4/28/22: \par Colonscopy \par Impressions:  \par  Few diverticulosis in the ascending colon-.  \par  Grade/Stage II internal hemorrhoids.  \par  -Anastomosis site 18 cm from anal verge. Random biopsy. NO recurrence.  \par \par 2/25/22 CT Abdomen/chest/pelvis \par IMPRESSION:\par Status post sigmoid colon resection without evidence of recurrent or metastatic disease within the chest, abdomen, or pelvis.\par \par 8/25/21 CT Abdomen Chest Pelvis IMPRESSION: Status post sigmoid resection. No evidence of locoregional recurrence or metastatic disease.\par \par PATHOLOGY 5/28/21 \par - Adenocarcinoma, moderately differentiated.\par - Sigmoid colon \par - Invasive moderately differentiated adenocarcinoma extending to pericolorectal\par adipose tissue (pT3).\par \par MLH1, MSH2, MSH6, PMS2:   Intact nuclear expression These results show low probability of microsatellite instability-high (MSI-H).\par There is no evidence of DNA mismatch repair deficiency in the analyzed tissue, indicating there is a low probability for Delgado\par syndrome (a common cause hereditary non polyposis colorectal\par cancer or HNPCC).\par \par CK-7: focal positive. CK-20, CDX2: positive\par \par COLONOSCOPY: 5/17/2021: Intramucosal adenocarcinoma \par \par CT CAP 5/2021 :  LUNGS AND LARGE AIRWAYS: Patent central airways. There is 3 mm nodular thickening of the minor fissure on image 80, likely postinflammatory. There is a 2 mm calcified granuloma in the left lower lobe on image 125. No confluent airspace opacity is identified. There is no evidence of interstitial lung disease, bronchiectasis, or fibrosis.\par

## 2023-05-03 NOTE — ASSESSMENT
[FreeTextEntry1] : pT3 N0 M0 ADenocarcinoma SIgmoid colon, in Jun 2021 \par \par - Invasive moderately differentiated adenocarcinoma invading through the muscularis propria into pericolorectal tissue.\par - Tattoo is present.\par - Diverticulosis.\par - 24 lymph nodes, negative for carcinoma (0/24).\par - Margins of resection are not involved.\par \par Resected node-negative (stage II) disease, the benefits of chemotherapy are controversial, as is the relative benefit of an oxaliplatin-based as compared with a non-oxaliplatin-based regimen.\par Benefits of chemotherapy is based on high risk features \par Patient does not have any high risk features, No LVI   NO neural invasion 25 LN examined, NO Perforation \par \par GIven no high risk features no benefit of adjuvant chemotherapy. The patient was started on surveillance. \par \par - Today's Labs with Normal WBC \par - F/u visit with labs cbc/ cmp/ CEA q 3months x 2 y and then q 6 months from Y 3-5 \par - CT CAP q 6 months x 5 years and Y3-5 and consider spacing q 8-12 months\par - 1 year colonoscopy showed no evidence of recurrence. \par - Ct imaging reviewed with patient. No evidence of recurrence noted on imaging from 8/2022 and 9/2022.  \par - Imaging from 4/2023 without evidence of disease; results discussed with patient.  \par - Patient getting annual mammograms with PCP; seeing gynecology and counseled on getting pap smears. Patient colonoscopy scheduled for today. \par - follow up with new MD in 3 months\par \par

## 2023-05-03 NOTE — PHYSICAL EXAM
[Fully active, able to carry on all pre-disease performance without restriction] : Status 0 - Fully active, able to carry on all pre-disease performance without restriction [Normal] : affect appropriate [de-identified] : left lower and right lower abdominal pain

## 2023-05-04 ENCOUNTER — RX RENEWAL (OUTPATIENT)
Age: 53
End: 2023-05-04

## 2023-05-12 NOTE — ED PROVIDER NOTE - CARDIAC, MLM
Normal rate, regular rhythm.  Heart sounds S1, S2.  No murmurs, rubs or gallops. Doxepin Pregnancy And Lactation Text: This medication is Pregnancy Category C and it isn't known if it is safe during pregnancy. It is also excreted in breast milk and breast feeding isn't recommended.

## 2023-05-15 ENCOUNTER — APPOINTMENT (OUTPATIENT)
Dept: RHEUMATOLOGY | Facility: CLINIC | Age: 53
End: 2023-05-15
Payer: MEDICAID

## 2023-05-15 VITALS
HEIGHT: 61 IN | WEIGHT: 111 LBS | TEMPERATURE: 96.8 F | BODY MASS INDEX: 20.96 KG/M2 | DIASTOLIC BLOOD PRESSURE: 65 MMHG | OXYGEN SATURATION: 99 % | SYSTOLIC BLOOD PRESSURE: 105 MMHG | HEART RATE: 66 BPM

## 2023-05-15 DIAGNOSIS — Z79.899 OTHER LONG TERM (CURRENT) DRUG THERAPY: ICD-10-CM

## 2023-05-15 PROCEDURE — 99214 OFFICE O/P EST MOD 30 MIN: CPT

## 2023-05-15 NOTE — HISTORY OF PRESENT ILLNESS
[FreeTextEntry1] : HISTORY: \par 51 y/o female w/ GERD, sigmoid colon cancer s/p resection 5/2021 presents as follow up for seropositive RA.  \par Pt has been having increasing L lower back pain for which she is taking gabapentin 300mg QHS without much relief. Pt has not gone to PT for insurance reasons. Pt also reports feeling of swelling of knees, hands, elbows. Pt was seen by PM&R who increased gabapentin to 600mg QHS and Rx Robaxin PRN.  \par Pt has neck, shoulders, hands, hips, knees pains. Pt reports AM stiffness and pain of hands.  \par Pt reports pain worse with moving after resting.  \par Pt has known herniated disc and L shoulder (rotator cuff and biceps tendons) pending surgery.  \par Reports recurrent heat sensation of chest that radiates down to b/l arms. Hx of L hand surgery due to trauma.  \par Pt also reports chronic fatigue, oral ulcers, hair loss, dry mouth, muscle weakness.  \par Reports areas of erythema of different parts of the body.  \par Hx of hysterectomy, colon cancer s/p resection.  \par Referred to rheum for positive EKLLY and mild positive RF. Normal CRP, CPK, Lyme, thyroid function, thyroid Abs.  \par Pt has been physically active throughout her life  \par Pt currently takes ibuprofen 600mg PRN, methocarbamol PRN for the pains.  \par Pt would like to try acupuncture, yoga and other pain therapies.  \par Mother - RA, SLE  \par \par Workup by me with KELLY+, RF 18, . XRs with ankylosis and acro-osteolysis. Pt was diagnosed with RA based on RA+, CCP+, erosive arthritis. Pt was started on MTX by me in 8/2022 for RA but stopped due to severe heartburn. PT’s MTX was switched to SQ in 10/2022. Pt's folic acid changed to leucovorin due to side effects of significant malaise, nausea the day after MTX. Pt's MTX was stopped in 12/2022 due to continuing GI side effects. \par \par INTERVAL HISTORY: \par Pt has been on Humira since ~3/2023 with ~60-70% improvement in the joint pains but still has significant stiffness of b/l hands. 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 Severe arthrosis between proximal and distal carpal rows and L 2nd and 3rd MCPs with areas of ankylosis consistent with inflammatory arthropathy.  \par XR b/l knees (8/2022): Mild patellofemoral compartment OA  \par XR b/l feet (8/2022): Normal

## 2023-05-15 NOTE — ASSESSMENT
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  \par Pt has been having increasing L lower back pain for which she is taking gabapentin 300mg QHS without much relief. Pt has not gone to PT for insurance reasons. Pt also reports feeling of swelling of knees, hands, elbows. Pt was seen by PM&R who increased gabapentin to 600mg QHS and Rx Robaxin PRN.  \par Pt has neck, shoulders, hands, hips, knees pains. Pt reports AM stiffness and pain of hands.  \par Pt reports pain worse with moving after resting.  \par Pt has known herniated disc and L shoulder (rotator cuff and biceps tendons) pending surgery.  \par Reports recurrent heat sensation of chest that radiates down to b/l arms. Hx of L hand surgery due to trauma.  \par Pt also reports chronic fatigue, oral ulcers, hair loss, dry mouth, muscle weakness.  \par Reports areas of erythema of different parts of the body.  \par Hx of hysterectomy, colon cancer s/p resection.  \par Referred to rheum for positive KELLY and mild positive RF. Normal CRP, CPK, Lyme, thyroid function, thyroid Abs.  \par Pt has been physically active throughout her life  \par Pt currently takes ibuprofen 600mg PRN, methocarbamol PRN for the pains.  \par Pt would like to try acupuncture, yoga and other pain therapies.  \par Mother - RA, SLE  \par \par Workup by me with KELLY+, RF 18, . XRs with ankylosis and acro-osteolysis. Pt was diagnosed with RA based on RA+, CCP+, erosive arthritis. Pt was started on MTX by me in 8/2022 for RA but stopped due to severe heartburn. PT’s MTX was switched to SQ in 10/2022. Pt's folic acid changed to leucovorin due to side effects of significant malaise, nausea the day after MTX. Pt's MTX was stopped in 12/2022 due to continuing GI side effects. \par Pt was started on Humira ~3/2023 with significant improvement in her joint pains but still with stiffness of handsi n AM. Pt takes Celebrex PRN with good improvement in her pains without GI side effects.\par \par - Will obtain medication safety labs and disease activity labs on next visit\par - c/w Humira 40mg SQ [id number]. Pt is unable to maneuver syringe due to her RA, will order pen injector. Side effects of Humira were discussed with the pt in detail including increased risk of infection, demyelinating disease, re-activation of latent TB, possible increased risk of solid and skin tumors. Advised pt to seek medical care ASAP if he/she has an infection and advised to stop the medication until infection resolves. \par This is a high-risk therapy requiring intense monitoring for toxicity. \par - Last Hep B/C negative 8/2022. Last Quantiferon TB negative 2/2023.\par - If pt needs further control of disease by next visit, will consider adding oral DMARDs (although Hx of significant GI side effects with MTX) vs. changing Humira to a different biologic.\par - c/w celecoxib 200mg PO BID PRN given pt's RA inflammatory arthritis and severe GERD symptoms. Advised to discuss with GI before starting. I advised that the NSAID should be taken with food. In addition while taking the prescribed NSAID, no over the counter or other NSAIDs should be used, such as ibuprofen (Motrin or Advil) or naproxen (Aleve) as this can cause stomach upset or other side effects. If needed for fever or breakthrough pain Tylenol can be used. \par - Continue follow up with other specialists for mechanical joint diseases \par - RTC 3 months for follow up \par

## 2023-05-18 ENCOUNTER — APPOINTMENT (OUTPATIENT)
Dept: OBGYN | Facility: CLINIC | Age: 53
End: 2023-05-18
Payer: MEDICAID

## 2023-05-18 ENCOUNTER — NON-APPOINTMENT (OUTPATIENT)
Age: 53
End: 2023-05-18

## 2023-05-18 VITALS
DIASTOLIC BLOOD PRESSURE: 60 MMHG | WEIGHT: 111 LBS | SYSTOLIC BLOOD PRESSURE: 116 MMHG | HEIGHT: 61 IN | BODY MASS INDEX: 20.96 KG/M2

## 2023-05-18 PROCEDURE — 57421 EXAM/BIOPSY OF VAG W/SCOPE: CPT

## 2023-05-19 ENCOUNTER — APPOINTMENT (OUTPATIENT)
Dept: CARDIOLOGY | Facility: CLINIC | Age: 53
End: 2023-05-19
Payer: MEDICAID

## 2023-05-19 ENCOUNTER — INPATIENT (INPATIENT)
Facility: HOSPITAL | Age: 53
LOS: 2 days | Discharge: HOME | DRG: 758 | End: 2023-05-22
Payer: COMMERCIAL

## 2023-05-19 VITALS
TEMPERATURE: 98 F | DIASTOLIC BLOOD PRESSURE: 75 MMHG | OXYGEN SATURATION: 98 % | SYSTOLIC BLOOD PRESSURE: 119 MMHG | WEIGHT: 113.1 LBS | RESPIRATION RATE: 20 BRPM | HEIGHT: 61 IN | HEART RATE: 74 BPM

## 2023-05-19 DIAGNOSIS — Z90.49 ACQUIRED ABSENCE OF OTHER SPECIFIED PARTS OF DIGESTIVE TRACT: Chronic | ICD-10-CM

## 2023-05-19 DIAGNOSIS — Z98.890 OTHER SPECIFIED POSTPROCEDURAL STATES: Chronic | ICD-10-CM

## 2023-05-19 DIAGNOSIS — Z90.710 ACQUIRED ABSENCE OF BOTH CERVIX AND UTERUS: Chronic | ICD-10-CM

## 2023-05-19 DIAGNOSIS — Z98.891 HISTORY OF UTERINE SCAR FROM PREVIOUS SURGERY: Chronic | ICD-10-CM

## 2023-05-19 LAB
ALBUMIN SERPL ELPH-MCNC: 4.3 G/DL — SIGNIFICANT CHANGE UP (ref 3.3–5.2)
ALP SERPL-CCNC: 59 U/L — SIGNIFICANT CHANGE UP (ref 40–120)
ALT FLD-CCNC: 13 U/L — SIGNIFICANT CHANGE UP
ANION GAP SERPL CALC-SCNC: 11 MMOL/L — SIGNIFICANT CHANGE UP (ref 5–17)
APPEARANCE UR: CLEAR — SIGNIFICANT CHANGE UP
AST SERPL-CCNC: 23 U/L — SIGNIFICANT CHANGE UP
BASOPHILS # BLD AUTO: 0.04 K/UL — SIGNIFICANT CHANGE UP (ref 0–0.2)
BASOPHILS NFR BLD AUTO: 0.3 % — SIGNIFICANT CHANGE UP (ref 0–2)
BILIRUB SERPL-MCNC: 0.6 MG/DL — SIGNIFICANT CHANGE UP (ref 0.4–2)
BILIRUB UR-MCNC: NEGATIVE — SIGNIFICANT CHANGE UP
BUN SERPL-MCNC: 11.9 MG/DL — SIGNIFICANT CHANGE UP (ref 8–20)
CALCIUM SERPL-MCNC: 8.8 MG/DL — SIGNIFICANT CHANGE UP (ref 8.4–10.5)
CHLORIDE SERPL-SCNC: 102 MMOL/L — SIGNIFICANT CHANGE UP (ref 96–108)
CO2 SERPL-SCNC: 26 MMOL/L — SIGNIFICANT CHANGE UP (ref 22–29)
COLOR SPEC: YELLOW — SIGNIFICANT CHANGE UP
CREAT SERPL-MCNC: 0.69 MG/DL — SIGNIFICANT CHANGE UP (ref 0.5–1.3)
DIFF PNL FLD: ABNORMAL
EGFR: 105 ML/MIN/1.73M2 — SIGNIFICANT CHANGE UP
EOSINOPHIL # BLD AUTO: 0.3 K/UL — SIGNIFICANT CHANGE UP (ref 0–0.5)
EOSINOPHIL NFR BLD AUTO: 2.2 % — SIGNIFICANT CHANGE UP (ref 0–6)
EPI CELLS # UR: SIGNIFICANT CHANGE UP
GLUCOSE SERPL-MCNC: 83 MG/DL — SIGNIFICANT CHANGE UP (ref 70–99)
GLUCOSE UR QL: NEGATIVE MG/DL — SIGNIFICANT CHANGE UP
HCT VFR BLD CALC: 38.9 % — SIGNIFICANT CHANGE UP (ref 34.5–45)
HGB BLD-MCNC: 13.1 G/DL — SIGNIFICANT CHANGE UP (ref 11.5–15.5)
IMM GRANULOCYTES NFR BLD AUTO: 0.3 % — SIGNIFICANT CHANGE UP (ref 0–0.9)
KETONES UR-MCNC: ABNORMAL
LEUKOCYTE ESTERASE UR-ACNC: NEGATIVE — SIGNIFICANT CHANGE UP
LIDOCAIN IGE QN: 56 U/L — HIGH (ref 22–51)
LYMPHOCYTES # BLD AUTO: 1.63 K/UL — SIGNIFICANT CHANGE UP (ref 1–3.3)
LYMPHOCYTES # BLD AUTO: 11.9 % — LOW (ref 13–44)
MCHC RBC-ENTMCNC: 31 PG — SIGNIFICANT CHANGE UP (ref 27–34)
MCHC RBC-ENTMCNC: 33.7 GM/DL — SIGNIFICANT CHANGE UP (ref 32–36)
MCV RBC AUTO: 92 FL — SIGNIFICANT CHANGE UP (ref 80–100)
MONOCYTES # BLD AUTO: 0.91 K/UL — HIGH (ref 0–0.9)
MONOCYTES NFR BLD AUTO: 6.6 % — SIGNIFICANT CHANGE UP (ref 2–14)
NEUTROPHILS # BLD AUTO: 10.78 K/UL — HIGH (ref 1.8–7.4)
NEUTROPHILS NFR BLD AUTO: 78.7 % — HIGH (ref 43–77)
NITRITE UR-MCNC: NEGATIVE — SIGNIFICANT CHANGE UP
PH UR: 6.5 — SIGNIFICANT CHANGE UP (ref 5–8)
PLATELET # BLD AUTO: 256 K/UL — SIGNIFICANT CHANGE UP (ref 150–400)
POTASSIUM SERPL-MCNC: 3.9 MMOL/L — SIGNIFICANT CHANGE UP (ref 3.5–5.3)
POTASSIUM SERPL-SCNC: 3.9 MMOL/L — SIGNIFICANT CHANGE UP (ref 3.5–5.3)
PROT SERPL-MCNC: 6.8 G/DL — SIGNIFICANT CHANGE UP (ref 6.6–8.7)
PROT UR-MCNC: NEGATIVE — SIGNIFICANT CHANGE UP
RBC # BLD: 4.23 M/UL — SIGNIFICANT CHANGE UP (ref 3.8–5.2)
RBC # FLD: 13.1 % — SIGNIFICANT CHANGE UP (ref 10.3–14.5)
RBC CASTS # UR COMP ASSIST: SIGNIFICANT CHANGE UP /HPF (ref 0–4)
SODIUM SERPL-SCNC: 139 MMOL/L — SIGNIFICANT CHANGE UP (ref 135–145)
SP GR SPEC: 1 — LOW (ref 1.01–1.02)
UROBILINOGEN FLD QL: NEGATIVE MG/DL — SIGNIFICANT CHANGE UP
WBC # BLD: 13.7 K/UL — HIGH (ref 3.8–10.5)
WBC # FLD AUTO: 13.7 K/UL — HIGH (ref 3.8–10.5)
WBC UR QL: NEGATIVE /HPF — SIGNIFICANT CHANGE UP (ref 0–5)

## 2023-05-19 PROCEDURE — 99285 EMERGENCY DEPT VISIT HI MDM: CPT

## 2023-05-19 PROCEDURE — 93306 TTE W/DOPPLER COMPLETE: CPT

## 2023-05-19 RX ORDER — KETOROLAC TROMETHAMINE 30 MG/ML
15 SYRINGE (ML) INJECTION ONCE
Refills: 0 | Status: DISCONTINUED | OUTPATIENT
Start: 2023-05-19 | End: 2023-05-19

## 2023-05-19 RX ADMIN — Medication 15 MILLIGRAM(S): at 21:59

## 2023-05-19 NOTE — ED ADULT NURSE NOTE - OBJECTIVE STATEMENT
status post colposcopy yestereay, no anesthesia, complaining of lower abdominal pain and bloating, severe

## 2023-05-19 NOTE — CONSULT NOTE ADULT - SUBJECTIVE AND OBJECTIVE BOX
LE OWEN is a 52y , LMP 2020, who presented to the ED for 8-10/10 lower abdominal pain and cramping s/p vaginal cuff colposcopy and biospy in the obgyn office yesterday for a papsmear + for HPV. She reports that the speculum exam and procedure itself was extremely painful, she has a history of pubic symphysis OA and goes to pelvic floor therapy and she feels that the pain was reactivated from the procedure. She reports the pain began last night preventing her from sleeping, she was able to tolerate PO this morning without vomiting; however, has persistent nausea. Reports a bowel movement this morning, + distention and feeling of being unable to fully relieve her bowels. She also reports increase urinary hesitancy today.  GYN consulted for evaluation of pain s/p colposcopy.    OB history: CSx3 , ,   GYN history: History of cervical dysplasia, s/p TRH, BS, LMP 2020    Past medical history:  GERD   RA  OA  Asthma  Cervical dysplasia  Malignant neoplasm of sigmoid colon  Seasonal allergies    Past surgical history:   Incisional hernia x2-repaired   section x3 , ,   hand surgery staph infection 1992 x 3 surgeries  History of sinus surgery   Dilation and curettage  hysterectomy 2021  colon resection 21    Medications: Humira, famotidine  Allergies: PCN, Doxycycline    Physical Exam  T(C): 36.9 (23 @ 19:59), Max: 36.9 (23 @ 19:59)  HR: 74 (23 @ 19:59) (74 - 74)  BP: 119/75 (23 @ 19:59) (119/75 - 119/75)  RR: 20 (23 @ 19:59) (20 - 20)  SpO2: 98% (23 @ 19:59) (98% - 98%)    General: alert and oriented x3, no acute distress  Abdominal: no mildly distended, tender to palpation globally; however most significant in the lower quadrants and midline. No guarding, no rebound tenderness. No CVA tenderness  Pelvic: deferred until after imaging per patient request        Labs:                        13.1   13.70 )-----------( 256      ( 19 May 2023 22:00 )             38.9     05-    139  |  102  |  11.9  ----------------------------<  83  3.9   |  26.0  |  0.69    Ca    8.8      19 May 2023 22:00    TPro  6.8  /  Alb  4.3  /  TBili  0.6  /  DBili  x   /  AST  23  /  ALT  13  /  AlkPhos  59                  Imaging:   CT AP pending       53 y/o , LMP 2020, who presented to the ED for 8-10/10 lower abdominal pain and cramping s/p vaginal cuff colposcopy and biospy in the obgyn office yesterday for a papsmear + for HPV. She reports that the speculum exam and procedure itself was extremely painful, she has a history of pubic symphysis OA and goes to pelvic floor therapy and she feels that the pain was reactivated from the procedure. She reports the pain began last night preventing her from sleeping.  She was able to tolerate PO this morning without vomiting; however, has persistent nausea. Reports a bowel movement this morning, + distention and feeling of being unable to fully relieve her bowels. She also reports increase urinary hesitancy today.  GYN consulted for evaluation of pain s/p colposcopy.    OB history: CSx3 , ,   GYN history: History of cervical dysplasia, s/p TRH, BS, LMP 2020    Past medical history:  GERD   RA  OA  Asthma  Cervical dysplasia  Malignant neoplasm of sigmoid colon  Seasonal allergies    Past surgical history:   Incisional hernia x2-repaired   section x3 , ,   Hand surgery staph infection 1992 x 3 surgeries  History of sinus surgery   Dilation and curettage  hysterectomy 2021  colon resection 21    Medications: Humira, famotidine  Allergies: PCN, Doxycycline    Physical Exam  T(C): 36.9 (23 @ 19:59), Max: 36.9 (23 @ 19:59)  HR: 74 (23 @ 19:59) (74 - 74)  BP: 119/75 (23 @ 19:59) (119/75 - 119/75)  RR: 20 (23 @ 19:59) (20 - 20)  SpO2: 98% (23 @ 19:59) (98% - 98%)    General: alert and oriented x3, no acute distress  Abdominal: no mildly distended, tender to palpation globally; however most significant in the lower quadrants and midline. No guarding, no rebound tenderness. No CVA tenderness  Pelvic: deferred until after imaging per patient request        Labs:                        13.1   13.70 )-----------( 256      ( 19 May 2023 22:00 )             38.9         139  |  102  |  11.9  ----------------------------<  83  3.9   |  26.0  |  0.69    Ca    8.8      19 May 2023 22:00    TPro  6.8  /  Alb  4.3  /  TBili  0.6  /  DBili  x   /  AST  23  /  ALT  13  /  AlkPhos  59                  Imaging:   CT AP pending

## 2023-05-19 NOTE — ED STATDOCS - ATTENDING APP SHARED VISIT CONTRIBUTION OF CARE
I, Jewel Hannah, performed the initial face to face bedside interview with this patient regarding history of present illness, review of symptoms and relevant past medical, social and family history.  I completed an independent physical examination.  I was the initial provider who evaluated this patient. I have signed out the follow up of any pending tests (i.e. labs, radiological studies) to the ACP.  I have communicated the patient’s plan of care and disposition with the ACP.

## 2023-05-19 NOTE — ED STATDOCS - NSICDXPASTMEDICALHX_GEN_ALL_CORE_FT
PAST MEDICAL HISTORY:  2019 novel coronavirus disease (COVID-19) 1/2022    Asthma mild    Cervical dysplasia     COVID-19 vaccine series completed     GERD (gastroesophageal reflux disease)     Impingement syndrome of left shoulder     Incisional hernia x2-pt. states requires repair    Kidney stone     Malignant neoplasm of sigmoid colon     Seasonal allergies      denies family hx/delayed awakening

## 2023-05-19 NOTE — ED STATDOCS - CLINICAL SUMMARY MEDICAL DECISION MAKING FREE TEXT BOX
Pt with pelvic pain after colposcopy. will check labs, medicate and obtain GYN consult. Pt with pelvic pain after colposcopy. will check labs, medicate and obtain GYN consult.  Mild leukocytosis 13.7, electrolytes wnl, GYN consulted- recommended CT A/P to r/o perforation. CT A/P indicated equivocal for appendicitis and rt sided pelvic loculated fluid collection. Sx consulted- no surgical intervention at this time. Pt with pelvic pain after colposcopy. will check labs, medicate and obtain GYN consult.  Mild leukocytosis 13.7, electrolytes wnl, GYN consulted- recommended CT A/P to r/o perforation. CT A/P indicated equivocal for appendicitis and rt sided pelvic loculated fluid collection. Sx consulted- no surgical intervention at this time. OBGYN admitting patient for IV abx. pt admitted

## 2023-05-19 NOTE — ED STATDOCS - DISPOSITION TYPE
"Chief Complaint   Patient presents with     Cold Symptoms     /88 (BP Location: Right arm, Patient Position: Sitting, Cuff Size: Adult Regular)  Pulse 104  Temp 98.1  F (36.7  C) (Oral)  Resp 18  Wt 265 lb 9 oz (120.5 kg)  SpO2 100%  Breastfeeding? No  BMI 43.52 kg/m2 Estimated body mass index is 43.52 kg/(m^2) as calculated from the following:    Height as of 8/28/18: 5' 5.5\" (1.664 m).    Weight as of this encounter: 265 lb 9 oz (120.5 kg).  bp completed using cuff size: regular      All.JOVANY Webber                "
ADMIT

## 2023-05-19 NOTE — ED ADULT TRIAGE NOTE - CCCP TRG CHIEF CMPLNT
Tacrolimus decreased to 0.5 mg BID  renal help appreciated  has CKD stage 3 by criteria abdominal pain

## 2023-05-19 NOTE — ED ADULT TRIAGE NOTE - CHIEF COMPLAINT QUOTE
Patient presents to ED with c/o generalized abdominal pain and bloating started yesterday.  Patient had colonoscopy done yesterday at Dr. Johnson's office.  Patient has h/o colon cancer 5/2021.  Seen at urgent care today and was instructed to come to ED.

## 2023-05-19 NOTE — ED ADULT TRIAGE NOTE - RESPIRATORY RATE (BREATHS/MIN)
[Tinnitus] : tinnitus [Subsequent Evaluation] : a subsequent evaluation for [FreeTextEntry2] : ear 20

## 2023-05-19 NOTE — ED STATDOCS - OBJECTIVE STATEMENT
53 y/o female with PMHx of Kidney Stones, Osteoarthritis presents s/p colposcopy one day ago, not under anesthesia, c/o severe pelvic pain and bloating. Hx of 3 c-sections, and hysterectomy. Hx of colon cancer in 2021. Took Ibuprofen without relief. Went to urgent care for evaluation who advised her to come to the ED for a more in depth work up.

## 2023-05-19 NOTE — CONSULT NOTE ADULT - ASSESSMENT
LE OWEN is a 52y , LMP 2020, who presented to the ED for 8-10/10 lower abdominal pain and cramping s/p vaginal cuff colposcopy and biospy in the obgyn office yesterday for a papsmear + for HPV.  GYN consulted for evaluation of pain s/p colposcopy.    -VSS; afebrile  -Mild leukocytosis 13.7  -Spoke with OBGYN who performed colposcopy and biospy and she would like imaging to rule out vaginal cuff perforation and bowel perforation due to appearance of cuff s/p biospy; may be contributing to pain  -Pain appears to be multiple etiologies; the abdominal pain and the the pelvic cramping, which may be secondary to chronic pelvic pain/pelvic floor dysfunction; recommend continued pelvic floor PT; possible referral to urogynecology for further evaluation   -CT AP with IV and oral contrast ordered  -Pelvic exam to be performed after CT scan is read  -Patient agrees with the plan  -ED PA updated    D/w Dr. Wheeler 53 y/o , LMP 2020, who presented to the ED for 8-10/10 lower abdominal pain and cramping s/p vaginal cuff colposcopy and biospy in the obgyn office yesterday for a papsmear + for HPV.  GYN consulted for evaluation of pain s/p colposcopy.    -VSS; afebrile  -Mild leukocytosis 13.7  -Spoke with OBGYN who performed colposcopy and biospy and she would like imaging to rule out vaginal cuff perforation and bowel perforation due to appearance of cuff s/p biospy; may be contributing to pain  -Pain appears to be multiple etiologies; the abdominal pain and the pelvic cramping, which may be secondary to chronic pelvic pain/pelvic floor dysfunction; recommend continued pelvic floor PT; possible referral to urogynecology for further evaluation   -CT AP with IV and oral contrast ordered  -Pelvic exam to be performed after CT scan is read  -Patient agrees with the plan  -ED PA updated    D/w Dr. Wheeler    I agree with the documentation by the resident physician above. I am the covering OBGYN hospitalist this evening.  Patient presenting for pain status post colposcopy.  Recommend CT imaging and pelvic examination after imaging is performed.    Francisco Javier Wheeler MD

## 2023-05-19 NOTE — ED ADULT NURSE NOTE - NSFALLUNIVINTERV_ED_ALL_ED
Bed/Stretcher in lowest position, wheels locked, appropriate side rails in place/Call bell, personal items and telephone in reach/Instruct patient to call for assistance before getting out of bed/chair/stretcher/Non-slip footwear applied when patient is off stretcher/[place] to call system/Physically safe environment - no spills, clutter or unnecessary equipment/Purposeful proactive rounding/Room/bathroom lighting operational, light cord in reach

## 2023-05-19 NOTE — ED ADULT NURSE NOTE - SCORE
October 18, 2022       Patient: Becca Arriaza   YOB: 1969   Date of Visit: 10/18/2022         To Whom It May Concern:    In my medical opinion, I recommend that Becca Arriaza should be excused from work for today; 10/18/22.      If you have any questions or concerns, please don't hesitate to call [phone]          Sincerely,          Chad Ngo P.A.-C.  Electronically Signed      2

## 2023-05-20 DIAGNOSIS — N73.9 FEMALE PELVIC INFLAMMATORY DISEASE, UNSPECIFIED: ICD-10-CM

## 2023-05-20 PROCEDURE — 74177 CT ABD & PELVIS W/CONTRAST: CPT | Mod: 26,ME

## 2023-05-20 PROCEDURE — G1004: CPT

## 2023-05-20 RX ORDER — ACETAMINOPHEN 500 MG
1000 TABLET ORAL ONCE
Refills: 0 | Status: COMPLETED | OUTPATIENT
Start: 2023-05-20 | End: 2023-05-20

## 2023-05-20 RX ORDER — ACETAMINOPHEN 500 MG
650 TABLET ORAL EVERY 6 HOURS
Refills: 0 | Status: DISCONTINUED | OUTPATIENT
Start: 2023-05-20 | End: 2023-05-21

## 2023-05-20 RX ORDER — KETOROLAC TROMETHAMINE 30 MG/ML
15 SYRINGE (ML) INJECTION ONCE
Refills: 0 | Status: DISCONTINUED | OUTPATIENT
Start: 2023-05-20 | End: 2023-05-20

## 2023-05-20 RX ORDER — METRONIDAZOLE 500 MG
500 TABLET ORAL EVERY 8 HOURS
Refills: 0 | Status: DISCONTINUED | OUTPATIENT
Start: 2023-05-20 | End: 2023-05-22

## 2023-05-20 RX ORDER — ONDANSETRON 8 MG/1
4 TABLET, FILM COATED ORAL ONCE
Refills: 0 | Status: COMPLETED | OUTPATIENT
Start: 2023-05-20 | End: 2023-05-20

## 2023-05-20 RX ORDER — OXYCODONE HYDROCHLORIDE 5 MG/1
5 TABLET ORAL EVERY 8 HOURS
Refills: 0 | Status: DISCONTINUED | OUTPATIENT
Start: 2023-05-20 | End: 2023-05-22

## 2023-05-20 RX ORDER — FAMOTIDINE 10 MG/ML
20 INJECTION INTRAVENOUS DAILY
Refills: 0 | Status: DISCONTINUED | OUTPATIENT
Start: 2023-05-20 | End: 2023-05-22

## 2023-05-20 RX ORDER — SENNA PLUS 8.6 MG/1
2 TABLET ORAL AT BEDTIME
Refills: 0 | Status: DISCONTINUED | OUTPATIENT
Start: 2023-05-20 | End: 2023-05-22

## 2023-05-20 RX ADMIN — Medication 650 MILLIGRAM(S): at 13:38

## 2023-05-20 RX ADMIN — Medication 650 MILLIGRAM(S): at 11:35

## 2023-05-20 RX ADMIN — OXYCODONE HYDROCHLORIDE 5 MILLIGRAM(S): 5 TABLET ORAL at 19:45

## 2023-05-20 RX ADMIN — Medication 650 MILLIGRAM(S): at 18:52

## 2023-05-20 RX ADMIN — Medication 100 MILLIGRAM(S): at 23:55

## 2023-05-20 RX ADMIN — Medication 650 MILLIGRAM(S): at 23:54

## 2023-05-20 RX ADMIN — ONDANSETRON 4 MILLIGRAM(S): 8 TABLET, FILM COATED ORAL at 06:20

## 2023-05-20 RX ADMIN — OXYCODONE HYDROCHLORIDE 5 MILLIGRAM(S): 5 TABLET ORAL at 18:52

## 2023-05-20 RX ADMIN — Medication 1000 MILLIGRAM(S): at 06:25

## 2023-05-20 RX ADMIN — Medication 100 MILLIGRAM(S): at 13:46

## 2023-05-20 RX ADMIN — Medication 1000 MILLIGRAM(S): at 06:26

## 2023-05-20 RX ADMIN — Medication 650 MILLIGRAM(S): at 19:45

## 2023-05-20 RX ADMIN — FAMOTIDINE 20 MILLIGRAM(S): 10 INJECTION INTRAVENOUS at 11:35

## 2023-05-20 RX ADMIN — Medication 400 MILLIGRAM(S): at 00:49

## 2023-05-20 RX ADMIN — Medication 15 MILLIGRAM(S): at 06:25

## 2023-05-20 RX ADMIN — Medication 15 MILLIGRAM(S): at 00:49

## 2023-05-20 NOTE — ED ADULT NURSE REASSESSMENT NOTE - NS ED NURSE REASSESS COMMENT FT1
Report received from off going RN Ricardo, care of pt assumed at 0730. pt relieved sleeping on stretcher resp even and unlabored. , arousable to name,  at beside. updated on plan of care, questions asked and answered.
report given to day nurse
tolerating salting crackers and earnest crackers well. denies any new c/o at this time.
pain improved slightly more toleratlbe, asking for additioanl pain meds, PA aware.

## 2023-05-20 NOTE — PATIENT PROFILE ADULT - PATIENT REPRESENTATIVE: ( YOU CAN CHOOSE ANY PERSON THAT CAN ASSIST YOU WITH YOUR HEALTH CARE PREFERENCES, DOES NOT HAVE TO BE A SPOUSE, IMMEDIATE FAMILY OR SIGNIFICANT OTHER/PARTNER)
Immediate Brief Procedure Note  Patient Name:  Gerard Travis  YOB: 1951  DATE OF PROCEDURE : 12/22/2020  PROCEDURALIST: Galindo Cosby MD  ASSISTANT(S):  None  ANESTHESIA TYPE:  Local  ANESTHESIOLOGIST:  None    PROCEDURE PERFORMED: RLE wound bed sclerotherapy     Pre-procedure Dx:   1. Varicose veins with ulcer, right (CMS/HCC)        Post-procedure Dx: Same    Findings:  RLE wound bed sclerotherapy     Estimated Blood Loss: Less than 5ml.    Complications: None.    Specimens Removed: No     
declines

## 2023-05-20 NOTE — CONSULT NOTE ADULT - SUBJECTIVE AND OBJECTIVE BOX
ACUTE CARE SURGERY CONSULT    HPI:  53yo F w PMH of cervical dysplasia s/p hysterectomy, colon CA s/p sigmoidectomy, and RA on Humira who 2 days ago had a vaginal cuff colposcopy with biopsy and reports severe abdominal pain since. Patient did not have anesthesia for the procedure and felt pain almost immediately upon speculum insertion. Reports pain is across lower abdomen and initially felt like there was gaseous distention. By evening the pain was worse and patient presented to urgent care following morning for concern for UTI. Came here at suggestion of urgent care for imaging. Associated with nausea, chills, and burning sensation with urination. Denies vomiting or fevers. Lat BM was 2 d/a initially formed then watery.    CT with findings concerning for equivocal early appendicitis for which ACS was consulted. Also with findings of mildly prominent cluster of jejunal folds in the pelvis with adjacent mesenteric edema and right-sided pelvic loculated fluid collection with peripheral rim enhancement measuring 3.5 x 2.1 cm extending to the vaginal cuff.         PAST MEDICAL HISTORY:  GERD (gastroesophageal reflux disease)    Asthma    Cervical dysplasia    Kidney stone    Malignant neoplasm of sigmoid colon    COVID-19 vaccine series completed     novel coronavirus disease (COVID-19)    Impingement syndrome of left shoulder    Seasonal allergies    Incisional hernia        PAST SURGICAL HISTORY:  H/O  section    H/O hand surgery    History of sinus surgery    H/O dilation and curettage    S/P hysterectomy    S/P colon resection    H/O shoulder surgery        ALLERGIES:  penicillins (Rash)  doxycycline (Other)  dermabond, mupirocin (Blisters)      FAMILY HISTORY: Noncontributory    SOCIAL HISTORY: Denies tobacco, EtOH, illicit substance use.     HOME MEDICATIONS:    MEDICATIONS  (STANDING):  ondansetron Injectable 4 milliGRAM(s) IV Push once    MEDICATIONS  (PRN):      VITALS & I/Os:  Vital Signs Last 24 Hrs  T(C): 36.6 (20 May 2023 04:24), Max: 36.9 (19 May 2023 19:59)  T(F): 97.9 (20 May 2023 04:24), Max: 98.4 (19 May 2023 19:59)  HR: 70 (20 May 2023 04:24) (70 - 78)  BP: 128/76 (20 May 2023 04:24) (113/75 - 128/76)  BP(mean): --  RR: 16 (20 May 2023 04:24) (16 - 20)  SpO2: 99% (20 May 2023 04:24) (98% - 99%)    Parameters below as of 19 May 2023 19:59  Patient On (Oxygen Delivery Method): room air      CAPILLARY BLOOD GLUCOSE          I&O's Summary      GENERAL: Alert, well developed, in no acute distress.  HEENT: EOMI. Trachea midline. full ROM  RESPIRATORY: Unlabored, no conversational dyspnea.  CARDIOVASCULAR: RRR.   GASTROINTESTINAL: Abdomen soft, tender across lower abdomen worse at midline, ND, no rebound, voluntary guarding.   NEUROLOGIC: Cranial nerves II-XII grossly intact. No focal neurological deficits. Moves all extremities spontaneously.  MUSCULOSKELETAL: No cyanosis or clubbing. No gross deformities.       LABS:                        13.1   13.70 )-----------( 256      ( 19 May 2023 22:00 )             38.9     05-    139  |  102  |  11.9  ----------------------------<  83  3.9   |  26.0  |  0.69    Ca    8.8      19 May 2023 22:00    TPro  6.8  /  Alb  4.3  /  TBili  0.6  /  DBili  x   /  AST  23  /  ALT  13  /  AlkPhos  59  05-    Lactate: ondansetron Injectable 4 milliGRAM(s) IV Push once               Urinalysis Basic - ( 19 May 2023 21:23 )    Color: Yellow / Appearance: Clear / S.005 / pH: x  Gluc: x / Ketone: Small  / Bili: Negative / Urobili: Negative mg/dL   Blood: x / Protein: Negative / Nitrite: Negative   Leuk Esterase: Negative / RBC: 0-2 /HPF / WBC Negative /HPF   Sq Epi: x / Non Sq Epi: x / Bacteria: x        IMAGING:  A/P  IMPRESSION:  Findings equivocal for early appendicitis. Mildly prominent cluster of jejunal folds in the pelvis with adjacent mesenteric edema, likely reactive.    Right-sided pelvic loculated fluid collection with peripheral rim enhancement measuring 3.5 x 2.1 cm extending to the vaginal cuff. Phlegmon or developing abscess considered in the differential.    Bladder wall thickening, difficult to assess secondary to inadequate distention. Correlate with urinalysis and lab values to assess for cystitis and/or ascending urinary tract infection.    ELVIRA THORNTON MD; Attending Radiologist  This document has been electronically signed. May 20 2023 3:50AM

## 2023-05-20 NOTE — CONSULT NOTE ADULT - ASSESSMENT
51yo F w PMH of cervical dysplasia s/p hysterectomy, colon CA s/p sigmoidectomy, and RA on Humira who 2 days ago had a vaginal cuff colposcopy with biopsy and reports severe abdominal pain since. ACS consulted for findings concerning of early appendicitis. History of pain onset and findings on imaging inconsistent with acute appendicitis, likely reactive to separate process. Consider additional OB/gGyn evaluation.    - Not appendicitis  - Consider additional OB/Gyn evaluation  - Surgery signing off.    Patient seen by and plan discussed with Dr. Auguste.

## 2023-05-20 NOTE — H&P ADULT - NSHPLABSRESULTS_GEN_ALL_CORE
Labs:                        13.1   13.70 )-----------( 256      ( 19 May 2023 22:00 )             38.9     05-19    139  |  102  |  11.9  ----------------------------<  83  3.9   |  26.0  |  0.69    Ca    8.8      19 May 2023 22:00    TPro  6.8  /  Alb  4.3  /  TBili  0.6  /  DBili  x   /  AST  23  /  ALT  13  /  AlkPhos  59  05-19      RADIOLOGY:  < from: CT Abdomen and Pelvis w/ Oral Cont and w/ IV Cont (05.20.23 @ 02:05) >      ACC: [id number] EXAM:  CT ABDOMEN AND PELVIS OC IC   ORDERED BY: JANET NIELSEN     PROCEDURE DATE:  05/20/2023          INTERPRETATION:  CLINICAL INFORMATION: Abdominal pain. Status post   vaginal cuff biopsy.    COMPARISON: CT abdomen pelvis 4/5/2023.    PROCEDURE:  CT of the Abdomen and Pelvis was performed with intravenous contrast.  Intravenous contrast: 90 ml Omnipaque 350.  Oral contrast: positive contrast was administered.  Sagittal and coronal reformats were performed.    FINDINGS:    LOWER CHEST: Within normal limits.    LIVER: Within normal limits.  BILE DUCTS: Normal caliber.  GALLBLADDER: Within normal limits.  SPLEEN: Within normal limits.  PANCREAS: Within normal limits.  ADRENALS: Within normal limits.  KIDNEYS/URETERS: No hydronephrosis. Too small to characterize bilateral   renal hypodensities.    BLADDER: Wall thickening, difficult to assess secondary to inadequate   distention.  REPRODUCTIVE ORGANS: Hysterectomy. Trace fluid identified within the   vaginal fornices containing small foci of air.    BOWEL: No bowel obstruction. Fluid-filled borderline prominent appendix   measuring 7 mm with wall thickening/hyperemia. Findings equivocal for   early appendicitis. Mildly prominent cluster of jejunal folds in the   pelvis with adjacent mesenteric edema, likely reactive. Rectosigmoid   surgical anastomosis.  PERITONEUM: Right-sided pelvic loculated fluid collection with peripheral   rim enhancement measuring 3.5 x 2.1 cm extending to the vaginal cuff   (100-104:3). Mesenteric edema.  VESSELS:  Normal aortic caliber.  RETROPERITONEUM: No lymphadenopathy.  ABDOMINAL WALL: Small umbilical hernia containing fat.  BONES: Advanced degenerative disc disease at L5-S1.    IMPRESSION:    Findings equivocal for early appendicitis. Mildly prominent cluster of   jejunal folds in the pelvis with adjacent mesenteric edema, likely   reactive.    Right-sided pelvic loculated fluid collection with peripheral rim   enhancement measuring 3.5 x 2.1 cm extending to the vaginal cuff.   Phlegmon or developing abscess considered in the differential.    Bladder wall thickening, difficult to assess secondary to inadequate   distention. Correlate with urinalysis and lab values to assess for   cystitis and/or ascending urinary tract infection.    These results were discussed via telephone at 5/20/2023 3:48 AM by Dr. Thornton of radiology with [facility].        --- End of Report ---            ELVIRA THORNTON MD; Attending Radiologist  This document has been electronically signed. May 20 2023  3:50AM

## 2023-05-20 NOTE — CONSULT NOTE ADULT - ATTENDING COMMENTS
51yo F w PMH of cervical dysplasia s/p hysterectomy, colon CA s/p sigmoidectomy, and RA on Humira who 2 days ago had a vaginal cuff colposcopy with biopsy and reports severe abdominal pain since. ACS consulted for findings concerning of early appendicitis. History of pain onset and findings on imaging inconsistent with acute appendicitis, likely reactive to separate process. Consider additional OB/gGyn evaluation.  Awake alert  Bilateral BS  Hemodynamic intact  Abdomen soft, mildly tender diffusely in both lower quadrants and suprapubic  No RG  No masses  Patient definitely does not have appendicitis. After evaluating the patient and reviewing the studies, it is conceivable to suspect occult vaginal cuff perforation/trauma. Usually SB will be adherent to the cuff so caution is paramount.  Neurologic grossly intact

## 2023-05-20 NOTE — PATIENT PROFILE ADULT - FALL HARM RISK - UNIVERSAL INTERVENTIONS
Bed in lowest position, wheels locked, appropriate side rails in place/Call bell, personal items and telephone in reach/Instruct patient to call for assistance before getting out of bed or chair/Non-slip footwear when patient is out of bed/[place] to call system/Physically safe environment - no spills, clutter or unnecessary equipment/Purposeful Proactive Rounding/Room/bathroom lighting operational, light cord in reach

## 2023-05-20 NOTE — H&P ADULT - HISTORY OF PRESENT ILLNESS
LE OWEN is a 52y , LMP 2020, who presented to the ED for 8-10/10 lower abdominal pain and cramping s/p vaginal cuff colposcopy and biospy in the obgyn office yesterday for a papsmear + for HPV.         She reports that the speculum exam and procedure itself was extremely painful, she has a history of pubic symphysis OA and goes to pelvic floor therapy and she feels that the pain was reactivated from the procedure. She reports the pain began last night preventing her from sleeping, she was able to tolerate PO this morning without vomiting; however, has persistent nausea. Reports a bowel movement this morning, + distention and feeling of being unable to fully relieve her bowels. She also reports increase urinary hesitancy today.  GYN consulted for evaluation of pain s/p colposcopy.    OB history: CSx3 , ,   GYN history: History of cervical dysplasia, s/p TRH, BS, LMP 2020    Past medical history:  GERD   RA  OA  Asthma  Cervical dysplasia  Malignant neoplasm of sigmoid colon  Seasonal allergies    Past surgical history:   Incisional hernia x2-repaired   section x3 , ,   hand surgery staph infection 1992 x 3 surgeries  History of sinus surgery   Dilation and curettage  hysterectomy 2021  colon resection 22    Medications: Humira, famotidine  Allergies: PCN, Doxycycline

## 2023-05-20 NOTE — PATIENT PROFILE ADULT - STATED REASON FOR ADMISSION
pt had colposcopy done outpt and is now experiencing extreme abdominal/pelvic pain that has radiated to LL and RL quadrants

## 2023-05-20 NOTE — H&P ADULT - ASSESSMENT
LE OWEN is a 52y , LMP 2020, who presented to the ED for 8-10/10 lower abdominal pain and cramping s/p vaginal cuff colposcopy and biospy in the obgyn office yesterday for a papsmear + for HPV; CT findings demonstrating 3.5x2.1 right sided pelvic collection extending to the vaginal cuff concerning for phlegmon or abscess likely due to vaginal cuff perforation after colposcopy.  Will admin to GYN service for IV antibiotics       PLAN:    Neuro: PO pain meds prn   CV: Hemodynamically stable.  No signs of sepsis at this time with no evidence of hypotension or tachycardia.    Pulm: Saturating well on room air.   GI: Regular diet.  Famotidine QD. Anti-emetics prn   : Voiding spontaneously  Heme: H/H stable  DVT ppx: SCDs when not ambulating. Encourage ambulation for DVT ppx.    Fluids: Tolerating regular diet.  Electrolytes: replete prn   ID: Afebrile at bedside. WBC:  13.7> . Will start IV antibiotics for mgmt of intra-abdominal abscess. Afebrile at bedside. Copious green vaginal discharge noted, genital cultures sent. Consider repeat imaging if presenting with worsening abd pain/fevers.       Will monitor for resolution of pelvic tenderness.  Will continue to monitor vitals closely.   Psych: No active issues  Code Status: Full code.  Dispo: Continue to monitor. Routine inpatient mgmt        Plan d/w Dr. Gutierrez

## 2023-05-20 NOTE — H&P ADULT - NSHPPHYSICALEXAM_GEN_ALL_CORE
Physical Exam  T(C): 36.9 (05-19-23 @ 19:59), Max: 36.9 (05-19-23 @ 19:59)  HR: 74 (05-19-23 @ 19:59) (74 - 74)  BP: 119/75 (05-19-23 @ 19:59) (119/75 - 119/75)  RR: 20 (05-19-23 @ 19:59) (20 - 20)  SpO2: 98% (05-19-23 @ 19:59) (98% - 98%)    General: alert and oriented x3, no acute distress  Abdominal: no mildly distended, tender to palpation globally; however most significant in the lower quadrants and midline. No guarding, no rebound tenderness. No CVA tenderness  PELVIC:        EXTERNAL GENITALIA: atraumatic, no lesions        VAGINA: Copious white/green watery discharge noted. No bleeding. Vaginal cuff intact on digital exam

## 2023-05-21 ENCOUNTER — OUTPATIENT (OUTPATIENT)
Dept: OUTPATIENT SERVICES | Facility: HOSPITAL | Age: 53
LOS: 1 days | Discharge: HOME | End: 2023-05-21

## 2023-05-21 DIAGNOSIS — Z98.890 OTHER SPECIFIED POSTPROCEDURAL STATES: Chronic | ICD-10-CM

## 2023-05-21 DIAGNOSIS — C18.9 MALIGNANT NEOPLASM OF COLON, UNSPECIFIED: ICD-10-CM

## 2023-05-21 DIAGNOSIS — Z90.710 ACQUIRED ABSENCE OF BOTH CERVIX AND UTERUS: Chronic | ICD-10-CM

## 2023-05-21 DIAGNOSIS — Z98.891 HISTORY OF UTERINE SCAR FROM PREVIOUS SURGERY: Chronic | ICD-10-CM

## 2023-05-21 DIAGNOSIS — Z90.49 ACQUIRED ABSENCE OF OTHER SPECIFIED PARTS OF DIGESTIVE TRACT: Chronic | ICD-10-CM

## 2023-05-21 LAB
ANION GAP SERPL CALC-SCNC: 9 MMOL/L — SIGNIFICANT CHANGE UP (ref 5–17)
BASOPHILS # BLD AUTO: 0.04 K/UL — SIGNIFICANT CHANGE UP (ref 0–0.2)
BASOPHILS NFR BLD AUTO: 0.7 % — SIGNIFICANT CHANGE UP (ref 0–2)
BUN SERPL-MCNC: 14.6 MG/DL — SIGNIFICANT CHANGE UP (ref 8–20)
CALCIUM SERPL-MCNC: 8.9 MG/DL — SIGNIFICANT CHANGE UP (ref 8.4–10.5)
CHLORIDE SERPL-SCNC: 103 MMOL/L — SIGNIFICANT CHANGE UP (ref 96–108)
CO2 SERPL-SCNC: 28 MMOL/L — SIGNIFICANT CHANGE UP (ref 22–29)
CREAT SERPL-MCNC: 0.92 MG/DL — SIGNIFICANT CHANGE UP (ref 0.5–1.3)
EGFR: 75 ML/MIN/1.73M2 — SIGNIFICANT CHANGE UP
EOSINOPHIL # BLD AUTO: 0.52 K/UL — HIGH (ref 0–0.5)
EOSINOPHIL NFR BLD AUTO: 8.5 % — HIGH (ref 0–6)
GLUCOSE SERPL-MCNC: 96 MG/DL — SIGNIFICANT CHANGE UP (ref 70–99)
HCT VFR BLD CALC: 36 % — SIGNIFICANT CHANGE UP (ref 34.5–45)
HGB BLD-MCNC: 11.7 G/DL — SIGNIFICANT CHANGE UP (ref 11.5–15.5)
IMM GRANULOCYTES NFR BLD AUTO: 0.3 % — SIGNIFICANT CHANGE UP (ref 0–0.9)
LYMPHOCYTES # BLD AUTO: 1.74 K/UL — SIGNIFICANT CHANGE UP (ref 1–3.3)
LYMPHOCYTES # BLD AUTO: 28.3 % — SIGNIFICANT CHANGE UP (ref 13–44)
MCHC RBC-ENTMCNC: 30.7 PG — SIGNIFICANT CHANGE UP (ref 27–34)
MCHC RBC-ENTMCNC: 32.5 GM/DL — SIGNIFICANT CHANGE UP (ref 32–36)
MCV RBC AUTO: 94.5 FL — SIGNIFICANT CHANGE UP (ref 80–100)
MONOCYTES # BLD AUTO: 0.8 K/UL — SIGNIFICANT CHANGE UP (ref 0–0.9)
MONOCYTES NFR BLD AUTO: 13 % — SIGNIFICANT CHANGE UP (ref 2–14)
NEUTROPHILS # BLD AUTO: 3.03 K/UL — SIGNIFICANT CHANGE UP (ref 1.8–7.4)
NEUTROPHILS NFR BLD AUTO: 49.2 % — SIGNIFICANT CHANGE UP (ref 43–77)
PLATELET # BLD AUTO: 260 K/UL — SIGNIFICANT CHANGE UP (ref 150–400)
POTASSIUM SERPL-MCNC: 4.2 MMOL/L — SIGNIFICANT CHANGE UP (ref 3.5–5.3)
POTASSIUM SERPL-SCNC: 4.2 MMOL/L — SIGNIFICANT CHANGE UP (ref 3.5–5.3)
RBC # BLD: 3.81 M/UL — SIGNIFICANT CHANGE UP (ref 3.8–5.2)
RBC # FLD: 13.1 % — SIGNIFICANT CHANGE UP (ref 10.3–14.5)
SODIUM SERPL-SCNC: 140 MMOL/L — SIGNIFICANT CHANGE UP (ref 135–145)
WBC # BLD: 6.15 K/UL — SIGNIFICANT CHANGE UP (ref 3.8–10.5)
WBC # FLD AUTO: 6.15 K/UL — SIGNIFICANT CHANGE UP (ref 3.8–10.5)

## 2023-05-21 PROCEDURE — 99232 SBSQ HOSP IP/OBS MODERATE 35: CPT | Mod: GC

## 2023-05-21 RX ORDER — ACETAMINOPHEN 500 MG
975 TABLET ORAL EVERY 6 HOURS
Refills: 0 | Status: DISCONTINUED | OUTPATIENT
Start: 2023-05-21 | End: 2023-05-22

## 2023-05-21 RX ORDER — LANOLIN ALCOHOL/MO/W.PET/CERES
5 CREAM (GRAM) TOPICAL AT BEDTIME
Refills: 0 | Status: DISCONTINUED | OUTPATIENT
Start: 2023-05-21 | End: 2023-05-22

## 2023-05-21 RX ORDER — ACETAMINOPHEN 500 MG
975 TABLET ORAL EVERY 6 HOURS
Refills: 0 | Status: DISCONTINUED | OUTPATIENT
Start: 2023-05-21 | End: 2023-05-21

## 2023-05-21 RX ORDER — DIPHENHYDRAMINE HCL 50 MG
25 CAPSULE ORAL EVERY 6 HOURS
Refills: 0 | Status: DISCONTINUED | OUTPATIENT
Start: 2023-05-21 | End: 2023-05-22

## 2023-05-21 RX ADMIN — Medication 650 MILLIGRAM(S): at 06:04

## 2023-05-21 RX ADMIN — Medication 650 MILLIGRAM(S): at 05:03

## 2023-05-21 RX ADMIN — SENNA PLUS 2 TABLET(S): 8.6 TABLET ORAL at 00:15

## 2023-05-21 RX ADMIN — FAMOTIDINE 20 MILLIGRAM(S): 10 INJECTION INTRAVENOUS at 09:53

## 2023-05-21 RX ADMIN — Medication 975 MILLIGRAM(S): at 21:51

## 2023-05-21 RX ADMIN — Medication 100 MILLIGRAM(S): at 08:25

## 2023-05-21 RX ADMIN — Medication 650 MILLIGRAM(S): at 11:43

## 2023-05-21 RX ADMIN — Medication 650 MILLIGRAM(S): at 12:59

## 2023-05-21 RX ADMIN — Medication 500 MILLIGRAM(S): at 05:03

## 2023-05-21 RX ADMIN — Medication 100 MILLIGRAM(S): at 17:45

## 2023-05-21 RX ADMIN — Medication 25 MILLIGRAM(S): at 22:11

## 2023-05-21 RX ADMIN — Medication 5 MILLIGRAM(S): at 00:15

## 2023-05-21 RX ADMIN — Medication 500 MILLIGRAM(S): at 06:04

## 2023-05-21 RX ADMIN — Medication 975 MILLIGRAM(S): at 21:21

## 2023-05-21 RX ADMIN — Medication 650 MILLIGRAM(S): at 00:55

## 2023-05-21 NOTE — PROGRESS NOTE ADULT - SUBJECTIVE AND OBJECTIVE BOX
53yo who presented to the ED for pelvic pain. She is s/p vaginal cuff colposcopy 5/18/2023 for hx of cervical dysplasia s/p RA TLH BS for this and pelvic pain,   hx of sigmoid adenocarcinoma s/p resection in 5/2021 with recent colonoscopy 4/28/2023 showing diverticula in the ascending colon, well-healed rectal anastamosis  Now receiving IV abx for suspected pelvic abscess of unknown cause    S: Patient was seen and examined at bedside  Pain is moderately well-controlled   Tolerating regular diet, denies N/V, vaginal bleeding.  Denies vaginal discharge now  +flatus +BMs    O:   T(C): 36.6 (05-21-23 @ 10:29), Max: 37.2 (05-20-23 @ 18:40)  HR: 66 (05-21-23 @ 10:29) (57 - 76)  BP: 99/67 (05-21-23 @ 10:29) (92/50 - 99/67)  RR: 18 (05-21-23 @ 10:29) (15 - 18)  SpO2: 97% (05-21-23 @ 10:29) (94% - 99%)    General: Alert and oriented x3, NAD  CV: Regular rate and rhythm  Lungs: CTAB  Abdomen: soft, mildly tender, +BS  SVE: No swelling, discharge, bleeding noted.  Ext: no edema, erythema or pain

## 2023-05-21 NOTE — PROGRESS NOTE ADULT - ATTENDING COMMENTS
In summary, this is a 51yo s/p vaginal cuff colposcopy 5/18/2023, as well as hx of sigmoid adenocarcinoma s/p resection in 5/2021 with recent colonoscopy 4/28/2023 admitted for pelvic abscess. She had reported pelvic pain and nausea prior to admission. Provider previously documented that collection was draining from vaginal cuff and culture was collected. Patient does not feel that she is having drainage. She denies fevers, chills and endorses improvement in nausea. She does continue to have diffuse lower pelvic pain starting in midline and radiating outwards. Vital signs and labs reviewed. WBC improving on Levofloxacin and Flagyl. She remains afebrile. Discussed that due to hx of colon cancer with recent colonoscopy, would have colorectal surgery team evaluate her as this may be cause of abscess. She was counseled that she may require additional imaging to assess whether or not collection is truly draining and she potentially may need IR guided drainage.     Sheree Orona MD

## 2023-05-21 NOTE — CONSULT NOTE ADULT - ASSESSMENT
52 year old female with PMH of colon cancer s/p colectomy and hysterectomy in 2021, last colonoscopy in 4/28/23, had a recent colposcopy 5/18/23, presented to the ED on 5/19 with abdominal pain, distention and nausea. She was HDS and afebrile, CT scan showed 3x2 cm pelvic collection by the vaginal cuff, per gyn team in the ED "vaginal exam showed discharge, with a hole in vaginal cuff that appears to be communicating with abdomen".    Colorectal team was consulted in the setting of possible perforation from the colonoscopy done on 4/28/23 vs diverticulitis. The patient is almost one month out of the colonoscopy which was negative for any cancer recurrence, less suspicion for a perforation from the colon, or diverticulitis.  Given the pelvic abscess is communicating with the vaginal cuff it is possible a complication from the recent colposcopy.   Patient seems to be spontaneously draining in to the vagina as per GYN teams notes and speaking with the attending, she is improving clinically in terms of her pain and responding to antibiotics as the WBC is now normal.    Our recommendations would be to continue antibiotics and serial abdominal exam, if no improvement, then may repeat the CT scan in couple of days.   No surgical intervention needed at this time  May consult IR for drainage however the abscess might be too small to be drained.

## 2023-05-21 NOTE — PROCEDURE
[Colposcopy] : Colposcopy  [Time out performed] : Pre-procedure time out performed.  Patient's name, date of birth and procedure confirmed. [Consent Obtained] : Consent obtained [Risks] : risks [Benefits] : benefits [Alternatives] : alternatives [Patient] : patient [Infection] : infection [Bleeding] : bleeding [Allergic Reaction] : allergic reaction [HPV High Risk] : HPV high risk [Biopsy] : biopsy taken [Colposcopy Adequate] : colposcopy adequate [Hemostasis Obtained] : Hemostasis obtained [Tolerated Well] : the patient tolerated the procedure well [de-identified] : awe at 10 oclock biopsied w blayne foreceps, approx 3mm

## 2023-05-21 NOTE — CONSULT NOTE ADULT - SUBJECTIVE AND OBJECTIVE BOX
COLORECTAL SURGERY CONSULT    HPI:  A 52 year old female with PMH of colon cancer s/p colectomy and hysterectomy in , presented to the ED with severe abdominal pain, reported 10/10, not relieved by anything mostly lower abdomen, associated with nausea, abdominal pain and not passing flatus. Patient is 3 days out from recent colposcopy. She states the procedure was painful, she didn't fell well post procedure and by second day she started to have the pain.  At the hospital she started to feel some relief, she had return of bowel function, is a able to ambulate with less pain.    Patient has been following up post operative from her colon surgery with Dr Daigle last colonoscopy was done  with GI, and shows no evidence of cancer recurrence.    Patient otherwise denied fevers, chills, recent loss of weight, night sweats, loss of appetite, and blood per rectum.        PAST MEDICAL HISTORY:  GERD (gastroesophageal reflux disease)    Asthma    Cervical dysplasia    Kidney stone    Malignant neoplasm of sigmoid colon    COVID-19 vaccine series completed     novel coronavirus disease (COVID-19)    Impingement syndrome of left shoulder    Seasonal allergies    Incisional hernia        PAST SURGICAL HISTORY:  H/O  section    H/O hand surgery    History of sinus surgery    H/O dilation and curettage    S/P hysterectomy    S/P colon resection    H/O shoulder surgery        ALLERGIES:  penicillins (Rash)  doxycycline (Other)  dermabond, mupirocin (Blisters)      FAMILY HISTORY: Noncontributory    SOCIAL HISTORY: Denies tobacco, EtOH, illicit substance use.     HOME MEDICATIONS:    MEDICATIONS  (STANDING):  acetaminophen 325 mG/butalbital 50 mG/caffeine 40 mG 1 Tablet(s) Oral once  famotidine    Tablet 20 milliGRAM(s) Oral daily  levoFLOXacin IVPB 750 milliGRAM(s) IV Intermittent every 24 hours  melatonin 5 milliGRAM(s) Oral at bedtime  metroNIDAZOLE  IVPB 500 milliGRAM(s) IV Intermittent every 8 hours    MEDICATIONS  (PRN):  acetaminophen     Tablet .. 975 milliGRAM(s) Oral every 6 hours PRN Mild Pain (1 - 3), Moderate Pain (4 - 6)  diphenhydrAMINE 25 milliGRAM(s) Oral every 6 hours PRN Rash and/or Itching  oxyCODONE    IR 5 milliGRAM(s) Oral every 8 hours PRN Severe Pain (7 - 10)  senna 2 Tablet(s) Oral at bedtime PRN Constipation      VITALS & I/Os:  Vital Signs Last 24 Hrs  T(C): 36.9 (22 May 2023 00:51), Max: 36.9 (22 May 2023 00:51)  T(F): 98.4 (22 May 2023 00:51), Max: 98.4 (22 May 2023 00:51)  HR: 72 (22 May 2023 00:51) (57 - 72)  BP: 106/58 (22 May 2023 00:51) (93/57 - 106/58)  BP(mean): --  RR: 17 (22 May 2023 00:51) (17 - 18)  SpO2: 100% (22 May 2023 00:51) (97% - 100%)    Parameters below as of 22 May 2023 00:51  Patient On (Oxygen Delivery Method): room air      CAPILLARY BLOOD GLUCOSE          I&O's Summary      GENERAL: Alert, well developed, in no acute distress.  MENTAL STATUS: AAOx3. Appropriate affect.  RESPIRATORY: Unlabored breathing  CARDIOVASCULAR: RRR.   GASTROINTESTINAL: Abdomen soft, mild lower abdominal tenderness, ND, -R/-G.  No pulsatile mass, no flank tenderness or suprapubic tenderness. No hepatosplenomegaly.  NEUROLOGIC: Cranial nerves II-XII grossly intact. No focal neurological deficits. Moves all extremities spontaneously. Sensation intact bilaterally.  INTEGUMENTARY: No overt rashes or lesions, petechia or purpura. Good turgor. No edema.  MUSCULOSKELETAL: No cyanosis or clubbing. No gross deformities.   LYMPHATIC: Palpation of neck reveals no swelling or tenderness of neck nodes. Palpation of groin reveals no swelling or tenderness of groin nodes.    LABS:                        11.7   6.15  )-----------( 260      ( 21 May 2023 05:47 )             36.0     -    140  |  103  |  14.6  ----------------------------<  96  4.2   |  28.0  |  0.92    Ca    8.9      21 May 2023 05:47      Lactate: acetaminophen     Tablet .. 975 milliGRAM(s) Oral every 6 hours PRN  acetaminophen 325 mG/butalbital 50 mG/caffeine 40 mG 1 Tablet(s) Oral once  diphenhydrAMINE 25 milliGRAM(s) Oral every 6 hours PRN  famotidine    Tablet 20 milliGRAM(s) Oral daily  levoFLOXacin IVPB 750 milliGRAM(s) IV Intermittent every 24 hours  melatonin 5 milliGRAM(s) Oral at bedtime  metroNIDAZOLE  IVPB 500 milliGRAM(s) IV Intermittent every 8 hours  oxyCODONE    IR 5 milliGRAM(s) Oral every 8 hours PRN  senna 2 Tablet(s) Oral at bedtime PRN                   IMAGING:

## 2023-05-21 NOTE — PROGRESS NOTE ADULT - ASSESSMENT
A/P   51yo who presented to the ED for pelvic pain. She is s/p vaginal cuff colposcopy 5/18/2023 for hx of cervical dysplasia s/p RA TLH BS for this and pelvic pain,   hx of sigmoid adenocarcinoma s/p resection in 5/2021 with recent colonoscopy 4/28/2023 showing diverticula in the ascending colon, well-healed rectal anastamosis  Now receiving IV abx for suspected pelvic abscess of unknown cause  - CV: No concerns  - Pulm: Incentive spirometer at bedside, encourage ambulation  - GI: tolerating regular diet, demonstrating bowel function. Recent colonoscopy 4/28/23 showing diverticulosis. In light of hx of colon ca and recent procedure, suspect abscess may be GI of origin. Surgery initially consulted for CT findings of possible appendicitis which is not suspected per surgery. She is a pt of Dr. Johnson for her hx of colon ca, colorectal surgery called to ensure no suspicion for recurrent CA given new abscess, ensure proper abx are being administered given possible GI source  - : Voiding, Cr wnl  - Gyn: No vaginal bleeding, no current discharge however on admission she had vaginal discharge that appeared after vaginal cuff colpo that caused what appeared to be a hole communicating with the abdomen. Vaginal culture sent.  - Heme: Hgb wnl, no bleeding concerns  - ID: WBC 13.7 > 6.15. On Levofloxacin and Flagyl for pelvic abscess. Will consult ID if not improving  - DVT: SCDs  - Naproxen, tylenol, oxy for pain  - Neuro/Psych: No concerns  - Dispo: Continue inpatient care    Seen at bedside with Dr. Orona

## 2023-05-22 ENCOUNTER — TRANSCRIPTION ENCOUNTER (OUTPATIENT)
Age: 53
End: 2023-05-22

## 2023-05-22 VITALS
OXYGEN SATURATION: 96 % | HEART RATE: 74 BPM | TEMPERATURE: 98 F | SYSTOLIC BLOOD PRESSURE: 99 MMHG | RESPIRATION RATE: 18 BRPM | DIASTOLIC BLOOD PRESSURE: 64 MMHG

## 2023-05-22 LAB
ANION GAP SERPL CALC-SCNC: 8 MMOL/L — SIGNIFICANT CHANGE UP (ref 5–17)
BASOPHILS # BLD AUTO: 0.05 K/UL — SIGNIFICANT CHANGE UP (ref 0–0.2)
BASOPHILS NFR BLD AUTO: 0.8 % — SIGNIFICANT CHANGE UP (ref 0–2)
BUN SERPL-MCNC: 13.1 MG/DL — SIGNIFICANT CHANGE UP (ref 8–20)
CALCIUM SERPL-MCNC: 8.8 MG/DL — SIGNIFICANT CHANGE UP (ref 8.4–10.5)
CHLORIDE SERPL-SCNC: 108 MMOL/L — SIGNIFICANT CHANGE UP (ref 96–108)
CO2 SERPL-SCNC: 25 MMOL/L — SIGNIFICANT CHANGE UP (ref 22–29)
CREAT SERPL-MCNC: 0.78 MG/DL — SIGNIFICANT CHANGE UP (ref 0.5–1.3)
CULTURE RESULTS: SIGNIFICANT CHANGE UP
EGFR: 92 ML/MIN/1.73M2 — SIGNIFICANT CHANGE UP
EOSINOPHIL # BLD AUTO: 0.53 K/UL — HIGH (ref 0–0.5)
EOSINOPHIL NFR BLD AUTO: 8.8 % — HIGH (ref 0–6)
GLUCOSE SERPL-MCNC: 94 MG/DL — SIGNIFICANT CHANGE UP (ref 70–99)
HCT VFR BLD CALC: 36.6 % — SIGNIFICANT CHANGE UP (ref 34.5–45)
HGB BLD-MCNC: 12.2 G/DL — SIGNIFICANT CHANGE UP (ref 11.5–15.5)
IMM GRANULOCYTES NFR BLD AUTO: 0.3 % — SIGNIFICANT CHANGE UP (ref 0–0.9)
LYMPHOCYTES # BLD AUTO: 1.71 K/UL — SIGNIFICANT CHANGE UP (ref 1–3.3)
LYMPHOCYTES # BLD AUTO: 28.3 % — SIGNIFICANT CHANGE UP (ref 13–44)
MCHC RBC-ENTMCNC: 31 PG — SIGNIFICANT CHANGE UP (ref 27–34)
MCHC RBC-ENTMCNC: 33.3 GM/DL — SIGNIFICANT CHANGE UP (ref 32–36)
MCV RBC AUTO: 93.1 FL — SIGNIFICANT CHANGE UP (ref 80–100)
MONOCYTES # BLD AUTO: 0.68 K/UL — SIGNIFICANT CHANGE UP (ref 0–0.9)
MONOCYTES NFR BLD AUTO: 11.3 % — SIGNIFICANT CHANGE UP (ref 2–14)
NEUTROPHILS # BLD AUTO: 3.05 K/UL — SIGNIFICANT CHANGE UP (ref 1.8–7.4)
NEUTROPHILS NFR BLD AUTO: 50.5 % — SIGNIFICANT CHANGE UP (ref 43–77)
PLATELET # BLD AUTO: 275 K/UL — SIGNIFICANT CHANGE UP (ref 150–400)
POTASSIUM SERPL-MCNC: 4.4 MMOL/L — SIGNIFICANT CHANGE UP (ref 3.5–5.3)
POTASSIUM SERPL-SCNC: 4.4 MMOL/L — SIGNIFICANT CHANGE UP (ref 3.5–5.3)
RBC # BLD: 3.93 M/UL — SIGNIFICANT CHANGE UP (ref 3.8–5.2)
RBC # FLD: 13 % — SIGNIFICANT CHANGE UP (ref 10.3–14.5)
SODIUM SERPL-SCNC: 141 MMOL/L — SIGNIFICANT CHANGE UP (ref 135–145)
SPECIMEN SOURCE: SIGNIFICANT CHANGE UP
WBC # BLD: 6.04 K/UL — SIGNIFICANT CHANGE UP (ref 3.8–10.5)
WBC # FLD AUTO: 6.04 K/UL — SIGNIFICANT CHANGE UP (ref 3.8–10.5)

## 2023-05-22 PROCEDURE — 96375 TX/PRO/DX INJ NEW DRUG ADDON: CPT

## 2023-05-22 PROCEDURE — 74177 CT ABD & PELVIS W/CONTRAST: CPT | Mod: ME

## 2023-05-22 PROCEDURE — G1004: CPT

## 2023-05-22 PROCEDURE — 96365 THER/PROPH/DIAG IV INF INIT: CPT | Mod: XU

## 2023-05-22 PROCEDURE — 36415 COLL VENOUS BLD VENIPUNCTURE: CPT

## 2023-05-22 PROCEDURE — 80048 BASIC METABOLIC PNL TOTAL CA: CPT

## 2023-05-22 PROCEDURE — 81001 URINALYSIS AUTO W/SCOPE: CPT

## 2023-05-22 PROCEDURE — 83690 ASSAY OF LIPASE: CPT

## 2023-05-22 PROCEDURE — 87070 CULTURE OTHR SPECIMN AEROBIC: CPT

## 2023-05-22 PROCEDURE — 96376 TX/PRO/DX INJ SAME DRUG ADON: CPT

## 2023-05-22 PROCEDURE — 85025 COMPLETE CBC W/AUTO DIFF WBC: CPT

## 2023-05-22 PROCEDURE — 96366 THER/PROPH/DIAG IV INF ADDON: CPT

## 2023-05-22 PROCEDURE — 99285 EMERGENCY DEPT VISIT HI MDM: CPT | Mod: 25

## 2023-05-22 PROCEDURE — 80053 COMPREHEN METABOLIC PANEL: CPT

## 2023-05-22 RX ORDER — LEVOFLOXACIN 5 MG/ML
1 INJECTION, SOLUTION INTRAVENOUS
Qty: 10 | Refills: 0
Start: 2023-05-22 | End: 2023-05-31

## 2023-05-22 RX ORDER — METRONIDAZOLE 500 MG
1 TABLET ORAL
Qty: 20 | Refills: 0
Start: 2023-05-22 | End: 2023-05-31

## 2023-05-22 RX ORDER — METHOCARBAMOL 500 MG/1
1 TABLET, FILM COATED ORAL
Qty: 0 | Refills: 0 | DISCHARGE

## 2023-05-22 RX ORDER — LORATADINE 10 MG/1
10 TABLET ORAL DAILY
Refills: 0 | Status: DISCONTINUED | OUTPATIENT
Start: 2023-05-22 | End: 2023-05-22

## 2023-05-22 RX ORDER — OMEPRAZOLE 10 MG/1
1 CAPSULE, DELAYED RELEASE ORAL
Qty: 0 | Refills: 0 | DISCHARGE

## 2023-05-22 RX ORDER — ESTRADIOL 4 UG/1
1 INSERT VAGINAL
Qty: 0 | Refills: 0 | DISCHARGE

## 2023-05-22 RX ORDER — FLUTICASONE PROPIONATE 50 MCG
1 SPRAY, SUSPENSION NASAL
Qty: 0 | Refills: 0 | DISCHARGE

## 2023-05-22 RX ADMIN — Medication 100 MILLIGRAM(S): at 08:04

## 2023-05-22 RX ADMIN — Medication 100 MILLIGRAM(S): at 00:37

## 2023-05-22 RX ADMIN — LORATADINE 10 MILLIGRAM(S): 10 TABLET ORAL at 10:10

## 2023-05-22 RX ADMIN — FAMOTIDINE 20 MILLIGRAM(S): 10 INJECTION INTRAVENOUS at 09:54

## 2023-05-22 NOTE — DISCHARGE NOTE PROVIDER - HOSPITAL COURSE
Patient admitted for pain, suspected pelvic abscess. She received IV abx and continued to be afebrile, no leukocytosis. Patient admitted for pain, suspected pelvic abscess. She received IV abx and continued to be afebrile, no leukocytosis. On day of discharge she was ambulating, voiding, tolerating PO, passing flatus, pain well controlled.

## 2023-05-22 NOTE — DISCHARGE NOTE PROVIDER - CARE PROVIDERS DIRECT ADDRESSES
[email or web address]

## 2023-05-22 NOTE — PROGRESS NOTE ADULT - ASSESSMENT
A/P   51yo who presented to the ED for pelvic pain. She is s/p vaginal cuff colposcopy 5/18/2023 for hx of cervical dysplasia s/p RA TLH BS for this and pelvic pain, hx of sigmoid adenocarcinoma s/p resection in 5/2021 with recent colonoscopy 4/28/2023 showing diverticula in the ascending colon, well-healed rectal anastamosis  Now receiving IV abx for suspected pelvic abscess of unknown cause  - CV: No concerns  - Pulm: Incentive spirometer at bedside, encourage ambulation  - GI: tolerating regular diet, demonstrating bowel function. Recent colonoscopy 4/28/23 showing diverticulosis. In light of hx of colon ca and recent procedure, suspect abscess may be GI of origin. Surgery initially consulted for CT findings of possible appendicitis which is not suspected per surgery. She is a pt of Dr. Johnson for her hx of colon ca, colorectal surgery called to ensure no suspicion for recurrent CA given new abscess, ensure proper abx are being administered given possible GI source. CRS without further recommendations, do not suspect GI origin, not suspicious for complication of colonoscopy  - : Voiding, Cr wnl  - Gyn: No vaginal bleeding or discharge. Vaginal cuff colpo that caused what appeared to be a hole communicating with the abdomen. Vaginal cultures returned normal vaginal jessie  - Heme: Hgb wnl, no bleeding concerns  - ID: WBC 13.7>6.15>6.04. On Levofloxacin and Flagyl for pelvic abscess. Will consult ID if not improving  - DVT: SCDs  - Tylenol, oxy for pain  - Neuro/Psych: No concerns  - Dispo: Continue inpatient care, possible d/c home today    Plan d/w Brenda A/P   53yo who presented to the ED for pelvic pain. She is s/p vaginal cuff colposcopy 5/18/2023 for hx of cervical dysplasia s/p RA TLH BS for this and pelvic pain, hx of sigmoid adenocarcinoma s/p resection in 5/2021 with recent colonoscopy 4/28/2023 showing diverticula in the ascending colon, well-healed rectal anastamosis  Now receiving IV abx for suspected pelvic abscess of unknown cause  - CV: No concerns  - Pulm: Incentive spirometer at bedside, encourage ambulation  - GI: tolerating regular diet, demonstrating bowel function. Recent colonoscopy 4/28/23 showing diverticulosis. In light of hx of colon ca and recent procedure, suspect abscess may be GI of origin. Surgery initially consulted for CT findings of possible appendicitis which is not suspected per surgery. She is a pt of Dr. Johnson for her hx of colon ca, colorectal surgery called to ensure no suspicion for recurrent CA given new abscess, ensure proper abx are being administered given possible GI source. CRS without further recommendations, do not suspect GI origin, not suspicious for complication of colonoscopy  - : Voiding, Cr wnl  - Gyn: No vaginal bleeding or discharge. Vaginal cuff colpo that caused what appeared to be a hole communicating with the abdomen. Vaginal cultures returned normal vaginal jessie  - Heme: Hgb wnl, no bleeding concerns  - ID: WBC 13.7>6.15>6.04. On Levofloxacin and Flagyl for pelvic abscess. Will consult ID if not improving. PO abx for home  - DVT: SCDs  - Tylenol, oxy for pain  - Neuro/Psych: No concerns  - Dispo: Continue inpatient care, possible d/c home today per attending    Plan d/w Brenda A/P   53yo who presented to the ED for pelvic pain. She is s/p vaginal cuff colposcopy 5/18/2023 for hx of cervical dysplasia s/p RA TLH BS for this and pelvic pain, hx of sigmoid adenocarcinoma s/p resection in 5/2021 with recent colonoscopy 4/28/2023 showing diverticula in the ascending colon, well-healed rectal anastamosis  Now receiving IV abx for suspected pelvic abscess of unknown cause  - CV: No concerns  - Pulm: Incentive spirometer at bedside, encourage ambulation  - GI: tolerating regular diet, demonstrating bowel function. Recent colonoscopy 4/28/23 showing diverticulosis. In light of hx of colon ca and recent procedure, suspect abscess may be GI of origin. Surgery initially consulted for CT findings of possible appendicitis which is not suspected per surgery. She is a pt of Dr. Johnson for her hx of colon ca, colorectal surgery called to ensure no suspicion for recurrent CA given new abscess, ensure proper abx are being administered given possible GI source. CRS without further recommendations, do not suspect GI origin, not suspicious for complication of colonoscopy. Continued mild lower abdominal pain however this preexisted her current condition. Pt sees pelvic floor PT outpatient for this pain  - : Voiding, Cr wnl  - Gyn: No vaginal bleeding or discharge. Vaginal cuff colpo that caused what appeared to be a hole communicating with the abdomen. Vaginal cultures returned normal vaginal jessie  - Heme: Hgb wnl, no bleeding concerns  - ID: WBC 13.7>6.15>6.04. On Levofloxacin and Flagyl for pelvic abscess. Will consult ID if not improving. PO abx for home  - DVT: SCDs  - Tylenol, oxy for pain  - Neuro/Psych: No concerns  - Dispo: Continue inpatient care, possible d/c home today per attending    Plan d/w Brenda (2) well flexed

## 2023-05-22 NOTE — DISCHARGE NOTE PROVIDER - NSDCCPCAREPLAN_GEN_ALL_CORE_FT
PRINCIPAL DISCHARGE DIAGNOSIS  Diagnosis: Pelvic abscess in female  Assessment and Plan of Treatment:

## 2023-05-22 NOTE — DISCHARGE NOTE PROVIDER - NSDCFUADDINST_GEN_ALL_CORE_FT
- Please take ibuprofen, tylenol as needed for pain. 975mg of tylenol (3 tabs) can be taken every 6 hours, 600mg (3 tabs) of ibuprofen can be taken every 6 hours for a maximum of 4000mg of tylenol and 2400mg of ibuprofen every 24 hours.  - Miralax, Senna can be purchased at the pharmacy to help with bowel regularity   - Nothing in the vagina for 6 weeks (including no sex, no tampons, and no douching).  - No tub baths or pools for 6 weeks. Showers are okay. Please keep your incision(s) dry until your follow up appointment.  - Limit heavy lifting over 10lbs until your follow up appointment  - Please call your doctor for a follow up appointment if you do not already have one  - Please call the office sooner if you have heavy vaginal bleeding, severe abdominal pain, fever over 100.4F, or are unable to urinate

## 2023-05-22 NOTE — DISCHARGE NOTE PROVIDER - NSDCFUSCHEDAPPT_GEN_ALL_CORE_FT
Yahaira Gutierrez  [facility]  OBGYNGLISSA [address]   Scheduled Appointment: 06/05/2023    Handy Goodwin  [facility]  [facility]  Scheduled Appointment: 06/07/2023    Ciara Ryan  [facility]  CARDIOLOGY [address]  Scheduled Appointment: 06/13/2023

## 2023-05-22 NOTE — DISCHARGE NOTE NURSING/CASE MANAGEMENT/SOCIAL WORK - PATIENT PORTAL LINK FT
You can access the FollowMyHealth Patient Portal offered by [facility] by registering at the following website: [email or web address]. By joining [facility]’s FollowMyHealth portal, you will also be able to view your health information using other applications (apps) compatible with our system.

## 2023-05-22 NOTE — DISCHARGE NOTE PROVIDER - NSDCMRMEDTOKEN_GEN_ALL_CORE_FT
acetaminophen 325 mg oral tablet: 2 tab(s) orally every 6 hours, As needed, Mild Pain (1 - 3)  famotidine 10 mg oral tablet: 1 tab(s) orally once a day (at bedtime)  gabapentin 100 mg oral capsule: 3 cap(s) orally once a day (at bedtime)  levoFLOXacin 750 mg oral tablet: 1 tab(s) orally once a day  loratadine 10 mg oral tablet: 1 tab(s) orally once a day  metroNIDAZOLE 500 mg oral tablet: 1 tab(s) orally 2 times a day  Multiple Vitamins oral tablet: 1 tab(s) orally once a day  polyethylene glycol 3350 oral powder for reconstitution: 17 gram(s) orally once a day  Vitamin C: orally once a day  Vitamin D3: orally once a day

## 2023-05-22 NOTE — PROGRESS NOTE ADULT - ASSESSMENT
53yo F s/p recent colposcopy on 5/18/23 with pelvic abscess.    Plan:  - c/w IV antibiotics: Levaquin/Flagyl  - Pain control prn  - c/w Regular diet  - f/u AM labs  - if no improvement, may repeat CT scan   - JANINA  - No surgical intervention needed at this time

## 2023-05-22 NOTE — DISCHARGE NOTE PROVIDER - CARE PROVIDER_API CALL
Yahaira Hutson)  Obstetrics and Gynecology  [address] [facility], [facility] [address] [place] [address]  Phone: [phone]  Fax: [phone]  Follow Up Time: 1 week

## 2023-05-22 NOTE — PROGRESS NOTE ADULT - SUBJECTIVE AND OBJECTIVE BOX
HPI/OVERNIGHT EVENTS: Patient seen and examined at bedside during morning rounds. No overnight events. Reports lower abdominal pain that is improving with start of antibiotic management and pain medications. She states that she is nauseous occasionally. Patient admits discharge from vagina. Patient is passing flatus and had bowel movement yesterday. Afebrile. VSS.    Vital Signs Last 24 Hrs  T(C): 36.8 (22 May 2023 04:51), Max: 36.9 (22 May 2023 00:51)  T(F): 98.3 (22 May 2023 04:51), Max: 98.4 (22 May 2023 00:51)  HR: 54 (22 May 2023 04:51) (54 - 72)  BP: 93/58 (22 May 2023 04:51) (93/58 - 106/58)  BP(mean): --  RR: 16 (22 May 2023 04:51) (16 - 18)  SpO2: 92% (22 May 2023 04:51) (92% - 100%)    Parameters below as of 22 May 2023 04:51  Patient On (Oxygen Delivery Method): room air        I&O's Detail      Constitutional: patient resting comfortably in bed, in no acute distress  HEENT: EOMI, PERRLA, MMM.  Respiratory: Non labored breathing on RA  Cardiovascular: RRR  Gastrointestinal: Abdomen soft, suprapubic tenderness, non-distended, no rebound tenderness / guarding  Musculoskeletal: No joint pain, swelling or deformity; no limitation of movement  Vascular: Extremities warm and well perfused.     LABS:                        12.2   6.04  )-----------( 275      ( 22 May 2023 05:33 )             36.6     05-22    141  |  108  |  13.1  ----------------------------<  94  4.4   |  25.0  |  0.78    Ca    8.8      22 May 2023 05:33            MEDICATIONS  (STANDING):  acetaminophen 325 mG/butalbital 50 mG/caffeine 40 mG 1 Tablet(s) Oral once  famotidine    Tablet 20 milliGRAM(s) Oral daily  levoFLOXacin IVPB 750 milliGRAM(s) IV Intermittent every 24 hours  melatonin 5 milliGRAM(s) Oral at bedtime  metroNIDAZOLE  IVPB 500 milliGRAM(s) IV Intermittent every 8 hours    MEDICATIONS  (PRN):  acetaminophen     Tablet .. 975 milliGRAM(s) Oral every 6 hours PRN Mild Pain (1 - 3), Moderate Pain (4 - 6)  diphenhydrAMINE 25 milliGRAM(s) Oral every 6 hours PRN Rash and/or Itching  oxyCODONE    IR 5 milliGRAM(s) Oral every 8 hours PRN Severe Pain (7 - 10)  senna 2 Tablet(s) Oral at bedtime PRN Constipation      MICRO:   Cultures     STUDIES:   EKG, CXR, U/S, CT, MRI

## 2023-05-22 NOTE — PROGRESS NOTE ADULT - SUBJECTIVE AND OBJECTIVE BOX
51yo who presented to the ED for pelvic pain. She is s/p vaginal cuff colposcopy 5/18/2023 for hx of cervical dysplasia s/p RA TLH BS for this and pelvic pain,   hx of sigmoid adenocarcinoma s/p resection in 5/2021 with recent colonoscopy 4/28/2023 showing diverticula in the ascending colon, well-healed rectal anastamosis  Now receiving IV abx for suspected pelvic abscess  HD#3    S: Patient was seen and examined at bedside  Pain is well-controlled   Tolerating regular diet, denies N/V, vaginal bleeding.  Denies vaginal discharge  +flatus +BMs    O:   Vital Signs Last 24 Hrs  T(C): 36.8 (22 May 2023 04:51), Max: 36.9 (22 May 2023 00:51)  T(F): 98.3 (22 May 2023 04:51), Max: 98.4 (22 May 2023 00:51)  HR: 54 (22 May 2023 04:51) (54 - 72)  BP: 93/58 (22 May 2023 04:51) (93/58 - 106/58)  BP(mean): --  RR: 16 (22 May 2023 04:51) (16 - 18)  SpO2: 92% (22 May 2023 04:51) (92% - 100%)    Parameters below as of 22 May 2023 04:51  Patient On (Oxygen Delivery Method): room air    General: Alert and oriented x3, NAD  CV: Regular rate and rhythm  Lungs: CTAB  Abdomen: soft, mildly tender, +BS  SVE: No swelling, discharge, bleeding noted.  Ext: no edema, erythema or pain         53yo who presented to the ED for pelvic pain. She is s/p vaginal cuff colposcopy 5/18/2023 for hx of cervical dysplasia s/p RA TLH BS for this and pelvic pain,   hx of sigmoid adenocarcinoma s/p resection in 5/2021 with recent colonoscopy 4/28/2023 showing diverticula in the ascending colon, well-healed rectal anastamosis  Now receiving IV abx for suspected pelvic abscess  HD#3    S: Patient was seen and examined at bedside  Pain is well-controlled, c/o lower abdominal pain however unchanged from existing pain she had prior to this admission   Tolerating regular diet, denies N/V, vaginal bleeding.  Denies vaginal discharge  +flatus +BMs    O:   Vital Signs Last 24 Hrs  T(C): 36.8 (22 May 2023 04:51), Max: 36.9 (22 May 2023 00:51)  T(F): 98.3 (22 May 2023 04:51), Max: 98.4 (22 May 2023 00:51)  HR: 54 (22 May 2023 04:51) (54 - 72)  BP: 93/58 (22 May 2023 04:51) (93/58 - 106/58)  BP(mean): --  RR: 16 (22 May 2023 04:51) (16 - 18)  SpO2: 92% (22 May 2023 04:51) (92% - 100%)    Parameters below as of 22 May 2023 04:51  Patient On (Oxygen Delivery Method): room air    General: Alert and oriented x3, NAD  CV: Regular rate and rhythm  Lungs: CTAB  Abdomen: soft, mildly tender, +BS  SVE: No swelling, discharge, bleeding noted.  Ext: no edema, erythema or pain

## 2023-05-24 ENCOUNTER — NON-APPOINTMENT (OUTPATIENT)
Age: 53
End: 2023-05-24

## 2023-05-25 ENCOUNTER — NON-APPOINTMENT (OUTPATIENT)
Age: 53
End: 2023-05-25

## 2023-05-28 ENCOUNTER — RX RENEWAL (OUTPATIENT)
Age: 53
End: 2023-05-28

## 2023-05-30 ENCOUNTER — APPOINTMENT (OUTPATIENT)
Dept: OBGYN | Facility: CLINIC | Age: 53
End: 2023-05-30
Payer: MEDICAID

## 2023-05-30 ENCOUNTER — NON-APPOINTMENT (OUTPATIENT)
Age: 53
End: 2023-05-30

## 2023-05-30 VITALS
DIASTOLIC BLOOD PRESSURE: 70 MMHG | WEIGHT: 111 LBS | BODY MASS INDEX: 20.96 KG/M2 | SYSTOLIC BLOOD PRESSURE: 100 MMHG | HEIGHT: 61 IN

## 2023-05-30 DIAGNOSIS — B97.7 PAPILLOMAVIRUS AS THE CAUSE OF DISEASES CLASSIFIED ELSEWHERE: ICD-10-CM

## 2023-05-30 PROCEDURE — 99213 OFFICE O/P EST LOW 20 MIN: CPT

## 2023-05-30 NOTE — PHYSICAL EXAM
[Labia Majora] : normal [Labia Minora] : normal [Normal] : normal [No Bleeding] : There was no active vaginal bleeding [Absent] : absent [Uterine Adnexae] : normal

## 2023-05-30 NOTE — PLAN
[FreeTextEntry1] : cuff well healed. tca tx to cuff. \par culture performed, call w results. will finish full 14 day course abxs.

## 2023-05-30 NOTE — HISTORY OF PRESENT ILLNESS
[FreeTextEntry1] : pt here to fu after colpo done on vagina for persistent pos hpv. \par post colpo was complicated by tabdominal pain, ct scan cw cuff abscess noted, pt was hospitalized , treated w iv abxs, and sent home on 2 weeks of po flagyl/levoquin. \par feels better, denies pelvic pain, feels stomache is a little upset w abxs. \par denies abnl dc. \par cxs from hospital showed nl vag jessie,

## 2023-05-31 ENCOUNTER — APPOINTMENT (OUTPATIENT)
Dept: PHYSICAL MEDICINE AND REHAB | Facility: CLINIC | Age: 53
End: 2023-05-31
Payer: MEDICAID

## 2023-05-31 DIAGNOSIS — M54.16 RADICULOPATHY, LUMBAR REGION: ICD-10-CM

## 2023-05-31 DIAGNOSIS — M79.18 MYALGIA, OTHER SITE: ICD-10-CM

## 2023-05-31 DIAGNOSIS — M89.9 DISORDER OF BONE, UNSPECIFIED: ICD-10-CM

## 2023-05-31 DIAGNOSIS — M51.26 OTHER INTERVERTEBRAL DISC DISPLACEMENT, LUMBAR REGION: ICD-10-CM

## 2023-05-31 LAB — CORE LAB BIOPSY: NORMAL

## 2023-05-31 PROCEDURE — 99214 OFFICE O/P EST MOD 30 MIN: CPT | Mod: 95

## 2023-05-31 NOTE — DATA REVIEWED
[FreeTextEntry1] : CT Abd/Pelvis 5/20/23 reviewed by me: multilevel DDD, worst at L5-S1 with disc protrusion\par \par ACC: [id number] EXAM: CT ABDOMEN AND PELVIS OC IC ORDERED BY: JANET NIELSEN\par \par PROCEDURE DATE: 05/20/2023\par \par \par \par INTERPRETATION: CLINICAL INFORMATION: Abdominal pain. Status post vaginal cuff biopsy.\par \par COMPARISON: CT abdomen pelvis 4/5/2023.\par \par PROCEDURE:\par CT of the Abdomen and Pelvis was performed with intravenous contrast.\par Intravenous contrast: 90 ml Omnipaque 350.\par Oral contrast: positive contrast was administered.\par Sagittal and coronal reformats were performed.\par \par FINDINGS:\par \par LOWER CHEST: Within normal limits.\par \par LIVER: Within normal limits.\par BILE DUCTS: Normal caliber.\par GALLBLADDER: Within normal limits.\par SPLEEN: Within normal limits.\par PANCREAS: Within normal limits.\par ADRENALS: Within normal limits.\par KIDNEYS/URETERS: No hydronephrosis. Too small to characterize bilateral renal hypodensities.\par \par BLADDER: Wall thickening, difficult to assess secondary to inadequate distention.\par REPRODUCTIVE ORGANS: Hysterectomy. Trace fluid identified within the vaginal fornices containing small foci of air.\par \par BOWEL: No bowel obstruction. Fluid-filled borderline prominent appendix measuring 7 mm with wall thickening/hyperemia. Findings equivocal for early appendicitis. Mildly prominent cluster of jejunal folds in the pelvis with adjacent mesenteric edema, likely reactive. Rectosigmoid surgical anastomosis.\par PERITONEUM: Right-sided pelvic loculated fluid collection with peripheral rim enhancement measuring 3.5 x 2.1 cm extending to the vaginal cuff (100-104:3). Mesenteric edema.\par VESSELS: Normal aortic caliber.\par RETROPERITONEUM: No lymphadenopathy.\par ABDOMINAL WALL: Small umbilical hernia containing fat.\par BONES: Advanced degenerative disc disease at L5-S1.\par \par IMPRESSION:\par \par Findings equivocal for early appendicitis. Mildly prominent cluster of jejunal folds in the pelvis with adjacent mesenteric edema, likely reactive.\par \par Right-sided pelvic loculated fluid collection with peripheral rim enhancement measuring 3.5 x 2.1 cm extending to the vaginal cuff. Phlegmon or developing abscess considered in the differential.\par \par Bladder wall thickening, difficult to assess secondary to inadequate distention. Correlate with urinalysis and lab values to assess for cystitis and/or ascending urinary tract infection.\par \par These results were discussed via telephone at 5/20/2023 3:48 AM by Dr. Thornton of radiology with [facility].\par \par \par \par --- End of Report ---\par \par \par \par \par \par ELVIRA THORNTON MD; Attending Radiologist\par This document has been electronically signed. May 20 2023 3:50AM

## 2023-05-31 NOTE — ASSESSMENT
[FreeTextEntry1] : Ms. LE OWEN is a 52 year old female who presented with low back pain and now left lower extremity pain, numbness, and tingling, likely secondary to L5-S1 herniated disc. She underwent MORENA on 5/9/22 for which she has now noticed relief. She has also been seen for her bilateral shoulder/neck pain, which has been more controlled lately but still bothersome. She recently has been following with rheumatology for rheumatoid arthritis, for which she was recently started on Humira with relief but now she is recovering form a pelvic abscess. She is now also seeing Dr. Tan for advanced pubic symphysis OA and pain.   Denies any red flag signs. Will recommend: \par - Patient to undergo local anesthetic injection to the pubic symphysis to see if it significant improves her pain but that is now on hold\par - Patient to continue following with rheumatology, will be restarting humira soon\par - Will give Methocarbamol 500mg Qhs PRN. Advised of side effects including sedation. \par - Advised patient that restarting Humira will hopefully improve pain. \par \par Return in 2-3 weeks. If no significant relief, will obtain new L Spine imaging.   Patient in agreement with plan. Patient aware of red flag signs including any changes to their bowel/bladder control, groin numbness or new weakness. Patient knows to seek immediate attention by calling 911 or going to nearest ER if these symptoms appear. \par \par I spent a total of 12 minutes on the phone with the patient for this encounter.

## 2023-05-31 NOTE — HISTORY OF PRESENT ILLNESS
[FreeTextEntry1] : This visit was conducted via an audiovisual call. Patient confirmed they were at home at [address] [facility]\[place] [address] . Dr. Diane was the office at [address] [place] [address] . Patient consented to the televisit. \par \par Ms. LE OWEN is a 52 year old  female who presents for follow up. At last visit, she was going to undergo a local anesthetic injection of the pubic symphysis, and follow with rheumatology. Since then, she was admitted to the hospital for possible pelvic abscess after a colposcopy. She is now recovering from the infection, her last day of antibiotics is today. She has since had return of her left sided back pain that is going down her leg. She is unsure if this is related to being off the humira. Has not yet had the pubic symphysis injection. \par \par Location:Middle of low back, worst left lumbar side, also in  pubic symphysis\par Onset:Chronic, In late 4/2022 after fall\par Provocation/Palliative: sitting, walking, driving makes the pain worse. Lying on her left side helps a little\par Quality: Achy, cramping, numbness, and tingling\par Radiation: Now going down into her L leg\par Severity: moderate to severe\par Timing: Worst at night\par \par No bowel/bladder changes. No groin numbness.

## 2023-06-02 ENCOUNTER — APPOINTMENT (OUTPATIENT)
Dept: FAMILY MEDICINE | Facility: CLINIC | Age: 53
End: 2023-06-02
Payer: MEDICAID

## 2023-06-02 VITALS
HEART RATE: 70 BPM | HEIGHT: 61 IN | SYSTOLIC BLOOD PRESSURE: 118 MMHG | TEMPERATURE: 97.2 F | RESPIRATION RATE: 14 BRPM | OXYGEN SATURATION: 98 % | DIASTOLIC BLOOD PRESSURE: 70 MMHG | BODY MASS INDEX: 20.58 KG/M2 | WEIGHT: 109 LBS

## 2023-06-02 DIAGNOSIS — N73.9 FEMALE PELVIC INFLAMMATORY DISEASE, UNSPECIFIED: ICD-10-CM

## 2023-06-02 DIAGNOSIS — Z09 ENCOUNTER FOR FOLLOW-UP EXAMINATION AFTER COMPLETED TREATMENT FOR CONDITIONS OTHER THAN MALIGNANT NEOPLASM: ICD-10-CM

## 2023-06-02 PROCEDURE — 99495 TRANSJ CARE MGMT MOD F2F 14D: CPT | Mod: 25

## 2023-06-02 RX ORDER — TERCONAZOLE 80 MG/1
80 SUPPOSITORY VAGINAL
Qty: 3 | Refills: 0 | Status: DISCONTINUED | COMMUNITY
Start: 2023-02-07 | End: 2023-06-02

## 2023-06-02 RX ORDER — METRONIDAZOLE 7.5 MG/G
0.75 GEL VAGINAL
Qty: 1 | Refills: 0 | Status: DISCONTINUED | COMMUNITY
Start: 2023-02-09 | End: 2023-06-02

## 2023-06-02 NOTE — HISTORY OF PRESENT ILLNESS
[Post-hospitalization from ___ Hospital] : Post-hospitalization from [unfilled] Hospital [Admitted on: ___] : The patient was admitted on [unfilled] [Discharged on ___] : discharged on [unfilled] [Discharge Summary] : discharge summary [Pertinent Labs] : pertinent labs [Radiology Findings] : radiology findings [Discharge Med List] : discharge medication list [Med Reconciliation] : medication reconciliation has been completed [Patient Contacted By: ____] : and contacted by [unfilled] [FreeTextEntry2] : 53 y/o F with medical hx significant for GERD, chronic sinusitis, sigmoid colon cancer s/p colectomy, and adenomyosis s/p hysterectomy/salpingectomy.\par Pt with BRIGID, She was seen by Rheumatology and started on MTX, which did not tolerate. Now on Humira.\par Admitted 8/22/22 with SBO, d/c on 8/25/22.\par Patient admitted 5/20/23 for pain post Colposcopy, suspected pelvic abscess. She received IV abx and continued to be afebrile, no leukocytosis. On day of discharge she was ambulating, voiding, tolerating PO, passing flatus, pain well controlled. \par Pt d/c on: acetaminophen 325 mg oral tablet: 2 tab(s) orally every 6 hours, As needed, Mild Pain (1 - 3) \par famotidine 10 mg oral tablet: 1 tab(s) orally once a day (at bedtime) \par gabapentin 100 mg oral capsule: 3 cap(s) orally once a day (at bedtime) \par levoFLOXacin 750 mg oral tablet: 1 tab(s) orally once a day completed yesterday\par loratadine 10 mg oral tablet: 1 tab(s) orally once a day \par metroNIDAZOLE 500 mg oral tablet: 1 tab(s) orally 2 times a day completed yesterday\par Multiple Vitamins oral tablet: 1 tab(s) orally once a day \par polyethylene glycol 3350 oral powder for reconstitution: 17 gram(s) orally once \par a day \par Vitamin C: orally once a day \par Vitamin D3: orally once a day \par

## 2023-06-02 NOTE — HEALTH RISK ASSESSMENT
[Intercurrent hospitalizations] : was admitted to the hospital  [No] : In the past 12 months have you used drugs other than those required for medical reasons? No [No falls in past year] : Patient reported no falls in the past year [0] : 2) Feeling down, depressed, or hopeless: Not at all (0) [PHQ-2 Negative - No further assessment needed] : PHQ-2 Negative - No further assessment needed [With Family] : lives with family [Unemployed] : unemployed [Fully functional (bathing, dressing, toileting, transferring, walking, feeding)] : Fully functional (bathing, dressing, toileting, transferring, walking, feeding) [Fully functional (using the telephone, shopping, preparing meals, housekeeping, doing laundry, using] : Fully functional and needs no help or supervision to perform IADLs (using the telephone, shopping, preparing meals, housekeeping, doing laundry, using transportation, managing medications and managing finances) [With Patient/Caregiver] : , with patient/caregiver [Designated Healthcare Proxy] : Designated healthcare proxy [Name: ___] : Health Care Proxy's Name: [unfilled]  [Relationship: ___] : Relationship: [unfilled] [Never] : Never [de-identified] : 5/20/23 [de-identified] : GYN [[id number]] : 0 [AdvancecareDate] : 06/23

## 2023-06-02 NOTE — ASSESSMENT
[FreeTextEntry1] : 53 y/o F with Dx Colon cancer post Robotic sigmoid resection on 05/28/21. No adjuvant chemo recommended. Doing well, TRH, B/L Salpingectomy, presents post Hospitalization 5/20/23-5/22/23\par \par PRINCIPAL DISCHARGE DIAGNOSIS \par #  Pelvic abscess in female \par \par # Hospital F/up> [facility]\par -Admitted on 08/22/22-08/25/22\par \par #Chronic Fatigue, malaise> Rheumatoid arthritis:\par -F/up with Rheumatology.\par -Fail MTX PO.\par -Now on HUMIRA.\par \par Mammo and Gyn: seen now with Dr rios.\par \par Colonoscopy: 05/2021\par -needs to continue surveillance colonosocpy in 1 year\par \par # Colon cancer s/p Robotic sigmoid resection\par -F/up with Oncology\par -Needs CEA q 6 months.\par \par # Lumbar and cervical radiculopathy. :\par -On Gabapentin.\par -Seen by Neurosx, dr Nguyen.\par -Seen by PM&R Dr MEDINA.\par \par B/L Shoulder pain:\par -Seen by Orthopedic< Dr BOBBY\par \par # pelvic pain\par -Orthopedic eval advise\par \par # immunizations:\par -Shingrix 2nd dose today\par \par # Encounter for Disability determination> Pt apply to social security> On hold?\par \par

## 2023-06-02 NOTE — PHYSICAL EXAM
[No Acute Distress] : no acute distress [Well Nourished] : well nourished [Well Developed] : well developed [Well-Appearing] : well-appearing [Normal Sclera/Conjunctiva] : normal sclera/conjunctiva [PERRL] : pupils equal round and reactive to light [EOMI] : extraocular movements intact [Normal Outer Ear/Nose] : the outer ears and nose were normal in appearance [Normal Oropharynx] : the oropharynx was normal [No JVD] : no jugular venous distention [No Lymphadenopathy] : no lymphadenopathy [Supple] : supple [Thyroid Normal, No Nodules] : the thyroid was normal and there were no nodules present [No Respiratory Distress] : no respiratory distress  [No Accessory Muscle Use] : no accessory muscle use [Clear to Auscultation] : lungs were clear to auscultation bilaterally [Normal Rate] : normal rate  [Regular Rhythm] : with a regular rhythm [Normal S1, S2] : normal S1 and S2 [No Murmur] : no murmur heard [No Carotid Bruits] : no carotid bruits [No Abdominal Bruit] : a ~M bruit was not heard ~T in the abdomen [No Varicosities] : no varicosities [Pedal Pulses Present] : the pedal pulses are present [No Edema] : there was no peripheral edema [No Palpable Aorta] : no palpable aorta [No Extremity Clubbing/Cyanosis] : no extremity clubbing/cyanosis [Soft] : abdomen soft [Non Tender] : non-tender [Non-distended] : non-distended [No Masses] : no abdominal mass palpated [No HSM] : no HSM [Normal Bowel Sounds] : normal bowel sounds [Normal Posterior Cervical Nodes] : no posterior cervical lymphadenopathy [Normal Anterior Cervical Nodes] : no anterior cervical lymphadenopathy [No CVA Tenderness] : no CVA  tenderness [No Spinal Tenderness] : no spinal tenderness [No Joint Swelling] : no joint swelling [Grossly Normal Strength/Tone] : grossly normal strength/tone [No Rash] : no rash [Coordination Grossly Intact] : coordination grossly intact [No Focal Deficits] : no focal deficits [Normal Gait] : normal gait [Deep Tendon Reflexes (DTR)] : deep tendon reflexes were 2+ and symmetric [Normal Affect] : the affect was normal [Normal Insight/Judgement] : insight and judgment were intact [47735 - Moderate Complexity requires multiple possible diagnoses and/or the management options, moderate complexity of the medical data (tests, etc.) to be reviewed, and moderate risk of significant complications, morbidity, and/or mortality as well as co] : Moderate Complexity

## 2023-06-03 LAB
ALBUMIN SERPL ELPH-MCNC: 4.5 G/DL
ALP BLD-CCNC: 59 U/L
ALT SERPL-CCNC: 15 U/L
ANION GAP SERPL CALC-SCNC: 11 MMOL/L
AST SERPL-CCNC: 27 U/L
BILIRUB SERPL-MCNC: 0.2 MG/DL
BUN SERPL-MCNC: 13 MG/DL
CALCIUM SERPL-MCNC: 10.1 MG/DL
CHLORIDE SERPL-SCNC: 105 MMOL/L
CO2 SERPL-SCNC: 26 MMOL/L
CREAT SERPL-MCNC: 0.85 MG/DL
CRP SERPL-MCNC: <3 MG/L
EGFR: 83 ML/MIN/1.73M2
GLUCOSE SERPL-MCNC: 87 MG/DL
POTASSIUM SERPL-SCNC: 4.4 MMOL/L
PROT SERPL-MCNC: 6.8 G/DL
SODIUM SERPL-SCNC: 142 MMOL/L

## 2023-06-05 ENCOUNTER — NON-APPOINTMENT (OUTPATIENT)
Age: 53
End: 2023-06-05

## 2023-06-07 ENCOUNTER — APPOINTMENT (OUTPATIENT)
Dept: HEMATOLOGY ONCOLOGY | Facility: CLINIC | Age: 53
End: 2023-06-07

## 2023-06-09 LAB — BACTERIA GENITAL AEROBE CULT: NORMAL

## 2023-06-09 NOTE — PHYSICAL EXAM
[Fully active, able to carry on all pre-disease performance without restriction] : Status 0 - Fully active, able to carry on all pre-disease performance without restriction [Normal] : affect appropriate [de-identified] : left lower and right lower abdominal pain

## 2023-06-09 NOTE — ADDENDUM
[FreeTextEntry1] : Documented by Alice Hurtado acting as scribe for Dr. Goodwin on 06/07/2023 \par \par All Medical record entries made by the Scribe were at my, Dr. Goodwin's , direction and personally dictated by me on 06/07/2023 . I have reviewed the chart and agree that the record accurately reflects my personal performance of the history, physical exam, assessment and plan. I have also personally directed, reviewed, and agreed with the discharge instructions.\par

## 2023-06-09 NOTE — HISTORY OF PRESENT ILLNESS
[de-identified] : 50-year-old female presents to our office with a new diagnosis of Sigmoid colon cancer \par \par She has had a variety of symptoms with chronic pelvic pain over the last 10 years and recently underwent a hysterectomy for adenomyosis by Dr. Cruz in February 2021. Several of her pelvic symptoms did improve. After that she had intermittent blood in her stool and some changes in the caliber of her stool. Her appetite has been fine and she has not had any significant weight loss. She underwent a colonoscopy which showed a mass in the sigmoid colon. \par \par COLONOSCOPY: 5/17/2021: Intramucosal adenocarcinoma \par CT CAP 5/2021 :  LUNGS AND LARGE AIRWAYS: Patent central airways. There is 3 mm nodular thickening of the minor fissure on image 80, likely postinflammatory. There is a 2 mm calcified granuloma in the left lower lobe on image 125. No confluent airspace opacity is identified. There is no evidence of interstitial lung disease, bronchiectasis, or fibrosis.\par \par S/p Robotic sigmoid colon resection for colon cancer on 5/28/21 T3N0 stage II colon cancer, MSI Stable, No high risk features. The patient did not receive adjuvant treatment. Subsequent surveillance imaging in 2/2022 did not reveal evidence of recurrence. Colonosopy in 4/28/22 was also unremarkable. \par  [de-identified] : The patient continues chronic joint pain 2/2 to menopause and RA that is unchanged from previous. RA currently managed on humara, with some improvement in knees. Menopause managed on estrogen patches, she recently started.\par The patient denies weight loss, poor appetite, and bleeding. \par Continued abdominal pain, undergoing pelvic floor therapy\par Patient has colonoscopy scheduled today

## 2023-06-13 ENCOUNTER — APPOINTMENT (OUTPATIENT)
Dept: CARDIOLOGY | Facility: CLINIC | Age: 53
End: 2023-06-13
Payer: MEDICAID

## 2023-06-13 VITALS
WEIGHT: 108 LBS | DIASTOLIC BLOOD PRESSURE: 58 MMHG | OXYGEN SATURATION: 96 % | SYSTOLIC BLOOD PRESSURE: 86 MMHG | HEART RATE: 64 BPM | BODY MASS INDEX: 20.39 KG/M2 | HEIGHT: 61 IN

## 2023-06-13 VITALS — DIASTOLIC BLOOD PRESSURE: 60 MMHG | SYSTOLIC BLOOD PRESSURE: 110 MMHG

## 2023-06-13 PROCEDURE — 93000 ELECTROCARDIOGRAM COMPLETE: CPT

## 2023-06-13 PROCEDURE — 99214 OFFICE O/P EST MOD 30 MIN: CPT | Mod: 25

## 2023-06-15 ENCOUNTER — NON-APPOINTMENT (OUTPATIENT)
Age: 53
End: 2023-06-15

## 2023-06-15 NOTE — ADDENDUM
[FreeTextEntry1] : Patient has no baseline TTE in the chart and would like TTE to assess for any significant valvulopathy to assess for fatigue and dizziness. EKG Sinus  Rhythm @ 68 bpm WITHIN NORMAL LIMITS. \par \par Ciara Ryan D.O. FACMARITZA\par Cardiology/Vascular Cardiology -[facility]\par Telephone # [phone]\par

## 2023-06-15 NOTE — ASSESSMENT
[FreeTextEntry1] : 51 y/o F with PMH of Rheumatoid arthritis, Colon cancer (dx in 5/2021, Stage 2 s/p colonic resection), presents for cardiovascular evaluation \par \par 1)Fatigued and shortness of breath \par -0 ? if this is anginal or anginal equivalent \par - EKG reviewed shows sinus bradycardia with no acute ST T segment changes associated with ischemia \par - TTE done shows normal LVEF with rheumatic MV with mild to moderate MR \par - patient has rheumatoid arthitis and this linked with CAD and thus will refer for Cardiac CTA to assess coronary anatomy and if any evidence of obstructive disease \par - blood pressure controlled during the visit \par \par will follow up in 3 months \par \par Ciara Ryan D.O., FACC\par Cardiology/Vascular Cardiology -[facility]\par Telephone # [phone]\par

## 2023-06-15 NOTE — CARDIOLOGY SUMMARY
[de-identified] : 6/13/2023: Sinus  Bradycardia @ 59 bpm WITHIN NORMAL LIMITS\par Sinus  Rhythm @ 68 bpm WITHIN NORMAL LIMITS

## 2023-06-15 NOTE — REASON FOR VISIT
[Symptom and Test Evaluation] : symptom and test evaluation [FreeTextEntry1] : 53 y/o F with PMH of Rheumatoid arthritis, Colon cancer (dx in 5/2021, Stage 2 s/p colonic resection), presents for cardiovascular evaluation \par \par Prior Cardiologist: [facility] \par \par Patient notes that she  Feb 2021 Hysterectomy, had 3 section last 2006 by Dr. Cruz and hx of colon cancer - Colon resection - Cancer 3 months later Stage 2, no chemotherapy, checking in with Dr. Alvarado, CT abdomen every 3, Rheumatoid arthritis - Dr. Key, Humira started 2 weeks ago. \par Possible Chest pain ? acid reflux found to have MR, felt like she had something stuck in her throat. \par \par Patient states that she has been feeling fatigued and short of breath, and then subsequently had to be admitted 5/20/23 for pain post Colposcopy, suspected pelvic abscess. She received IV abx and continued to be afebrile, no leukocytosis. Has been off of her Humira for RA and since then has been feeling worse with no interval improvement in fatigue and has yet ot restart her Humira. \par Discussed will obtain stress testing. \par \par Surgical Hx: hysterectomy and colon resection \par smoking: none \par Alcohol use: weekends \par Family Hx: Grandfather stroke \par

## 2023-06-27 ENCOUNTER — APPOINTMENT (OUTPATIENT)
Dept: CT IMAGING | Facility: CLINIC | Age: 53
End: 2023-06-27
Payer: MEDICAID

## 2023-06-27 ENCOUNTER — OUTPATIENT (OUTPATIENT)
Dept: OUTPATIENT SERVICES | Facility: HOSPITAL | Age: 53
LOS: 1 days | End: 2023-06-27
Payer: MEDICAID

## 2023-06-27 DIAGNOSIS — R53.83 OTHER FATIGUE: ICD-10-CM

## 2023-06-27 DIAGNOSIS — Z98.890 OTHER SPECIFIED POSTPROCEDURAL STATES: Chronic | ICD-10-CM

## 2023-06-27 DIAGNOSIS — Z98.891 HISTORY OF UTERINE SCAR FROM PREVIOUS SURGERY: Chronic | ICD-10-CM

## 2023-06-27 DIAGNOSIS — R06.02 SHORTNESS OF BREATH: ICD-10-CM

## 2023-06-27 DIAGNOSIS — Z90.710 ACQUIRED ABSENCE OF BOTH CERVIX AND UTERUS: Chronic | ICD-10-CM

## 2023-06-27 DIAGNOSIS — Z00.8 ENCOUNTER FOR OTHER GENERAL EXAMINATION: ICD-10-CM

## 2023-06-27 DIAGNOSIS — Z90.49 ACQUIRED ABSENCE OF OTHER SPECIFIED PARTS OF DIGESTIVE TRACT: Chronic | ICD-10-CM

## 2023-06-27 PROCEDURE — 75574 CT ANGIO HRT W/3D IMAGE: CPT | Mod: 26

## 2023-06-27 PROCEDURE — 75574 CT ANGIO HRT W/3D IMAGE: CPT

## 2023-06-30 ENCOUNTER — NON-APPOINTMENT (OUTPATIENT)
Age: 53
End: 2023-06-30

## 2023-07-12 ENCOUNTER — NON-APPOINTMENT (OUTPATIENT)
Age: 53
End: 2023-07-12

## 2023-07-21 ENCOUNTER — OUTPATIENT (OUTPATIENT)
Dept: OUTPATIENT SERVICES | Facility: HOSPITAL | Age: 53
LOS: 1 days | Discharge: HOME | End: 2023-07-21

## 2023-07-21 DIAGNOSIS — Z98.890 OTHER SPECIFIED POSTPROCEDURAL STATES: Chronic | ICD-10-CM

## 2023-07-21 DIAGNOSIS — Z90.710 ACQUIRED ABSENCE OF BOTH CERVIX AND UTERUS: Chronic | ICD-10-CM

## 2023-07-21 DIAGNOSIS — Z98.891 HISTORY OF UTERINE SCAR FROM PREVIOUS SURGERY: Chronic | ICD-10-CM

## 2023-07-21 DIAGNOSIS — Z90.49 ACQUIRED ABSENCE OF OTHER SPECIFIED PARTS OF DIGESTIVE TRACT: Chronic | ICD-10-CM

## 2023-07-21 DIAGNOSIS — C18.9 MALIGNANT NEOPLASM OF COLON, UNSPECIFIED: ICD-10-CM

## 2023-07-26 ENCOUNTER — APPOINTMENT (OUTPATIENT)
Dept: HEMATOLOGY ONCOLOGY | Facility: CLINIC | Age: 53
End: 2023-07-26

## 2023-07-26 NOTE — ASSESSMENT
[FreeTextEntry1] : pT3 N0 M0 ADenocarcinoma SIgmoid colon, in Jun 2021 \par \par - Invasive moderately differentiated adenocarcinoma invading through the muscularis propria into pericolorectal tissue.\par - Tattoo is present.\par - Diverticulosis.\par - 24 lymph nodes, negative for carcinoma (0/24).\par - Margins of resection are not involved.\par \par Resected node-negative (stage II) disease, the benefits of chemotherapy are controversial, as is the relative benefit of an oxaliplatin-based as compared with a non-oxaliplatin-based regimen.\par Benefits of chemotherapy is based on high risk features \par Patient does not have any high risk features, No LVI   NO neural invasion 25 LN examined, NO Perforation \par \par GIven no high risk features no benefit of adjuvant chemotherapy. The patient was started on surveillance. \par \par - Today's Labs with Normal WBC \par - F/u visit with labs cbc/ cmp/ CEA q 3months x 2 y and then q 6 months from Y 3-5 \par - CT CAP q 6 months x 5 years and Y3-5 and consider spacing q 8-12 months\par - 1 year colonoscopy showed no evidence of recurrence. \par - Ct imaging reviewed with patient. No evidence of recurrence noted on imaging from 8/2022 and 9/2022.  \par - Imaging from 4/2023 without evidence of disease; results discussed with patient.  \par - Patient getting annual mammograms with PCP; seeing gynecology and counseled on getting pap smears. Patient colonoscopy scheduled for today. \par \par \par

## 2023-07-26 NOTE — PHYSICAL EXAM
[Fully active, able to carry on all pre-disease performance without restriction] : Status 0 - Fully active, able to carry on all pre-disease performance without restriction [Normal] : affect appropriate [de-identified] : left lower and right lower abdominal pain

## 2023-07-26 NOTE — REVIEW OF SYSTEMS
[Abdominal Pain] : abdominal pain [Vomiting] : no vomiting [Joint Pain] : joint pain [Skin Rash] : no skin rash [FreeTextEntry2] : as per interval history

## 2023-07-26 NOTE — HISTORY OF PRESENT ILLNESS
[de-identified] : 50-year-old female presents to our office with a new diagnosis of Sigmoid colon cancer \par \par She has had a variety of symptoms with chronic pelvic pain over the last 10 years and recently underwent a hysterectomy for adenomyosis by Dr. Cruz in February 2021. Several of her pelvic symptoms did improve. After that she had intermittent blood in her stool and some changes in the caliber of her stool. Her appetite has been fine and she has not had any significant weight loss. She underwent a colonoscopy which showed a mass in the sigmoid colon. \par \par COLONOSCOPY: 5/17/2021: Intramucosal adenocarcinoma \par CT CAP 5/2021 :  LUNGS AND LARGE AIRWAYS: Patent central airways. There is 3 mm nodular thickening of the minor fissure on image 80, likely postinflammatory. There is a 2 mm calcified granuloma in the left lower lobe on image 125. No confluent airspace opacity is identified. There is no evidence of interstitial lung disease, bronchiectasis, or fibrosis.\par \par S/p Robotic sigmoid colon resection for colon cancer on 5/28/21 T3N0 stage II colon cancer, MSI Stable, No high risk features. The patient did not receive adjuvant treatment. Subsequent surveillance imaging in 2/2022 did not reveal evidence of recurrence. Colonosopy in 4/28/22 was also unremarkable. \par  [de-identified] : The patient continues chronic joint pain 2/2 to menopause and RA that is unchanged from previous. RA currently managed on humara, with some improvement in knees. Menopause managed on estrogen patches, she recently started.\par The patient denies weight loss, poor appetite, and bleeding. \par Continued abdominal pain, undergoing pelvic floor therapy\par Patient has colonoscopy scheduled today

## 2023-07-26 NOTE — ADDENDUM
[FreeTextEntry1] : Documented by Alice Hurtado acting as scribe for Dr. Goodwin on 07/26/2023 \par \par All Medical record entries made by the Scribe were at my, Dr. Goodwin's , direction and personally dictated by me on 07/26/2023 . I have reviewed the chart and agree that the record accurately reflects my personal performance of the history, physical exam, assessment and plan. I have also personally directed, reviewed, and agreed with the discharge instructions.\par \par

## 2023-09-06 RX ORDER — ADALIMUMAB 40MG/0.4ML
40 KIT SUBCUTANEOUS
Qty: 2 | Refills: 3 | Status: COMPLETED | COMMUNITY
Start: 2023-02-13 | End: 2023-09-06

## 2023-09-07 ENCOUNTER — RESULT REVIEW (OUTPATIENT)
Age: 53
End: 2023-09-07

## 2023-09-07 ENCOUNTER — APPOINTMENT (OUTPATIENT)
Dept: HEMATOLOGY ONCOLOGY | Facility: CLINIC | Age: 53
End: 2023-09-07
Payer: MEDICAID

## 2023-09-07 VITALS
HEART RATE: 67 BPM | SYSTOLIC BLOOD PRESSURE: 123 MMHG | BODY MASS INDEX: 21.08 KG/M2 | DIASTOLIC BLOOD PRESSURE: 82 MMHG | HEIGHT: 61 IN | WEIGHT: 111.66 LBS | OXYGEN SATURATION: 100 %

## 2023-09-07 PROCEDURE — 99214 OFFICE O/P EST MOD 30 MIN: CPT

## 2023-09-07 NOTE — PHYSICAL EXAM
[Fully active, able to carry on all pre-disease performance without restriction] : Status 0 - Fully active, able to carry on all pre-disease performance without restriction [Normal] : affect appropriate [de-identified] : left lower and right lower abdominal pain

## 2023-09-07 NOTE — HISTORY OF PRESENT ILLNESS
[de-identified] : 50-year-old female presents to our office with a new diagnosis of Sigmoid colon cancer \par  \par  She has had a variety of symptoms with chronic pelvic pain over the last 10 years and recently underwent a hysterectomy for adenomyosis by Dr. Cruz in February 2021. Several of her pelvic symptoms did improve. After that she had intermittent blood in her stool and some changes in the caliber of her stool. Her appetite has been fine and she has not had any significant weight loss. She underwent a colonoscopy which showed a mass in the sigmoid colon. \par  \par  COLONOSCOPY: 5/17/2021: Intramucosal adenocarcinoma \par  CT CAP 5/2021 :  LUNGS AND LARGE AIRWAYS: Patent central airways. There is 3 mm nodular thickening of the minor fissure on image 80, likely postinflammatory. There is a 2 mm calcified granuloma in the left lower lobe on image 125. No confluent airspace opacity is identified. There is no evidence of interstitial lung disease, bronchiectasis, or fibrosis.\par  \par  S/p Robotic sigmoid colon resection for colon cancer on 5/28/21 T3N0 stage II colon cancer, MSI Stable, No high risk features. The patient did not receive adjuvant treatment. Subsequent surveillance imaging in 2/2022 did not reveal evidence of recurrence. Colonosopy in 4/28/22 was also unremarkable. \par   [de-identified] : The patient continues chronic joint pain 2/2 to menopause and RA that is unchanged from previous. RA currently managed on humara, with some improvement in knees. Menopause managed on estrogen patches, she recently started. The patient denies weight loss, poor appetite, and bleeding.  Continued abdominal pain, undergoing pelvic floor therapy Patient has colonoscopy scheduled today  09/07/23:  Doing ok overall.  May be switching RA drugs. Has some burning/discomfort with urination and in the pelvic area, but no fever.  She is active around the house and with the kids. CBC WNL, CEA pending.

## 2023-09-07 NOTE — ASSESSMENT
[FreeTextEntry1] : pT3 N0 M0 ADenocarcinoma SIgmoid colon, in Jun 2021   - Invasive moderately differentiated adenocarcinoma invading through the muscularis propria into pericolorectal tissue. - Tattoo is present. - Diverticulosis. - 24 lymph nodes, negative for carcinoma (0/24). - Margins of resection are not involved.  Resected node-negative (stage II) disease, the benefits of chemotherapy are controversial, as is the relative benefit of an oxaliplatin-based as compared with a non-oxaliplatin-based regimen. Benefits of chemotherapy is based on high risk features  Patient does not have any high risk features, No LVI   NO neural invasion 25 LN examined, NO Perforation   GIven no high risk features no benefit of adjuvant chemotherapy. The patient was started on surveillance.   - Today's Labs with Normal WBC  - F/u visit with labs cbc/ cmp/ CEA q 3months x 2 y and then q 6 months from Y 3-5  - CT CAP q 6 months x 5 years and Y3-5 and consider spacing q 8-12 months - 1 year colonoscopy showed no evidence of recurrence.  - Ct imaging reviewed with patient. No evidence of recurrence noted on imaging from 8/2022, 9/2022, and 4/2023 -Will order CT imaging for end of October and she will follow up early november to discuss results - has some symptoms consistent with possible UTI, does not want antibiotics at this time, but will call if symptoms do not kimberly or worsen - follow up in November

## 2023-09-08 LAB
ALBUMIN SERPL ELPH-MCNC: 4.3 G/DL
ALP BLD-CCNC: 56 U/L
ALT SERPL-CCNC: 14 U/L
ANION GAP SERPL CALC-SCNC: 13 MMOL/L
AST SERPL-CCNC: 20 U/L
BILIRUB SERPL-MCNC: 0.2 MG/DL
BUN SERPL-MCNC: 10 MG/DL
CALCIUM SERPL-MCNC: 9.2 MG/DL
CEA SERPL-MCNC: 1.8 NG/ML
CHLORIDE SERPL-SCNC: 104 MMOL/L
CO2 SERPL-SCNC: 25 MMOL/L
CREAT SERPL-MCNC: 0.81 MG/DL
EGFR: 88 ML/MIN/1.73M2
GLUCOSE SERPL-MCNC: 83 MG/DL
MAGNESIUM SERPL-MCNC: 2.1 MG/DL
POTASSIUM SERPL-SCNC: 4.1 MMOL/L
PROT SERPL-MCNC: 6.4 G/DL
SODIUM SERPL-SCNC: 142 MMOL/L

## 2023-09-08 RX ORDER — ABATACEPT 125 MG/ML
125 INJECTION, SOLUTION SUBCUTANEOUS
Qty: 1 | Refills: 5 | Status: ACTIVE | COMMUNITY
Start: 2023-09-06 | End: 1900-01-01

## 2023-10-01 PROBLEM — M54.16 LUMBAR RADICULAR PAIN: Status: ACTIVE | Noted: 2022-05-11

## 2023-10-02 ENCOUNTER — APPOINTMENT (OUTPATIENT)
Dept: CARDIOLOGY | Facility: CLINIC | Age: 53
End: 2023-10-02
Payer: MEDICAID

## 2023-10-02 ENCOUNTER — APPOINTMENT (OUTPATIENT)
Dept: OBGYN | Facility: CLINIC | Age: 53
End: 2023-10-02

## 2023-10-02 VITALS
WEIGHT: 112 LBS | DIASTOLIC BLOOD PRESSURE: 89 MMHG | SYSTOLIC BLOOD PRESSURE: 130 MMHG | OXYGEN SATURATION: 100 % | BODY MASS INDEX: 21.14 KG/M2 | HEART RATE: 72 BPM | HEIGHT: 61 IN

## 2023-10-02 DIAGNOSIS — R06.02 SHORTNESS OF BREATH: ICD-10-CM

## 2023-10-02 PROCEDURE — 93000 ELECTROCARDIOGRAM COMPLETE: CPT

## 2023-10-02 PROCEDURE — 99213 OFFICE O/P EST LOW 20 MIN: CPT | Mod: 25

## 2023-10-02 RX ORDER — METHOCARBAMOL 500 MG/1
500 TABLET, FILM COATED ORAL
Qty: 60 | Refills: 0 | Status: DISCONTINUED | COMMUNITY
Start: 2022-03-09 | End: 2023-10-02

## 2023-10-03 ENCOUNTER — APPOINTMENT (OUTPATIENT)
Dept: FAMILY MEDICINE | Facility: CLINIC | Age: 53
End: 2023-10-03
Payer: MEDICAID

## 2023-10-03 VITALS
HEIGHT: 61 IN | SYSTOLIC BLOOD PRESSURE: 128 MMHG | RESPIRATION RATE: 12 BRPM | WEIGHT: 112 LBS | OXYGEN SATURATION: 98 % | HEART RATE: 66 BPM | DIASTOLIC BLOOD PRESSURE: 70 MMHG | TEMPERATURE: 98.2 F | BODY MASS INDEX: 21.14 KG/M2

## 2023-10-03 DIAGNOSIS — Z23 ENCOUNTER FOR IMMUNIZATION: ICD-10-CM

## 2023-10-03 PROBLEM — R06.02 SHORTNESS OF BREATH ON EXERTION: Status: ACTIVE | Noted: 2023-06-13

## 2023-10-03 PROCEDURE — G0008: CPT

## 2023-10-03 PROCEDURE — 90686 IIV4 VACC NO PRSV 0.5 ML IM: CPT

## 2023-10-06 ENCOUNTER — NON-APPOINTMENT (OUTPATIENT)
Age: 53
End: 2023-10-06

## 2023-10-06 ENCOUNTER — APPOINTMENT (OUTPATIENT)
Dept: OBGYN | Facility: CLINIC | Age: 53
End: 2023-10-06
Payer: MEDICAID

## 2023-10-06 VITALS
SYSTOLIC BLOOD PRESSURE: 110 MMHG | WEIGHT: 111.25 LBS | DIASTOLIC BLOOD PRESSURE: 80 MMHG | BODY MASS INDEX: 21 KG/M2 | HEIGHT: 61 IN

## 2023-10-06 DIAGNOSIS — Z76.89 PERSONS ENCOUNTERING HEALTH SERVICES IN OTHER SPECIFIED CIRCUMSTANCES: ICD-10-CM

## 2023-10-06 DIAGNOSIS — Z86.59 PERSONAL HISTORY OF OTHER MENTAL AND BEHAVIORAL DISORDERS: ICD-10-CM

## 2023-10-06 DIAGNOSIS — R30.0 DYSURIA: ICD-10-CM

## 2023-10-06 DIAGNOSIS — Z01.419 ENCOUNTER FOR GYNECOLOGICAL EXAMINATION (GENERAL) (ROUTINE) W/OUT ABNORMAL FINDINGS: ICD-10-CM

## 2023-10-06 PROCEDURE — 99396 PREV VISIT EST AGE 40-64: CPT

## 2023-10-09 LAB
APPEARANCE: CLEAR
BILIRUBIN URINE: NEGATIVE
BLOOD URINE: NEGATIVE
COLOR: YELLOW
GLUCOSE QUALITATIVE U: NEGATIVE MG/DL
KETONES URINE: NEGATIVE MG/DL
LEUKOCYTE ESTERASE URINE: NEGATIVE
NITRITE URINE: NEGATIVE
PH URINE: 7
PROTEIN URINE: NEGATIVE MG/DL
SPECIFIC GRAVITY URINE: 1.01
UROBILINOGEN URINE: 0.2 MG/DL

## 2023-10-20 ENCOUNTER — RESULT REVIEW (OUTPATIENT)
Age: 53
End: 2023-10-20

## 2023-10-23 ENCOUNTER — OUTPATIENT (OUTPATIENT)
Dept: OUTPATIENT SERVICES | Facility: HOSPITAL | Age: 53
LOS: 1 days | End: 2023-10-23
Payer: MEDICAID

## 2023-10-23 ENCOUNTER — APPOINTMENT (OUTPATIENT)
Dept: CT IMAGING | Facility: CLINIC | Age: 53
End: 2023-10-23

## 2023-10-23 DIAGNOSIS — Z90.49 ACQUIRED ABSENCE OF OTHER SPECIFIED PARTS OF DIGESTIVE TRACT: Chronic | ICD-10-CM

## 2023-10-23 DIAGNOSIS — Z98.890 OTHER SPECIFIED POSTPROCEDURAL STATES: Chronic | ICD-10-CM

## 2023-10-23 DIAGNOSIS — Z98.891 HISTORY OF UTERINE SCAR FROM PREVIOUS SURGERY: Chronic | ICD-10-CM

## 2023-10-23 DIAGNOSIS — C18.7 MALIGNANT NEOPLASM OF SIGMOID COLON: ICD-10-CM

## 2023-10-23 DIAGNOSIS — Z90.710 ACQUIRED ABSENCE OF BOTH CERVIX AND UTERUS: Chronic | ICD-10-CM

## 2023-10-23 PROCEDURE — 74177 CT ABD & PELVIS W/CONTRAST: CPT | Mod: 26

## 2023-10-23 PROCEDURE — 71260 CT THORAX DX C+: CPT | Mod: 26

## 2023-10-25 ENCOUNTER — OUTPATIENT (OUTPATIENT)
Dept: OUTPATIENT SERVICES | Facility: HOSPITAL | Age: 53
LOS: 1 days | Discharge: HOME | End: 2023-10-25

## 2023-10-25 DIAGNOSIS — C18.9 MALIGNANT NEOPLASM OF COLON, UNSPECIFIED: ICD-10-CM

## 2023-10-25 DIAGNOSIS — Z98.890 OTHER SPECIFIED POSTPROCEDURAL STATES: Chronic | ICD-10-CM

## 2023-10-25 DIAGNOSIS — Z90.710 ACQUIRED ABSENCE OF BOTH CERVIX AND UTERUS: Chronic | ICD-10-CM

## 2023-10-25 DIAGNOSIS — Z98.891 HISTORY OF UTERINE SCAR FROM PREVIOUS SURGERY: Chronic | ICD-10-CM

## 2023-10-25 DIAGNOSIS — Z90.49 ACQUIRED ABSENCE OF OTHER SPECIFIED PARTS OF DIGESTIVE TRACT: Chronic | ICD-10-CM

## 2023-11-01 ENCOUNTER — APPOINTMENT (OUTPATIENT)
Dept: RHEUMATOLOGY | Facility: CLINIC | Age: 53
End: 2023-11-01
Payer: MEDICAID

## 2023-11-01 ENCOUNTER — APPOINTMENT (OUTPATIENT)
Dept: HEMATOLOGY ONCOLOGY | Facility: CLINIC | Age: 53
End: 2023-11-01
Payer: MEDICAID

## 2023-11-01 VITALS
HEART RATE: 68 BPM | WEIGHT: 113.32 LBS | SYSTOLIC BLOOD PRESSURE: 129 MMHG | OXYGEN SATURATION: 98 % | BODY MASS INDEX: 21.39 KG/M2 | HEIGHT: 61 IN | TEMPERATURE: 98 F | DIASTOLIC BLOOD PRESSURE: 77 MMHG

## 2023-11-01 DIAGNOSIS — Z79.899 OTHER LONG TERM (CURRENT) DRUG THERAPY: ICD-10-CM

## 2023-11-01 DIAGNOSIS — Z79.620 LONG TERM (CURRENT) USE OF IMMUNOSUPPRESSIVE BIOLOGIC: ICD-10-CM

## 2023-11-01 DIAGNOSIS — M06.9 RHEUMATOID ARTHRITIS, UNSPECIFIED: ICD-10-CM

## 2023-11-01 PROCEDURE — 99214 OFFICE O/P EST MOD 30 MIN: CPT

## 2023-11-01 PROCEDURE — 99215 OFFICE O/P EST HI 40 MIN: CPT

## 2023-11-02 LAB
ALBUMIN SERPL ELPH-MCNC: 4.7 G/DL
ALP BLD-CCNC: 66 U/L
ALT SERPL-CCNC: 16 U/L
ANION GAP SERPL CALC-SCNC: 9 MMOL/L
AST SERPL-CCNC: 23 U/L
BILIRUB SERPL-MCNC: 0.4 MG/DL
BUN SERPL-MCNC: 14 MG/DL
CALCIUM SERPL-MCNC: 10.3 MG/DL
CHLORIDE SERPL-SCNC: 101 MMOL/L
CO2 SERPL-SCNC: 29 MMOL/L
CREAT SERPL-MCNC: 0.82 MG/DL
CRP SERPL-MCNC: <3 MG/L
EGFR: 87 ML/MIN/1.73M2
ERYTHROCYTE [SEDIMENTATION RATE] IN BLOOD BY WESTERGREN METHOD: 8 MM/HR
GLUCOSE SERPL-MCNC: 86 MG/DL
HBV CORE IGG+IGM SER QL: NONREACTIVE
HBV SURFACE AB SER QL: REACTIVE
HBV SURFACE AG SER QL: NONREACTIVE
HCT VFR BLD CALC: 44.8 %
HCV AB SER QL: NONREACTIVE
HCV S/CO RATIO: 0.09 S/CO
HGB BLD-MCNC: 14.8 G/DL
MCHC RBC-ENTMCNC: 31.4 PG
MCHC RBC-ENTMCNC: 33 GM/DL
MCV RBC AUTO: 94.9 FL
PLATELET # BLD AUTO: 305 K/UL
POTASSIUM SERPL-SCNC: 5 MMOL/L
PROT SERPL-MCNC: 7.2 G/DL
RBC # BLD: 4.72 M/UL
RBC # FLD: 13.2 %
SODIUM SERPL-SCNC: 139 MMOL/L
WBC # FLD AUTO: 5.03 K/UL

## 2023-11-04 LAB
M TB IFN-G BLD-IMP: NEGATIVE
QUANTIFERON TB PLUS MITOGEN MINUS NIL: 0.72 IU/ML
QUANTIFERON TB PLUS NIL: 0.04 IU/ML
QUANTIFERON TB PLUS TB1 MINUS NIL: -0.01 IU/ML
QUANTIFERON TB PLUS TB2 MINUS NIL: 0 IU/ML

## 2023-11-09 ENCOUNTER — APPOINTMENT (OUTPATIENT)
Dept: FAMILY MEDICINE | Facility: CLINIC | Age: 53
End: 2023-11-09

## 2023-11-14 LAB
ALBUMIN SERPL ELPH-MCNC: 4.7 G/DL
ALP BLD-CCNC: 66 U/L
ALT SERPL-CCNC: 18 U/L
ANION GAP SERPL CALC-SCNC: 11 MMOL/L
AST SERPL-CCNC: 24 U/L
BILIRUB SERPL-MCNC: 0.4 MG/DL
BUN SERPL-MCNC: 13 MG/DL
CALCIUM SERPL-MCNC: 10.1 MG/DL
CEA SERPL-MCNC: 2.3 NG/ML
CHLORIDE SERPL-SCNC: 101 MMOL/L
CO2 SERPL-SCNC: 28 MMOL/L
CREAT SERPL-MCNC: 0.85 MG/DL
EGFR: 83 ML/MIN/1.73M2
GLUCOSE SERPL-MCNC: 88 MG/DL
MAGNESIUM SERPL-MCNC: 2.1 MG/DL
POTASSIUM SERPL-SCNC: 4.5 MMOL/L
PROT SERPL-MCNC: 7.3 G/DL
SODIUM SERPL-SCNC: 140 MMOL/L

## 2024-01-08 ENCOUNTER — APPOINTMENT (OUTPATIENT)
Dept: CARDIOLOGY | Facility: CLINIC | Age: 54
End: 2024-01-08
Payer: MEDICAID

## 2024-01-08 VITALS
HEART RATE: 61 BPM | HEIGHT: 61 IN | SYSTOLIC BLOOD PRESSURE: 122 MMHG | OXYGEN SATURATION: 99 % | BODY MASS INDEX: 21.34 KG/M2 | TEMPERATURE: 98.6 F | DIASTOLIC BLOOD PRESSURE: 74 MMHG | WEIGHT: 113 LBS

## 2024-01-08 DIAGNOSIS — R53.83 OTHER FATIGUE: ICD-10-CM

## 2024-01-08 DIAGNOSIS — M25.50 PAIN IN UNSPECIFIED JOINT: ICD-10-CM

## 2024-01-08 PROCEDURE — 93000 ELECTROCARDIOGRAM COMPLETE: CPT

## 2024-01-08 PROCEDURE — 99213 OFFICE O/P EST LOW 20 MIN: CPT | Mod: 25

## 2024-01-22 ENCOUNTER — NON-APPOINTMENT (OUTPATIENT)
Age: 54
End: 2024-01-22

## 2024-01-22 PROBLEM — R53.83 FATIGUE: Status: ACTIVE | Noted: 2023-03-16

## 2024-01-22 PROBLEM — M25.50 POLYARTHRALGIA: Status: ACTIVE | Noted: 2022-08-03

## 2024-01-22 NOTE — REASON FOR VISIT
[Symptom and Test Evaluation] : symptom and test evaluation [FreeTextEntry1] : 51 y/o F with PMH of Rheumatoid arthritis, Colon cancer (dx in 5/2021, Stage 2 s/p colonic resection), presents for follow up   Patient notes that she has been doing well from a cardiovascular standpoint with no chest pain or shortness of breath but notes over the last couple of weeks has been having extremity pain and discomfort which may be related to her rheumatoid arthritis   Surgical Hx: hysterectomy and colon resection  smoking: none  Alcohol use: weekends  Family Hx: Grandfather stroke

## 2024-01-22 NOTE — REVIEW OF SYSTEMS
[Feeling Fatigued] : feeling fatigued [Joint Pain] : joint pain [Negative] : Heme/Lymph [SOB] : no shortness of breath

## 2024-01-22 NOTE — ADDENDUM
[FreeTextEntry1] : Patient has no baseline TTE in the chart and would like TTE to assess for any significant valvulopathy to assess for fatigue and dizziness. EKG Sinus  Rhythm @ 68 bpm WITHIN NORMAL LIMITS. \par  \par  Ciara Ryan D.O. FACMARITZA\par  Cardiology/Vascular Cardiology -[facility]\par  Telephone # [phone]\par

## 2024-01-22 NOTE — CARDIOLOGY SUMMARY
[de-identified] : 1/08/2024: Sinus Rhythm @ 61 bpm WITHIN NORMAL LIMITS 6/13/2023: Sinus  Bradycardia @ 59 bpm WITHIN NORMAL LIMITS Sinus  Rhythm @ 68 bpm WITHIN NORMAL LIMITS [de-identified] : 1. The left ventricular systolic function is normal with an ejection fraction of 58 % by Hardin's method of disks with an ejection fraction visually estimated at 55 to 60 %. 2. There is normal left ventricular diastolic function. 3. Normal right ventricular cavity size and normal systolic function. 4. Mild-moderate tricuspid regurgitation. 5. Mild to moderate mitral regurgitation. 6. Doming and thickening of the tips of the mitral valve consistent with rheumatic mitral valve disease. 7. Normal aortic valve, without any evidence of aortic stenosis or regurgitation. 8. Mild calcification of the aortic valve leaflets. 9. No pericardial effusion seen. 10. No prior echocardiogram is available for comparison.  [de-identified] : Cardiac CTA: IMPRESSION: Agatston calcium score: 0. Coronary CTA: Right coronary artery dominance. No significant moderate to severe category coronary artery stenosis. Scattered minimal stenosis secondary to noncalcified plaque. Mild bilateral lower lobe groundglass opacities may be accentuated by phase of respiration. Correlate with clinical symptoms. Consider follow-up chest CT in 3 months to evaluate for stability versus resolution.

## 2024-01-22 NOTE — ASSESSMENT
[FreeTextEntry1] : 54 y/o F with PMH of Rheumatoid arthritis, Colon cancer (dx in 5/2021, Stage 2 s/p colonic resection), presents for cardiovascular evaluation  1)Fatigued and shortness of breath -0 ? if this is anginal or anginal equivalent - EKG reviewed shows sinus bradycardia with no acute ST T segment changes associated with ischemia - TTE done shows normal LVEF with rheumatic MV with mild to moderate MR -Cardiac CTA shows Ag score of 0 with spotty calcification of the RCA distribution with no evidence of hemodynamically significant stenosis - blood pressure controlled during the visit - discussed should be seen by Rheumatologist as patient is now having joint pain and so would benefit being restarted on biologics  Ciara Ryan D.O. [facility] Cardiology/Vascular Cardiology -[facility] Telephone # [phone].

## 2024-02-12 ENCOUNTER — OUTPATIENT (OUTPATIENT)
Dept: OUTPATIENT SERVICES | Facility: HOSPITAL | Age: 54
LOS: 1 days | Discharge: HOME | End: 2024-02-12

## 2024-02-12 DIAGNOSIS — Z90.49 ACQUIRED ABSENCE OF OTHER SPECIFIED PARTS OF DIGESTIVE TRACT: Chronic | ICD-10-CM

## 2024-02-12 DIAGNOSIS — Z90.710 ACQUIRED ABSENCE OF BOTH CERVIX AND UTERUS: Chronic | ICD-10-CM

## 2024-02-12 DIAGNOSIS — Z98.890 OTHER SPECIFIED POSTPROCEDURAL STATES: Chronic | ICD-10-CM

## 2024-02-12 DIAGNOSIS — C18.9 MALIGNANT NEOPLASM OF COLON, UNSPECIFIED: ICD-10-CM

## 2024-02-12 DIAGNOSIS — Z98.891 HISTORY OF UTERINE SCAR FROM PREVIOUS SURGERY: Chronic | ICD-10-CM

## 2024-02-15 ENCOUNTER — APPOINTMENT (OUTPATIENT)
Dept: HEMATOLOGY ONCOLOGY | Facility: CLINIC | Age: 54
End: 2024-02-15

## 2024-02-16 ENCOUNTER — RX RENEWAL (OUTPATIENT)
Age: 54
End: 2024-02-16

## 2024-03-25 ENCOUNTER — RESULT REVIEW (OUTPATIENT)
Age: 54
End: 2024-03-25

## 2024-03-30 ENCOUNTER — OUTPATIENT (OUTPATIENT)
Dept: OUTPATIENT SERVICES | Facility: HOSPITAL | Age: 54
LOS: 1 days | End: 2024-03-30
Payer: MEDICAID

## 2024-03-30 ENCOUNTER — APPOINTMENT (OUTPATIENT)
Dept: CT IMAGING | Facility: CLINIC | Age: 54
End: 2024-03-30
Payer: MEDICAID

## 2024-03-30 DIAGNOSIS — Z98.890 OTHER SPECIFIED POSTPROCEDURAL STATES: Chronic | ICD-10-CM

## 2024-03-30 DIAGNOSIS — Z90.49 ACQUIRED ABSENCE OF OTHER SPECIFIED PARTS OF DIGESTIVE TRACT: Chronic | ICD-10-CM

## 2024-03-30 DIAGNOSIS — Z98.891 HISTORY OF UTERINE SCAR FROM PREVIOUS SURGERY: Chronic | ICD-10-CM

## 2024-03-30 DIAGNOSIS — Z90.710 ACQUIRED ABSENCE OF BOTH CERVIX AND UTERUS: Chronic | ICD-10-CM

## 2024-03-30 DIAGNOSIS — C18.6 MALIGNANT NEOPLASM OF DESCENDING COLON: ICD-10-CM

## 2024-03-30 PROCEDURE — 71260 CT THORAX DX C+: CPT

## 2024-03-30 PROCEDURE — 71260 CT THORAX DX C+: CPT | Mod: 26

## 2024-03-30 PROCEDURE — 74177 CT ABD & PELVIS W/CONTRAST: CPT

## 2024-03-30 PROCEDURE — 74177 CT ABD & PELVIS W/CONTRAST: CPT | Mod: 26

## 2024-04-01 ENCOUNTER — NON-APPOINTMENT (OUTPATIENT)
Age: 54
End: 2024-04-01

## 2024-04-04 ENCOUNTER — APPOINTMENT (OUTPATIENT)
Dept: OBGYN | Facility: CLINIC | Age: 54
End: 2024-04-04
Payer: MEDICAID

## 2024-04-04 VITALS
SYSTOLIC BLOOD PRESSURE: 122 MMHG | BODY MASS INDEX: 21.91 KG/M2 | HEIGHT: 61 IN | DIASTOLIC BLOOD PRESSURE: 80 MMHG | WEIGHT: 116.06 LBS

## 2024-04-04 DIAGNOSIS — Z79.890 HORMONE REPLACEMENT THERAPY: ICD-10-CM

## 2024-04-04 PROCEDURE — 99213 OFFICE O/P EST LOW 20 MIN: CPT

## 2024-04-04 RX ORDER — ESTRADIOL 0.1 MG/D
0.1 PATCH, EXTENDED RELEASE TRANSDERMAL
Qty: 8 | Refills: 6 | Status: ACTIVE | COMMUNITY
Start: 2024-04-04 | End: 1900-01-01

## 2024-04-04 NOTE — COUNSELING
[Nutrition/ Exercise/ Weight Management] : nutrition, exercise, weight management [Body Image] : body image [Vitamins/Supplements] : vitamins/supplements [Sunscreen] : sunscreen [Breast Self Exam] : breast self exam [Bladder Hygiene] : bladder hygiene [Confidentiality] : confidentiality [Lab Results] : lab results [Medication Management] : medication management

## 2024-04-05 ENCOUNTER — APPOINTMENT (OUTPATIENT)
Dept: MAMMOGRAPHY | Facility: CLINIC | Age: 54
End: 2024-04-05
Payer: MEDICAID

## 2024-04-05 ENCOUNTER — RESULT REVIEW (OUTPATIENT)
Age: 54
End: 2024-04-05

## 2024-04-05 ENCOUNTER — APPOINTMENT (OUTPATIENT)
Dept: ULTRASOUND IMAGING | Facility: CLINIC | Age: 54
End: 2024-04-05
Payer: MEDICAID

## 2024-04-05 ENCOUNTER — OUTPATIENT (OUTPATIENT)
Dept: OUTPATIENT SERVICES | Facility: HOSPITAL | Age: 54
LOS: 1 days | End: 2024-04-05
Payer: MEDICAID

## 2024-04-05 DIAGNOSIS — Z98.890 OTHER SPECIFIED POSTPROCEDURAL STATES: Chronic | ICD-10-CM

## 2024-04-05 DIAGNOSIS — Z00.8 ENCOUNTER FOR OTHER GENERAL EXAMINATION: ICD-10-CM

## 2024-04-05 DIAGNOSIS — Z90.49 ACQUIRED ABSENCE OF OTHER SPECIFIED PARTS OF DIGESTIVE TRACT: Chronic | ICD-10-CM

## 2024-04-05 DIAGNOSIS — Z13.820 ENCOUNTER FOR SCREENING FOR OSTEOPOROSIS: ICD-10-CM

## 2024-04-05 PROCEDURE — 77063 BREAST TOMOSYNTHESIS BI: CPT | Mod: 26

## 2024-04-05 PROCEDURE — 77067 SCR MAMMO BI INCL CAD: CPT | Mod: 26

## 2024-04-05 PROCEDURE — 77067 SCR MAMMO BI INCL CAD: CPT

## 2024-04-05 PROCEDURE — 77063 BREAST TOMOSYNTHESIS BI: CPT

## 2024-04-08 ENCOUNTER — APPOINTMENT (OUTPATIENT)
Dept: OBGYN | Facility: CLINIC | Age: 54
End: 2024-04-08

## 2024-04-10 ENCOUNTER — APPOINTMENT (OUTPATIENT)
Dept: GASTROENTEROLOGY | Facility: CLINIC | Age: 54
End: 2024-04-10
Payer: MEDICAID

## 2024-04-10 VITALS
RESPIRATION RATE: 16 BRPM | OXYGEN SATURATION: 98 % | HEART RATE: 70 BPM | WEIGHT: 116 LBS | BODY MASS INDEX: 21.9 KG/M2 | SYSTOLIC BLOOD PRESSURE: 115 MMHG | DIASTOLIC BLOOD PRESSURE: 70 MMHG | HEIGHT: 61 IN

## 2024-04-10 DIAGNOSIS — Z12.11 ENCOUNTER FOR SCREENING FOR MALIGNANT NEOPLASM OF COLON: ICD-10-CM

## 2024-04-10 DIAGNOSIS — R12 HEARTBURN: ICD-10-CM

## 2024-04-10 DIAGNOSIS — C18.6 MALIGNANT NEOPLASM OF DESCENDING COLON: ICD-10-CM

## 2024-04-10 DIAGNOSIS — R13.10 DYSPHAGIA, UNSPECIFIED: ICD-10-CM

## 2024-04-10 DIAGNOSIS — R10.32 LEFT LOWER QUADRANT PAIN: ICD-10-CM

## 2024-04-10 DIAGNOSIS — C18.7 MALIGNANT NEOPLASM OF SIGMOID COLON: ICD-10-CM

## 2024-04-10 DIAGNOSIS — K21.9 GASTRO-ESOPHAGEAL REFLUX DISEASE W/OUT ESOPHAGITIS: ICD-10-CM

## 2024-04-10 PROCEDURE — 99214 OFFICE O/P EST MOD 30 MIN: CPT

## 2024-04-10 RX ORDER — CELECOXIB 200 MG/1
200 CAPSULE ORAL DAILY
Refills: 0 | Status: ACTIVE | COMMUNITY

## 2024-04-10 RX ORDER — LORATADINE 5 MG/5 ML
10 SOLUTION, ORAL ORAL
Refills: 0 | Status: ACTIVE | COMMUNITY

## 2024-04-10 RX ORDER — ELECTROLYTES/DEXTROSE
SOLUTION, ORAL ORAL
Refills: 0 | Status: ACTIVE | COMMUNITY

## 2024-04-10 RX ORDER — CHLORHEXIDINE GLUCONATE 4 %
LIQUID (ML) TOPICAL
Refills: 0 | Status: ACTIVE | COMMUNITY

## 2024-04-10 RX ORDER — FAMOTIDINE 40 MG/1
40 TABLET, FILM COATED ORAL TWICE DAILY
Qty: 180 | Refills: 2 | Status: ACTIVE | COMMUNITY
Start: 2022-09-12 | End: 1900-01-01

## 2024-04-10 NOTE — PHYSICAL EXAM

## 2024-04-10 NOTE — ASSESSMENT
[FreeTextEntry1] : 53 year old female with a personal history of colon cancer presenting for colon cancer surveillance. Complaints of worsening GERD symptoms over the last several weeks. Will scheduled colonoscopy and endoscopy for further evaluation. I have discussed the indications, benefits, risks (including but not limited to reaction to the anesthesia, infection, bleeding, missed lesions, and perforation), and alternatives to a colonoscopy and upper endoscopy with the patient. She understands all options and has agreed to have both procedures. She is medically optimized.  Miralax prep  GERD diet Start taking Famotidine 40 mg BID as directed

## 2024-04-10 NOTE — REASON FOR VISIT
[Initial Evaluation] : an initial evaluation [FreeTextEntry1] : colon cancer surveillance, history of colon cancer, GERD

## 2024-05-07 ENCOUNTER — OUTPATIENT (OUTPATIENT)
Dept: OUTPATIENT SERVICES | Facility: HOSPITAL | Age: 54
LOS: 1 days | Discharge: HOME | End: 2024-05-07

## 2024-05-07 DIAGNOSIS — C18.9 MALIGNANT NEOPLASM OF COLON, UNSPECIFIED: ICD-10-CM

## 2024-05-07 DIAGNOSIS — Z90.49 ACQUIRED ABSENCE OF OTHER SPECIFIED PARTS OF DIGESTIVE TRACT: Chronic | ICD-10-CM

## 2024-05-07 DIAGNOSIS — Z98.891 HISTORY OF UTERINE SCAR FROM PREVIOUS SURGERY: Chronic | ICD-10-CM

## 2024-05-07 DIAGNOSIS — Z98.890 OTHER SPECIFIED POSTPROCEDURAL STATES: Chronic | ICD-10-CM

## 2024-05-07 DIAGNOSIS — Z90.710 ACQUIRED ABSENCE OF BOTH CERVIX AND UTERUS: Chronic | ICD-10-CM

## 2024-05-10 ENCOUNTER — APPOINTMENT (OUTPATIENT)
Dept: HEMATOLOGY ONCOLOGY | Facility: CLINIC | Age: 54
End: 2024-05-10
Payer: MEDICAID

## 2024-05-10 ENCOUNTER — RESULT REVIEW (OUTPATIENT)
Age: 54
End: 2024-05-10

## 2024-05-10 VITALS
HEART RATE: 73 BPM | HEIGHT: 61 IN | TEMPERATURE: 97.5 F | OXYGEN SATURATION: 97 % | SYSTOLIC BLOOD PRESSURE: 113 MMHG | WEIGHT: 119.31 LBS | DIASTOLIC BLOOD PRESSURE: 56 MMHG | BODY MASS INDEX: 22.53 KG/M2

## 2024-05-10 LAB
BASOPHILS # BLD AUTO: 0.1 K/UL — SIGNIFICANT CHANGE UP (ref 0–0.2)
BASOPHILS NFR BLD AUTO: 2 % — SIGNIFICANT CHANGE UP (ref 0–2)
EOSINOPHIL # BLD AUTO: 0.3 K/UL — SIGNIFICANT CHANGE UP (ref 0–0.5)
EOSINOPHIL NFR BLD AUTO: 5.1 % — SIGNIFICANT CHANGE UP (ref 0–6)
HCT VFR BLD CALC: 40.8 % — SIGNIFICANT CHANGE UP (ref 34.5–45)
HGB BLD-MCNC: 13.8 G/DL — SIGNIFICANT CHANGE UP (ref 11.5–15.5)
LYMPHOCYTES # BLD AUTO: 1.5 K/UL — SIGNIFICANT CHANGE UP (ref 1–3.3)
LYMPHOCYTES # BLD AUTO: 27.3 % — SIGNIFICANT CHANGE UP (ref 13–44)
MCHC RBC-ENTMCNC: 31.2 PG — SIGNIFICANT CHANGE UP (ref 27–34)
MCHC RBC-ENTMCNC: 33.7 G/DL — SIGNIFICANT CHANGE UP (ref 32–36)
MCV RBC AUTO: 92.7 FL — SIGNIFICANT CHANGE UP (ref 80–100)
MONOCYTES # BLD AUTO: 0.6 K/UL — SIGNIFICANT CHANGE UP (ref 0–0.9)
MONOCYTES NFR BLD AUTO: 10.8 % — SIGNIFICANT CHANGE UP (ref 2–14)
NEUTROPHILS # BLD AUTO: 2.9 K/UL — SIGNIFICANT CHANGE UP (ref 1.8–7.4)
NEUTROPHILS NFR BLD AUTO: 54.8 % — SIGNIFICANT CHANGE UP (ref 43–77)
PLATELET # BLD AUTO: 276 K/UL — SIGNIFICANT CHANGE UP (ref 150–400)
RBC # BLD: 4.4 M/UL — SIGNIFICANT CHANGE UP (ref 3.8–5.2)
RBC # FLD: 12.3 % — SIGNIFICANT CHANGE UP (ref 10.3–14.5)
WBC # BLD: 5.3 K/UL — SIGNIFICANT CHANGE UP (ref 3.8–10.5)
WBC # FLD AUTO: 5.3 K/UL — SIGNIFICANT CHANGE UP (ref 3.8–10.5)

## 2024-05-10 PROCEDURE — 99213 OFFICE O/P EST LOW 20 MIN: CPT

## 2024-05-10 NOTE — REVIEW OF SYSTEMS
[Patient Intake Form Reviewed] : Patient intake form was reviewed [Abdominal Pain] : abdominal pain [Joint Pain] : joint pain [Fever] : no fever [Fatigue] : no fatigue [Recent Change In Weight] : ~T no recent weight change [Vision Problems] : no vision problems [Hoarseness] : no hoarseness [Chest Pain] : no chest pain [Leg Claudication] : no intermittent leg claudication [Lower Ext Edema] : no lower extremity edema [Shortness Of Breath] : no shortness of breath [Vomiting] : no vomiting [Skin Rash] : no skin rash [Easy Bleeding] : no tendency for easy bleeding [Easy Bruising] : no tendency for easy bruising [Swollen Glands] : no swollen glands [FreeTextEntry2] : as per interval history

## 2024-05-10 NOTE — PHYSICAL EXAM
[Fully active, able to carry on all pre-disease performance without restriction] : Status 0 - Fully active, able to carry on all pre-disease performance without restriction [Normal] : affect appropriate [de-identified] : left lower and right lower abdominal pain

## 2024-05-10 NOTE — HISTORY OF PRESENT ILLNESS
[de-identified] : 50-year-old female presents to our office with a new diagnosis of Sigmoid colon cancer \par  \par  She has had a variety of symptoms with chronic pelvic pain over the last 10 years and recently underwent a hysterectomy for adenomyosis by Dr. Cruz in February 2021. Several of her pelvic symptoms did improve. After that she had intermittent blood in her stool and some changes in the caliber of her stool. Her appetite has been fine and she has not had any significant weight loss. She underwent a colonoscopy which showed a mass in the sigmoid colon. \par  \par  COLONOSCOPY: 5/17/2021: Intramucosal adenocarcinoma \par  CT CAP 5/2021 :  LUNGS AND LARGE AIRWAYS: Patent central airways. There is 3 mm nodular thickening of the minor fissure on image 80, likely postinflammatory. There is a 2 mm calcified granuloma in the left lower lobe on image 125. No confluent airspace opacity is identified. There is no evidence of interstitial lung disease, bronchiectasis, or fibrosis.\par  \par  S/p Robotic sigmoid colon resection for colon cancer on 5/28/21 T3N0 stage II colon cancer, MSI Stable, No high risk features. The patient did not receive adjuvant treatment. Subsequent surveillance imaging in 2/2022 did not reveal evidence of recurrence. Colonosopy in 4/28/22 was also unremarkable. \par   [de-identified] : The patient continues chronic joint pain 2/2 to menopause and RA that is unchanged from previous. RA currently managed on humara, with some improvement in knees. Menopause managed on estrogen patches, she recently started. The patient denies weight loss, poor appetite, and bleeding.  Continued abdominal pain, undergoing pelvic floor therapy Patient has colonoscopy scheduled today  09/07/23:  Doing ok overall.  May be switching RA drugs. Has some burning/discomfort with urination and in the pelvic area, but no fever.  She is active around the house and with the kids. CBC WNL, CEA pending.  11/01/23: Ms Marie returns for follow up. Her CT imaging from 10/23/23 shows no evidence of recurrence/metastases and she remains with JADEN. Doing well overall, debating wheher to take the covid booster.  Encouraged her to start Orencia as recommended by Dr. Key as he pain from RA seems to be quite detrimental to her quality of life.    05/10/24: Ms Marie returns for follow up. Recent CT imaging from March 2024 shows JADEN.  Not taking orencia, doing acupuncture.  Discussed the "kneesovertoes khoi" as a way to address some functional mobility and pain issues with her knees and pelvis.  Follow up in 3 months.

## 2024-05-10 NOTE — ASSESSMENT
[FreeTextEntry1] : pT3 N0 M0 ADenocarcinoma SIgmoid colon, in Jun 2021  - Invasive moderately differentiated adenocarcinoma invading through the muscularis propria into pericolorectal tissue. - Tattoo is present. - Diverticulosis. - 24 lymph nodes, negative for carcinoma (0/24). - Margins of resection are not involved.  Resected node-negative (stage II) disease, the benefits of chemotherapy are controversial, as is the relative benefit of an oxaliplatin-based as compared with a non-oxaliplatin-based regimen. Benefits of chemotherapy is based on high risk features Patient does not have any high risk features, No LVI NO neural invasion 25 LN examined, NO Perforation  GIven no high risk features no benefit of adjuvant chemotherapy. The patient was started on surveillance.  - Today's Labs with Normal WBC - F/u visit with labs cbc/ cmp/ CEA q 3months x 2 y and then q 6 months from Y 3-5 - CT CAP q 6 months x 5 years and Y3-5 and consider spacing q 8-12 months - 1 year colonoscopy showed no evidence of recurrence. - Ct imaging reviewed with patient. No evidence of recurrence noted on imaging from 8/2022, 9/2022, and 4/2023, 10/2023   -11/01/23: Ms Maire returns for follow up. Her CT imaging from 10/23/23 shows no evidence of recurrence/metastases and she remains with JAEDN. Doing well overall, debating sonuher to take the covid booster.  Encouraged her to start Orencia as recommended by Dr. Key as he pain from RA seems to be quite detrimental to her quality of life.    05/10/24: Ms Marie returns for follow up. Recent CT imaging from March 2024 shows JADEN.  Not taking orencia, doing acupuncture.  Discussed the "kneesovertoes khoi" as a way to address some functional mobility and pain issues with her knees and pelvis.  Follow up in 3 months.

## 2024-05-18 LAB
ALBUMIN SERPL ELPH-MCNC: 4.3 G/DL
ALP BLD-CCNC: 61 U/L
ALT SERPL-CCNC: 17 U/L
ANION GAP SERPL CALC-SCNC: 10 MMOL/L
AST SERPL-CCNC: 24 U/L
BILIRUB SERPL-MCNC: 0.2 MG/DL
BUN SERPL-MCNC: 18 MG/DL
CALCIUM SERPL-MCNC: 8.9 MG/DL
CEA SERPL-MCNC: 2.6 NG/ML
CHLORIDE SERPL-SCNC: 107 MMOL/L
CO2 SERPL-SCNC: 23 MMOL/L
CREAT SERPL-MCNC: 0.78 MG/DL
EGFR: 91 ML/MIN/1.73M2
GLUCOSE SERPL-MCNC: 101 MG/DL
MAGNESIUM SERPL-MCNC: 2.1 MG/DL
POTASSIUM SERPL-SCNC: 4.9 MMOL/L
PROT SERPL-MCNC: 6.7 G/DL
SODIUM SERPL-SCNC: 140 MMOL/L

## 2024-05-21 ENCOUNTER — APPOINTMENT (OUTPATIENT)
Dept: DERMATOLOGY | Facility: CLINIC | Age: 54
End: 2024-05-21
Payer: MEDICAID

## 2024-05-21 PROCEDURE — 99213 OFFICE O/P EST LOW 20 MIN: CPT

## 2024-05-28 ENCOUNTER — RX RENEWAL (OUTPATIENT)
Age: 54
End: 2024-05-28

## 2024-05-29 RX ORDER — CELECOXIB 200 MG/1
200 CAPSULE ORAL TWICE DAILY
Qty: 60 | Refills: 2 | Status: ACTIVE | COMMUNITY
Start: 2023-02-13 | End: 1900-01-01

## 2024-07-07 ENCOUNTER — TRANSCRIPTION ENCOUNTER (OUTPATIENT)
Age: 54
End: 2024-07-07

## 2024-07-08 ENCOUNTER — RESULT REVIEW (OUTPATIENT)
Age: 54
End: 2024-07-08

## 2024-07-08 ENCOUNTER — OUTPATIENT (OUTPATIENT)
Dept: OUTPATIENT SERVICES | Facility: HOSPITAL | Age: 54
LOS: 1 days | End: 2024-07-08
Payer: COMMERCIAL

## 2024-07-08 ENCOUNTER — APPOINTMENT (OUTPATIENT)
Dept: GASTROENTEROLOGY | Facility: GI CENTER | Age: 54
End: 2024-07-08
Payer: MEDICAID

## 2024-07-08 DIAGNOSIS — Z98.890 OTHER SPECIFIED POSTPROCEDURAL STATES: Chronic | ICD-10-CM

## 2024-07-08 DIAGNOSIS — Z90.49 ACQUIRED ABSENCE OF OTHER SPECIFIED PARTS OF DIGESTIVE TRACT: Chronic | ICD-10-CM

## 2024-07-08 DIAGNOSIS — Z85.038 PERSONAL HISTORY OF OTHER MALIGNANT NEOPLASM OF LARGE INTESTINE: ICD-10-CM

## 2024-07-08 DIAGNOSIS — Z98.891 HISTORY OF UTERINE SCAR FROM PREVIOUS SURGERY: Chronic | ICD-10-CM

## 2024-07-08 DIAGNOSIS — Z90.710 ACQUIRED ABSENCE OF BOTH CERVIX AND UTERUS: Chronic | ICD-10-CM

## 2024-07-08 DIAGNOSIS — Z86.010 PERSONAL HISTORY OF COLONIC POLYPS: ICD-10-CM

## 2024-07-08 PROCEDURE — 88305 TISSUE EXAM BY PATHOLOGIST: CPT | Mod: 26

## 2024-07-08 PROCEDURE — 88342 IMHCHEM/IMCYTCHM 1ST ANTB: CPT

## 2024-07-08 PROCEDURE — 43239 EGD BIOPSY SINGLE/MULTIPLE: CPT | Mod: 59

## 2024-07-08 PROCEDURE — 45378 DIAGNOSTIC COLONOSCOPY: CPT | Mod: 33

## 2024-07-08 PROCEDURE — 88305 TISSUE EXAM BY PATHOLOGIST: CPT

## 2024-07-08 PROCEDURE — 88342 IMHCHEM/IMCYTCHM 1ST ANTB: CPT | Mod: 26

## 2024-07-08 PROCEDURE — 43239 EGD BIOPSY SINGLE/MULTIPLE: CPT

## 2024-07-08 PROCEDURE — G0105: CPT

## 2024-07-12 LAB — SURGICAL PATHOLOGY STUDY: SIGNIFICANT CHANGE UP

## 2024-07-23 ENCOUNTER — APPOINTMENT (OUTPATIENT)
Dept: RHEUMATOLOGY | Facility: CLINIC | Age: 54
End: 2024-07-23

## 2024-07-30 ENCOUNTER — APPOINTMENT (OUTPATIENT)
Dept: VASCULAR SURGERY | Facility: CLINIC | Age: 54
End: 2024-07-30
Payer: MEDICAID

## 2024-07-30 VITALS
DIASTOLIC BLOOD PRESSURE: 78 MMHG | WEIGHT: 122 LBS | SYSTOLIC BLOOD PRESSURE: 105 MMHG | HEIGHT: 61 IN | HEART RATE: 67 BPM | BODY MASS INDEX: 23.03 KG/M2 | RESPIRATION RATE: 16 BRPM | TEMPERATURE: 98 F | OXYGEN SATURATION: 97 %

## 2024-07-30 PROCEDURE — 99202 OFFICE O/P NEW SF 15 MIN: CPT

## 2024-07-30 NOTE — ASSESSMENT
[FreeTextEntry1] : A 53 F who presents with lower extremity spider veins. It was explained to the patient how the pain in her lower extremities is likely secondary to her sacral disc protrusion and arthritis. Minor spider veins in her legs likely not responsible for her symptoms.   Plan: - No intervention indicated at this time - Can follow up as needed

## 2024-07-30 NOTE — HISTORY OF PRESENT ILLNESS
[FreeTextEntry1] : This is a 53 F with a history of colon Cx s/p colectomy, mitral valve regurgitation, rheumatoid arthritis, bilateral knee osteoarthritis, pubic symphysis arthritis, S1-5 disc protrusion s/p epidural, and adenomyosis s/p hysterectomy who is presenting with lower extremity spider veins. Patient notes that at times she feels soreness throughout her R LE and L LE mainly behind the knee. Otherwise no claudication symptoms or leg swelling. Positive family history of varicose veins in her mother.

## 2024-07-30 NOTE — PHYSICAL EXAM
[2+] : left 2+ [de-identified] : NAD [de-identified] : Normal work of breathing [de-identified] : - minor spider veins seen in LLE behind knee and R lateral calf

## 2024-08-12 ENCOUNTER — APPOINTMENT (OUTPATIENT)
Dept: OBGYN | Facility: CLINIC | Age: 54
End: 2024-08-12

## 2024-08-13 ENCOUNTER — OUTPATIENT (OUTPATIENT)
Dept: OUTPATIENT SERVICES | Facility: HOSPITAL | Age: 54
LOS: 1 days | Discharge: HOME | End: 2024-08-13

## 2024-08-13 DIAGNOSIS — Z98.890 OTHER SPECIFIED POSTPROCEDURAL STATES: Chronic | ICD-10-CM

## 2024-08-13 DIAGNOSIS — Z90.49 ACQUIRED ABSENCE OF OTHER SPECIFIED PARTS OF DIGESTIVE TRACT: Chronic | ICD-10-CM

## 2024-08-13 DIAGNOSIS — Z90.710 ACQUIRED ABSENCE OF BOTH CERVIX AND UTERUS: Chronic | ICD-10-CM

## 2024-08-13 DIAGNOSIS — Z98.891 HISTORY OF UTERINE SCAR FROM PREVIOUS SURGERY: Chronic | ICD-10-CM

## 2024-08-13 DIAGNOSIS — C18.9 MALIGNANT NEOPLASM OF COLON, UNSPECIFIED: ICD-10-CM

## 2024-08-27 ENCOUNTER — APPOINTMENT (OUTPATIENT)
Dept: HEMATOLOGY ONCOLOGY | Facility: CLINIC | Age: 54
End: 2024-08-27
Payer: MEDICAID

## 2024-08-27 ENCOUNTER — RESULT REVIEW (OUTPATIENT)
Age: 54
End: 2024-08-27

## 2024-08-27 VITALS
HEIGHT: 61 IN | WEIGHT: 121.47 LBS | RESPIRATION RATE: 16 BRPM | HEART RATE: 73 BPM | OXYGEN SATURATION: 96 % | DIASTOLIC BLOOD PRESSURE: 68 MMHG | TEMPERATURE: 97.6 F | SYSTOLIC BLOOD PRESSURE: 131 MMHG | BODY MASS INDEX: 22.93 KG/M2

## 2024-08-27 LAB
BASOPHILS # BLD AUTO: 0.1 K/UL — SIGNIFICANT CHANGE UP (ref 0–0.2)
BASOPHILS NFR BLD AUTO: 2 % — SIGNIFICANT CHANGE UP (ref 0–2)
EOSINOPHIL # BLD AUTO: 0.2 K/UL — SIGNIFICANT CHANGE UP (ref 0–0.5)
EOSINOPHIL NFR BLD AUTO: 4.2 % — SIGNIFICANT CHANGE UP (ref 0–6)
HCT VFR BLD CALC: 41.3 % — SIGNIFICANT CHANGE UP (ref 34.5–45)
HGB BLD-MCNC: 13.8 G/DL — SIGNIFICANT CHANGE UP (ref 11.5–15.5)
LYMPHOCYTES # BLD AUTO: 1.6 K/UL — SIGNIFICANT CHANGE UP (ref 1–3.3)
LYMPHOCYTES # BLD AUTO: 28.5 % — SIGNIFICANT CHANGE UP (ref 13–44)
MCHC RBC-ENTMCNC: 31.6 PG — SIGNIFICANT CHANGE UP (ref 27–34)
MCHC RBC-ENTMCNC: 33.4 G/DL — SIGNIFICANT CHANGE UP (ref 32–36)
MCV RBC AUTO: 94.6 FL — SIGNIFICANT CHANGE UP (ref 80–100)
MONOCYTES # BLD AUTO: 0.5 K/UL — SIGNIFICANT CHANGE UP (ref 0–0.9)
MONOCYTES NFR BLD AUTO: 9 % — SIGNIFICANT CHANGE UP (ref 2–14)
NEUTROPHILS # BLD AUTO: 3.2 K/UL — SIGNIFICANT CHANGE UP (ref 1.8–7.4)
NEUTROPHILS NFR BLD AUTO: 56.3 % — SIGNIFICANT CHANGE UP (ref 43–77)
PLATELET # BLD AUTO: 279 K/UL — SIGNIFICANT CHANGE UP (ref 150–400)
RBC # BLD: 4.37 M/UL — SIGNIFICANT CHANGE UP (ref 3.8–5.2)
RBC # FLD: 12 % — SIGNIFICANT CHANGE UP (ref 10.3–14.5)
WBC # BLD: 5.7 K/UL — SIGNIFICANT CHANGE UP (ref 3.8–10.5)
WBC # FLD AUTO: 5.7 K/UL — SIGNIFICANT CHANGE UP (ref 3.8–10.5)

## 2024-08-27 PROCEDURE — 99214 OFFICE O/P EST MOD 30 MIN: CPT

## 2024-08-27 NOTE — HISTORY OF PRESENT ILLNESS
[de-identified] : 50-year-old female presents to our office with a new diagnosis of Sigmoid colon cancer \par  \par  She has had a variety of symptoms with chronic pelvic pain over the last 10 years and recently underwent a hysterectomy for adenomyosis by Dr. Cruz in February 2021. Several of her pelvic symptoms did improve. After that she had intermittent blood in her stool and some changes in the caliber of her stool. Her appetite has been fine and she has not had any significant weight loss. She underwent a colonoscopy which showed a mass in the sigmoid colon. \par  \par  COLONOSCOPY: 5/17/2021: Intramucosal adenocarcinoma \par  CT CAP 5/2021 :  LUNGS AND LARGE AIRWAYS: Patent central airways. There is 3 mm nodular thickening of the minor fissure on image 80, likely postinflammatory. There is a 2 mm calcified granuloma in the left lower lobe on image 125. No confluent airspace opacity is identified. There is no evidence of interstitial lung disease, bronchiectasis, or fibrosis.\par  \par  S/p Robotic sigmoid colon resection for colon cancer on 5/28/21 T3N0 stage II colon cancer, MSI Stable, No high risk features. The patient did not receive adjuvant treatment. Subsequent surveillance imaging in 2/2022 did not reveal evidence of recurrence. Colonosopy in 4/28/22 was also unremarkable. \par   [de-identified] : The patient continues chronic joint pain 2/2 to menopause and RA that is unchanged from previous. RA currently managed on humara, with some improvement in knees. Menopause managed on estrogen patches, she recently started. The patient denies weight loss, poor appetite, and bleeding.  Continued abdominal pain, undergoing pelvic floor therapy Patient has colonoscopy scheduled today  09/07/23:  Ms Marie returns for follow up.  Doing ok overall.  May be switching RA drugs. Has some burning/discomfort with urination and in the pelvic area, but no fever.  She is active around the house and with the kids. CBC WNL, CEA pending.  11/01/23: Ms Marie returns for follow up. Her CT imaging from 10/23/23 shows no evidence of recurrence/metastases and she remains with JADEN. Doing well overall, debating wheher to take the covid booster.  Encouraged her to start Orencia as recommended by Dr. Key as he pain from RA seems to be quite detrimental to her quality of life.    05/10/24: Ms Marie returns for follow up. Recent CT imaging from March 2024 shows JADEN.  Not taking orencia, doing acupuncture.  Discussed the "kneesovertoes khoi" as a way to address some functional mobility and pain issues with her knees and pelvis.  Follow up in 3 months.  08/27/24:  Ms Marie returns for follow up. She is getting more exercise and plans to go to the gym with her daughter soon.  She will see a new rheumatologist in the coming weeks.  Will order surveillance CT imaging for end of October, and she will follow up in November to review the results.

## 2024-08-27 NOTE — PHYSICAL EXAM
[Fully active, able to carry on all pre-disease performance without restriction] : Status 0 - Fully active, able to carry on all pre-disease performance without restriction [Normal] : affect appropriate [de-identified] : left lower and right lower abdominal pain

## 2024-08-28 LAB
ALBUMIN SERPL ELPH-MCNC: 4.6 G/DL
ALP BLD-CCNC: 60 U/L
ALT SERPL-CCNC: 21 U/L
ANION GAP SERPL CALC-SCNC: 10 MMOL/L
AST SERPL-CCNC: 26 U/L
BILIRUB SERPL-MCNC: 0.3 MG/DL
BUN SERPL-MCNC: 17 MG/DL
CALCIUM SERPL-MCNC: 9.5 MG/DL
CEA SERPL-MCNC: 2.8 NG/ML
CHLORIDE SERPL-SCNC: 105 MMOL/L
CO2 SERPL-SCNC: 26 MMOL/L
CREAT SERPL-MCNC: 0.73 MG/DL
EGFR: 99 ML/MIN/1.73M2
GLUCOSE SERPL-MCNC: 97 MG/DL
MAGNESIUM SERPL-MCNC: 2.1 MG/DL
POTASSIUM SERPL-SCNC: 4.7 MMOL/L
PROT SERPL-MCNC: 6.8 G/DL
SODIUM SERPL-SCNC: 141 MMOL/L

## 2024-09-06 ENCOUNTER — APPOINTMENT (OUTPATIENT)
Dept: RADIOLOGY | Facility: CLINIC | Age: 54
End: 2024-09-06
Payer: MEDICAID

## 2024-09-06 ENCOUNTER — APPOINTMENT (OUTPATIENT)
Dept: RHEUMATOLOGY | Facility: CLINIC | Age: 54
End: 2024-09-06

## 2024-09-06 ENCOUNTER — OUTPATIENT (OUTPATIENT)
Dept: OUTPATIENT SERVICES | Facility: HOSPITAL | Age: 54
LOS: 1 days | End: 2024-09-06
Payer: MEDICAID

## 2024-09-06 ENCOUNTER — LABORATORY RESULT (OUTPATIENT)
Age: 54
End: 2024-09-06

## 2024-09-06 VITALS
DIASTOLIC BLOOD PRESSURE: 72 MMHG | SYSTOLIC BLOOD PRESSURE: 140 MMHG | HEIGHT: 61 IN | TEMPERATURE: 98 F | OXYGEN SATURATION: 94 % | HEART RATE: 73 BPM

## 2024-09-06 DIAGNOSIS — G89.29 LOW BACK PAIN, UNSPECIFIED: ICD-10-CM

## 2024-09-06 DIAGNOSIS — Z98.890 OTHER SPECIFIED POSTPROCEDURAL STATES: Chronic | ICD-10-CM

## 2024-09-06 DIAGNOSIS — M06.9 RHEUMATOID ARTHRITIS, UNSPECIFIED: ICD-10-CM

## 2024-09-06 DIAGNOSIS — M54.50 LOW BACK PAIN, UNSPECIFIED: ICD-10-CM

## 2024-09-06 DIAGNOSIS — Z00.00 ENCOUNTER FOR GENERAL ADULT MEDICAL EXAMINATION W/OUT ABNORMAL FINDINGS: ICD-10-CM

## 2024-09-06 DIAGNOSIS — Z90.710 ACQUIRED ABSENCE OF BOTH CERVIX AND UTERUS: Chronic | ICD-10-CM

## 2024-09-06 PROCEDURE — 72202 X-RAY EXAM SI JOINTS 3/> VWS: CPT | Mod: 26

## 2024-09-06 PROCEDURE — 99214 OFFICE O/P EST MOD 30 MIN: CPT

## 2024-09-06 PROCEDURE — 73110 X-RAY EXAM OF WRIST: CPT | Mod: 26,LT,RT

## 2024-09-06 PROCEDURE — 73110 X-RAY EXAM OF WRIST: CPT

## 2024-09-06 PROCEDURE — 73130 X-RAY EXAM OF HAND: CPT | Mod: 26,LT,RT

## 2024-09-06 PROCEDURE — 73130 X-RAY EXAM OF HAND: CPT

## 2024-09-06 PROCEDURE — 72202 X-RAY EXAM SI JOINTS 3/> VWS: CPT

## 2024-09-06 RX ORDER — FEXOFENADINE HCL 60 MG
TABLET ORAL
Refills: 0 | Status: ACTIVE | COMMUNITY

## 2024-09-07 LAB
25(OH)D3 SERPL-MCNC: 42.3 NG/ML
ALBUMIN SERPL ELPH-MCNC: 4.5 G/DL
ALP BLD-CCNC: 64 U/L
ALT SERPL-CCNC: 17 U/L
ANION GAP SERPL CALC-SCNC: 11 MMOL/L
APPEARANCE: CLEAR
AST SERPL-CCNC: 27 U/L
BASOPHILS # BLD AUTO: 0.09 K/UL
BASOPHILS NFR BLD AUTO: 1.8 %
BILIRUB SERPL-MCNC: 0.5 MG/DL
BILIRUBIN URINE: NEGATIVE
BLOOD URINE: NEGATIVE
BUN SERPL-MCNC: 14 MG/DL
CALCIUM SERPL-MCNC: 9.1 MG/DL
CCP AB SER IA-ACNC: >500 U/ML
CHLORIDE SERPL-SCNC: 104 MMOL/L
CK SERPL-CCNC: 219 U/L
CO2 SERPL-SCNC: 26 MMOL/L
COLOR: YELLOW
CREAT SERPL-MCNC: 0.8 MG/DL
CREAT SPEC-SCNC: 97 MG/DL
CREAT/PROT UR: 0.1 RATIO
CRP SERPL-MCNC: <3 MG/L
DSDNA AB SER-ACNC: <1 IU/ML
EGFR: 89 ML/MIN/1.73M2
ENA RNP AB SER IA-ACNC: 0.2 AL
ENA SM AB SER IA-ACNC: <0.2 AL
ENA SS-A AB SER IA-ACNC: <0.2 AL
ENA SS-B AB SER IA-ACNC: <0.2 AL
EOSINOPHIL # BLD AUTO: 0.21 K/UL
EOSINOPHIL NFR BLD AUTO: 4.2 %
ERYTHROCYTE [SEDIMENTATION RATE] IN BLOOD BY WESTERGREN METHOD: 3 MM/HR
GLUCOSE QUALITATIVE U: NEGATIVE MG/DL
GLUCOSE SERPL-MCNC: 99 MG/DL
HCT VFR BLD CALC: 42.9 %
HGB BLD-MCNC: 13.8 G/DL
HIV1+2 AB SPEC QL IA.RAPID: NONREACTIVE
IMM GRANULOCYTES NFR BLD AUTO: 0.2 %
KETONES URINE: NEGATIVE MG/DL
LEUKOCYTE ESTERASE URINE: NEGATIVE
LYMPHOCYTES # BLD AUTO: 1.5 K/UL
LYMPHOCYTES NFR BLD AUTO: 30 %
MAN DIFF?: NORMAL
MCHC RBC-ENTMCNC: 30.8 PG
MCHC RBC-ENTMCNC: 32.2 GM/DL
MCV RBC AUTO: 95.8 FL
MONOCYTES # BLD AUTO: 0.49 K/UL
MONOCYTES NFR BLD AUTO: 9.8 %
NEUTROPHILS # BLD AUTO: 2.7 K/UL
NEUTROPHILS NFR BLD AUTO: 54 %
NITRITE URINE: NEGATIVE
PH URINE: 6
PLATELET # BLD AUTO: 297 K/UL
POTASSIUM SERPL-SCNC: 4 MMOL/L
PROT SERPL-MCNC: 6.9 G/DL
PROT UR-MCNC: 8 MG/DL
PROTEIN URINE: NEGATIVE MG/DL
RBC # BLD: 4.48 M/UL
RBC # FLD: 13.1 %
RF+CCP IGG SER-IMP: POSITIVE
RHEUMATOID FACT SER QL: 21 IU/ML
SODIUM SERPL-SCNC: 141 MMOL/L
SPECIFIC GRAVITY URINE: 1.01
TSH SERPL-ACNC: 1.02 UIU/ML
UROBILINOGEN URINE: 0.2 MG/DL
WBC # FLD AUTO: 5 K/UL

## 2024-09-07 NOTE — ED CDU PROVIDER DISPOSITION NOTE - CADM POA URETHRAL CATHETER
Group Topic: BH Activity Group    Date: 9/7/2024  Start Time: 1100  End Time: 1120  Facilitators: Candy Moses RN; Khoda, Humeraa Z; Tramuta, Tesa    Focus: BH Activity/ AM Community Group  Number in attendance: 7    Method: Group   Attendance: Present  Participation: Active  Patient Response: Able to return demonstration and Appropriate feedback  Mood: Happy  Affect: Type: Euthymic (normal mood)   Range: Full (normal)   Congruency: Congruent   Stability: Stable  Behavior/Socialization: Appropriate to group  Thought Process: Demonstrated insight  Task Performance: Follows directions  Patient Evaluation: Independent - full participation  Goal: Establish therapeutic collaboration between staff and patients in a group setting.    Why are you here: Family wanted me to get help    Visitation : uncle    Evening overall : enjoyed games with peers             Slept : no issues    Mood 10/10                                 Feeling: blessed/happy  Something you learned : socializing is very fun    Yesterdays personal goal :    Learn one new positive coping skill    Personal goal for today: Create a list of 10-15 positive coping skills    Journal topic : Peer relations    Unit rule: Be appropriate with stress balls    SI/HI/Safety Concerns si/hi: denies    Contracts for Safety verbally, Yes     Community Concern : None       No

## 2024-09-08 LAB
HBV CORE IGG+IGM SER QL: NONREACTIVE
HBV SURFACE AB SER QL: REACTIVE
HBV SURFACE AG SER QL: NONREACTIVE
HCV AB SER QL: NONREACTIVE
HCV S/CO RATIO: 0.14 S/CO

## 2024-09-09 LAB — ACE BLD-CCNC: 59 U/L

## 2024-09-11 LAB
ANA PAT FLD IF-IMP: ABNORMAL
ANA SER IF-ACNC: ABNORMAL

## 2024-09-12 LAB
HLA-B27 QL NAA+PROBE: NORMAL
M TB IFN-G BLD-IMP: NEGATIVE
QUANTIFERON TB PLUS MITOGEN MINUS NIL: >10 IU/ML
QUANTIFERON TB PLUS NIL: 0.02 IU/ML
QUANTIFERON TB PLUS TB1 MINUS NIL: 0 IU/ML
QUANTIFERON TB PLUS TB2 MINUS NIL: 0 IU/ML

## 2024-09-20 ENCOUNTER — NON-APPOINTMENT (OUTPATIENT)
Age: 54
End: 2024-09-20

## 2024-10-01 ENCOUNTER — APPOINTMENT (OUTPATIENT)
Dept: CT IMAGING | Facility: CLINIC | Age: 54
End: 2024-10-01

## 2024-10-07 ENCOUNTER — APPOINTMENT (OUTPATIENT)
Dept: CT IMAGING | Facility: CLINIC | Age: 54
End: 2024-10-07
Payer: MEDICAID

## 2024-10-07 ENCOUNTER — OUTPATIENT (OUTPATIENT)
Dept: OUTPATIENT SERVICES | Facility: HOSPITAL | Age: 54
LOS: 1 days | End: 2024-10-07
Payer: MEDICAID

## 2024-10-07 DIAGNOSIS — Z98.891 HISTORY OF UTERINE SCAR FROM PREVIOUS SURGERY: Chronic | ICD-10-CM

## 2024-10-07 DIAGNOSIS — Z90.710 ACQUIRED ABSENCE OF BOTH CERVIX AND UTERUS: Chronic | ICD-10-CM

## 2024-10-07 DIAGNOSIS — Z98.890 OTHER SPECIFIED POSTPROCEDURAL STATES: Chronic | ICD-10-CM

## 2024-10-07 DIAGNOSIS — C18.7 MALIGNANT NEOPLASM OF SIGMOID COLON: ICD-10-CM

## 2024-10-07 PROCEDURE — 71260 CT THORAX DX C+: CPT | Mod: 26

## 2024-10-07 PROCEDURE — 74177 CT ABD & PELVIS W/CONTRAST: CPT | Mod: 26

## 2024-10-07 PROCEDURE — 74177 CT ABD & PELVIS W/CONTRAST: CPT

## 2024-10-07 PROCEDURE — 71260 CT THORAX DX C+: CPT

## 2024-10-08 NOTE — H&P ADULT - NSICDXPASTSURGICALHX_GEN_ALL_CORE_FT
Infusion Nursing Note:    Ronnie Lagos presents today for Labs, plus the monthly standing order labs ( PSA, CBC, CMP) + Xgeva.      Patient seen by provider today: No     present during visit today: Not Applicable.    Note: After Tonya blood draw, he was not feeling well and visibly looked pale.  B/P 79/47.  Placed feet up, head back, and a cold wash cloth over patients forehead.  Also gave him a granola bar as he only had a muffin this morning.  After 15min, patient was feeling much better.  Blood pressure  127/80 and p. 52.  Ronnie tells me he has a history of fainting after blood draws or after placing an IV.  Kept patient for 30mins to make sure he felt 100%, and Ronnie stated he did feel back to baseline.    Denies any major dental work upcoming or recently.  Tomorrow having a retainer/bridge placed, nothing invasive.    Takes Ca and Vit D daily.    Intravenous Access:  Lab draw site right AC, Needle type butterfly, Gauge 21g.  Labs drawn without difficulty.    Treatment Conditions:  Lab Results   Component Value Date     10/08/2024    POTASSIUM 4.1 10/08/2024    CR 1.00 10/08/2024    AV 9.8 10/08/2024    BILITOTAL 2.0 (H) 10/08/2024    ALBUMIN 4.5 10/08/2024    ALT 21 10/08/2024    AST 30 10/08/2024       Results reviewed, labs MET treatment parameters, ok to proceed with treatment.      Post Infusion Assessment:  Patient tolerated injection in the left arm without incident.       Discharge Plan:   Discharge instructions reviewed with: Patient.  Patient and/or family verbalized understanding of discharge instructions and all questions answered.  AVS to patient via [employer].  Patient will return 11/5 for next appointment for labs, 11/6  with provider, working on scheduling Xgeva  Patient discharged in stable condition accompanied by: self.  Departure Mode: Ambulatory.      Liliana Haynes RN     PAST SURGICAL HISTORY:  H/O  section x3 , ,     H/O dilation and curettage     H/O hand surgery staph infection 1992 x 3 surgeries    History of sinus surgery     S/P colon resection 21    S/P hysterectomy 2021

## 2024-10-11 ENCOUNTER — APPOINTMENT (OUTPATIENT)
Dept: FAMILY MEDICINE | Facility: CLINIC | Age: 54
End: 2024-10-11
Payer: MEDICAID

## 2024-10-11 VITALS
RESPIRATION RATE: 14 BRPM | WEIGHT: 119 LBS | DIASTOLIC BLOOD PRESSURE: 70 MMHG | HEART RATE: 80 BPM | HEIGHT: 61 IN | SYSTOLIC BLOOD PRESSURE: 130 MMHG | OXYGEN SATURATION: 96 % | BODY MASS INDEX: 22.47 KG/M2

## 2024-10-11 DIAGNOSIS — Z13.31 ENCOUNTER FOR SCREENING FOR DEPRESSION: ICD-10-CM

## 2024-10-11 DIAGNOSIS — K42.9 UMBILICAL HERNIA W/OUT OBSTRUCTION OR GANGRENE: ICD-10-CM

## 2024-10-11 DIAGNOSIS — R53.83 OTHER FATIGUE: ICD-10-CM

## 2024-10-11 DIAGNOSIS — M06.9 RHEUMATOID ARTHRITIS, UNSPECIFIED: ICD-10-CM

## 2024-10-11 DIAGNOSIS — Z85.038 PERSONAL HISTORY OF OTHER MALIGNANT NEOPLASM OF LARGE INTESTINE: ICD-10-CM

## 2024-10-11 PROCEDURE — 99214 OFFICE O/P EST MOD 30 MIN: CPT

## 2024-10-11 PROCEDURE — G2211 COMPLEX E/M VISIT ADD ON: CPT | Mod: NC

## 2024-10-21 ENCOUNTER — APPOINTMENT (OUTPATIENT)
Dept: SURGERY | Facility: CLINIC | Age: 54
End: 2024-10-21
Payer: MEDICAID

## 2024-10-21 VITALS
TEMPERATURE: 98.3 F | RESPIRATION RATE: 16 BRPM | HEIGHT: 61 IN | WEIGHT: 122 LBS | SYSTOLIC BLOOD PRESSURE: 99 MMHG | OXYGEN SATURATION: 99 % | BODY MASS INDEX: 23.03 KG/M2 | HEART RATE: 76 BPM | DIASTOLIC BLOOD PRESSURE: 64 MMHG

## 2024-10-21 DIAGNOSIS — K43.2 INCISIONAL HERNIA W/OUT OBSTRUCTION OR GANGRENE: ICD-10-CM

## 2024-10-21 PROCEDURE — 99215 OFFICE O/P EST HI 40 MIN: CPT

## 2024-10-24 ENCOUNTER — APPOINTMENT (OUTPATIENT)
Dept: OBGYN | Facility: CLINIC | Age: 54
End: 2024-10-24
Payer: MEDICAID

## 2024-10-24 VITALS
DIASTOLIC BLOOD PRESSURE: 82 MMHG | WEIGHT: 119 LBS | HEIGHT: 61 IN | BODY MASS INDEX: 22.47 KG/M2 | SYSTOLIC BLOOD PRESSURE: 124 MMHG

## 2024-10-24 DIAGNOSIS — Z01.419 ENCOUNTER FOR GYNECOLOGICAL EXAMINATION (GENERAL) (ROUTINE) W/OUT ABNORMAL FINDINGS: ICD-10-CM

## 2024-10-24 DIAGNOSIS — N95.1 MENOPAUSAL AND FEMALE CLIMACTERIC STATES: ICD-10-CM

## 2024-10-24 PROCEDURE — 99459 PELVIC EXAMINATION: CPT

## 2024-10-24 PROCEDURE — 99396 PREV VISIT EST AGE 40-64: CPT

## 2024-10-24 PROCEDURE — 99212 OFFICE O/P EST SF 10 MIN: CPT | Mod: 25

## 2024-10-29 ENCOUNTER — OUTPATIENT (OUTPATIENT)
Dept: OUTPATIENT SERVICES | Facility: HOSPITAL | Age: 54
LOS: 1 days | Discharge: HOME | End: 2024-10-29

## 2024-10-29 DIAGNOSIS — Z98.891 HISTORY OF UTERINE SCAR FROM PREVIOUS SURGERY: Chronic | ICD-10-CM

## 2024-10-29 DIAGNOSIS — Z90.710 ACQUIRED ABSENCE OF BOTH CERVIX AND UTERUS: Chronic | ICD-10-CM

## 2024-10-29 DIAGNOSIS — Z98.890 OTHER SPECIFIED POSTPROCEDURAL STATES: Chronic | ICD-10-CM

## 2024-10-29 DIAGNOSIS — C18.9 MALIGNANT NEOPLASM OF COLON, UNSPECIFIED: ICD-10-CM

## 2024-10-29 DIAGNOSIS — Z90.49 ACQUIRED ABSENCE OF OTHER SPECIFIED PARTS OF DIGESTIVE TRACT: Chronic | ICD-10-CM

## 2024-10-29 LAB
CYTOLOGY CVX/VAG DOC THIN PREP: NORMAL
HPV HIGH+LOW RISK DNA PNL CVX: NOT DETECTED

## 2024-11-01 ENCOUNTER — APPOINTMENT (OUTPATIENT)
Dept: FAMILY MEDICINE | Facility: CLINIC | Age: 54
End: 2024-11-01
Payer: MEDICAID

## 2024-11-01 ENCOUNTER — APPOINTMENT (OUTPATIENT)
Dept: RHEUMATOLOGY | Facility: CLINIC | Age: 54
End: 2024-11-01

## 2024-11-01 VITALS
WEIGHT: 118 LBS | RESPIRATION RATE: 14 BRPM | HEIGHT: 61 IN | SYSTOLIC BLOOD PRESSURE: 102 MMHG | HEART RATE: 87 BPM | BODY MASS INDEX: 22.28 KG/M2 | OXYGEN SATURATION: 98 % | TEMPERATURE: 97.8 F | DIASTOLIC BLOOD PRESSURE: 60 MMHG

## 2024-11-01 VITALS
DIASTOLIC BLOOD PRESSURE: 62 MMHG | HEART RATE: 55 BPM | SYSTOLIC BLOOD PRESSURE: 118 MMHG | TEMPERATURE: 98 F | BODY MASS INDEX: 22.3 KG/M2 | HEIGHT: 61 IN | OXYGEN SATURATION: 95 %

## 2024-11-01 DIAGNOSIS — K42.9 UMBILICAL HERNIA W/OUT OBSTRUCTION OR GANGRENE: ICD-10-CM

## 2024-11-01 DIAGNOSIS — Z01.818 ENCOUNTER FOR OTHER PREPROCEDURAL EXAMINATION: ICD-10-CM

## 2024-11-01 DIAGNOSIS — M06.9 RHEUMATOID ARTHRITIS, UNSPECIFIED: ICD-10-CM

## 2024-11-01 PROCEDURE — G2211 COMPLEX E/M VISIT ADD ON: CPT | Mod: NC

## 2024-11-01 PROCEDURE — 99214 OFFICE O/P EST MOD 30 MIN: CPT

## 2024-11-01 PROCEDURE — 99213 OFFICE O/P EST LOW 20 MIN: CPT

## 2024-11-02 ENCOUNTER — OUTPATIENT (OUTPATIENT)
Dept: OUTPATIENT SERVICES | Facility: HOSPITAL | Age: 54
LOS: 1 days | End: 2024-11-02
Payer: COMMERCIAL

## 2024-11-02 VITALS
OXYGEN SATURATION: 97 % | DIASTOLIC BLOOD PRESSURE: 68 MMHG | SYSTOLIC BLOOD PRESSURE: 110 MMHG | HEIGHT: 61 IN | RESPIRATION RATE: 16 BRPM | HEART RATE: 74 BPM | WEIGHT: 116.84 LBS | TEMPERATURE: 98 F

## 2024-11-02 DIAGNOSIS — K43.2 INCISIONAL HERNIA WITHOUT OBSTRUCTION OR GANGRENE: ICD-10-CM

## 2024-11-02 DIAGNOSIS — Z98.890 OTHER SPECIFIED POSTPROCEDURAL STATES: Chronic | ICD-10-CM

## 2024-11-02 DIAGNOSIS — Z01.818 ENCOUNTER FOR OTHER PREPROCEDURAL EXAMINATION: ICD-10-CM

## 2024-11-02 DIAGNOSIS — J45.909 UNSPECIFIED ASTHMA, UNCOMPLICATED: ICD-10-CM

## 2024-11-02 DIAGNOSIS — Z98.891 HISTORY OF UTERINE SCAR FROM PREVIOUS SURGERY: Chronic | ICD-10-CM

## 2024-11-02 DIAGNOSIS — Z29.9 ENCOUNTER FOR PROPHYLACTIC MEASURES, UNSPECIFIED: ICD-10-CM

## 2024-11-02 DIAGNOSIS — Z90.710 ACQUIRED ABSENCE OF BOTH CERVIX AND UTERUS: Chronic | ICD-10-CM

## 2024-11-02 DIAGNOSIS — Z90.49 ACQUIRED ABSENCE OF OTHER SPECIFIED PARTS OF DIGESTIVE TRACT: Chronic | ICD-10-CM

## 2024-11-02 DIAGNOSIS — C18.7 MALIGNANT NEOPLASM OF SIGMOID COLON: ICD-10-CM

## 2024-11-02 DIAGNOSIS — I34.0 NONRHEUMATIC MITRAL (VALVE) INSUFFICIENCY: ICD-10-CM

## 2024-11-02 DIAGNOSIS — K21.9 GASTRO-ESOPHAGEAL REFLUX DISEASE WITHOUT ESOPHAGITIS: ICD-10-CM

## 2024-11-02 LAB
A1C WITH ESTIMATED AVERAGE GLUCOSE RESULT: 5.4 % — SIGNIFICANT CHANGE UP (ref 4–5.6)
ALBUMIN SERPL ELPH-MCNC: 4.1 G/DL — SIGNIFICANT CHANGE UP (ref 3.3–5.2)
ALP SERPL-CCNC: 61 U/L — SIGNIFICANT CHANGE UP (ref 40–120)
ALT FLD-CCNC: 14 U/L — SIGNIFICANT CHANGE UP
ANION GAP SERPL CALC-SCNC: 10 MMOL/L — SIGNIFICANT CHANGE UP (ref 5–17)
APTT BLD: 33.9 SEC — SIGNIFICANT CHANGE UP (ref 24.5–35.6)
AST SERPL-CCNC: 22 U/L — SIGNIFICANT CHANGE UP
BASOPHILS # BLD AUTO: 0.09 K/UL — SIGNIFICANT CHANGE UP (ref 0–0.2)
BASOPHILS NFR BLD AUTO: 1.9 % — SIGNIFICANT CHANGE UP (ref 0–2)
BILIRUB SERPL-MCNC: <0.2 MG/DL — LOW (ref 0.4–2)
BLD GP AB SCN SERPL QL: SIGNIFICANT CHANGE UP
BUN SERPL-MCNC: 14.2 MG/DL — SIGNIFICANT CHANGE UP (ref 8–20)
CALCIUM SERPL-MCNC: 9.2 MG/DL — SIGNIFICANT CHANGE UP (ref 8.4–10.5)
CHLORIDE SERPL-SCNC: 105 MMOL/L — SIGNIFICANT CHANGE UP (ref 96–108)
CO2 SERPL-SCNC: 26 MMOL/L — SIGNIFICANT CHANGE UP (ref 22–29)
CREAT SERPL-MCNC: 0.77 MG/DL — SIGNIFICANT CHANGE UP (ref 0.5–1.3)
EGFR: 93 ML/MIN/1.73M2 — SIGNIFICANT CHANGE UP
EOSINOPHIL # BLD AUTO: 0.29 K/UL — SIGNIFICANT CHANGE UP (ref 0–0.5)
EOSINOPHIL NFR BLD AUTO: 6.2 % — HIGH (ref 0–6)
ESTIMATED AVERAGE GLUCOSE: 108 MG/DL — SIGNIFICANT CHANGE UP (ref 68–114)
GLUCOSE SERPL-MCNC: 107 MG/DL — HIGH (ref 70–99)
HCG SERPL-ACNC: <4 MIU/ML — SIGNIFICANT CHANGE UP
HCT VFR BLD CALC: 41.7 % — SIGNIFICANT CHANGE UP (ref 34.5–45)
HGB BLD-MCNC: 14 G/DL — SIGNIFICANT CHANGE UP (ref 11.5–15.5)
IMM GRANULOCYTES NFR BLD AUTO: 0.2 % — SIGNIFICANT CHANGE UP (ref 0–0.9)
INR BLD: 0.87 RATIO — SIGNIFICANT CHANGE UP (ref 0.85–1.16)
LYMPHOCYTES # BLD AUTO: 1.56 K/UL — SIGNIFICANT CHANGE UP (ref 1–3.3)
LYMPHOCYTES # BLD AUTO: 33.5 % — SIGNIFICANT CHANGE UP (ref 13–44)
MCHC RBC-ENTMCNC: 30.8 PG — SIGNIFICANT CHANGE UP (ref 27–34)
MCHC RBC-ENTMCNC: 33.6 G/DL — SIGNIFICANT CHANGE UP (ref 32–36)
MCV RBC AUTO: 91.9 FL — SIGNIFICANT CHANGE UP (ref 80–100)
MONOCYTES # BLD AUTO: 0.44 K/UL — SIGNIFICANT CHANGE UP (ref 0–0.9)
MONOCYTES NFR BLD AUTO: 9.5 % — SIGNIFICANT CHANGE UP (ref 2–14)
MRSA PCR RESULT.: SIGNIFICANT CHANGE UP
NEUTROPHILS # BLD AUTO: 2.26 K/UL — SIGNIFICANT CHANGE UP (ref 1.8–7.4)
NEUTROPHILS NFR BLD AUTO: 48.7 % — SIGNIFICANT CHANGE UP (ref 43–77)
PLATELET # BLD AUTO: 255 K/UL — SIGNIFICANT CHANGE UP (ref 150–400)
POTASSIUM SERPL-MCNC: 4.5 MMOL/L — SIGNIFICANT CHANGE UP (ref 3.5–5.3)
POTASSIUM SERPL-SCNC: 4.5 MMOL/L — SIGNIFICANT CHANGE UP (ref 3.5–5.3)
PROT SERPL-MCNC: 6.6 G/DL — SIGNIFICANT CHANGE UP (ref 6.6–8.7)
PROTHROM AB SERPL-ACNC: 9.8 SEC — LOW (ref 9.9–13.4)
RBC # BLD: 4.54 M/UL — SIGNIFICANT CHANGE UP (ref 3.8–5.2)
RBC # FLD: 12.6 % — SIGNIFICANT CHANGE UP (ref 10.3–14.5)
S AUREUS DNA NOSE QL NAA+PROBE: DETECTED
SODIUM SERPL-SCNC: 141 MMOL/L — SIGNIFICANT CHANGE UP (ref 135–145)
WBC # BLD: 4.65 K/UL — SIGNIFICANT CHANGE UP (ref 3.8–10.5)
WBC # FLD AUTO: 4.65 K/UL — SIGNIFICANT CHANGE UP (ref 3.8–10.5)

## 2024-11-02 PROCEDURE — 84702 CHORIONIC GONADOTROPIN TEST: CPT

## 2024-11-02 PROCEDURE — 87641 MR-STAPH DNA AMP PROBE: CPT

## 2024-11-02 PROCEDURE — 93005 ELECTROCARDIOGRAM TRACING: CPT

## 2024-11-02 PROCEDURE — 36415 COLL VENOUS BLD VENIPUNCTURE: CPT

## 2024-11-02 PROCEDURE — 93010 ELECTROCARDIOGRAM REPORT: CPT

## 2024-11-02 PROCEDURE — 80053 COMPREHEN METABOLIC PANEL: CPT

## 2024-11-02 PROCEDURE — 85730 THROMBOPLASTIN TIME PARTIAL: CPT

## 2024-11-02 PROCEDURE — 85610 PROTHROMBIN TIME: CPT

## 2024-11-02 PROCEDURE — G0463: CPT

## 2024-11-02 PROCEDURE — 87640 STAPH A DNA AMP PROBE: CPT

## 2024-11-02 PROCEDURE — 86901 BLOOD TYPING SEROLOGIC RH(D): CPT

## 2024-11-02 PROCEDURE — 85025 COMPLETE CBC W/AUTO DIFF WBC: CPT

## 2024-11-02 PROCEDURE — 86850 RBC ANTIBODY SCREEN: CPT

## 2024-11-02 PROCEDURE — 86900 BLOOD TYPING SEROLOGIC ABO: CPT

## 2024-11-02 PROCEDURE — 83036 HEMOGLOBIN GLYCOSYLATED A1C: CPT

## 2024-11-02 RX ORDER — POVIDONE-IODINE 0.07 MG/ML
1 SOLUTION TOPICAL ONCE
Refills: 0 | Status: COMPLETED | OUTPATIENT
Start: 2024-11-05 | End: 2024-11-05

## 2024-11-02 NOTE — H&P PST ADULT - PROBLEM SELECTOR PLAN 6
ECG at [facility]  Medical and cardiology clearance pending. PCP/Cardiology for medical management and follow up as indicated.  Last echo in chart.

## 2024-11-02 NOTE — H&P PST ADULT - PROBLEM SELECTOR PLAN 2
Caprini Score 7. High risk,  Surgical team should assess and recommend pharmacological and mechanical measures for VTE prophylaxis indicated

## 2024-11-02 NOTE — H&P PST ADULT - NSANTHNECKRD_ENT_A_CORE
55 yo F no PMHx presents with CC of CP.  Symptoms started today at rest.  C/o left chest tightness, radiating to left neck and shoulder.  Associate mild SOB, palpitations.  Denies diaphoresis, worening with exertion, nausea, vomiting, cough, or any other symptoms.  Denies previous occurrence.  Pt took baby ASA at home without much improvement.  Pt without HTN, HLD, DMII, tobacco use hx or first degree relative with CAD.  No other concerns.
No

## 2024-11-02 NOTE — H&P PST ADULT - HISTORY OF PRESENT ILLNESS
54 y/o female presents for PST of colon cancer 2021 s/p surgical colon resection 5/28/21, MR, GERD, asthma, kidney stones.           Patient scheduled for Robotic Extended Totally Extraperitoneal Repair Incisional Hernia Repair on 11/5/24 with Dr. Merida  52 y/o female presents for PST of colon cancer 2021 s/p surgical colon resection 5/28/21, MR, GERD, asthma, kidney stones. Patient c/o fatigue, nausea and bloating over the last few months. Patient c/o abdominal pain described as shooting and shap pain radiating to lower back. Patient denies fever/chills, dizziness, bleeding, dark/bloody stools. Patient scheduled for Robotic Extended Totally Extraperitoneal Repair Incisional Hernia Repair on 11/5/24 with Dr. Merida  52 y/o female presents for PST with PMH of colon cancer 2021 s/p surgical colon resection 5/28/21, MR, GERD, asthma, kidney stones. Patient c/o fatigue, nausea and bloating over the last few months. Patient c/o abdominal pain described as shooting and sharp pain radiating to lower back. CT ABD and Pelvis "ABDOMINAL WALL: Rectus diastases. Small fat-containing supraumbilical hernia arising from the diastases." Patient denies fever/chills, dizziness, bleeding, dark/bloody stools. Patient scheduled for Robotic Extended Totally Extraperitoneal Repair Incisional Hernia Repair on 11/5/24 with Dr. Merida.  .

## 2024-11-02 NOTE — H&P PST ADULT - PROBLEM SELECTOR PLAN 1
Patient scheduled for Robotic Extended Totally Extraperitoneal Repair Incisional Hernia Repair on 11/5/24 with Dr. Merida

## 2024-11-02 NOTE — H&P PST ADULT - NSICDXPASTMEDICALHX_GEN_ALL_CORE_FT
PAST MEDICAL HISTORY:  2019 novel coronavirus disease (COVID-19) 1/2022    Asthma mild    Cervical dysplasia     COVID-19 vaccine series completed     GERD (gastroesophageal reflux disease)     Impingement syndrome of left shoulder     Incisional hernia x2-pt. states requires repair    Kidney stone     Malignant neoplasm of sigmoid colon     Seasonal allergies      PAST MEDICAL HISTORY:  2019 novel coronavirus disease (COVID-19) 1/2022    Asthma mild    Cervical dysplasia     COVID-19 vaccine series completed     GERD (gastroesophageal reflux disease)     Impingement syndrome of left shoulder     Incisional hernia x2-pt. states requires repair    Kidney stone     Malignant neoplasm of sigmoid colon     Mitral valve regurgitation     Seasonal allergies

## 2024-11-02 NOTE — H&P PST ADULT - OTHER CARE PROVIDERS
Dr. Hartman [phone], Cardiology Dr. Son [phone], Oncology Dr. Aviles, Dr. Chávez pain specialty Dr. Hartman [phone], Cardiology Dr. Son [phone], Oncology Dr. Aviles, Dr. Chávez pain specialty, Hematologist Dr. Rodríguez

## 2024-11-02 NOTE — H&P PST ADULT - MUSCULOSKELETAL
normal/ROM intact/no joint swelling/no joint erythema/no joint warmth/normal gait/strength 5/5 bilateral lower extremities details…

## 2024-11-02 NOTE — H&P PST ADULT - PROBLEM SELECTOR PLAN 4
Patient told to continue medications as usual up to DOS. Medical clearance pending. PCP/oncology for medical management and follow up.

## 2024-11-02 NOTE — H&P PST ADULT - NEGATIVE MUSCULOSKELETAL SYMPTOMS
no arthralgia/no arthritis/no joint swelling/no myalgia/no muscle cramps/no muscle weakness/no stiffness/no neck pain/no arm pain R/no back pain/no leg pain L no arthralgia/no joint swelling/no myalgia/no muscle cramps/no muscle weakness/no stiffness/no neck pain/no arm pain R/no back pain/no leg pain L

## 2024-11-02 NOTE — H&P PST ADULT - GASTROINTESTINAL
normal/soft/nontender/nondistended/normal active bowel sounds/no guarding/no rigidity/no organomegaly/no palpable johny/no masses palpable negative details…

## 2024-11-02 NOTE — H&P PST ADULT - ASSESSMENT
Pt. to have medical clearance with Dr. Hartman 22 and pt. verbalized agreement and understanding.  Pt. to have cardiac clearance with Dr. Gipson, states she was seen, pt. verbalized agreement and understanding. Email sent to surgical coordinator to obtain copy.   Patient educated on surgical scrub, COVID testing-pt. advised to contact surgeons office regarding scheduling COVID PCR, preadmission instructions, medical clearance and day of procedure medications as per policy and pt. verbalizes understanding and agreement.   Pt. educated and instructed regarding all preoperative instructions and education as per policy via both verbal and written means of communication and pt. verbalized agreement and understanding.  Pt instructed to stop vitamins/supplements/herbal medications/NSAIDS for one week prior to surgery and pt. verbalizes understanding and agreement.   Problem/Plan - 1:  ·  Problem: GERD (gastroesophageal reflux disease).   ·  Plan: Medical clearance pending. PCP for medical management and follow up.  Problem/Plan - 2:  ·  Problem: Impingement syndrome of left shoulder.   ·  Plan: Medical and cardiology clearance pending for  left shoulder arthroscopy possible rotator cuff repair vs. debridement subacromial decompression synovectomy possible open subpectoral bicep tenodesis on 22 with Dr. Nicolas Abebe.  Problem/Plan - 3:  ·  Problem: Malignant neoplasm of sigmoid colon.   ·  Plan: Medical clearance pending. PCP/oncology for medical management and follow up.  Problem/Plan - 4:  ·  Problem: Asthma.   ·  Plan: Medical clearance pending. PCP/oncology for medical management and follow up.  Problem/Plan - 5:  ·  Problem: Need for prophylactic measure.   ·  Plan: Caprini Score=3  surgical team to order appropriate VTE prophylaxis.  Problem/Plan - 6:  ·  Problem: Right leg pain.   ·  Plan: Medical clearance pending. PCP/oncology for medical management and follow up.  Problem/Plan - 7:  ·  Problem: Mitral regurgitation.   ·  Plan: Medical and cardiology clearance pending. PCP/Cardiology for medical management and follow up as indicated.        Patient educated on surgical scrub, preadmission instructions, medical clearance and day of procedure medications, pt. verbalizes understanding and agreement. Pt. to have medical clearance with  and pt. verbalized agreement and understanding. Pt. to have cardiac clearance with and pt. verbalized agreement and understanding.     CAPRINI SCORE    AGE RELATED RISK FACTORS                                                             [x ] Age 41-60 years                                            (1 Point)  [ ] Age: 61-74 years                                           (2 Points)                 [ ] Age= 75 years                                                (3 Points)             DISEASE RELATED RISK FACTORS                                                       [ ] Edema in the lower extremities                 (1 Point)                     [ ] Varicose veins                                               (1 Point)                                 [ x] BMI > 25 Kg/m2                                            (1 Point)                                  [ ] Serious infection (ie PNA)                            (1 Point)                     [ ] Lung disease ( COPD, Emphysema)            (1 Point)                                                                          [ ] Acute myocardial infarction                         (1 Point)                  [ ] Congestive heart failure (in the previous month)  (1 Point)         [ ] Inflammatory bowel disease                            (1 Point)                  [ ] Central venous access, PICC or Port               (2 points)       (within the last month)                                                                [ ] Stroke (in the previous month)                        (5 Points)    [ ] Previous or present malignancy                       (2 points)                                                                                                                                                         HEMATOLOGY RELATED FACTORS                                                         [ ] Prior episodes of VTE                                     (3 Points)                     [ ] Positive family history for VTE                      (3 Points)                  [ ] Prothrombin 99958 A                                     (3 Points)                     [ ] Factor V Leiden                                                (3 Points)                        [ ] Lupus anticoagulants                                      (3 Points)                                                           [ ] Anticardiolipin antibodies                              (3 Points)                                                       [ ] High homocysteine in the blood                   (3 Points)                                             [ ] Other congenital or acquired thrombophilia      (3 Points)                                                [ ] Heparin induced thrombocytopenia                  (3 Points)                                        MOBILITY RELATED FACTORS  [ ] Bed rest                                                         (1 Point)  [ ] Plaster cast                                                    (2 points)  [ ] Bed bound for more than 72 hours           (2 Points)    GENDER SPECIFIC FACTORS  [ ] Pregnancy or had a baby within the last month   (1 Point)  [ ] Post-partum < 6 weeks                                   (1 Point)  [ ] Hormonal therapy  or oral contraception   (1 Point)  [ ] History of pregnancy complications              (1 point)  [ ] Unexplained or recurrent              (1 Point)    OTHER RISK FACTORS                                           (1 Point)  [ ] BMI >40, smoking, diabetes requiring insulin, chemotherapy  blood transfusions and length of surgery over 2 hours    SURGERY RELATED RISK FACTORS  [ ]  Section within the last month     (1 Point)  [ ] Minor surgery                                                  (1 Point)  [ ] Arthroscopic surgery                                       (2 Points)  [x ] Planned major surgery lasting more            (2 Points)      than 45 minutes     [ ] Elective hip or knee joint replacement       (5 points)       surgery                                                TRAUMA RELATED RISK FACTORS  [ ] Fracture of the hip, pelvis, or leg                       (5 Points)  [ ] Spinal cord injury resulting in paralysis             (5 points)       (in the previous month)    [ ] Paralysis  (less than 1 month)                             (5 Points)  [ ] Multiple Trauma within 1 month                        (5 Points)    Total Score [        ]    Capeliani Score 0-2: Low Risk, NO VTE prophylaxis required for most patients, encourage ambulation  Caprini Score 3-6: Moderate Risk , pharmacologic VTE prophylaxis is indicated for most patients (in the absence of contraindications)  Caprini Score Greater than or =7: High risk, pharmocologic VTE prophylaxis indicated for most patients (in the absence of contraindications)    OPIOID RISK TOOL    NIECY EACH BOX THAT APPLIES AND ADD TOTALS AT THE END    FAMILY HISTORY OF SUBSTANCE ABUSE                 FEMALE         MALE                                                Alcohol                             [  ]1 pt          [  ]3pts                                               Illegal Durgs                     [  ]2 pts        [  ]3pts                                               Rx Drugs                           [  ]4 pts        [  ]4 pts    PERSONAL HISTORY OF SUBSTANCE ABUSE                                                                                          Alcohol                             [  ]3 pts       [  ]3 pts                                               Illegal Drugs                     [  ]4 pts        [  ]4 pts                                               Rx Drugs                           [  ]5 pts        [  ]5 pts    AGE BETWEEN 16-45 YEARS                                      [  ]1 pt         [  ]1 pt    HISTORY OF PREADOLESCENT   SEXUAL ABUSE                                                             [  ]3 pts        [  ]0pts    PSYCHOLOGICAL DISEASE                     ADD, OCD, Bipolar, Schizophrenia        [  ]2 pts         [  ]2 pts                      Depression                                               [  ]1 pt           [  ]1 pt           SCORING TOTAL   (add numbers and type here)              (**0*)                                     A score of 3 or lower indicated LOW risk for future opioid abuse  A score of 4 to 7 indicated moderate risk for future opioid abuse  A score of 8 or higher indicates a high risk for opioid abuse     Pt. to have medical clearance with Dr. Hartman 22 and pt. verbalized agreement and understanding.  Pt. to have cardiac clearance with Dr. Ryan, states she was seen, pt. verbalized agreement and understanding. Email sent to surgical coordinator to obtain copy.     Problem/Plan - 1:  ·  Problem: GERD (gastroesophageal reflux disease).   ·  Plan: Medical clearance pending. PCP for medical management and follow up.  Problem/Plan - 2:  ·  Problem: Impingement syndrome of left shoulder.   ·  Plan: Medical and cardiology clearance pending for  left shoulder arthroscopy possible rotator cuff repair vs. debridement subacromial decompression synovectomy possible open subpectoral bicep tenodesis on 22 with Dr. Nicolas Abebe.  Problem/Plan - 3:  ·  Problem: Malignant neoplasm of sigmoid colon.   ·  Plan: Medical clearance pending. PCP/oncology for medical management and follow up.  Problem/Plan - 4:  ·  Problem: Asthma.   ·  Plan: Medical clearance pending. PCP/oncology for medical management and follow up.  Problem/Plan - 5:  ·  Problem: Need for prophylactic measure.   ·  Plan: Caprini Score=3  surgical team to order appropriate VTE prophylaxis.  Problem/Plan - 6:  ·  Problem: Right leg pain.   ·  Plan: Medical clearance pending. PCP/oncology for medical management and follow up.  Problem/Plan - 7:  ·  Problem: Mitral regurgitation.   ·  Plan: Medical and cardiology clearance pending. PCP/Cardiology for medical management and follow up as indicated.        Patient educated on surgical scrub, preadmission instructions, medical clearance and day of procedure medications, pt. verbalizes understanding and agreement. Pt. to have medical clearance with  and pt. verbalized agreement and understanding. Pt. to have cardiac clearance with and pt. verbalized agreement and understanding.     CAPRINI SCORE    AGE RELATED RISK FACTORS                                                             [x ] Age 41-60 years                                            (1 Point)  [ ] Age: 61-74 years                                           (2 Points)                 [ ] Age= 75 years                                                (3 Points)             DISEASE RELATED RISK FACTORS                                                       [ ] Edema in the lower extremities                 (1 Point)                     [ ] Varicose veins                                               (1 Point)                                 [ x] BMI > 25 Kg/m2                                            (1 Point)                                  [ ] Serious infection (ie PNA)                            (1 Point)                     [ ] Lung disease ( COPD, Emphysema)            (1 Point)                                                                          [ ] Acute myocardial infarction                         (1 Point)                  [ ] Congestive heart failure (in the previous month)  (1 Point)         [ ] Inflammatory bowel disease                            (1 Point)                  [ ] Central venous access, PICC or Port               (2 points)       (within the last month)                                                                [ ] Stroke (in the previous month)                        (5 Points)    [ ] Previous or present malignancy                       (2 points)                                                                                                                                                         HEMATOLOGY RELATED FACTORS                                                         [ ] Prior episodes of VTE                                     (3 Points)                     [ ] Positive family history for VTE                      (3 Points)                  [ ] Prothrombin 45195 A                                     (3 Points)                     [ ] Factor V Leiden                                                (3 Points)                        [ ] Lupus anticoagulants                                      (3 Points)                                                           [ ] Anticardiolipin antibodies                              (3 Points)                                                       [ ] High homocysteine in the blood                   (3 Points)                                             [ ] Other congenital or acquired thrombophilia      (3 Points)                                                [ ] Heparin induced thrombocytopenia                  (3 Points)                                        MOBILITY RELATED FACTORS  [ ] Bed rest                                                         (1 Point)  [ ] Plaster cast                                                    (2 points)  [ ] Bed bound for more than 72 hours           (2 Points)    GENDER SPECIFIC FACTORS  [ ] Pregnancy or had a baby within the last month   (1 Point)  [ ] Post-partum < 6 weeks                                   (1 Point)  [ ] Hormonal therapy  or oral contraception   (1 Point)  [ ] History of pregnancy complications              (1 point)  [ ] Unexplained or recurrent              (1 Point)    OTHER RISK FACTORS                                           (1 Point)  [ ] BMI >40, smoking, diabetes requiring insulin, chemotherapy  blood transfusions and length of surgery over 2 hours    SURGERY RELATED RISK FACTORS  [ ]  Section within the last month     (1 Point)  [ ] Minor surgery                                                  (1 Point)  [ ] Arthroscopic surgery                                       (2 Points)  [x ] Planned major surgery lasting more            (2 Points)      than 45 minutes     [ ] Elective hip or knee joint replacement       (5 points)       surgery                                                TRAUMA RELATED RISK FACTORS  [ ] Fracture of the hip, pelvis, or leg                       (5 Points)  [ ] Spinal cord injury resulting in paralysis             (5 points)       (in the previous month)    [ ] Paralysis  (less than 1 month)                             (5 Points)  [ ] Multiple Trauma within 1 month                        (5 Points)    Total Score [        ]    Caprini Score 0-2: Low Risk, NO VTE prophylaxis required for most patients, encourage ambulation  Caprini Score 3-6: Moderate Risk , pharmacologic VTE prophylaxis is indicated for most patients (in the absence of contraindications)  Caprini Score Greater than or =7: High risk, pharmocologic VTE prophylaxis indicated for most patients (in the absence of contraindications)    OPIOID RISK TOOL    NIECY EACH BOX THAT APPLIES AND ADD TOTALS AT THE END    FAMILY HISTORY OF SUBSTANCE ABUSE                 FEMALE         MALE                                                Alcohol                             [  ]1 pt          [  ]3pts                                               Illegal Durgs                     [  ]2 pts        [  ]3pts                                               Rx Drugs                           [  ]4 pts        [  ]4 pts    PERSONAL HISTORY OF SUBSTANCE ABUSE                                                                                          Alcohol                             [  ]3 pts       [  ]3 pts                                               Illegal Drugs                     [  ]4 pts        [  ]4 pts                                               Rx Drugs                           [  ]5 pts        [  ]5 pts    AGE BETWEEN 16-45 YEARS                                      [  ]1 pt         [  ]1 pt    HISTORY OF PREADOLESCENT   SEXUAL ABUSE                                                             [  ]3 pts        [  ]0pts    PSYCHOLOGICAL DISEASE                     ADD, OCD, Bipolar, Schizophrenia        [  ]2 pts         [  ]2 pts                      Depression                                               [  ]1 pt           [  ]1 pt           SCORING TOTAL   (add numbers and type here)              (**0*)                                     A score of 3 or lower indicated LOW risk for future opioid abuse  A score of 4 to 7 indicated moderate risk for future opioid abuse  A score of 8 or higher indicates a high risk for opioid abuse   54 y/o female presents for PST with PMH of colon cancer  s/p surgical colon resection 21, MR, GERD, asthma, kidney stones. Patient c/o fatigue, nausea and bloating over the last few months. Patient c/o abdominal pain described as shooting and sharp pain radiating to lower back. CT ABD and Pelvis "ABDOMINAL WALL: Rectus diastases. Small fat-containing supraumbilical hernia arising from the diastases." Patient denies fever/chills, dizziness, bleeding, dark/bloody stools. Patient scheduled for Robotic Extended Totally Extraperitoneal Repair Incisional Hernia Repair on 24 with Dr. Merida   Pt. to have medical clearance with Dr. Hartman 22 and pt. verbalized agreement and understanding.  Pt. to have cardiac clearance with Dr. Ryan, states she was seen, pt. verbalized agreement and understanding. Email sent to surgical coordinator to obtain copy. Patient educated on surgical scrub, preadmission instructions, medical clearance and day of procedure medications, pt. verbalizes understanding and agreement.     CAPRINI SCORE    AGE RELATED RISK FACTORS                                                             [x ] Age 41-60 years                                            (1 Point)  [ ] Age: 61-74 years                                           (2 Points)                 [ ] Age= 75 years                                                (3 Points)             DISEASE RELATED RISK FACTORS                                                       [ ] Edema in the lower extremities                 (1 Point)                     [ ] Varicose veins                                               (1 Point)                                 [ x] BMI > 25 Kg/m2                                            (1 Point)                                  [ ] Serious infection (ie PNA)                            (1 Point)                     [ ] Lung disease ( COPD, Emphysema)            (1 Point)                                                                          [ ] Acute myocardial infarction                         (1 Point)                  [ ] Congestive heart failure (in the previous month)  (1 Point)         [ ] Inflammatory bowel disease                            (1 Point)                  [ ] Central venous access, PICC or Port               (2 points)       (within the last month)                                                                [ ] Stroke (in the previous month)                        (5 Points)    [ x] Previous or present malignancy                       (2 points)                                                                                                                                                         HEMATOLOGY RELATED FACTORS                                                         [ ] Prior episodes of VTE                                     (3 Points)                     [ ] Positive family history for VTE                      (3 Points)                  [ ] Prothrombin 52386 A                                     (3 Points)                     [ ] Factor V Leiden                                                (3 Points)                        [ ] Lupus anticoagulants                                      (3 Points)                                                           [ ] Anticardiolipin antibodies                              (3 Points)                                                       [ ] High homocysteine in the blood                   (3 Points)                                             [ ] Other congenital or acquired thrombophilia      (3 Points)                                                [ ] Heparin induced thrombocytopenia                  (3 Points)                                        MOBILITY RELATED FACTORS  [ ] Bed rest                                                         (1 Point)  [ ] Plaster cast                                                    (2 points)  [ ] Bed bound for more than 72 hours           (2 Points)    GENDER SPECIFIC FACTORS  [ ] Pregnancy or had a baby within the last month   (1 Point)  [ ] Post-partum < 6 weeks                                   (1 Point)  [ ] Hormonal therapy  or oral contraception   (1 Point)  [ ] History of pregnancy complications              (1 point)  [ ] Unexplained or recurrent              (1 Point)    OTHER RISK FACTORS                                           (1 Point)  [x ] BMI >40, smoking, diabetes requiring insulin, chemotherapy  blood transfusions and length of surgery over 2 hours    SURGERY RELATED RISK FACTORS  [ ]  Section within the last month     (1 Point)  [ ] Minor surgery                                                  (1 Point)  [ ] Arthroscopic surgery                                       (2 Points)  [x ] Planned major surgery lasting more            (2 Points)      than 45 minutes     [ ] Elective hip or knee joint replacement       (5 points)       surgery                                                TRAUMA RELATED RISK FACTORS  [ ] Fracture of the hip, pelvis, or leg                       (5 Points)  [ ] Spinal cord injury resulting in paralysis             (5 points)       (in the previous month)    [ ] Paralysis  (less than 1 month)                             (5 Points)  [ ] Multiple Trauma within 1 month                        (5 Points)    Total Score [   7   ]    Caprini Score 0-2: Low Risk, NO VTE prophylaxis required for most patients, encourage ambulation  Caprini Score 3-6: Moderate Risk , pharmacologic VTE prophylaxis is indicated for most patients (in the absence of contraindications)  Caprini Score Greater than or =7: High risk, pharmocologic VTE prophylaxis indicated for most patients (in the absence of contraindications)    OPIOID RISK TOOL    NIECY EACH BOX THAT APPLIES AND ADD TOTALS AT THE END    FAMILY HISTORY OF SUBSTANCE ABUSE                 FEMALE         MALE                                                Alcohol                             [  ]1 pt          [  ]3pts                                               Illegal Durgs                     [  ]2 pts        [  ]3pts                                               Rx Drugs                           [  ]4 pts        [  ]4 pts    PERSONAL HISTORY OF SUBSTANCE ABUSE                                                                                          Alcohol                             [  ]3 pts       [  ]3 pts                                               Illegal Drugs                     [  ]4 pts        [  ]4 pts                                               Rx Drugs                           [  ]5 pts        [  ]5 pts    AGE BETWEEN 16-45 YEARS                                      [  ]1 pt         [  ]1 pt    HISTORY OF PREADOLESCENT   SEXUAL ABUSE                                                             [  ]3 pts        [  ]0pts    PSYCHOLOGICAL DISEASE                     ADD, OCD, Bipolar, Schizophrenia        [  ]2 pts         [  ]2 pts                      Depression                                               [  ]1 pt           [  ]1 pt           SCORING TOTAL   (add numbers and type here)              (**0*)                                     A score of 3 or lower indicated LOW risk for future opioid abuse  A score of 4 to 7 indicated moderate risk for future opioid abuse  A score of 8 or higher indicates a high risk for opioid abuse

## 2024-11-04 ENCOUNTER — APPOINTMENT (OUTPATIENT)
Dept: CARDIOLOGY | Facility: CLINIC | Age: 54
End: 2024-11-04
Payer: MEDICAID

## 2024-11-04 VITALS
BODY MASS INDEX: 22.47 KG/M2 | HEIGHT: 61 IN | WEIGHT: 119 LBS | DIASTOLIC BLOOD PRESSURE: 68 MMHG | SYSTOLIC BLOOD PRESSURE: 102 MMHG | OXYGEN SATURATION: 97 % | HEART RATE: 67 BPM

## 2024-11-04 DIAGNOSIS — Z01.810 ENCOUNTER FOR PREPROCEDURAL CARDIOVASCULAR EXAMINATION: ICD-10-CM

## 2024-11-04 DIAGNOSIS — I34.0 NONRHEUMATIC MITRAL (VALVE) INSUFFICIENCY: ICD-10-CM

## 2024-11-04 PROCEDURE — 99214 OFFICE O/P EST MOD 30 MIN: CPT

## 2024-11-04 RX ORDER — ABATACEPT 125 MG/ML
125 INJECTION, SOLUTION SUBCUTANEOUS
Qty: 4 | Refills: 5 | Status: ACTIVE | COMMUNITY
Start: 2024-11-01 | End: 1900-01-01

## 2024-11-04 NOTE — ASU PATIENT PROFILE, ADULT - NSICDXPASTMEDICALHX_GEN_ALL_CORE_FT
PAST MEDICAL HISTORY:  2019 novel coronavirus disease (COVID-19) 1/2022    Asthma mild    Cervical dysplasia     COVID-19 vaccine series completed     GERD (gastroesophageal reflux disease)     Impingement syndrome of left shoulder     Incisional hernia x2-pt. states requires repair    Kidney stone     Malignant neoplasm of sigmoid colon     Mitral valve regurgitation     Seasonal allergies

## 2024-11-05 ENCOUNTER — APPOINTMENT (OUTPATIENT)
Dept: SURGERY | Facility: HOSPITAL | Age: 54
End: 2024-11-05

## 2024-11-05 ENCOUNTER — OUTPATIENT (OUTPATIENT)
Dept: INPATIENT UNIT | Facility: HOSPITAL | Age: 54
LOS: 1 days | End: 2024-11-05
Payer: COMMERCIAL

## 2024-11-05 ENCOUNTER — TRANSCRIPTION ENCOUNTER (OUTPATIENT)
Age: 54
End: 2024-11-05

## 2024-11-05 VITALS
SYSTOLIC BLOOD PRESSURE: 139 MMHG | WEIGHT: 116.84 LBS | HEIGHT: 61 IN | TEMPERATURE: 98 F | HEART RATE: 76 BPM | DIASTOLIC BLOOD PRESSURE: 67 MMHG | RESPIRATION RATE: 17 BRPM | OXYGEN SATURATION: 98 %

## 2024-11-05 VITALS
TEMPERATURE: 98 F | DIASTOLIC BLOOD PRESSURE: 53 MMHG | RESPIRATION RATE: 20 BRPM | HEART RATE: 70 BPM | OXYGEN SATURATION: 96 % | SYSTOLIC BLOOD PRESSURE: 106 MMHG

## 2024-11-05 DIAGNOSIS — Z90.49 ACQUIRED ABSENCE OF OTHER SPECIFIED PARTS OF DIGESTIVE TRACT: Chronic | ICD-10-CM

## 2024-11-05 DIAGNOSIS — Z98.890 OTHER SPECIFIED POSTPROCEDURAL STATES: Chronic | ICD-10-CM

## 2024-11-05 DIAGNOSIS — K43.2 INCISIONAL HERNIA WITHOUT OBSTRUCTION OR GANGRENE: ICD-10-CM

## 2024-11-05 DIAGNOSIS — Z98.891 HISTORY OF UTERINE SCAR FROM PREVIOUS SURGERY: Chronic | ICD-10-CM

## 2024-11-05 DIAGNOSIS — Z90.710 ACQUIRED ABSENCE OF BOTH CERVIX AND UTERUS: Chronic | ICD-10-CM

## 2024-11-05 PROCEDURE — 15734 MUSCLE-SKIN GRAFT TRUNK: CPT | Mod: 59

## 2024-11-05 PROCEDURE — S2900: CPT

## 2024-11-05 PROCEDURE — C9399: CPT

## 2024-11-05 PROCEDURE — S2900 ROBOTIC SURGICAL SYSTEM: CPT | Mod: NC

## 2024-11-05 PROCEDURE — C1781: CPT

## 2024-11-05 PROCEDURE — 15734 MUSCLE-SKIN GRAFT TRUNK: CPT

## 2024-11-05 PROCEDURE — 49596 RPR AA HRN 1ST > 10 NCR/STRN: CPT | Mod: AS

## 2024-11-05 PROCEDURE — 49595 RPR AA HRN 1ST > 10 RDC: CPT

## 2024-11-05 PROCEDURE — 15734 MUSCLE-SKIN GRAFT TRUNK: CPT | Mod: AS,59

## 2024-11-05 PROCEDURE — 49596 RPR AA HRN 1ST > 10 NCR/STRN: CPT

## 2024-11-05 DEVICE — MESH HERNIA VENTRAL SYMBOTEX COMPOSITE ROUND 12CM: Type: IMPLANTABLE DEVICE | Status: FUNCTIONAL

## 2024-11-05 RX ORDER — FENTANYL CITRAT/DEXTROSE 5%/PF 1250MCG/50
25 PATIENT CONTROLLED ANALGESIA SYRINGE INTRAVENOUS
Refills: 0 | Status: DISCONTINUED | OUTPATIENT
Start: 2024-11-05 | End: 2024-11-06

## 2024-11-05 RX ORDER — BUPIVACAINE 13.3 MG/ML
20 INJECTION, SUSPENSION, LIPOSOMAL INFILTRATION ONCE
Refills: 0 | Status: DISCONTINUED | OUTPATIENT
Start: 2024-11-05 | End: 2024-11-05

## 2024-11-05 RX ORDER — ACETAMINOPHEN 500 MG
975 TABLET ORAL ONCE
Refills: 0 | Status: COMPLETED | OUTPATIENT
Start: 2024-11-05 | End: 2024-11-05

## 2024-11-05 RX ORDER — FENTANYL CITRAT/DEXTROSE 5%/PF 1250MCG/50
50 PATIENT CONTROLLED ANALGESIA SYRINGE INTRAVENOUS
Refills: 0 | Status: DISCONTINUED | OUTPATIENT
Start: 2024-11-05 | End: 2024-11-05

## 2024-11-05 RX ORDER — LEVOCETIRIZINE DIHYDROCHLORIDE 5 MG/1
1 TABLET ORAL
Refills: 0 | DISCHARGE

## 2024-11-05 RX ORDER — HYDROMORPHONE HCL/0.9% NACL/PF 6 MG/30 ML
0.5 PATIENT CONTROLLED ANALGESIA SYRINGE INTRAVENOUS
Refills: 0 | Status: DISCONTINUED | OUTPATIENT
Start: 2024-11-05 | End: 2024-11-06

## 2024-11-05 RX ORDER — SODIUM CHLORIDE 9 MG/ML
3 INJECTION, SOLUTION INTRAMUSCULAR; INTRAVENOUS; SUBCUTANEOUS ONCE
Refills: 0 | Status: DISCONTINUED | OUTPATIENT
Start: 2024-11-05 | End: 2024-11-05

## 2024-11-05 RX ORDER — ONDANSETRON HYDROCHLORIDE 2 MG/ML
4 INJECTION, SOLUTION INTRAMUSCULAR; INTRAVENOUS ONCE
Refills: 0 | Status: COMPLETED | OUTPATIENT
Start: 2024-11-05 | End: 2024-11-05

## 2024-11-05 RX ORDER — CELECOXIB 100 MG
1 CAPSULE ORAL
Refills: 0 | DISCHARGE

## 2024-11-05 RX ORDER — VANCOMYCIN HYDROCHLORIDE 50 MG/ML
750 KIT ORAL ONCE
Refills: 0 | Status: DISCONTINUED | OUTPATIENT
Start: 2024-11-05 | End: 2024-11-20

## 2024-11-05 RX ADMIN — Medication 0.5 MILLIGRAM(S): at 17:57

## 2024-11-05 RX ADMIN — ONDANSETRON HYDROCHLORIDE 4 MILLIGRAM(S): 2 INJECTION, SOLUTION INTRAMUSCULAR; INTRAVENOUS at 19:41

## 2024-11-05 RX ADMIN — Medication 0.5 MILLIGRAM(S): at 18:50

## 2024-11-05 RX ADMIN — POVIDONE-IODINE 1 APPLICATION(S): 0.07 SOLUTION TOPICAL at 13:44

## 2024-11-05 RX ADMIN — Medication 0.5 MILLIGRAM(S): at 18:35

## 2024-11-05 RX ADMIN — Medication 0.5 MILLIGRAM(S): at 18:15

## 2024-11-05 RX ADMIN — Medication 975 MILLIGRAM(S): at 13:48

## 2024-11-05 NOTE — ASU DISCHARGE PLAN (ADULT/PEDIATRIC) - CARE PROVIDER_API CALL
Sang Merida  Surgery  [address] [place] [id number]  Phone: [phone]  Fax: [phone]  Follow Up Time: 2 weeks

## 2024-11-05 NOTE — BRIEF OPERATIVE NOTE - OPERATION/FINDINGS
Optiview entry into retrorectus space. Pneumo and blunt dissection to develop space and remaining ports placed under direct visualization. Hernias reduced and anterior abdominal wall rectus edges approximated. Posterior sheath repaired. Hemostasis noted. Mesh placed and pneumo reduced under visualization. Skin closed, then steri-strips.

## 2024-11-05 NOTE — ASU PREOP CHECKLIST - VIA
Airway  Date/Time: 11/26/2021 11:13 AM  Urgency: elective    Airway not difficult    General Information and Staff    Patient location during procedure: OR  Anesthesiologist: Jana Green MD  Performed: anesthesiologist     Indications and Patient Cond
Regional Block  Performed by: Jana Green MD  Authorized by: Jana Green MD       General Information and Staff    Start Time:  11/26/2021 11:15 AM  End Time:  11/26/2021 11:20 AM  Anesthesiologist:  Jana Green MD  Performed by:   Anesthesio
stretcher

## 2024-11-05 NOTE — ASU DISCHARGE PLAN (ADULT/PEDIATRIC) - FINANCIAL ASSISTANCE
[facility] provides services at a reduced cost to those who are determined to be eligible through [facility]’s financial assistance program. Information regarding [facility]’s financial assistance program can be found by going to [email or web address] or by calling [phone].

## 2024-11-05 NOTE — ASU DISCHARGE PLAN (ADULT/PEDIATRIC) - ASU DC SPECIAL INSTRUCTIONSFT
May wear abdominal binder for comfort.     Take Miralax daily as needed for daily soft bowel movements. Use acetaminophen for pain control as needed. There are steri-strips on your incisions, do not remove. These will fall off on their own. May shower and let soap and water run over incisions, do not scrub.

## 2024-11-05 NOTE — BRIEF OPERATIVE NOTE - NSICDXBRIEFPROCEDURE_GEN_ALL_CORE_FT
PROCEDURES:  Robot-assisted reconstruction of abdominal wall by extended totally extraperitoneal approach 05-Nov-2024 17:27:11 with mesh Handy Craig

## 2024-11-06 ENCOUNTER — APPOINTMENT (OUTPATIENT)
Dept: HEMATOLOGY ONCOLOGY | Facility: CLINIC | Age: 54
End: 2024-11-06
Payer: MEDICAID

## 2024-11-06 ENCOUNTER — RESULT REVIEW (OUTPATIENT)
Age: 54
End: 2024-11-06

## 2024-11-06 VITALS
OXYGEN SATURATION: 99 % | TEMPERATURE: 97.3 F | DIASTOLIC BLOOD PRESSURE: 77 MMHG | BODY MASS INDEX: 22.54 KG/M2 | HEIGHT: 61 IN | HEART RATE: 73 BPM | SYSTOLIC BLOOD PRESSURE: 120 MMHG | WEIGHT: 119.38 LBS

## 2024-11-06 LAB
ALBUMIN SERPL ELPH-MCNC: 4.1 G/DL
ALP BLD-CCNC: 53 U/L
ALT SERPL-CCNC: 11 U/L
ANION GAP SERPL CALC-SCNC: 13 MMOL/L
AST SERPL-CCNC: 20 U/L
BASOPHILS # BLD AUTO: 0.1 K/UL — SIGNIFICANT CHANGE UP (ref 0–0.2)
BASOPHILS NFR BLD AUTO: 1.3 % — SIGNIFICANT CHANGE UP (ref 0–2)
BILIRUB SERPL-MCNC: 0.6 MG/DL
BUN SERPL-MCNC: 11 MG/DL
CALCIUM SERPL-MCNC: 9.5 MG/DL
CEA SERPL-MCNC: 2 NG/ML
CHLORIDE SERPL-SCNC: 104 MMOL/L
CO2 SERPL-SCNC: 26 MMOL/L
CREAT SERPL-MCNC: 0.83 MG/DL
EGFR: 85 ML/MIN/1.73M2
EOSINOPHIL # BLD AUTO: 0.3 K/UL — SIGNIFICANT CHANGE UP (ref 0–0.5)
EOSINOPHIL NFR BLD AUTO: 3.3 % — SIGNIFICANT CHANGE UP (ref 0–6)
GLUCOSE SERPL-MCNC: 154 MG/DL
HCT VFR BLD CALC: 39.7 % — SIGNIFICANT CHANGE UP (ref 34.5–45)
HGB BLD-MCNC: 13.4 G/DL — SIGNIFICANT CHANGE UP (ref 11.5–15.5)
LYMPHOCYTES # BLD AUTO: 1.3 K/UL — SIGNIFICANT CHANGE UP (ref 1–3.3)
LYMPHOCYTES # BLD AUTO: 16.1 % — SIGNIFICANT CHANGE UP (ref 13–44)
MAGNESIUM SERPL-MCNC: 2.1 MG/DL
MCHC RBC-ENTMCNC: 31.6 PG — SIGNIFICANT CHANGE UP (ref 27–34)
MCHC RBC-ENTMCNC: 33.8 G/DL — SIGNIFICANT CHANGE UP (ref 32–36)
MCV RBC AUTO: 93.6 FL — SIGNIFICANT CHANGE UP (ref 80–100)
MONOCYTES # BLD AUTO: 0.5 K/UL — SIGNIFICANT CHANGE UP (ref 0–0.9)
MONOCYTES NFR BLD AUTO: 7 % — SIGNIFICANT CHANGE UP (ref 2–14)
NEUTROPHILS # BLD AUTO: 5.7 K/UL — SIGNIFICANT CHANGE UP (ref 1.8–7.4)
NEUTROPHILS NFR BLD AUTO: 72.2 % — SIGNIFICANT CHANGE UP (ref 43–77)
PLATELET # BLD AUTO: 235 K/UL — SIGNIFICANT CHANGE UP (ref 150–400)
POTASSIUM SERPL-SCNC: 4.1 MMOL/L
PROT SERPL-MCNC: 6.3 G/DL
RBC # BLD: 4.24 M/UL — SIGNIFICANT CHANGE UP (ref 3.8–5.2)
RBC # FLD: 12.5 % — SIGNIFICANT CHANGE UP (ref 10.3–14.5)
SODIUM SERPL-SCNC: 143 MMOL/L
WBC # BLD: 7.9 K/UL — SIGNIFICANT CHANGE UP (ref 3.8–10.5)
WBC # FLD AUTO: 7.9 K/UL — SIGNIFICANT CHANGE UP (ref 3.8–10.5)

## 2024-11-06 PROCEDURE — 99214 OFFICE O/P EST MOD 30 MIN: CPT

## 2024-11-07 ENCOUNTER — APPOINTMENT (OUTPATIENT)
Dept: INTERNAL MEDICINE | Facility: CLINIC | Age: 54
End: 2024-11-07

## 2024-11-07 ENCOUNTER — NON-APPOINTMENT (OUTPATIENT)
Age: 54
End: 2024-11-07

## 2024-11-07 ENCOUNTER — OUTPATIENT (OUTPATIENT)
Dept: OUTPATIENT SERVICES | Facility: HOSPITAL | Age: 54
LOS: 1 days | End: 2024-11-07

## 2024-11-07 DIAGNOSIS — Z90.49 ACQUIRED ABSENCE OF OTHER SPECIFIED PARTS OF DIGESTIVE TRACT: Chronic | ICD-10-CM

## 2024-11-07 DIAGNOSIS — Z98.890 OTHER SPECIFIED POSTPROCEDURAL STATES: Chronic | ICD-10-CM

## 2024-11-07 DIAGNOSIS — Z90.710 ACQUIRED ABSENCE OF BOTH CERVIX AND UTERUS: Chronic | ICD-10-CM

## 2024-11-07 DIAGNOSIS — Z98.891 HISTORY OF UTERINE SCAR FROM PREVIOUS SURGERY: Chronic | ICD-10-CM

## 2024-11-13 ENCOUNTER — APPOINTMENT (OUTPATIENT)
Dept: SURGERY | Facility: CLINIC | Age: 54
End: 2024-11-13

## 2024-11-13 VITALS
WEIGHT: 119 LBS | RESPIRATION RATE: 14 BRPM | OXYGEN SATURATION: 98 % | SYSTOLIC BLOOD PRESSURE: 103 MMHG | HEART RATE: 69 BPM | TEMPERATURE: 98 F | HEIGHT: 61 IN | DIASTOLIC BLOOD PRESSURE: 67 MMHG | BODY MASS INDEX: 22.47 KG/M2

## 2024-11-13 DIAGNOSIS — G89.18 OTHER ACUTE POSTPROCEDURAL PAIN: ICD-10-CM

## 2024-11-13 DIAGNOSIS — L29.9 PRURITUS, UNSPECIFIED: ICD-10-CM

## 2024-11-13 PROCEDURE — 99214 OFFICE O/P EST MOD 30 MIN: CPT

## 2024-11-13 RX ORDER — KETOROLAC TROMETHAMINE 10 MG/1
10 TABLET, FILM COATED ORAL 3 TIMES DAILY
Qty: 6 | Refills: 0 | Status: ACTIVE | COMMUNITY
Start: 2024-11-13 | End: 1900-01-01

## 2024-11-13 RX ORDER — MOMETASONE FUROATE 1 MG/G
0.1 CREAM TOPICAL TWICE DAILY
Qty: 2 | Refills: 0 | Status: ACTIVE | COMMUNITY
Start: 2024-11-13 | End: 1900-01-01

## 2024-11-18 ENCOUNTER — APPOINTMENT (OUTPATIENT)
Dept: SURGERY | Facility: CLINIC | Age: 54
End: 2024-11-18
Payer: MEDICAID

## 2024-11-18 VITALS
TEMPERATURE: 98.3 F | HEIGHT: 61 IN | OXYGEN SATURATION: 97 % | BODY MASS INDEX: 22.28 KG/M2 | SYSTOLIC BLOOD PRESSURE: 119 MMHG | RESPIRATION RATE: 16 BRPM | WEIGHT: 118 LBS | DIASTOLIC BLOOD PRESSURE: 80 MMHG | HEART RATE: 77 BPM

## 2024-11-18 DIAGNOSIS — K42.9 UMBILICAL HERNIA W/OUT OBSTRUCTION OR GANGRENE: ICD-10-CM

## 2024-11-18 PROCEDURE — 99024 POSTOP FOLLOW-UP VISIT: CPT

## 2024-12-23 ENCOUNTER — APPOINTMENT (OUTPATIENT)
Dept: SURGERY | Facility: CLINIC | Age: 54
End: 2024-12-23

## 2024-12-27 ENCOUNTER — RX RENEWAL (OUTPATIENT)
Age: 54
End: 2024-12-27

## 2025-01-13 ENCOUNTER — APPOINTMENT (OUTPATIENT)
Dept: SURGERY | Facility: CLINIC | Age: 55
End: 2025-01-13
Payer: MEDICAID

## 2025-01-13 ENCOUNTER — NON-APPOINTMENT (OUTPATIENT)
Age: 55
End: 2025-01-13

## 2025-01-13 VITALS
HEIGHT: 61 IN | SYSTOLIC BLOOD PRESSURE: 104 MMHG | TEMPERATURE: 98 F | RESPIRATION RATE: 16 BRPM | OXYGEN SATURATION: 96 % | WEIGHT: 116 LBS | DIASTOLIC BLOOD PRESSURE: 67 MMHG | BODY MASS INDEX: 21.9 KG/M2 | HEART RATE: 73 BPM

## 2025-01-13 PROCEDURE — 99024 POSTOP FOLLOW-UP VISIT: CPT

## 2025-01-16 ENCOUNTER — APPOINTMENT (OUTPATIENT)
Dept: CARDIOLOGY | Facility: CLINIC | Age: 55
End: 2025-01-16
Payer: MEDICAID

## 2025-01-16 ENCOUNTER — NON-APPOINTMENT (OUTPATIENT)
Age: 55
End: 2025-01-16

## 2025-01-16 VITALS
DIASTOLIC BLOOD PRESSURE: 72 MMHG | WEIGHT: 116 LBS | BODY MASS INDEX: 21.9 KG/M2 | OXYGEN SATURATION: 97 % | SYSTOLIC BLOOD PRESSURE: 112 MMHG | HEIGHT: 61 IN | HEART RATE: 70 BPM

## 2025-01-16 DIAGNOSIS — I34.0 NONRHEUMATIC MITRAL (VALVE) INSUFFICIENCY: ICD-10-CM

## 2025-01-16 PROCEDURE — 99213 OFFICE O/P EST LOW 20 MIN: CPT | Mod: 25

## 2025-01-16 PROCEDURE — 93000 ELECTROCARDIOGRAM COMPLETE: CPT

## 2025-01-16 RX ORDER — GLUCOSAMINE/MSM/CHONDROIT SULF 500-166.6
10 TABLET ORAL
Refills: 0 | Status: ACTIVE | COMMUNITY

## 2025-01-16 RX ORDER — MULTIVIT-MIN/IRON/FOLIC/HRB186 3.3 MG-25
TABLET ORAL DAILY
Refills: 0 | Status: ACTIVE | COMMUNITY

## 2025-01-29 ENCOUNTER — OUTPATIENT (OUTPATIENT)
Dept: OUTPATIENT SERVICES | Facility: HOSPITAL | Age: 55
LOS: 1 days | Discharge: HOME | End: 2025-01-29

## 2025-01-29 DIAGNOSIS — Z98.891 HISTORY OF UTERINE SCAR FROM PREVIOUS SURGERY: Chronic | ICD-10-CM

## 2025-01-29 DIAGNOSIS — Z98.890 OTHER SPECIFIED POSTPROCEDURAL STATES: Chronic | ICD-10-CM

## 2025-01-29 DIAGNOSIS — Z90.49 ACQUIRED ABSENCE OF OTHER SPECIFIED PARTS OF DIGESTIVE TRACT: Chronic | ICD-10-CM

## 2025-01-29 DIAGNOSIS — C18.9 MALIGNANT NEOPLASM OF COLON, UNSPECIFIED: ICD-10-CM

## 2025-01-29 DIAGNOSIS — Z90.710 ACQUIRED ABSENCE OF BOTH CERVIX AND UTERUS: Chronic | ICD-10-CM

## 2025-02-03 ENCOUNTER — APPOINTMENT (OUTPATIENT)
Dept: CARDIOLOGY | Facility: CLINIC | Age: 55
End: 2025-02-03

## 2025-02-06 ENCOUNTER — RESULT REVIEW (OUTPATIENT)
Age: 55
End: 2025-02-06

## 2025-02-06 ENCOUNTER — APPOINTMENT (OUTPATIENT)
Dept: HEMATOLOGY ONCOLOGY | Facility: CLINIC | Age: 55
End: 2025-02-06
Payer: MEDICAID

## 2025-02-06 VITALS
SYSTOLIC BLOOD PRESSURE: 110 MMHG | DIASTOLIC BLOOD PRESSURE: 72 MMHG | HEIGHT: 61 IN | TEMPERATURE: 97.3 F | WEIGHT: 121.23 LBS | OXYGEN SATURATION: 98 % | BODY MASS INDEX: 22.89 KG/M2 | HEART RATE: 67 BPM

## 2025-02-06 LAB
ALBUMIN SERPL ELPH-MCNC: 4.3 G/DL
ALP BLD-CCNC: 67 U/L
ALT SERPL-CCNC: 16 U/L
ANION GAP SERPL CALC-SCNC: 10 MMOL/L
AST SERPL-CCNC: 22 U/L
BASOPHILS # BLD AUTO: 0.1 K/UL — SIGNIFICANT CHANGE UP (ref 0–0.2)
BASOPHILS NFR BLD AUTO: 1.8 % — SIGNIFICANT CHANGE UP (ref 0–2)
BILIRUB SERPL-MCNC: 0.3 MG/DL
BUN SERPL-MCNC: 17 MG/DL
CALCIUM SERPL-MCNC: 9.7 MG/DL
CEA SERPL-MCNC: 2.8 NG/ML
CHLORIDE SERPL-SCNC: 107 MMOL/L
CO2 SERPL-SCNC: 26 MMOL/L
CREAT SERPL-MCNC: 0.75 MG/DL
EGFR: 95 ML/MIN/1.73M2
EOSINOPHIL # BLD AUTO: 0.2 K/UL — SIGNIFICANT CHANGE UP (ref 0–0.5)
EOSINOPHIL NFR BLD AUTO: 5.1 % — SIGNIFICANT CHANGE UP (ref 0–6)
GLUCOSE SERPL-MCNC: 90 MG/DL
HCT VFR BLD CALC: 42.2 % — SIGNIFICANT CHANGE UP (ref 34.5–45)
HGB BLD-MCNC: 13.8 G/DL — SIGNIFICANT CHANGE UP (ref 11.5–15.5)
LYMPHOCYTES # BLD AUTO: 1.6 K/UL — SIGNIFICANT CHANGE UP (ref 1–3.3)
LYMPHOCYTES # BLD AUTO: 33.8 % — SIGNIFICANT CHANGE UP (ref 13–44)
MAGNESIUM SERPL-MCNC: 2.2 MG/DL
MCHC RBC-ENTMCNC: 30.6 PG — SIGNIFICANT CHANGE UP (ref 27–34)
MCHC RBC-ENTMCNC: 32.6 G/DL — SIGNIFICANT CHANGE UP (ref 32–36)
MCV RBC AUTO: 93.8 FL — SIGNIFICANT CHANGE UP (ref 80–100)
MONOCYTES # BLD AUTO: 0.7 K/UL — SIGNIFICANT CHANGE UP (ref 0–0.9)
MONOCYTES NFR BLD AUTO: 14.2 % — HIGH (ref 2–14)
NEUTROPHILS # BLD AUTO: 2.1 K/UL — SIGNIFICANT CHANGE UP (ref 1.8–7.4)
NEUTROPHILS NFR BLD AUTO: 45.2 % — SIGNIFICANT CHANGE UP (ref 43–77)
PLATELET # BLD AUTO: 278 K/UL — SIGNIFICANT CHANGE UP (ref 150–400)
POTASSIUM SERPL-SCNC: 4.4 MMOL/L
PROT SERPL-MCNC: 6.4 G/DL
RBC # BLD: 4.5 M/UL — SIGNIFICANT CHANGE UP (ref 3.8–5.2)
RBC # FLD: 12.2 % — SIGNIFICANT CHANGE UP (ref 10.3–14.5)
SODIUM SERPL-SCNC: 143 MMOL/L
WBC # BLD: 4.6 K/UL — SIGNIFICANT CHANGE UP (ref 3.8–10.5)
WBC # FLD AUTO: 4.6 K/UL — SIGNIFICANT CHANGE UP (ref 3.8–10.5)

## 2025-02-06 PROCEDURE — 99214 OFFICE O/P EST MOD 30 MIN: CPT

## 2025-02-10 ENCOUNTER — NON-APPOINTMENT (OUTPATIENT)
Age: 55
End: 2025-02-10

## 2025-02-11 ENCOUNTER — NON-APPOINTMENT (OUTPATIENT)
Age: 55
End: 2025-02-11

## 2025-02-18 NOTE — H&P PST ADULT - REASON FOR ADMISSION
General Call      Reason for Call:  patient called and would like the refrral for Ortho to go to [facility]    Fax Number:  [phone]    Please contact patient to let know.    Thank you.    What are your questions or concerns:  no    Date of last appointment with provider: feb    Could we send this information to you in [employer] or would you prefer to receive a phone call?:   Patient would prefer a phone call   Okay to leave a detailed message?: Yes at Cell number on file:    Telephone Information:   Mobile [phone]     
Faxed referral to [facility] in [place] @ [phone]. Called and informed patient that this was done.Kim Rodriguez [facility]    
"PST for procedure."

## 2025-02-24 ENCOUNTER — APPOINTMENT (OUTPATIENT)
Dept: CARDIOLOGY | Facility: CLINIC | Age: 55
End: 2025-02-24
Payer: MEDICAID

## 2025-02-24 ENCOUNTER — NON-APPOINTMENT (OUTPATIENT)
Age: 55
End: 2025-02-24

## 2025-02-24 PROCEDURE — 93306 TTE W/DOPPLER COMPLETE: CPT

## 2025-03-01 ENCOUNTER — NON-APPOINTMENT (OUTPATIENT)
Age: 55
End: 2025-03-01

## 2025-04-07 ENCOUNTER — APPOINTMENT (OUTPATIENT)
Dept: RHEUMATOLOGY | Facility: CLINIC | Age: 55
End: 2025-04-07
Payer: MEDICAID

## 2025-04-07 VITALS
HEART RATE: 82 BPM | TEMPERATURE: 98.3 F | HEIGHT: 61 IN | DIASTOLIC BLOOD PRESSURE: 64 MMHG | SYSTOLIC BLOOD PRESSURE: 122 MMHG | OXYGEN SATURATION: 98 %

## 2025-04-07 DIAGNOSIS — M06.9 RHEUMATOID ARTHRITIS, UNSPECIFIED: ICD-10-CM

## 2025-04-07 PROCEDURE — 99214 OFFICE O/P EST MOD 30 MIN: CPT

## 2025-04-09 DIAGNOSIS — R92.30 DENSE BREASTS, UNSPECIFIED: ICD-10-CM

## 2025-04-09 DIAGNOSIS — Z12.39 ENCOUNTER FOR OTHER SCREENING FOR MALIGNANT NEOPLASM OF BREAST: ICD-10-CM

## 2025-05-01 ENCOUNTER — OUTPATIENT (OUTPATIENT)
Dept: OUTPATIENT SERVICES | Facility: HOSPITAL | Age: 55
LOS: 1 days | Discharge: HOME | End: 2025-05-01

## 2025-05-01 DIAGNOSIS — Z98.890 OTHER SPECIFIED POSTPROCEDURAL STATES: Chronic | ICD-10-CM

## 2025-05-01 DIAGNOSIS — Z98.891 HISTORY OF UTERINE SCAR FROM PREVIOUS SURGERY: Chronic | ICD-10-CM

## 2025-05-01 DIAGNOSIS — Z90.49 ACQUIRED ABSENCE OF OTHER SPECIFIED PARTS OF DIGESTIVE TRACT: Chronic | ICD-10-CM

## 2025-05-01 DIAGNOSIS — Z90.710 ACQUIRED ABSENCE OF BOTH CERVIX AND UTERUS: Chronic | ICD-10-CM

## 2025-05-01 DIAGNOSIS — C18.9 MALIGNANT NEOPLASM OF COLON, UNSPECIFIED: ICD-10-CM

## 2025-05-03 ENCOUNTER — RESULT REVIEW (OUTPATIENT)
Age: 55
End: 2025-05-03

## 2025-05-03 ENCOUNTER — OUTPATIENT (OUTPATIENT)
Dept: OUTPATIENT SERVICES | Facility: HOSPITAL | Age: 55
LOS: 1 days | End: 2025-05-03
Payer: MEDICAID

## 2025-05-03 ENCOUNTER — APPOINTMENT (OUTPATIENT)
Dept: MAMMOGRAPHY | Facility: CLINIC | Age: 55
End: 2025-05-03
Payer: MEDICAID

## 2025-05-03 ENCOUNTER — APPOINTMENT (OUTPATIENT)
Dept: CT IMAGING | Facility: CLINIC | Age: 55
End: 2025-05-03
Payer: MEDICAID

## 2025-05-03 DIAGNOSIS — Z98.890 OTHER SPECIFIED POSTPROCEDURAL STATES: Chronic | ICD-10-CM

## 2025-05-03 DIAGNOSIS — C18.7 MALIGNANT NEOPLASM OF SIGMOID COLON: ICD-10-CM

## 2025-05-03 DIAGNOSIS — Z90.49 ACQUIRED ABSENCE OF OTHER SPECIFIED PARTS OF DIGESTIVE TRACT: Chronic | ICD-10-CM

## 2025-05-03 DIAGNOSIS — Z90.710 ACQUIRED ABSENCE OF BOTH CERVIX AND UTERUS: Chronic | ICD-10-CM

## 2025-05-03 DIAGNOSIS — Z98.891 HISTORY OF UTERINE SCAR FROM PREVIOUS SURGERY: Chronic | ICD-10-CM

## 2025-05-03 PROCEDURE — 77067 SCR MAMMO BI INCL CAD: CPT

## 2025-05-03 PROCEDURE — 71260 CT THORAX DX C+: CPT | Mod: 26

## 2025-05-03 PROCEDURE — 77063 BREAST TOMOSYNTHESIS BI: CPT | Mod: 26

## 2025-05-03 PROCEDURE — 71260 CT THORAX DX C+: CPT

## 2025-05-03 PROCEDURE — 74177 CT ABD & PELVIS W/CONTRAST: CPT

## 2025-05-03 PROCEDURE — 77067 SCR MAMMO BI INCL CAD: CPT | Mod: 26

## 2025-05-03 PROCEDURE — 74177 CT ABD & PELVIS W/CONTRAST: CPT | Mod: 26

## 2025-05-03 PROCEDURE — 77063 BREAST TOMOSYNTHESIS BI: CPT

## 2025-05-05 ENCOUNTER — APPOINTMENT (OUTPATIENT)
Dept: OBGYN | Facility: CLINIC | Age: 55
End: 2025-05-05
Payer: MEDICAID

## 2025-05-05 VITALS
WEIGHT: 121 LBS | BODY MASS INDEX: 22.84 KG/M2 | SYSTOLIC BLOOD PRESSURE: 102 MMHG | DIASTOLIC BLOOD PRESSURE: 62 MMHG | HEIGHT: 61 IN

## 2025-05-05 PROCEDURE — 99214 OFFICE O/P EST MOD 30 MIN: CPT

## 2025-05-08 ENCOUNTER — APPOINTMENT (OUTPATIENT)
Dept: HEMATOLOGY ONCOLOGY | Facility: CLINIC | Age: 55
End: 2025-05-08
Payer: MEDICAID

## 2025-05-08 ENCOUNTER — RESULT REVIEW (OUTPATIENT)
Age: 55
End: 2025-05-08

## 2025-05-08 VITALS
OXYGEN SATURATION: 96 % | SYSTOLIC BLOOD PRESSURE: 100 MMHG | BODY MASS INDEX: 22.64 KG/M2 | WEIGHT: 119.93 LBS | HEIGHT: 61 IN | HEART RATE: 62 BPM | TEMPERATURE: 98.1 F | DIASTOLIC BLOOD PRESSURE: 53 MMHG

## 2025-05-08 DIAGNOSIS — C18.7 MALIGNANT NEOPLASM OF SIGMOID COLON: ICD-10-CM

## 2025-05-08 LAB
ALBUMIN SERPL ELPH-MCNC: 4.1 G/DL
ALP BLD-CCNC: 75 U/L
ALT SERPL-CCNC: 20 U/L
ANION GAP SERPL CALC-SCNC: 12 MMOL/L
AST SERPL-CCNC: 24 U/L
BASOPHILS # BLD AUTO: 0.08 K/UL — SIGNIFICANT CHANGE UP (ref 0–0.2)
BASOPHILS NFR BLD AUTO: 2 % — SIGNIFICANT CHANGE UP (ref 0–2)
BILIRUB SERPL-MCNC: 0.4 MG/DL
BUN SERPL-MCNC: 14 MG/DL
CALCIUM SERPL-MCNC: 9 MG/DL
CEA SERPL-MCNC: 2.4 NG/ML
CHLORIDE SERPL-SCNC: 105 MMOL/L
CO2 SERPL-SCNC: 23 MMOL/L
CREAT SERPL-MCNC: 0.8 MG/DL
EGFRCR SERPLBLD CKD-EPI 2021: 88 ML/MIN/1.73M2
EOSINOPHIL # BLD AUTO: 0.23 K/UL — SIGNIFICANT CHANGE UP (ref 0–0.5)
EOSINOPHIL NFR BLD AUTO: 5.9 % — SIGNIFICANT CHANGE UP (ref 0–6)
GLUCOSE SERPL-MCNC: 95 MG/DL
HCT VFR BLD CALC: 41 % — SIGNIFICANT CHANGE UP (ref 34.5–45)
HGB BLD-MCNC: 13.7 G/DL — SIGNIFICANT CHANGE UP (ref 11.5–15.5)
IMM GRANULOCYTES # BLD AUTO: 0 K/UL — SIGNIFICANT CHANGE UP (ref 0–0.07)
IMM GRANULOCYTES NFR BLD AUTO: 0 % — SIGNIFICANT CHANGE UP (ref 0–0.9)
LYMPHOCYTES # BLD AUTO: 1.31 K/UL — SIGNIFICANT CHANGE UP (ref 1–3.3)
LYMPHOCYTES NFR BLD AUTO: 33.5 % — SIGNIFICANT CHANGE UP (ref 13–44)
MAGNESIUM SERPL-MCNC: 2.2 MG/DL
MCHC RBC-ENTMCNC: 30.2 PG — SIGNIFICANT CHANGE UP (ref 27–34)
MCHC RBC-ENTMCNC: 33.4 G/DL — SIGNIFICANT CHANGE UP (ref 32–36)
MCV RBC AUTO: 90.5 FL — SIGNIFICANT CHANGE UP (ref 80–100)
MONOCYTES # BLD AUTO: 0.49 K/UL — SIGNIFICANT CHANGE UP (ref 0–0.9)
MONOCYTES NFR BLD AUTO: 12.5 % — SIGNIFICANT CHANGE UP (ref 2–14)
NEUTROPHILS # BLD AUTO: 1.8 K/UL — SIGNIFICANT CHANGE UP (ref 1.8–7.4)
NEUTROPHILS NFR BLD AUTO: 46.1 % — SIGNIFICANT CHANGE UP (ref 43–77)
NRBC # BLD AUTO: 0 K/UL — SIGNIFICANT CHANGE UP (ref 0–0)
NRBC # FLD: 0 K/UL — SIGNIFICANT CHANGE UP (ref 0–0)
NRBC BLD AUTO-RTO: 0 /100 WBCS — SIGNIFICANT CHANGE UP (ref 0–0)
PLATELET # BLD AUTO: 256 K/UL — SIGNIFICANT CHANGE UP (ref 150–400)
PMV BLD: 9.1 FL — SIGNIFICANT CHANGE UP (ref 7–13)
POTASSIUM SERPL-SCNC: 4.5 MMOL/L
PROT SERPL-MCNC: 6.4 G/DL
RBC # BLD: 4.53 M/UL — SIGNIFICANT CHANGE UP (ref 3.8–5.2)
RBC # FLD: 13.1 % — SIGNIFICANT CHANGE UP (ref 10.3–14.5)
SODIUM SERPL-SCNC: 141 MMOL/L
WBC # BLD: 3.91 K/UL — SIGNIFICANT CHANGE UP (ref 3.8–10.5)
WBC # FLD AUTO: 3.91 K/UL — SIGNIFICANT CHANGE UP (ref 3.8–10.5)

## 2025-05-08 PROCEDURE — 99214 OFFICE O/P EST MOD 30 MIN: CPT

## 2025-05-19 NOTE — PATIENT PROFILE ADULT - NSPROGENPREVTRANSF_GEN_A_NUR
Could not get enough blood sample via port, obtained labs by venipuncture. NITISH Bonilla   no history of blood product transfusion

## 2025-06-19 ENCOUNTER — APPOINTMENT (OUTPATIENT)
Dept: OBGYN | Facility: CLINIC | Age: 55
End: 2025-06-19
Payer: MEDICAID

## 2025-06-19 PROCEDURE — 81003 URINALYSIS AUTO W/O SCOPE: CPT | Mod: QW

## 2025-06-20 ENCOUNTER — NON-APPOINTMENT (OUTPATIENT)
Age: 55
End: 2025-06-20

## 2025-06-20 LAB
APPEARANCE: CLEAR
BACTERIA: NEGATIVE /HPF
BILIRUBIN URINE: NEGATIVE
BLOOD URINE: NEGATIVE
CAST: 0 /LPF
COLOR: YELLOW
EPITHELIAL CELLS: 8 /HPF
GLUCOSE QUALITATIVE U: NEGATIVE MG/DL
KETONES URINE: NEGATIVE MG/DL
LEUKOCYTE ESTERASE URINE: ABNORMAL
MICROSCOPIC-UA: NORMAL
NITRITE URINE: NEGATIVE
PH URINE: 6
PROTEIN URINE: NEGATIVE MG/DL
RED BLOOD CELLS URINE: 2 /HPF
SPECIFIC GRAVITY URINE: 1.02
UROBILINOGEN URINE: 0.2 MG/DL
WHITE BLOOD CELLS URINE: 6 /HPF

## 2025-06-21 ENCOUNTER — TRANSCRIPTION ENCOUNTER (OUTPATIENT)
Age: 55
End: 2025-06-21

## 2025-06-21 LAB — BACTERIA UR CULT: NORMAL

## 2025-06-23 ENCOUNTER — APPOINTMENT (OUTPATIENT)
Dept: OBGYN | Facility: CLINIC | Age: 55
End: 2025-06-23
Payer: MEDICAID

## 2025-06-23 VITALS
WEIGHT: 116 LBS | DIASTOLIC BLOOD PRESSURE: 70 MMHG | BODY MASS INDEX: 21.9 KG/M2 | HEIGHT: 61 IN | SYSTOLIC BLOOD PRESSURE: 110 MMHG

## 2025-06-23 PROBLEM — N81.10 FEMALE CYSTOCELE: Status: ACTIVE | Noted: 2025-06-23

## 2025-06-23 PROBLEM — N36.8 SKENE'S GLAND CYST: Status: ACTIVE | Noted: 2025-06-23

## 2025-06-23 PROCEDURE — 99212 OFFICE O/P EST SF 10 MIN: CPT

## 2025-07-01 ENCOUNTER — APPOINTMENT (OUTPATIENT)
Dept: GASTROENTEROLOGY | Facility: CLINIC | Age: 55
End: 2025-07-01
Payer: MEDICAID

## 2025-07-01 VITALS
SYSTOLIC BLOOD PRESSURE: 118 MMHG | HEIGHT: 61 IN | DIASTOLIC BLOOD PRESSURE: 66 MMHG | TEMPERATURE: 97.2 F | WEIGHT: 121 LBS | BODY MASS INDEX: 22.84 KG/M2 | RESPIRATION RATE: 14 BRPM | HEART RATE: 71 BPM | OXYGEN SATURATION: 98 %

## 2025-07-01 PROBLEM — Z09 FOLLOW-UP EXAM: Status: ACTIVE | Noted: 2025-07-01

## 2025-07-01 PROCEDURE — 99214 OFFICE O/P EST MOD 30 MIN: CPT

## 2025-07-03 ENCOUNTER — APPOINTMENT (OUTPATIENT)
Dept: OBGYN | Facility: CLINIC | Age: 55
End: 2025-07-03
Payer: MEDICAID

## 2025-07-03 ENCOUNTER — APPOINTMENT (OUTPATIENT)
Dept: UROGYNECOLOGY | Facility: CLINIC | Age: 55
End: 2025-07-03

## 2025-07-03 VITALS
HEIGHT: 61 IN | WEIGHT: 121 LBS | DIASTOLIC BLOOD PRESSURE: 77 MMHG | BODY MASS INDEX: 22.84 KG/M2 | HEART RATE: 70 BPM | SYSTOLIC BLOOD PRESSURE: 121 MMHG

## 2025-07-03 VITALS
BODY MASS INDEX: 22.84 KG/M2 | SYSTOLIC BLOOD PRESSURE: 110 MMHG | DIASTOLIC BLOOD PRESSURE: 70 MMHG | WEIGHT: 121 LBS | HEIGHT: 61 IN

## 2025-07-03 PROBLEM — N94.10 DYSPAREUNIA, FEMALE: Status: ACTIVE | Noted: 2025-07-03

## 2025-07-03 PROBLEM — Z87.09 HISTORY OF ASTHMA: Status: RESOLVED | Noted: 2025-07-03 | Resolved: 2025-07-03

## 2025-07-03 PROBLEM — M53.9 BACK PROBLEM: Status: ACTIVE | Noted: 2025-07-03

## 2025-07-03 PROBLEM — Z87.19 HISTORY OF HIATAL HERNIA: Status: RESOLVED | Noted: 2025-07-03 | Resolved: 2025-07-03

## 2025-07-03 PROBLEM — Z87.01 HISTORY OF PNEUMONIA: Status: RESOLVED | Noted: 2025-07-03 | Resolved: 2025-07-03

## 2025-07-03 PROBLEM — R32 INCONTINENCE OF URINE IN FEMALE: Status: ACTIVE | Noted: 2025-07-03

## 2025-07-03 PROBLEM — Z87.19 HISTORY OF CONSTIPATION: Status: RESOLVED | Noted: 2025-07-03 | Resolved: 2025-07-03

## 2025-07-03 PROBLEM — S39.92XA BACK INJURY: Status: ACTIVE | Noted: 2025-07-03

## 2025-07-03 PROBLEM — K57.90 DIVERTICULOSIS: Status: ACTIVE | Noted: 2025-07-03

## 2025-07-03 PROCEDURE — 99459 PELVIC EXAMINATION: CPT

## 2025-07-03 PROCEDURE — 99213 OFFICE O/P EST LOW 20 MIN: CPT

## 2025-07-03 PROCEDURE — 81003 URINALYSIS AUTO W/O SCOPE: CPT | Mod: QW

## 2025-07-03 PROCEDURE — 51701 INSERT BLADDER CATHETER: CPT | Mod: 59

## 2025-07-03 PROCEDURE — 99204 OFFICE O/P NEW MOD 45 MIN: CPT | Mod: 25

## 2025-07-03 RX ORDER — ESTRADIOL 0.1 MG/G
0.1 CREAM VAGINAL
Qty: 1 | Refills: 5 | Status: ACTIVE | COMMUNITY
Start: 2025-07-03 | End: 1900-01-01

## 2025-07-06 LAB
APPEARANCE: CLEAR
BACTERIA UR CULT: NORMAL
BACTERIA: NEGATIVE /HPF
BILIRUB UR QL STRIP: NEGATIVE
BILIRUBIN URINE: NEGATIVE
BLOOD URINE: NEGATIVE
CAST: 0 /LPF
CLARITY UR: CLEAR
COLLECTION METHOD: NORMAL
COLOR: YELLOW
EPITHELIAL CELLS: 0 /HPF
GLUCOSE QUALITATIVE U: NEGATIVE MG/DL
GLUCOSE UR-MCNC: NEGATIVE
HCG UR QL: 0.2 EU/DL
HGB UR QL STRIP.AUTO: NORMAL
KETONES UR-MCNC: NEGATIVE
KETONES URINE: NEGATIVE MG/DL
LEUKOCYTE ESTERASE UR QL STRIP: NEGATIVE
LEUKOCYTE ESTERASE URINE: NEGATIVE
MICROSCOPIC-UA: NORMAL
NITRITE UR QL STRIP: NEGATIVE
NITRITE URINE: NEGATIVE
PH UR STRIP: 7.5
PH URINE: 7.5
PROT UR STRIP-MCNC: NEGATIVE
PROTEIN URINE: NEGATIVE MG/DL
RED BLOOD CELLS URINE: 0 /HPF
REVIEW: NORMAL
SP GR UR STRIP: 1.02
SPECIFIC GRAVITY URINE: 1.01
UROBILINOGEN URINE: 0.2 MG/DL
WHITE BLOOD CELLS URINE: 0 /HPF

## 2025-07-07 ENCOUNTER — APPOINTMENT (OUTPATIENT)
Dept: OBGYN | Facility: CLINIC | Age: 55
End: 2025-07-07

## 2025-07-30 ENCOUNTER — OUTPATIENT (OUTPATIENT)
Dept: OUTPATIENT SERVICES | Facility: HOSPITAL | Age: 55
LOS: 1 days | End: 2025-07-30
Payer: COMMERCIAL

## 2025-07-30 ENCOUNTER — APPOINTMENT (OUTPATIENT)
Dept: GASTROENTEROLOGY | Facility: GI CENTER | Age: 55
End: 2025-07-30
Payer: MEDICAID

## 2025-07-30 ENCOUNTER — TRANSCRIPTION ENCOUNTER (OUTPATIENT)
Age: 55
End: 2025-07-30

## 2025-07-30 DIAGNOSIS — Z98.891 HISTORY OF UTERINE SCAR FROM PREVIOUS SURGERY: Chronic | ICD-10-CM

## 2025-07-30 DIAGNOSIS — Z90.49 ACQUIRED ABSENCE OF OTHER SPECIFIED PARTS OF DIGESTIVE TRACT: Chronic | ICD-10-CM

## 2025-07-30 DIAGNOSIS — Z98.890 OTHER SPECIFIED POSTPROCEDURAL STATES: Chronic | ICD-10-CM

## 2025-07-30 DIAGNOSIS — Z90.710 ACQUIRED ABSENCE OF BOTH CERVIX AND UTERUS: Chronic | ICD-10-CM

## 2025-07-30 DIAGNOSIS — Z85.038 PERSONAL HISTORY OF OTHER MALIGNANT NEOPLASM OF LARGE INTESTINE: ICD-10-CM

## 2025-07-30 PROCEDURE — 45378 DIAGNOSTIC COLONOSCOPY: CPT | Mod: 33

## 2025-07-30 PROCEDURE — G0105: CPT

## 2025-08-05 ENCOUNTER — APPOINTMENT (OUTPATIENT)
Dept: MRI IMAGING | Facility: CLINIC | Age: 55
End: 2025-08-05

## 2025-08-05 ENCOUNTER — OUTPATIENT (OUTPATIENT)
Dept: OUTPATIENT SERVICES | Facility: HOSPITAL | Age: 55
LOS: 1 days | End: 2025-08-05
Payer: MEDICAID

## 2025-08-05 DIAGNOSIS — Z90.710 ACQUIRED ABSENCE OF BOTH CERVIX AND UTERUS: Chronic | ICD-10-CM

## 2025-08-05 DIAGNOSIS — Z00.8 ENCOUNTER FOR OTHER GENERAL EXAMINATION: ICD-10-CM

## 2025-08-05 DIAGNOSIS — Z98.891 HISTORY OF UTERINE SCAR FROM PREVIOUS SURGERY: Chronic | ICD-10-CM

## 2025-08-05 DIAGNOSIS — Z98.890 OTHER SPECIFIED POSTPROCEDURAL STATES: Chronic | ICD-10-CM

## 2025-08-05 DIAGNOSIS — Z90.49 ACQUIRED ABSENCE OF OTHER SPECIFIED PARTS OF DIGESTIVE TRACT: Chronic | ICD-10-CM

## 2025-08-05 PROCEDURE — 72197 MRI PELVIS W/O & W/DYE: CPT | Mod: 26

## 2025-08-05 PROCEDURE — 72197 MRI PELVIS W/O & W/DYE: CPT

## 2025-08-05 PROCEDURE — A9585: CPT

## 2025-08-09 PROBLEM — N89.8 VAGINAL CYST: Status: ACTIVE | Noted: 2025-07-03

## 2025-08-09 PROBLEM — R35.0 FREQUENT URINATION: Status: ACTIVE | Noted: 2025-07-03

## 2025-08-11 ENCOUNTER — APPOINTMENT (OUTPATIENT)
Dept: UROGYNECOLOGY | Facility: CLINIC | Age: 55
End: 2025-08-11
Payer: MEDICAID

## 2025-08-11 VITALS
BODY MASS INDEX: 21.71 KG/M2 | OXYGEN SATURATION: 99 % | DIASTOLIC BLOOD PRESSURE: 70 MMHG | HEART RATE: 70 BPM | TEMPERATURE: 97.5 F | WEIGHT: 115 LBS | SYSTOLIC BLOOD PRESSURE: 118 MMHG | RESPIRATION RATE: 16 BRPM | HEIGHT: 61 IN

## 2025-08-11 DIAGNOSIS — N89.8 OTHER SPECIFIED NONINFLAMMATORY DISORDERS OF VAGINA: ICD-10-CM

## 2025-08-11 DIAGNOSIS — N95.2 POSTMENOPAUSAL ATROPHIC VAGINITIS: ICD-10-CM

## 2025-08-11 DIAGNOSIS — R35.0 FREQUENCY OF MICTURITION: ICD-10-CM

## 2025-08-11 LAB
BILIRUB UR QL STRIP: NEGATIVE
GLUCOSE UR-MCNC: NEGATIVE
HCG UR QL: 0.2 EU/DL
HGB UR QL STRIP.AUTO: NEGATIVE
KETONES UR-MCNC: NEGATIVE
LEUKOCYTE ESTERASE UR QL STRIP: NEGATIVE
NITRITE UR QL STRIP: NEGATIVE
PH UR STRIP: 6
PROT UR STRIP-MCNC: NEGATIVE
SP GR UR STRIP: 1.02

## 2025-08-11 PROCEDURE — 81002 URINALYSIS NONAUTO W/O SCOPE: CPT

## 2025-08-11 PROCEDURE — 52000 CYSTOURETHROSCOPY: CPT

## 2025-08-21 ENCOUNTER — APPOINTMENT (OUTPATIENT)
Dept: FAMILY MEDICINE | Facility: CLINIC | Age: 55
End: 2025-08-21
Payer: MEDICAID

## 2025-08-21 VITALS
SYSTOLIC BLOOD PRESSURE: 114 MMHG | HEART RATE: 84 BPM | HEIGHT: 61 IN | OXYGEN SATURATION: 95 % | DIASTOLIC BLOOD PRESSURE: 80 MMHG | BODY MASS INDEX: 21.71 KG/M2 | WEIGHT: 115 LBS

## 2025-08-21 DIAGNOSIS — M06.9 RHEUMATOID ARTHRITIS, UNSPECIFIED: ICD-10-CM

## 2025-08-21 DIAGNOSIS — R07.89 OTHER CHEST PAIN: ICD-10-CM

## 2025-08-21 PROCEDURE — 99214 OFFICE O/P EST MOD 30 MIN: CPT | Mod: 25

## 2025-08-21 PROCEDURE — 93000 ELECTROCARDIOGRAM COMPLETE: CPT

## 2025-08-22 LAB
ALBUMIN SERPL ELPH-MCNC: 4.7 G/DL
ALP BLD-CCNC: 78 U/L
ALT SERPL-CCNC: 25 U/L
ANION GAP SERPL CALC-SCNC: 13 MMOL/L
AST SERPL-CCNC: 30 U/L
BILIRUB SERPL-MCNC: 0.5 MG/DL
BUN SERPL-MCNC: 17 MG/DL
CALCIUM SERPL-MCNC: 10.3 MG/DL
CHLORIDE SERPL-SCNC: 104 MMOL/L
CO2 SERPL-SCNC: 25 MMOL/L
CREAT SERPL-MCNC: 0.83 MG/DL
CRP SERPL-MCNC: <3 MG/L
EGFRCR SERPLBLD CKD-EPI 2021: 84 ML/MIN/1.73M2
ERYTHROCYTE [SEDIMENTATION RATE] IN BLOOD BY WESTERGREN METHOD: 10 MM/HR
FOLATE SERPL-MCNC: >20 NG/ML
GLUCOSE SERPL-MCNC: 92 MG/DL
HCT VFR BLD CALC: 44.4 %
HGB BLD-MCNC: 14.4 G/DL
MCHC RBC-ENTMCNC: 30.6 PG
MCHC RBC-ENTMCNC: 32.4 G/DL
MCV RBC AUTO: 94.3 FL
PLATELET # BLD AUTO: 292 K/UL
POTASSIUM SERPL-SCNC: 4.6 MMOL/L
PROT SERPL-MCNC: 6.8 G/DL
RBC # BLD: 4.71 M/UL
RBC # FLD: 13.3 %
SODIUM SERPL-SCNC: 142 MMOL/L
TSH SERPL-ACNC: 1.58 UIU/ML
VIT B12 SERPL-MCNC: 709 PG/ML
WBC # FLD AUTO: 4.51 K/UL

## (undated) DEVICE — DRAPE XL SHEET 77X98"

## (undated) DEVICE — XI CORD MONOPOLAR CAUTERY (GREEN)

## (undated) DEVICE — DRAPE GENERAL ENDOSCOPY

## (undated) DEVICE — ELCTR BOVIE PENCIL SMOKE EVACUATION 15FT

## (undated) DEVICE — XI TIP COVER

## (undated) DEVICE — XI ARM SCISSOR MONO CURVED

## (undated) DEVICE — XI ARM NEEDLE DRIVER SUTURECUT MEGA 8MM

## (undated) DEVICE — GOWN ROYAL SILK XL

## (undated) DEVICE — TROCAR COVIDIEN VERSAPORT BLADELESS OPTICAL 5MM STANDARD

## (undated) DEVICE — VALVE ENDOSCOPE DEFENDO SINGLE USE

## (undated) DEVICE — INSUFFLATION NDL COVIDIEN SURGINEEDLE VERESS 120MM

## (undated) DEVICE — ELCTR GROUNDING PAD ADULT COVIDIEN

## (undated) DEVICE — FORCEP RADIAL JAW 4 W NDL 2.4MM 2.8MM 240CM ORANGE DISP

## (undated) DEVICE — SUT VLOC 180 3-0 6" V-20 GREEN

## (undated) DEVICE — FORCEP ENDOJAW AGTR LC W NDL 2.8MM 230CM DISP

## (undated) DEVICE — SOL IRR POUR H2O 1000ML

## (undated) DEVICE — STAPLER SKIN PROXIMATE

## (undated) DEVICE — DRAPE TOWEL BLUE STICKY

## (undated) DEVICE — D HELP - CLEARVIEW CLEARIFY SYSTEM

## (undated) DEVICE — XI DRAPE ARM

## (undated) DEVICE — XI DRAPE COLUMN

## (undated) DEVICE — VALVE ENDO SURESEAL II 0-5FR

## (undated) DEVICE — SOL IRR POUR NS 0.9% 1000ML

## (undated) DEVICE — VENODYNE/SCD SLEEVE CALF MEDIUM

## (undated) DEVICE — PACK ROBOTIC

## (undated) DEVICE — XI CORD BIPOLAR CAUTERY (BLUE)

## (undated) DEVICE — XI SEAL UNIVERSIAL 5-12MM

## (undated) DEVICE — STERIS DEFENDO 3-PIECE KIT (AIR/WATER, SUCTION & BIOPSY VALVES)

## (undated) DEVICE — SUT VICRYL 0 27" UR-6

## (undated) DEVICE — XI ARM FORCEP PROGRASP 8MM

## (undated) DEVICE — SUT MONOCRYL 4-0 27" PS-2 UNDYED

## (undated) DEVICE — XI OBTURATOR OPTICAL BLADELESS 8MM